# Patient Record
Sex: MALE | Race: WHITE | Employment: OTHER | URBAN - NONMETROPOLITAN AREA
[De-identification: names, ages, dates, MRNs, and addresses within clinical notes are randomized per-mention and may not be internally consistent; named-entity substitution may affect disease eponyms.]

---

## 2017-01-12 ENCOUNTER — OFFICE VISIT (OUTPATIENT)
Dept: PAIN MANAGEMENT | Age: 72
End: 2017-01-12

## 2017-01-12 ENCOUNTER — TELEPHONE (OUTPATIENT)
Dept: PAIN MANAGEMENT | Age: 72
End: 2017-01-12

## 2017-01-12 VITALS
WEIGHT: 231.2 LBS | DIASTOLIC BLOOD PRESSURE: 70 MMHG | HEART RATE: 80 BPM | HEIGHT: 69 IN | BODY MASS INDEX: 34.24 KG/M2 | SYSTOLIC BLOOD PRESSURE: 92 MMHG

## 2017-01-12 DIAGNOSIS — G89.29 CHRONIC BILATERAL LOW BACK PAIN WITH BILATERAL SCIATICA: ICD-10-CM

## 2017-01-12 DIAGNOSIS — M47.816 LUMBAR FACET JOINT SYNDROME: ICD-10-CM

## 2017-01-12 DIAGNOSIS — M54.42 CHRONIC BILATERAL LOW BACK PAIN WITH BILATERAL SCIATICA: Primary | ICD-10-CM

## 2017-01-12 DIAGNOSIS — M54.16 LUMBAR RADICULOPATHY, CHRONIC: ICD-10-CM

## 2017-01-12 DIAGNOSIS — M54.42 ACUTE BACK PAIN WITH SCIATICA, LEFT: Primary | ICD-10-CM

## 2017-01-12 DIAGNOSIS — M54.42 CHRONIC BILATERAL LOW BACK PAIN WITH BILATERAL SCIATICA: ICD-10-CM

## 2017-01-12 DIAGNOSIS — G89.29 CHRONIC BILATERAL LOW BACK PAIN WITH BILATERAL SCIATICA: Primary | ICD-10-CM

## 2017-01-12 DIAGNOSIS — M48.061 SPINAL STENOSIS, LUMBAR REGION, WITHOUT NEUROGENIC CLAUDICATION: ICD-10-CM

## 2017-01-12 DIAGNOSIS — M54.41 CHRONIC BILATERAL LOW BACK PAIN WITH BILATERAL SCIATICA: Primary | ICD-10-CM

## 2017-01-12 DIAGNOSIS — M48.062 SPINAL STENOSIS, LUMBAR REGION, WITH NEUROGENIC CLAUDICATION: ICD-10-CM

## 2017-01-12 DIAGNOSIS — M54.41 ACUTE BACK PAIN WITH SCIATICA, RIGHT: ICD-10-CM

## 2017-01-12 DIAGNOSIS — M54.16 LUMBAR RADICULOPATHY: ICD-10-CM

## 2017-01-12 DIAGNOSIS — M54.41 CHRONIC BILATERAL LOW BACK PAIN WITH BILATERAL SCIATICA: ICD-10-CM

## 2017-01-12 PROCEDURE — 99214 OFFICE O/P EST MOD 30 MIN: CPT | Performed by: NURSE PRACTITIONER

## 2017-01-12 RX ORDER — HYDROCODONE BITARTRATE AND ACETAMINOPHEN 5; 325 MG/1; MG/1
1 TABLET ORAL DAILY PRN
Qty: 30 TABLET | Refills: 0 | Status: SHIPPED | OUTPATIENT
Start: 2017-01-12 | End: 2017-01-24 | Stop reason: SDUPTHER

## 2017-01-12 RX ORDER — TIZANIDINE HYDROCHLORIDE 4 MG/1
4 CAPSULE, GELATIN COATED ORAL 3 TIMES DAILY
Qty: 90 CAPSULE | Refills: 1 | Status: SHIPPED | OUTPATIENT
Start: 2017-01-12 | End: 2017-03-28 | Stop reason: SDUPTHER

## 2017-01-12 ASSESSMENT — ENCOUNTER SYMPTOMS
BACK PAIN: 1
RESPIRATORY NEGATIVE: 1
EYES NEGATIVE: 1
NAUSEA: 0
ALLERGIC/IMMUNOLOGIC NEGATIVE: 1
CONSTIPATION: 0

## 2017-01-17 ENCOUNTER — OFFICE VISIT (OUTPATIENT)
Dept: CARDIOLOGY | Age: 72
End: 2017-01-17

## 2017-01-17 VITALS
HEIGHT: 69 IN | SYSTOLIC BLOOD PRESSURE: 92 MMHG | HEART RATE: 74 BPM | BODY MASS INDEX: 33.92 KG/M2 | RESPIRATION RATE: 18 BRPM | DIASTOLIC BLOOD PRESSURE: 58 MMHG | WEIGHT: 229 LBS

## 2017-01-17 DIAGNOSIS — E66.9 OBESITY (BMI 35.0-39.9 WITHOUT COMORBIDITY): ICD-10-CM

## 2017-01-17 DIAGNOSIS — E11.9 TYPE 2 DIABETES MELLITUS WITHOUT COMPLICATION, WITHOUT LONG-TERM CURRENT USE OF INSULIN (HCC): ICD-10-CM

## 2017-01-17 DIAGNOSIS — Z98.1 S/P LUMBAR SPINAL FUSION: ICD-10-CM

## 2017-01-17 DIAGNOSIS — I10 ESSENTIAL HYPERTENSION: Primary | ICD-10-CM

## 2017-01-17 DIAGNOSIS — R06.09 DOE (DYSPNEA ON EXERTION): ICD-10-CM

## 2017-01-17 DIAGNOSIS — E78.2 MIXED HYPERLIPIDEMIA: ICD-10-CM

## 2017-01-17 DIAGNOSIS — I25.10 CORONARY ARTERY DISEASE DUE TO LIPID RICH PLAQUE: ICD-10-CM

## 2017-01-17 DIAGNOSIS — I25.83 CORONARY ARTERY DISEASE DUE TO LIPID RICH PLAQUE: ICD-10-CM

## 2017-01-17 PROCEDURE — 1036F TOBACCO NON-USER: CPT | Performed by: INTERNAL MEDICINE

## 2017-01-17 PROCEDURE — G8427 DOCREV CUR MEDS BY ELIG CLIN: HCPCS | Performed by: INTERNAL MEDICINE

## 2017-01-17 PROCEDURE — G8484 FLU IMMUNIZE NO ADMIN: HCPCS | Performed by: INTERNAL MEDICINE

## 2017-01-17 PROCEDURE — 3044F HG A1C LEVEL LT 7.0%: CPT | Performed by: INTERNAL MEDICINE

## 2017-01-17 PROCEDURE — G8598 ASA/ANTIPLAT THER USED: HCPCS | Performed by: INTERNAL MEDICINE

## 2017-01-17 PROCEDURE — G8419 CALC BMI OUT NRM PARAM NOF/U: HCPCS | Performed by: INTERNAL MEDICINE

## 2017-01-17 PROCEDURE — 4040F PNEUMOC VAC/ADMIN/RCVD: CPT | Performed by: INTERNAL MEDICINE

## 2017-01-17 PROCEDURE — 1123F ACP DISCUSS/DSCN MKR DOCD: CPT | Performed by: INTERNAL MEDICINE

## 2017-01-17 PROCEDURE — 99213 OFFICE O/P EST LOW 20 MIN: CPT | Performed by: INTERNAL MEDICINE

## 2017-01-17 PROCEDURE — 93000 ELECTROCARDIOGRAM COMPLETE: CPT | Performed by: INTERNAL MEDICINE

## 2017-01-17 PROCEDURE — 3017F COLORECTAL CA SCREEN DOC REV: CPT | Performed by: INTERNAL MEDICINE

## 2017-01-24 ENCOUNTER — TELEPHONE (OUTPATIENT)
Dept: PAIN MANAGEMENT | Age: 72
End: 2017-01-24

## 2017-01-24 ENCOUNTER — OFFICE VISIT (OUTPATIENT)
Dept: PAIN MANAGEMENT | Age: 72
End: 2017-01-24

## 2017-01-24 VITALS
DIASTOLIC BLOOD PRESSURE: 58 MMHG | HEART RATE: 70 BPM | WEIGHT: 231 LBS | SYSTOLIC BLOOD PRESSURE: 104 MMHG | HEIGHT: 69 IN | BODY MASS INDEX: 34.21 KG/M2 | RESPIRATION RATE: 18 BRPM

## 2017-01-24 DIAGNOSIS — M54.16 LUMBAR RADICULOPATHY, CHRONIC: ICD-10-CM

## 2017-01-24 DIAGNOSIS — Z02.83 ENCOUNTER FOR DRUG SCREENING: ICD-10-CM

## 2017-01-24 DIAGNOSIS — M54.41 CHRONIC BILATERAL LOW BACK PAIN WITH BILATERAL SCIATICA: ICD-10-CM

## 2017-01-24 DIAGNOSIS — G89.29 CHRONIC BILATERAL LOW BACK PAIN WITH BILATERAL SCIATICA: ICD-10-CM

## 2017-01-24 DIAGNOSIS — M54.42 CHRONIC BILATERAL LOW BACK PAIN WITH BILATERAL SCIATICA: ICD-10-CM

## 2017-01-24 DIAGNOSIS — Z79.891 ENCOUNTER FOR LONG-TERM OPIATE ANALGESIC USE: Primary | ICD-10-CM

## 2017-01-24 DIAGNOSIS — M47.26 OSTEOARTHRITIS OF SPINE WITH RADICULOPATHY, LUMBAR REGION: ICD-10-CM

## 2017-01-24 PROCEDURE — G8419 CALC BMI OUT NRM PARAM NOF/U: HCPCS | Performed by: NURSE PRACTITIONER

## 2017-01-24 PROCEDURE — 3017F COLORECTAL CA SCREEN DOC REV: CPT | Performed by: NURSE PRACTITIONER

## 2017-01-24 PROCEDURE — 4040F PNEUMOC VAC/ADMIN/RCVD: CPT | Performed by: NURSE PRACTITIONER

## 2017-01-24 PROCEDURE — G8598 ASA/ANTIPLAT THER USED: HCPCS | Performed by: NURSE PRACTITIONER

## 2017-01-24 PROCEDURE — 1036F TOBACCO NON-USER: CPT | Performed by: NURSE PRACTITIONER

## 2017-01-24 PROCEDURE — 99215 OFFICE O/P EST HI 40 MIN: CPT | Performed by: NURSE PRACTITIONER

## 2017-01-24 PROCEDURE — 1123F ACP DISCUSS/DSCN MKR DOCD: CPT | Performed by: NURSE PRACTITIONER

## 2017-01-24 PROCEDURE — G8484 FLU IMMUNIZE NO ADMIN: HCPCS | Performed by: NURSE PRACTITIONER

## 2017-01-24 PROCEDURE — G8427 DOCREV CUR MEDS BY ELIG CLIN: HCPCS | Performed by: NURSE PRACTITIONER

## 2017-01-24 RX ORDER — DIAZEPAM 5 MG/1
TABLET ORAL
Qty: 4 TABLET | Refills: 0 | Status: CANCELLED | OUTPATIENT
Start: 2017-01-24

## 2017-01-24 RX ORDER — DIAZEPAM 5 MG/1
TABLET ORAL
Qty: 4 TABLET | Refills: 0 | Status: ON HOLD | OUTPATIENT
Start: 2017-01-24 | End: 2017-03-14 | Stop reason: HOSPADM

## 2017-01-24 RX ORDER — HYDROCODONE BITARTRATE AND ACETAMINOPHEN 5; 325 MG/1; MG/1
1 TABLET ORAL DAILY PRN
Qty: 30 TABLET | Refills: 0 | Status: SHIPPED | OUTPATIENT
Start: 2017-02-11 | End: 2017-03-13

## 2017-01-24 ASSESSMENT — ENCOUNTER SYMPTOMS
CONSTIPATION: 0
ALLERGIC/IMMUNOLOGIC NEGATIVE: 1
RESPIRATORY NEGATIVE: 1
EYES NEGATIVE: 1
BACK PAIN: 1
NAUSEA: 0

## 2017-02-13 ENCOUNTER — OFFICE VISIT (OUTPATIENT)
Dept: INTERNAL MEDICINE | Age: 72
End: 2017-02-13

## 2017-02-13 VITALS
HEIGHT: 69 IN | RESPIRATION RATE: 16 BRPM | HEART RATE: 88 BPM | WEIGHT: 230 LBS | BODY MASS INDEX: 34.07 KG/M2 | DIASTOLIC BLOOD PRESSURE: 74 MMHG | SYSTOLIC BLOOD PRESSURE: 130 MMHG

## 2017-02-13 DIAGNOSIS — I25.83 CORONARY ARTERY DISEASE DUE TO LIPID RICH PLAQUE: Primary | ICD-10-CM

## 2017-02-13 DIAGNOSIS — Z12.5 SPECIAL SCREENING FOR MALIGNANT NEOPLASM OF PROSTATE: ICD-10-CM

## 2017-02-13 DIAGNOSIS — I25.10 CORONARY ARTERY DISEASE DUE TO LIPID RICH PLAQUE: Primary | ICD-10-CM

## 2017-02-13 DIAGNOSIS — E78.2 MIXED HYPERLIPIDEMIA: ICD-10-CM

## 2017-02-13 DIAGNOSIS — E11.9 TYPE 2 DIABETES MELLITUS WITHOUT COMPLICATION, WITHOUT LONG-TERM CURRENT USE OF INSULIN (HCC): ICD-10-CM

## 2017-02-13 DIAGNOSIS — I10 ESSENTIAL HYPERTENSION: ICD-10-CM

## 2017-02-13 PROCEDURE — 99214 OFFICE O/P EST MOD 30 MIN: CPT | Performed by: INTERNAL MEDICINE

## 2017-02-13 PROCEDURE — G8427 DOCREV CUR MEDS BY ELIG CLIN: HCPCS | Performed by: INTERNAL MEDICINE

## 2017-02-13 PROCEDURE — 3044F HG A1C LEVEL LT 7.0%: CPT | Performed by: INTERNAL MEDICINE

## 2017-02-13 PROCEDURE — G8484 FLU IMMUNIZE NO ADMIN: HCPCS | Performed by: INTERNAL MEDICINE

## 2017-02-13 PROCEDURE — 1123F ACP DISCUSS/DSCN MKR DOCD: CPT | Performed by: INTERNAL MEDICINE

## 2017-02-13 PROCEDURE — 1036F TOBACCO NON-USER: CPT | Performed by: INTERNAL MEDICINE

## 2017-02-13 PROCEDURE — 4040F PNEUMOC VAC/ADMIN/RCVD: CPT | Performed by: INTERNAL MEDICINE

## 2017-02-13 PROCEDURE — 3017F COLORECTAL CA SCREEN DOC REV: CPT | Performed by: INTERNAL MEDICINE

## 2017-02-13 PROCEDURE — G8419 CALC BMI OUT NRM PARAM NOF/U: HCPCS | Performed by: INTERNAL MEDICINE

## 2017-02-13 PROCEDURE — G8598 ASA/ANTIPLAT THER USED: HCPCS | Performed by: INTERNAL MEDICINE

## 2017-02-13 RX ORDER — LOSARTAN POTASSIUM 25 MG/1
25 TABLET ORAL DAILY
Qty: 90 TABLET | Refills: 3 | Status: ON HOLD | OUTPATIENT
Start: 2017-02-13 | End: 2017-06-29 | Stop reason: SINTOL

## 2017-02-13 RX ORDER — CLOPIDOGREL BISULFATE 75 MG/1
75 TABLET ORAL DAILY
Qty: 90 TABLET | Refills: 3 | Status: SHIPPED | OUTPATIENT
Start: 2017-02-13 | End: 2018-01-28 | Stop reason: SDUPTHER

## 2017-02-13 RX ORDER — ATORVASTATIN CALCIUM 80 MG/1
80 TABLET, FILM COATED ORAL DAILY
Qty: 90 TABLET | Refills: 3 | Status: SHIPPED | OUTPATIENT
Start: 2017-02-13 | End: 2018-03-03 | Stop reason: SDUPTHER

## 2017-02-13 ASSESSMENT — ENCOUNTER SYMPTOMS
ABDOMINAL PAIN: 0
VOMITING: 0
COUGH: 0
SHORTNESS OF BREATH: 0
DIARRHEA: 0
BLOOD IN STOOL: 0
EYE PAIN: 0
CONSTIPATION: 0
BACK PAIN: 0
NAUSEA: 0

## 2017-02-13 ASSESSMENT — PATIENT HEALTH QUESTIONNAIRE - PHQ9
1. LITTLE INTEREST OR PLEASURE IN DOING THINGS: 0
SUM OF ALL RESPONSES TO PHQ9 QUESTIONS 1 & 2: 0
2. FEELING DOWN, DEPRESSED OR HOPELESS: 0
SUM OF ALL RESPONSES TO PHQ QUESTIONS 1-9: 0

## 2017-02-14 ENCOUNTER — HOSPITAL ENCOUNTER (OUTPATIENT)
Age: 72
Setting detail: OUTPATIENT SURGERY
Discharge: HOME | End: 2017-02-14
Attending: PHYSICAL MEDICINE & REHABILITATION | Admitting: PHYSICAL MEDICINE & REHABILITATION
Payer: MEDICARE

## 2017-02-14 ENCOUNTER — SURGERY (OUTPATIENT)
Age: 72
End: 2017-02-14

## 2017-02-14 ENCOUNTER — APPOINTMENT (OUTPATIENT)
Dept: GENERAL RADIOLOGY | Age: 72
End: 2017-02-14
Attending: PHYSICAL MEDICINE & REHABILITATION
Payer: MEDICARE

## 2017-02-14 VITALS
HEIGHT: 69 IN | WEIGHT: 232.8 LBS | DIASTOLIC BLOOD PRESSURE: 64 MMHG | SYSTOLIC BLOOD PRESSURE: 105 MMHG | HEART RATE: 65 BPM | OXYGEN SATURATION: 92 % | BODY MASS INDEX: 34.48 KG/M2 | RESPIRATION RATE: 16 BRPM | TEMPERATURE: 97.3 F

## 2017-02-14 LAB — GLUCOSE BLD-MCNC: 219 MG/DL (ref 75–110)

## 2017-02-14 PROCEDURE — 2500000003 HC RX 250 WO HCPCS: Performed by: PHYSICAL MEDICINE & REHABILITATION

## 2017-02-14 PROCEDURE — 64483 NJX AA&/STRD TFRM EPI L/S 1: CPT | Performed by: PHYSICAL MEDICINE & REHABILITATION

## 2017-02-14 PROCEDURE — 7100000010 HC PHASE II RECOVERY - FIRST 15 MIN: Performed by: PHYSICAL MEDICINE & REHABILITATION

## 2017-02-14 PROCEDURE — 82947 ASSAY GLUCOSE BLOOD QUANT: CPT

## 2017-02-14 PROCEDURE — 6360000004 HC RX CONTRAST MEDICATION: Performed by: PHYSICAL MEDICINE & REHABILITATION

## 2017-02-14 PROCEDURE — 6360000002 HC RX W HCPCS: Performed by: PHYSICAL MEDICINE & REHABILITATION

## 2017-02-14 PROCEDURE — 64484 NJX AA&/STRD TFRM EPI L/S EA: CPT | Performed by: PHYSICAL MEDICINE & REHABILITATION

## 2017-02-14 PROCEDURE — 2580000003 HC RX 258: Performed by: PHYSICAL MEDICINE & REHABILITATION

## 2017-02-14 PROCEDURE — 7100000011 HC PHASE II RECOVERY - ADDTL 15 MIN: Performed by: PHYSICAL MEDICINE & REHABILITATION

## 2017-02-14 PROCEDURE — 3209999900 FLUORO FOR SURGICAL PROCEDURES

## 2017-02-14 RX ORDER — SODIUM CHLORIDE 0.9 % (FLUSH) 0.9 %
10 SYRINGE (ML) INJECTION EVERY 12 HOURS SCHEDULED
Status: CANCELLED | OUTPATIENT
Start: 2017-02-14

## 2017-02-14 RX ORDER — DEXAMETHASONE SODIUM PHOSPHATE 10 MG/ML
INJECTION INTRAMUSCULAR; INTRAVENOUS PRN
Status: DISCONTINUED | OUTPATIENT
Start: 2017-02-14 | End: 2017-02-14 | Stop reason: HOSPADM

## 2017-02-14 RX ORDER — BUPIVACAINE HYDROCHLORIDE 5 MG/ML
INJECTION, SOLUTION EPIDURAL; INTRACAUDAL PRN
Status: DISCONTINUED | OUTPATIENT
Start: 2017-02-14 | End: 2017-02-14 | Stop reason: HOSPADM

## 2017-02-14 RX ORDER — SODIUM CHLORIDE 0.9 % (FLUSH) 0.9 %
10 SYRINGE (ML) INJECTION PRN
Status: CANCELLED | OUTPATIENT
Start: 2017-02-14

## 2017-02-14 RX ORDER — 0.9 % SODIUM CHLORIDE 0.9 %
VIAL (ML) INJECTION PRN
Status: DISCONTINUED | OUTPATIENT
Start: 2017-02-14 | End: 2017-02-14 | Stop reason: HOSPADM

## 2017-02-14 RX ADMIN — SODIUM CHLORIDE 2 ML: 9 INJECTION INTRAMUSCULAR; INTRAVENOUS; SUBCUTANEOUS at 08:50

## 2017-02-14 RX ADMIN — Medication 0.5 ML: at 08:50

## 2017-02-14 RX ADMIN — Medication 3 ML: at 08:49

## 2017-02-14 RX ADMIN — DEXAMETHASONE SODIUM PHOSPHATE 20 MG: 10 INJECTION INTRAMUSCULAR; INTRAVENOUS at 08:50

## 2017-02-14 RX ADMIN — BUPIVACAINE HYDROCHLORIDE 2 ML: 5 INJECTION, SOLUTION EPIDURAL; INTRACAUDAL; PERINEURAL at 08:50

## 2017-02-14 ASSESSMENT — PAIN DESCRIPTION - DESCRIPTORS: DESCRIPTORS: CONSTANT

## 2017-02-14 ASSESSMENT — PAIN - FUNCTIONAL ASSESSMENT: PAIN_FUNCTIONAL_ASSESSMENT: 0-10

## 2017-02-14 ASSESSMENT — PAIN SCALES - GENERAL: PAINLEVEL_OUTOF10: 0

## 2017-03-06 ENCOUNTER — TELEPHONE (OUTPATIENT)
Dept: PAIN MANAGEMENT | Age: 72
End: 2017-03-06

## 2017-03-14 ENCOUNTER — SURGERY (OUTPATIENT)
Age: 72
End: 2017-03-14

## 2017-03-14 ENCOUNTER — HOSPITAL ENCOUNTER (OUTPATIENT)
Age: 72
Setting detail: OUTPATIENT SURGERY
Discharge: HOME | End: 2017-03-14
Attending: PHYSICAL MEDICINE & REHABILITATION | Admitting: PHYSICAL MEDICINE & REHABILITATION
Payer: MEDICARE

## 2017-03-14 ENCOUNTER — APPOINTMENT (OUTPATIENT)
Dept: GENERAL RADIOLOGY | Age: 72
End: 2017-03-14
Attending: PHYSICAL MEDICINE & REHABILITATION
Payer: MEDICARE

## 2017-03-14 VITALS
DIASTOLIC BLOOD PRESSURE: 67 MMHG | RESPIRATION RATE: 16 BRPM | BODY MASS INDEX: 33.68 KG/M2 | HEIGHT: 69 IN | SYSTOLIC BLOOD PRESSURE: 130 MMHG | OXYGEN SATURATION: 95 % | TEMPERATURE: 97.5 F | WEIGHT: 227.4 LBS | HEART RATE: 69 BPM

## 2017-03-14 PROBLEM — G57.00 PYRIFORMIS SYNDROME: Status: ACTIVE | Noted: 2017-03-14

## 2017-03-14 LAB — GLUCOSE BLD-MCNC: 77 MG/DL (ref 75–110)

## 2017-03-14 PROCEDURE — 3209999900 FLUORO FOR SURGICAL PROCEDURES

## 2017-03-14 PROCEDURE — 20552 NJX 1/MLT TRIGGER POINT 1/2: CPT | Performed by: PHYSICAL MEDICINE & REHABILITATION

## 2017-03-14 PROCEDURE — 2500000003 HC RX 250 WO HCPCS: Performed by: PHYSICAL MEDICINE & REHABILITATION

## 2017-03-14 PROCEDURE — 6360000004 HC RX CONTRAST MEDICATION: Performed by: PHYSICAL MEDICINE & REHABILITATION

## 2017-03-14 PROCEDURE — 82947 ASSAY GLUCOSE BLOOD QUANT: CPT

## 2017-03-14 PROCEDURE — 6360000002 HC RX W HCPCS: Performed by: PHYSICAL MEDICINE & REHABILITATION

## 2017-03-14 PROCEDURE — 7100000010 HC PHASE II RECOVERY - FIRST 15 MIN: Performed by: PHYSICAL MEDICINE & REHABILITATION

## 2017-03-14 PROCEDURE — 27096 INJECT SACROILIAC JOINT: CPT | Performed by: PHYSICAL MEDICINE & REHABILITATION

## 2017-03-14 PROCEDURE — G0260 INJ FOR SACROILIAC JT ANESTH: HCPCS | Performed by: PHYSICAL MEDICINE & REHABILITATION

## 2017-03-14 RX ORDER — ROPIVACAINE HYDROCHLORIDE 5 MG/ML
INJECTION, SOLUTION EPIDURAL; INFILTRATION; PERINEURAL PRN
Status: DISCONTINUED | OUTPATIENT
Start: 2017-03-14 | End: 2017-03-14 | Stop reason: HOSPADM

## 2017-03-14 RX ORDER — DEXAMETHASONE SODIUM PHOSPHATE 10 MG/ML
INJECTION INTRAMUSCULAR; INTRAVENOUS PRN
Status: DISCONTINUED | OUTPATIENT
Start: 2017-03-14 | End: 2017-03-14 | Stop reason: HOSPADM

## 2017-03-14 RX ORDER — LIDOCAINE HYDROCHLORIDE 20 MG/ML
INJECTION, SOLUTION EPIDURAL; INFILTRATION; INTRACAUDAL; PERINEURAL PRN
Status: DISCONTINUED | OUTPATIENT
Start: 2017-03-14 | End: 2017-03-14 | Stop reason: HOSPADM

## 2017-03-14 RX ADMIN — ROPIVACAINE HYDROCHLORIDE 2 ML: 5 INJECTION, SOLUTION EPIDURAL; INFILTRATION; PERINEURAL at 13:19

## 2017-03-14 RX ADMIN — Medication 3 ML: at 13:19

## 2017-03-14 RX ADMIN — LIDOCAINE HYDROCHLORIDE 8 ML: 20 INJECTION, SOLUTION EPIDURAL; INFILTRATION; INTRACAUDAL; PERINEURAL at 13:19

## 2017-03-14 RX ADMIN — Medication 0.5 ML: at 13:18

## 2017-03-14 RX ADMIN — DEXAMETHASONE SODIUM PHOSPHATE 20 MG: 10 INJECTION INTRAMUSCULAR; INTRAVENOUS at 13:20

## 2017-03-14 ASSESSMENT — PAIN SCALES - GENERAL: PAINLEVEL_OUTOF10: 0

## 2017-03-14 ASSESSMENT — PAIN - FUNCTIONAL ASSESSMENT: PAIN_FUNCTIONAL_ASSESSMENT: 0-10

## 2017-03-14 ASSESSMENT — PAIN DESCRIPTION - DESCRIPTORS: DESCRIPTORS: CONSTANT;RADIATING

## 2017-03-28 RX ORDER — TIZANIDINE 4 MG/1
TABLET ORAL
Qty: 90 TABLET | Refills: 0 | Status: SHIPPED | OUTPATIENT
Start: 2017-03-28 | End: 2017-03-29 | Stop reason: SDUPTHER

## 2017-03-28 RX ORDER — TIZANIDINE HYDROCHLORIDE 4 MG/1
4 CAPSULE, GELATIN COATED ORAL 3 TIMES DAILY
Qty: 90 CAPSULE | Refills: 1 | Status: SHIPPED | OUTPATIENT
Start: 2017-03-28 | End: 2017-05-24 | Stop reason: SDUPTHER

## 2017-03-29 ENCOUNTER — OFFICE VISIT (OUTPATIENT)
Dept: PAIN MANAGEMENT | Age: 72
End: 2017-03-29
Payer: MEDICARE

## 2017-03-29 VITALS
SYSTOLIC BLOOD PRESSURE: 86 MMHG | HEIGHT: 69 IN | RESPIRATION RATE: 12 BRPM | WEIGHT: 226.8 LBS | HEART RATE: 88 BPM | DIASTOLIC BLOOD PRESSURE: 48 MMHG | BODY MASS INDEX: 33.59 KG/M2

## 2017-03-29 DIAGNOSIS — M70.62 TROCHANTERIC BURSITIS, LEFT HIP: ICD-10-CM

## 2017-03-29 DIAGNOSIS — M47.816 LUMBAR FACET JOINT SYNDROME: ICD-10-CM

## 2017-03-29 DIAGNOSIS — M25.552 LEFT HIP PAIN: Primary | ICD-10-CM

## 2017-03-29 DIAGNOSIS — M48.062 SPINAL STENOSIS, LUMBAR REGION, WITH NEUROGENIC CLAUDICATION: ICD-10-CM

## 2017-03-29 PROCEDURE — G8598 ASA/ANTIPLAT THER USED: HCPCS | Performed by: PHYSICAL MEDICINE & REHABILITATION

## 2017-03-29 PROCEDURE — 1123F ACP DISCUSS/DSCN MKR DOCD: CPT | Performed by: PHYSICAL MEDICINE & REHABILITATION

## 2017-03-29 PROCEDURE — G8427 DOCREV CUR MEDS BY ELIG CLIN: HCPCS | Performed by: PHYSICAL MEDICINE & REHABILITATION

## 2017-03-29 PROCEDURE — G8417 CALC BMI ABV UP PARAM F/U: HCPCS | Performed by: PHYSICAL MEDICINE & REHABILITATION

## 2017-03-29 PROCEDURE — 3017F COLORECTAL CA SCREEN DOC REV: CPT | Performed by: PHYSICAL MEDICINE & REHABILITATION

## 2017-03-29 PROCEDURE — 4040F PNEUMOC VAC/ADMIN/RCVD: CPT | Performed by: PHYSICAL MEDICINE & REHABILITATION

## 2017-03-29 PROCEDURE — 1036F TOBACCO NON-USER: CPT | Performed by: PHYSICAL MEDICINE & REHABILITATION

## 2017-03-29 PROCEDURE — G8484 FLU IMMUNIZE NO ADMIN: HCPCS | Performed by: PHYSICAL MEDICINE & REHABILITATION

## 2017-03-29 PROCEDURE — 99214 OFFICE O/P EST MOD 30 MIN: CPT | Performed by: PHYSICAL MEDICINE & REHABILITATION

## 2017-03-31 ENCOUNTER — HOSPITAL ENCOUNTER (OUTPATIENT)
Age: 72
Setting detail: OUTPATIENT SURGERY
Discharge: HOME | End: 2017-03-31
Attending: PHYSICAL MEDICINE & REHABILITATION | Admitting: PHYSICAL MEDICINE & REHABILITATION
Payer: MEDICARE

## 2017-03-31 ENCOUNTER — APPOINTMENT (OUTPATIENT)
Dept: GENERAL RADIOLOGY | Age: 72
End: 2017-03-31
Attending: PHYSICAL MEDICINE & REHABILITATION
Payer: MEDICARE

## 2017-03-31 VITALS
DIASTOLIC BLOOD PRESSURE: 73 MMHG | OXYGEN SATURATION: 93 % | HEIGHT: 69 IN | WEIGHT: 225.6 LBS | HEART RATE: 74 BPM | RESPIRATION RATE: 16 BRPM | BODY MASS INDEX: 33.41 KG/M2 | TEMPERATURE: 96.8 F | SYSTOLIC BLOOD PRESSURE: 112 MMHG

## 2017-03-31 PROBLEM — M25.552 LEFT HIP PAIN: Status: ACTIVE | Noted: 2017-03-31

## 2017-03-31 PROBLEM — M16.12 OSTEOARTHRITIS OF LEFT HIP: Status: ACTIVE | Noted: 2017-03-31

## 2017-03-31 LAB — GLUCOSE BLD-MCNC: 195 MG/DL (ref 75–110)

## 2017-03-31 PROCEDURE — 3209999900 FLUORO FOR SURGICAL PROCEDURES

## 2017-03-31 PROCEDURE — 27093 INJECTION FOR HIP X-RAY: CPT | Performed by: PHYSICAL MEDICINE & REHABILITATION

## 2017-03-31 PROCEDURE — 77002 NEEDLE LOCALIZATION BY XRAY: CPT | Performed by: PHYSICAL MEDICINE & REHABILITATION

## 2017-03-31 PROCEDURE — 2500000003 HC RX 250 WO HCPCS: Performed by: PHYSICAL MEDICINE & REHABILITATION

## 2017-03-31 PROCEDURE — 82947 ASSAY GLUCOSE BLOOD QUANT: CPT

## 2017-03-31 PROCEDURE — 7100000010 HC PHASE II RECOVERY - FIRST 15 MIN: Performed by: PHYSICAL MEDICINE & REHABILITATION

## 2017-03-31 PROCEDURE — 3600000054 HC PAIN LEVEL 3 BASE: Performed by: PHYSICAL MEDICINE & REHABILITATION

## 2017-03-31 PROCEDURE — 7100000011 HC PHASE II RECOVERY - ADDTL 15 MIN: Performed by: PHYSICAL MEDICINE & REHABILITATION

## 2017-03-31 PROCEDURE — 3600000055 HC PAIN LEVEL 3 ADDL 15 MIN: Performed by: PHYSICAL MEDICINE & REHABILITATION

## 2017-03-31 PROCEDURE — 6360000002 HC RX W HCPCS: Performed by: PHYSICAL MEDICINE & REHABILITATION

## 2017-03-31 PROCEDURE — 6360000004 HC RX CONTRAST MEDICATION: Performed by: PHYSICAL MEDICINE & REHABILITATION

## 2017-03-31 RX ORDER — DEXAMETHASONE SODIUM PHOSPHATE 10 MG/ML
INJECTION INTRAMUSCULAR; INTRAVENOUS PRN
Status: DISCONTINUED | OUTPATIENT
Start: 2017-03-31 | End: 2017-03-31 | Stop reason: HOSPADM

## 2017-03-31 RX ORDER — ROPIVACAINE HYDROCHLORIDE 5 MG/ML
INJECTION, SOLUTION EPIDURAL; INFILTRATION; PERINEURAL PRN
Status: DISCONTINUED | OUTPATIENT
Start: 2017-03-31 | End: 2017-03-31 | Stop reason: HOSPADM

## 2017-03-31 RX ORDER — LIDOCAINE HYDROCHLORIDE 20 MG/ML
INJECTION, SOLUTION EPIDURAL; INFILTRATION; INTRACAUDAL; PERINEURAL PRN
Status: DISCONTINUED | OUTPATIENT
Start: 2017-03-31 | End: 2017-03-31 | Stop reason: HOSPADM

## 2017-03-31 ASSESSMENT — PAIN DESCRIPTION - DESCRIPTORS: DESCRIPTORS: CONSTANT

## 2017-03-31 ASSESSMENT — PAIN - FUNCTIONAL ASSESSMENT: PAIN_FUNCTIONAL_ASSESSMENT: 0-10

## 2017-03-31 ASSESSMENT — PAIN SCALES - GENERAL: PAINLEVEL_OUTOF10: 0

## 2017-04-13 ENCOUNTER — OFFICE VISIT (OUTPATIENT)
Dept: PRIMARY CARE CLINIC | Age: 72
End: 2017-04-13
Payer: MEDICARE

## 2017-04-13 VITALS
SYSTOLIC BLOOD PRESSURE: 122 MMHG | DIASTOLIC BLOOD PRESSURE: 80 MMHG | BODY MASS INDEX: 33.18 KG/M2 | HEIGHT: 69 IN | TEMPERATURE: 98.7 F | WEIGHT: 224 LBS | OXYGEN SATURATION: 96 % | HEART RATE: 84 BPM

## 2017-04-13 DIAGNOSIS — J01.00 ACUTE NON-RECURRENT MAXILLARY SINUSITIS: Primary | ICD-10-CM

## 2017-04-13 PROCEDURE — G8598 ASA/ANTIPLAT THER USED: HCPCS | Performed by: PHYSICIAN ASSISTANT

## 2017-04-13 PROCEDURE — 1123F ACP DISCUSS/DSCN MKR DOCD: CPT | Performed by: PHYSICIAN ASSISTANT

## 2017-04-13 PROCEDURE — 4040F PNEUMOC VAC/ADMIN/RCVD: CPT | Performed by: PHYSICIAN ASSISTANT

## 2017-04-13 PROCEDURE — 99213 OFFICE O/P EST LOW 20 MIN: CPT | Performed by: PHYSICIAN ASSISTANT

## 2017-04-13 PROCEDURE — 3017F COLORECTAL CA SCREEN DOC REV: CPT | Performed by: PHYSICIAN ASSISTANT

## 2017-04-13 PROCEDURE — G8427 DOCREV CUR MEDS BY ELIG CLIN: HCPCS | Performed by: PHYSICIAN ASSISTANT

## 2017-04-13 PROCEDURE — 1036F TOBACCO NON-USER: CPT | Performed by: PHYSICIAN ASSISTANT

## 2017-04-13 PROCEDURE — G8417 CALC BMI ABV UP PARAM F/U: HCPCS | Performed by: PHYSICIAN ASSISTANT

## 2017-04-13 RX ORDER — CEFUROXIME AXETIL 250 MG/1
250 TABLET ORAL 2 TIMES DAILY
Qty: 20 TABLET | Refills: 0 | Status: SHIPPED | OUTPATIENT
Start: 2017-04-13 | End: 2017-04-23

## 2017-04-13 ASSESSMENT — ENCOUNTER SYMPTOMS
COUGH: 0
SORE THROAT: 0
SHORTNESS OF BREATH: 0
HOARSE VOICE: 1
SINUS PRESSURE: 1

## 2017-04-14 ENCOUNTER — HOSPITAL ENCOUNTER (OUTPATIENT)
Age: 72
Setting detail: OUTPATIENT SURGERY
Discharge: HOME | End: 2017-04-14
Attending: PHYSICAL MEDICINE & REHABILITATION | Admitting: PHYSICAL MEDICINE & REHABILITATION
Payer: MEDICARE

## 2017-04-14 ENCOUNTER — APPOINTMENT (OUTPATIENT)
Dept: GENERAL RADIOLOGY | Age: 72
End: 2017-04-14
Attending: PHYSICAL MEDICINE & REHABILITATION
Payer: MEDICARE

## 2017-04-14 VITALS
WEIGHT: 224 LBS | RESPIRATION RATE: 16 BRPM | BODY MASS INDEX: 33.18 KG/M2 | HEIGHT: 69 IN | HEART RATE: 69 BPM | OXYGEN SATURATION: 94 % | SYSTOLIC BLOOD PRESSURE: 105 MMHG | DIASTOLIC BLOOD PRESSURE: 61 MMHG

## 2017-04-14 PROCEDURE — 3209999900 FLUORO FOR SURGICAL PROCEDURES

## 2017-04-14 PROCEDURE — 77002 NEEDLE LOCALIZATION BY XRAY: CPT

## 2017-04-14 PROCEDURE — 2500000003 HC RX 250 WO HCPCS: Performed by: PHYSICAL MEDICINE & REHABILITATION

## 2017-04-14 PROCEDURE — 6360000004 HC RX CONTRAST MEDICATION: Performed by: PHYSICAL MEDICINE & REHABILITATION

## 2017-04-14 PROCEDURE — 77002 NEEDLE LOCALIZATION BY XRAY: CPT | Performed by: PHYSICAL MEDICINE & REHABILITATION

## 2017-04-14 PROCEDURE — 7100000010 HC PHASE II RECOVERY - FIRST 15 MIN: Performed by: PHYSICAL MEDICINE & REHABILITATION

## 2017-04-14 PROCEDURE — 6360000002 HC RX W HCPCS: Performed by: PHYSICAL MEDICINE & REHABILITATION

## 2017-04-14 PROCEDURE — 3600000054 HC PAIN LEVEL 3 BASE: Performed by: PHYSICAL MEDICINE & REHABILITATION

## 2017-04-14 PROCEDURE — 27093 INJECTION FOR HIP X-RAY: CPT | Performed by: PHYSICAL MEDICINE & REHABILITATION

## 2017-04-14 RX ORDER — ROPIVACAINE HYDROCHLORIDE 5 MG/ML
INJECTION, SOLUTION EPIDURAL; INFILTRATION; PERINEURAL PRN
Status: DISCONTINUED | OUTPATIENT
Start: 2017-04-14 | End: 2017-04-14 | Stop reason: HOSPADM

## 2017-04-14 RX ORDER — LIDOCAINE HYDROCHLORIDE 20 MG/ML
INJECTION, SOLUTION EPIDURAL; INFILTRATION; INTRACAUDAL; PERINEURAL PRN
Status: DISCONTINUED | OUTPATIENT
Start: 2017-04-14 | End: 2017-04-14 | Stop reason: HOSPADM

## 2017-04-14 RX ORDER — DEXAMETHASONE SODIUM PHOSPHATE 10 MG/ML
INJECTION INTRAMUSCULAR; INTRAVENOUS PRN
Status: DISCONTINUED | OUTPATIENT
Start: 2017-04-14 | End: 2017-04-14 | Stop reason: HOSPADM

## 2017-04-14 ASSESSMENT — PAIN SCALES - GENERAL: PAINLEVEL_OUTOF10: 0

## 2017-04-14 ASSESSMENT — PAIN - FUNCTIONAL ASSESSMENT: PAIN_FUNCTIONAL_ASSESSMENT: 0-10

## 2017-04-28 ENCOUNTER — TELEPHONE (OUTPATIENT)
Dept: PAIN MANAGEMENT | Age: 72
End: 2017-04-28

## 2017-04-28 ENCOUNTER — OFFICE VISIT (OUTPATIENT)
Dept: PAIN MANAGEMENT | Age: 72
End: 2017-04-28
Payer: MEDICARE

## 2017-04-28 VITALS
SYSTOLIC BLOOD PRESSURE: 140 MMHG | BODY MASS INDEX: 33.26 KG/M2 | HEART RATE: 72 BPM | WEIGHT: 225.2 LBS | DIASTOLIC BLOOD PRESSURE: 80 MMHG

## 2017-04-28 DIAGNOSIS — G89.29 CHRONIC BILATERAL LOW BACK PAIN WITH BILATERAL SCIATICA: ICD-10-CM

## 2017-04-28 DIAGNOSIS — M47.816 LUMBAR FACET JOINT SYNDROME: ICD-10-CM

## 2017-04-28 DIAGNOSIS — M54.42 CHRONIC BILATERAL LOW BACK PAIN WITH BILATERAL SCIATICA: ICD-10-CM

## 2017-04-28 DIAGNOSIS — M48.061 NEURAL FORAMINAL STENOSIS OF LUMBAR SPINE: ICD-10-CM

## 2017-04-28 DIAGNOSIS — M53.3 SACROILIAC PAIN: ICD-10-CM

## 2017-04-28 DIAGNOSIS — M54.41 CHRONIC BILATERAL LOW BACK PAIN WITH BILATERAL SCIATICA: ICD-10-CM

## 2017-04-28 DIAGNOSIS — M54.17 LUMBOSACRAL RADICULOPATHY AT L5: Primary | ICD-10-CM

## 2017-04-28 PROCEDURE — 4040F PNEUMOC VAC/ADMIN/RCVD: CPT | Performed by: NURSE PRACTITIONER

## 2017-04-28 PROCEDURE — G8598 ASA/ANTIPLAT THER USED: HCPCS | Performed by: NURSE PRACTITIONER

## 2017-04-28 PROCEDURE — 1123F ACP DISCUSS/DSCN MKR DOCD: CPT | Performed by: NURSE PRACTITIONER

## 2017-04-28 PROCEDURE — 1036F TOBACCO NON-USER: CPT | Performed by: NURSE PRACTITIONER

## 2017-04-28 PROCEDURE — G8417 CALC BMI ABV UP PARAM F/U: HCPCS | Performed by: NURSE PRACTITIONER

## 2017-04-28 PROCEDURE — 99214 OFFICE O/P EST MOD 30 MIN: CPT | Performed by: NURSE PRACTITIONER

## 2017-04-28 PROCEDURE — G8427 DOCREV CUR MEDS BY ELIG CLIN: HCPCS | Performed by: NURSE PRACTITIONER

## 2017-04-28 PROCEDURE — 3017F COLORECTAL CA SCREEN DOC REV: CPT | Performed by: NURSE PRACTITIONER

## 2017-04-28 ASSESSMENT — ENCOUNTER SYMPTOMS
CONSTIPATION: 0
BACK PAIN: 1
EYES NEGATIVE: 1
ALLERGIC/IMMUNOLOGIC NEGATIVE: 1
RESPIRATORY NEGATIVE: 1
NAUSEA: 0

## 2017-05-02 ENCOUNTER — APPOINTMENT (OUTPATIENT)
Dept: GENERAL RADIOLOGY | Age: 72
End: 2017-05-02
Attending: PHYSICAL MEDICINE & REHABILITATION
Payer: MEDICARE

## 2017-05-02 ENCOUNTER — HOSPITAL ENCOUNTER (OUTPATIENT)
Age: 72
Setting detail: OUTPATIENT SURGERY
Discharge: HOME | End: 2017-05-02
Attending: PHYSICAL MEDICINE & REHABILITATION | Admitting: PHYSICAL MEDICINE & REHABILITATION
Payer: MEDICARE

## 2017-05-02 VITALS
RESPIRATION RATE: 18 BRPM | HEART RATE: 66 BPM | HEIGHT: 69 IN | DIASTOLIC BLOOD PRESSURE: 102 MMHG | BODY MASS INDEX: 33.68 KG/M2 | WEIGHT: 227.4 LBS | SYSTOLIC BLOOD PRESSURE: 143 MMHG | OXYGEN SATURATION: 95 %

## 2017-05-02 LAB — GLUCOSE BLD-MCNC: 158 MG/DL (ref 75–110)

## 2017-05-02 PROCEDURE — 64483 NJX AA&/STRD TFRM EPI L/S 1: CPT | Performed by: PHYSICAL MEDICINE & REHABILITATION

## 2017-05-02 PROCEDURE — 2500000003 HC RX 250 WO HCPCS: Performed by: PHYSICAL MEDICINE & REHABILITATION

## 2017-05-02 PROCEDURE — 7100000011 HC PHASE II RECOVERY - ADDTL 15 MIN: Performed by: PHYSICAL MEDICINE & REHABILITATION

## 2017-05-02 PROCEDURE — 2580000003 HC RX 258: Performed by: PHYSICAL MEDICINE & REHABILITATION

## 2017-05-02 PROCEDURE — 6360000004 HC RX CONTRAST MEDICATION: Performed by: PHYSICAL MEDICINE & REHABILITATION

## 2017-05-02 PROCEDURE — 3209999900 FLUORO FOR SURGICAL PROCEDURES

## 2017-05-02 PROCEDURE — 7100000010 HC PHASE II RECOVERY - FIRST 15 MIN: Performed by: PHYSICAL MEDICINE & REHABILITATION

## 2017-05-02 PROCEDURE — 6360000002 HC RX W HCPCS: Performed by: PHYSICAL MEDICINE & REHABILITATION

## 2017-05-02 PROCEDURE — 3600000056 HC PAIN LEVEL 4 BASE: Performed by: PHYSICAL MEDICINE & REHABILITATION

## 2017-05-02 PROCEDURE — 82947 ASSAY GLUCOSE BLOOD QUANT: CPT

## 2017-05-02 RX ORDER — SODIUM CHLORIDE 0.9 % (FLUSH) 0.9 %
10 SYRINGE (ML) INJECTION EVERY 12 HOURS SCHEDULED
Status: DISCONTINUED | OUTPATIENT
Start: 2017-05-02 | End: 2017-05-02 | Stop reason: HOSPADM

## 2017-05-02 RX ORDER — SODIUM CHLORIDE 0.9 % (FLUSH) 0.9 %
10 SYRINGE (ML) INJECTION PRN
Status: DISCONTINUED | OUTPATIENT
Start: 2017-05-02 | End: 2017-05-02 | Stop reason: HOSPADM

## 2017-05-02 RX ORDER — BUPIVACAINE HYDROCHLORIDE 5 MG/ML
INJECTION, SOLUTION EPIDURAL; INTRACAUDAL PRN
Status: DISCONTINUED | OUTPATIENT
Start: 2017-05-02 | End: 2017-05-02 | Stop reason: HOSPADM

## 2017-05-02 RX ORDER — DEXAMETHASONE SODIUM PHOSPHATE 10 MG/ML
INJECTION INTRAMUSCULAR; INTRAVENOUS PRN
Status: DISCONTINUED | OUTPATIENT
Start: 2017-05-02 | End: 2017-05-02 | Stop reason: HOSPADM

## 2017-05-02 ASSESSMENT — PAIN - FUNCTIONAL ASSESSMENT: PAIN_FUNCTIONAL_ASSESSMENT: 0-10

## 2017-05-02 ASSESSMENT — PAIN SCALES - GENERAL
PAINLEVEL_OUTOF10: 0
PAINLEVEL_OUTOF10: 0

## 2017-05-09 ENCOUNTER — HOSPITAL ENCOUNTER (OUTPATIENT)
Age: 72
Setting detail: OUTPATIENT SURGERY
Discharge: HOME | End: 2017-05-09
Attending: PHYSICAL MEDICINE & REHABILITATION | Admitting: PHYSICAL MEDICINE & REHABILITATION
Payer: MEDICARE

## 2017-05-09 ENCOUNTER — APPOINTMENT (OUTPATIENT)
Dept: GENERAL RADIOLOGY | Age: 72
End: 2017-05-09
Attending: PHYSICAL MEDICINE & REHABILITATION
Payer: MEDICARE

## 2017-05-09 VITALS
BODY MASS INDEX: 33.33 KG/M2 | WEIGHT: 225 LBS | DIASTOLIC BLOOD PRESSURE: 75 MMHG | OXYGEN SATURATION: 96 % | SYSTOLIC BLOOD PRESSURE: 120 MMHG | TEMPERATURE: 98 F | HEIGHT: 69 IN | RESPIRATION RATE: 16 BRPM | HEART RATE: 69 BPM

## 2017-05-09 PROCEDURE — 7100000010 HC PHASE II RECOVERY - FIRST 15 MIN: Performed by: PHYSICAL MEDICINE & REHABILITATION

## 2017-05-09 PROCEDURE — 2500000003 HC RX 250 WO HCPCS: Performed by: PHYSICAL MEDICINE & REHABILITATION

## 2017-05-09 PROCEDURE — 3209999900 FLUORO FOR SURGICAL PROCEDURES

## 2017-05-09 PROCEDURE — 6360000004 HC RX CONTRAST MEDICATION: Performed by: PHYSICAL MEDICINE & REHABILITATION

## 2017-05-09 PROCEDURE — 64483 NJX AA&/STRD TFRM EPI L/S 1: CPT | Performed by: PHYSICAL MEDICINE & REHABILITATION

## 2017-05-09 PROCEDURE — 2580000003 HC RX 258: Performed by: PHYSICAL MEDICINE & REHABILITATION

## 2017-05-09 PROCEDURE — 6360000002 HC RX W HCPCS: Performed by: PHYSICAL MEDICINE & REHABILITATION

## 2017-05-09 PROCEDURE — 3600000056 HC PAIN LEVEL 4 BASE: Performed by: PHYSICAL MEDICINE & REHABILITATION

## 2017-05-09 RX ORDER — 0.9 % SODIUM CHLORIDE 0.9 %
VIAL (ML) INJECTION PRN
Status: DISCONTINUED | OUTPATIENT
Start: 2017-05-09 | End: 2017-05-09 | Stop reason: HOSPADM

## 2017-05-09 RX ORDER — SODIUM CHLORIDE 0.9 % (FLUSH) 0.9 %
10 SYRINGE (ML) INJECTION EVERY 12 HOURS SCHEDULED
Status: DISCONTINUED | OUTPATIENT
Start: 2017-05-09 | End: 2017-05-09 | Stop reason: HOSPADM

## 2017-05-09 RX ORDER — BUPIVACAINE HYDROCHLORIDE 5 MG/ML
INJECTION, SOLUTION EPIDURAL; INTRACAUDAL PRN
Status: DISCONTINUED | OUTPATIENT
Start: 2017-05-09 | End: 2017-05-09 | Stop reason: HOSPADM

## 2017-05-09 RX ORDER — SODIUM CHLORIDE 0.9 % (FLUSH) 0.9 %
10 SYRINGE (ML) INJECTION PRN
Status: DISCONTINUED | OUTPATIENT
Start: 2017-05-09 | End: 2017-05-09 | Stop reason: HOSPADM

## 2017-05-09 RX ORDER — DEXAMETHASONE SODIUM PHOSPHATE 10 MG/ML
INJECTION INTRAMUSCULAR; INTRAVENOUS PRN
Status: DISCONTINUED | OUTPATIENT
Start: 2017-05-09 | End: 2017-05-09 | Stop reason: HOSPADM

## 2017-05-09 ASSESSMENT — PAIN SCALES - GENERAL
PAINLEVEL_OUTOF10: 0
PAINLEVEL_OUTOF10: 0

## 2017-05-09 ASSESSMENT — PAIN - FUNCTIONAL ASSESSMENT: PAIN_FUNCTIONAL_ASSESSMENT: 0-10

## 2017-05-11 RX ORDER — TIZANIDINE 4 MG/1
TABLET ORAL
Qty: 90 TABLET | Refills: 5 | Status: SHIPPED | OUTPATIENT
Start: 2017-05-11 | End: 2022-05-02 | Stop reason: SDUPTHER

## 2017-05-24 ENCOUNTER — OFFICE VISIT (OUTPATIENT)
Dept: PAIN MANAGEMENT | Age: 72
End: 2017-05-24
Payer: MEDICARE

## 2017-05-24 ENCOUNTER — HOSPITAL ENCOUNTER (OUTPATIENT)
Age: 72
Setting detail: SPECIMEN
Discharge: HOME | End: 2017-05-24
Payer: MEDICARE

## 2017-05-24 VITALS
HEIGHT: 69 IN | BODY MASS INDEX: 33.59 KG/M2 | DIASTOLIC BLOOD PRESSURE: 80 MMHG | SYSTOLIC BLOOD PRESSURE: 136 MMHG | RESPIRATION RATE: 16 BRPM | WEIGHT: 226.8 LBS | HEART RATE: 62 BPM

## 2017-05-24 DIAGNOSIS — Z02.83 ENCOUNTER FOR DRUG SCREENING: ICD-10-CM

## 2017-05-24 DIAGNOSIS — Z79.891 ENCOUNTER FOR LONG-TERM OPIATE ANALGESIC USE: Primary | ICD-10-CM

## 2017-05-24 PROCEDURE — 1036F TOBACCO NON-USER: CPT | Performed by: PHYSICAL MEDICINE & REHABILITATION

## 2017-05-24 PROCEDURE — 3017F COLORECTAL CA SCREEN DOC REV: CPT | Performed by: PHYSICAL MEDICINE & REHABILITATION

## 2017-05-24 PROCEDURE — 1123F ACP DISCUSS/DSCN MKR DOCD: CPT | Performed by: PHYSICAL MEDICINE & REHABILITATION

## 2017-05-24 PROCEDURE — G8427 DOCREV CUR MEDS BY ELIG CLIN: HCPCS | Performed by: PHYSICAL MEDICINE & REHABILITATION

## 2017-05-24 PROCEDURE — G8598 ASA/ANTIPLAT THER USED: HCPCS | Performed by: PHYSICAL MEDICINE & REHABILITATION

## 2017-05-24 PROCEDURE — 4040F PNEUMOC VAC/ADMIN/RCVD: CPT | Performed by: PHYSICAL MEDICINE & REHABILITATION

## 2017-05-24 PROCEDURE — 99214 OFFICE O/P EST MOD 30 MIN: CPT | Performed by: PHYSICAL MEDICINE & REHABILITATION

## 2017-05-24 PROCEDURE — G8417 CALC BMI ABV UP PARAM F/U: HCPCS | Performed by: PHYSICAL MEDICINE & REHABILITATION

## 2017-05-24 RX ORDER — HYDROCODONE BITARTRATE AND ACETAMINOPHEN 5; 325 MG/1; MG/1
1 TABLET ORAL EVERY 8 HOURS PRN
COMMUNITY
End: 2021-02-25

## 2017-05-26 ASSESSMENT — ENCOUNTER SYMPTOMS
BACK PAIN: 1
CONSTIPATION: 0
ALLERGIC/IMMUNOLOGIC NEGATIVE: 1
NAUSEA: 0
EYES NEGATIVE: 1
RESPIRATORY NEGATIVE: 1

## 2017-05-27 LAB

## 2017-06-02 ENCOUNTER — HOSPITAL ENCOUNTER (OUTPATIENT)
Age: 72
Setting detail: OUTPATIENT SURGERY
Discharge: HOME | End: 2017-06-02
Attending: PHYSICAL MEDICINE & REHABILITATION | Admitting: PHYSICAL MEDICINE & REHABILITATION
Payer: MEDICARE

## 2017-06-02 ENCOUNTER — APPOINTMENT (OUTPATIENT)
Dept: GENERAL RADIOLOGY | Age: 72
End: 2017-06-02
Attending: PHYSICAL MEDICINE & REHABILITATION
Payer: MEDICARE

## 2017-06-02 VITALS
HEART RATE: 70 BPM | DIASTOLIC BLOOD PRESSURE: 81 MMHG | RESPIRATION RATE: 16 BRPM | SYSTOLIC BLOOD PRESSURE: 104 MMHG | TEMPERATURE: 97.7 F | OXYGEN SATURATION: 95 %

## 2017-06-02 PROBLEM — M54.09 PANNICULITIS AFFECTING REGIONS, NECK AND BACK, MULTIPLE SITES IN SPINE: Status: ACTIVE | Noted: 2017-06-02

## 2017-06-02 PROCEDURE — 3600000056 HC PAIN LEVEL 4 BASE: Performed by: PHYSICAL MEDICINE & REHABILITATION

## 2017-06-02 PROCEDURE — 3209999900 FLUORO FOR SURGICAL PROCEDURES

## 2017-06-02 PROCEDURE — 7100000010 HC PHASE II RECOVERY - FIRST 15 MIN: Performed by: PHYSICAL MEDICINE & REHABILITATION

## 2017-06-02 PROCEDURE — 64493 INJ PARAVERT F JNT L/S 1 LEV: CPT | Performed by: PHYSICAL MEDICINE & REHABILITATION

## 2017-06-02 PROCEDURE — 2500000003 HC RX 250 WO HCPCS: Performed by: PHYSICAL MEDICINE & REHABILITATION

## 2017-06-02 PROCEDURE — 64495 INJ PARAVERT F JNT L/S 3 LEV: CPT | Performed by: PHYSICAL MEDICINE & REHABILITATION

## 2017-06-02 PROCEDURE — 64494 INJ PARAVERT F JNT L/S 2 LEV: CPT | Performed by: PHYSICAL MEDICINE & REHABILITATION

## 2017-06-02 PROCEDURE — 7100000011 HC PHASE II RECOVERY - ADDTL 15 MIN: Performed by: PHYSICAL MEDICINE & REHABILITATION

## 2017-06-02 PROCEDURE — 6360000004 HC RX CONTRAST MEDICATION: Performed by: PHYSICAL MEDICINE & REHABILITATION

## 2017-06-02 RX ORDER — LIDOCAINE HYDROCHLORIDE 20 MG/ML
INJECTION, SOLUTION EPIDURAL; INFILTRATION; INTRACAUDAL; PERINEURAL PRN
Status: DISCONTINUED | OUTPATIENT
Start: 2017-06-02 | End: 2017-06-02 | Stop reason: HOSPADM

## 2017-06-02 ASSESSMENT — PAIN - FUNCTIONAL ASSESSMENT: PAIN_FUNCTIONAL_ASSESSMENT: 0-10

## 2017-06-02 ASSESSMENT — PAIN SCALES - GENERAL
PAINLEVEL_OUTOF10: 0
PAINLEVEL_OUTOF10: 0

## 2017-06-09 ENCOUNTER — APPOINTMENT (OUTPATIENT)
Dept: GENERAL RADIOLOGY | Age: 72
End: 2017-06-09
Attending: PHYSICAL MEDICINE & REHABILITATION
Payer: MEDICARE

## 2017-06-09 ENCOUNTER — HOSPITAL ENCOUNTER (OUTPATIENT)
Age: 72
Setting detail: OUTPATIENT SURGERY
Discharge: HOME | End: 2017-06-09
Attending: PHYSICAL MEDICINE & REHABILITATION | Admitting: PHYSICAL MEDICINE & REHABILITATION
Payer: MEDICARE

## 2017-06-09 VITALS
HEART RATE: 72 BPM | SYSTOLIC BLOOD PRESSURE: 167 MMHG | OXYGEN SATURATION: 95 % | BODY MASS INDEX: 33.47 KG/M2 | RESPIRATION RATE: 16 BRPM | DIASTOLIC BLOOD PRESSURE: 99 MMHG | TEMPERATURE: 97.6 F | HEIGHT: 69 IN | WEIGHT: 226 LBS

## 2017-06-09 PROCEDURE — 64494 INJ PARAVERT F JNT L/S 2 LEV: CPT | Performed by: PHYSICAL MEDICINE & REHABILITATION

## 2017-06-09 PROCEDURE — 2500000003 HC RX 250 WO HCPCS: Performed by: PHYSICAL MEDICINE & REHABILITATION

## 2017-06-09 PROCEDURE — 7100000010 HC PHASE II RECOVERY - FIRST 15 MIN: Performed by: PHYSICAL MEDICINE & REHABILITATION

## 2017-06-09 PROCEDURE — 6360000004 HC RX CONTRAST MEDICATION: Performed by: PHYSICAL MEDICINE & REHABILITATION

## 2017-06-09 PROCEDURE — 3209999900 FLUORO FOR SURGICAL PROCEDURES

## 2017-06-09 PROCEDURE — 7100000011 HC PHASE II RECOVERY - ADDTL 15 MIN: Performed by: PHYSICAL MEDICINE & REHABILITATION

## 2017-06-09 PROCEDURE — 64495 INJ PARAVERT F JNT L/S 3 LEV: CPT | Performed by: PHYSICAL MEDICINE & REHABILITATION

## 2017-06-09 PROCEDURE — 64493 INJ PARAVERT F JNT L/S 1 LEV: CPT | Performed by: PHYSICAL MEDICINE & REHABILITATION

## 2017-06-09 PROCEDURE — 3600000056 HC PAIN LEVEL 4 BASE: Performed by: PHYSICAL MEDICINE & REHABILITATION

## 2017-06-09 RX ORDER — SODIUM CHLORIDE 0.9 % (FLUSH) 0.9 %
10 SYRINGE (ML) INJECTION PRN
Status: DISCONTINUED | OUTPATIENT
Start: 2017-06-09 | End: 2017-06-09 | Stop reason: HOSPADM

## 2017-06-09 RX ORDER — SODIUM CHLORIDE 0.9 % (FLUSH) 0.9 %
10 SYRINGE (ML) INJECTION EVERY 12 HOURS SCHEDULED
Status: DISCONTINUED | OUTPATIENT
Start: 2017-06-09 | End: 2017-06-09 | Stop reason: HOSPADM

## 2017-06-09 RX ORDER — DIAZEPAM 5 MG/1
TABLET ORAL
Qty: 4 TABLET | Refills: 0 | Status: SHIPPED | OUTPATIENT
Start: 2017-06-09 | End: 2017-08-14 | Stop reason: ALTCHOICE

## 2017-06-09 RX ORDER — BUPIVACAINE HYDROCHLORIDE 7.5 MG/ML
INJECTION, SOLUTION EPIDURAL; RETROBULBAR PRN
Status: DISCONTINUED | OUTPATIENT
Start: 2017-06-09 | End: 2017-06-09 | Stop reason: HOSPADM

## 2017-06-09 ASSESSMENT — PAIN DESCRIPTION - LOCATION: LOCATION: BACK

## 2017-06-09 ASSESSMENT — PAIN SCALES - GENERAL: PAINLEVEL_OUTOF10: 0

## 2017-06-09 ASSESSMENT — PAIN - FUNCTIONAL ASSESSMENT: PAIN_FUNCTIONAL_ASSESSMENT: 0-10

## 2017-06-09 ASSESSMENT — PAIN DESCRIPTION - DESCRIPTORS: DESCRIPTORS: CONSTANT

## 2017-06-09 ASSESSMENT — PAIN DESCRIPTION - PAIN TYPE: TYPE: CHRONIC PAIN

## 2017-06-29 ENCOUNTER — APPOINTMENT (OUTPATIENT)
Dept: GENERAL RADIOLOGY | Age: 72
End: 2017-06-29
Attending: PHYSICAL MEDICINE & REHABILITATION
Payer: MEDICARE

## 2017-06-29 ENCOUNTER — HOSPITAL ENCOUNTER (OUTPATIENT)
Age: 72
Setting detail: OUTPATIENT SURGERY
Discharge: HOME | End: 2017-06-29
Attending: PHYSICAL MEDICINE & REHABILITATION | Admitting: PHYSICAL MEDICINE & REHABILITATION
Payer: MEDICARE

## 2017-06-29 VITALS
TEMPERATURE: 97.6 F | WEIGHT: 222.6 LBS | BODY MASS INDEX: 32.97 KG/M2 | DIASTOLIC BLOOD PRESSURE: 92 MMHG | HEIGHT: 69 IN | OXYGEN SATURATION: 98 % | SYSTOLIC BLOOD PRESSURE: 152 MMHG | RESPIRATION RATE: 16 BRPM | HEART RATE: 70 BPM

## 2017-06-29 PROCEDURE — 64636 DESTROY L/S FACET JNT ADDL: CPT | Performed by: PHYSICAL MEDICINE & REHABILITATION

## 2017-06-29 PROCEDURE — 3209999900 FLUORO FOR SURGICAL PROCEDURES

## 2017-06-29 PROCEDURE — 2500000003 HC RX 250 WO HCPCS: Performed by: PHYSICAL MEDICINE & REHABILITATION

## 2017-06-29 PROCEDURE — 64635 DESTROY LUMB/SAC FACET JNT: CPT | Performed by: PHYSICAL MEDICINE & REHABILITATION

## 2017-06-29 PROCEDURE — 6360000002 HC RX W HCPCS: Performed by: PHYSICAL MEDICINE & REHABILITATION

## 2017-06-29 PROCEDURE — 3600000057 HC PAIN LEVEL 4 ADDL 15 MIN: Performed by: PHYSICAL MEDICINE & REHABILITATION

## 2017-06-29 PROCEDURE — 7100000010 HC PHASE II RECOVERY - FIRST 15 MIN: Performed by: PHYSICAL MEDICINE & REHABILITATION

## 2017-06-29 PROCEDURE — 7100000011 HC PHASE II RECOVERY - ADDTL 15 MIN: Performed by: PHYSICAL MEDICINE & REHABILITATION

## 2017-06-29 PROCEDURE — 3600000056 HC PAIN LEVEL 4 BASE: Performed by: PHYSICAL MEDICINE & REHABILITATION

## 2017-06-29 RX ORDER — LIDOCAINE HYDROCHLORIDE 40 MG/ML
INJECTION, SOLUTION RETROBULBAR; TOPICAL PRN
Status: DISCONTINUED | OUTPATIENT
Start: 2017-06-29 | End: 2017-06-29 | Stop reason: HOSPADM

## 2017-06-29 RX ORDER — BUPIVACAINE HYDROCHLORIDE 5 MG/ML
INJECTION, SOLUTION EPIDURAL; INTRACAUDAL PRN
Status: DISCONTINUED | OUTPATIENT
Start: 2017-06-29 | End: 2017-06-29 | Stop reason: HOSPADM

## 2017-06-29 RX ORDER — DEXAMETHASONE SODIUM PHOSPHATE 10 MG/ML
INJECTION INTRAMUSCULAR; INTRAVENOUS PRN
Status: DISCONTINUED | OUTPATIENT
Start: 2017-06-29 | End: 2017-06-29 | Stop reason: HOSPADM

## 2017-06-29 ASSESSMENT — PAIN DESCRIPTION - DESCRIPTORS: DESCRIPTORS: SHARP

## 2017-06-29 ASSESSMENT — PAIN - FUNCTIONAL ASSESSMENT: PAIN_FUNCTIONAL_ASSESSMENT: 0-10

## 2017-07-06 ENCOUNTER — APPOINTMENT (OUTPATIENT)
Dept: GENERAL RADIOLOGY | Age: 72
End: 2017-07-06
Attending: PHYSICAL MEDICINE & REHABILITATION
Payer: MEDICARE

## 2017-07-06 ENCOUNTER — HOSPITAL ENCOUNTER (OUTPATIENT)
Age: 72
Setting detail: OUTPATIENT SURGERY
Discharge: HOME | End: 2017-07-06
Attending: PHYSICAL MEDICINE & REHABILITATION | Admitting: PHYSICAL MEDICINE & REHABILITATION
Payer: MEDICARE

## 2017-07-06 VITALS
SYSTOLIC BLOOD PRESSURE: 151 MMHG | DIASTOLIC BLOOD PRESSURE: 81 MMHG | WEIGHT: 220 LBS | TEMPERATURE: 97.4 F | RESPIRATION RATE: 16 BRPM | HEIGHT: 69 IN | HEART RATE: 71 BPM | BODY MASS INDEX: 32.58 KG/M2 | OXYGEN SATURATION: 96 %

## 2017-07-06 PROCEDURE — 64636 DESTROY L/S FACET JNT ADDL: CPT | Performed by: PHYSICAL MEDICINE & REHABILITATION

## 2017-07-06 PROCEDURE — 3600000056 HC PAIN LEVEL 4 BASE: Performed by: PHYSICAL MEDICINE & REHABILITATION

## 2017-07-06 PROCEDURE — 64635 DESTROY LUMB/SAC FACET JNT: CPT | Performed by: PHYSICAL MEDICINE & REHABILITATION

## 2017-07-06 PROCEDURE — 3600000057 HC PAIN LEVEL 4 ADDL 15 MIN: Performed by: PHYSICAL MEDICINE & REHABILITATION

## 2017-07-06 PROCEDURE — 6360000002 HC RX W HCPCS: Performed by: PHYSICAL MEDICINE & REHABILITATION

## 2017-07-06 PROCEDURE — 7100000010 HC PHASE II RECOVERY - FIRST 15 MIN: Performed by: PHYSICAL MEDICINE & REHABILITATION

## 2017-07-06 PROCEDURE — 2500000003 HC RX 250 WO HCPCS: Performed by: PHYSICAL MEDICINE & REHABILITATION

## 2017-07-06 PROCEDURE — 3209999900 FLUORO FOR SURGICAL PROCEDURES

## 2017-07-06 RX ORDER — BUPIVACAINE HYDROCHLORIDE 5 MG/ML
INJECTION, SOLUTION EPIDURAL; INTRACAUDAL PRN
Status: DISCONTINUED | OUTPATIENT
Start: 2017-07-06 | End: 2017-07-06 | Stop reason: HOSPADM

## 2017-07-06 RX ORDER — DEXAMETHASONE SODIUM PHOSPHATE 10 MG/ML
INJECTION INTRAMUSCULAR; INTRAVENOUS PRN
Status: DISCONTINUED | OUTPATIENT
Start: 2017-07-06 | End: 2017-07-06 | Stop reason: HOSPADM

## 2017-07-06 RX ORDER — LIDOCAINE HYDROCHLORIDE 40 MG/ML
INJECTION, SOLUTION RETROBULBAR; TOPICAL PRN
Status: DISCONTINUED | OUTPATIENT
Start: 2017-07-06 | End: 2017-07-06 | Stop reason: HOSPADM

## 2017-07-06 ASSESSMENT — PAIN SCALES - GENERAL
PAINLEVEL_OUTOF10: 0

## 2017-07-06 ASSESSMENT — PAIN DESCRIPTION - DESCRIPTORS: DESCRIPTORS: CONSTANT

## 2017-07-06 ASSESSMENT — PAIN - FUNCTIONAL ASSESSMENT: PAIN_FUNCTIONAL_ASSESSMENT: 0-10

## 2017-07-14 ENCOUNTER — OFFICE VISIT (OUTPATIENT)
Dept: PRIMARY CARE CLINIC | Age: 72
End: 2017-07-14
Payer: MEDICARE

## 2017-07-14 VITALS
BODY MASS INDEX: 32.82 KG/M2 | SYSTOLIC BLOOD PRESSURE: 112 MMHG | HEIGHT: 69 IN | WEIGHT: 221.6 LBS | OXYGEN SATURATION: 96 % | HEART RATE: 70 BPM | DIASTOLIC BLOOD PRESSURE: 60 MMHG

## 2017-07-14 DIAGNOSIS — J01.11 ACUTE RECURRENT FRONTAL SINUSITIS: Primary | ICD-10-CM

## 2017-07-14 PROCEDURE — G8598 ASA/ANTIPLAT THER USED: HCPCS | Performed by: FAMILY MEDICINE

## 2017-07-14 PROCEDURE — 4040F PNEUMOC VAC/ADMIN/RCVD: CPT | Performed by: FAMILY MEDICINE

## 2017-07-14 PROCEDURE — G8427 DOCREV CUR MEDS BY ELIG CLIN: HCPCS | Performed by: FAMILY MEDICINE

## 2017-07-14 PROCEDURE — 99213 OFFICE O/P EST LOW 20 MIN: CPT | Performed by: FAMILY MEDICINE

## 2017-07-14 PROCEDURE — G8417 CALC BMI ABV UP PARAM F/U: HCPCS | Performed by: FAMILY MEDICINE

## 2017-07-14 PROCEDURE — 3017F COLORECTAL CA SCREEN DOC REV: CPT | Performed by: FAMILY MEDICINE

## 2017-07-14 PROCEDURE — 1123F ACP DISCUSS/DSCN MKR DOCD: CPT | Performed by: FAMILY MEDICINE

## 2017-07-14 PROCEDURE — 1036F TOBACCO NON-USER: CPT | Performed by: FAMILY MEDICINE

## 2017-07-14 RX ORDER — AZITHROMYCIN 250 MG/1
TABLET, FILM COATED ORAL
Qty: 1 PACKET | Refills: 0 | Status: SHIPPED | OUTPATIENT
Start: 2017-07-14 | End: 2017-07-24

## 2017-07-25 ENCOUNTER — OFFICE VISIT (OUTPATIENT)
Dept: CARDIOLOGY | Age: 72
End: 2017-07-25
Payer: MEDICARE

## 2017-07-25 VITALS
BODY MASS INDEX: 33.47 KG/M2 | HEART RATE: 66 BPM | WEIGHT: 226 LBS | HEIGHT: 69 IN | DIASTOLIC BLOOD PRESSURE: 80 MMHG | SYSTOLIC BLOOD PRESSURE: 122 MMHG

## 2017-07-25 DIAGNOSIS — Z98.1 S/P LUMBAR SPINAL FUSION: ICD-10-CM

## 2017-07-25 DIAGNOSIS — I10 ESSENTIAL HYPERTENSION: ICD-10-CM

## 2017-07-25 DIAGNOSIS — E78.2 MIXED HYPERLIPIDEMIA: ICD-10-CM

## 2017-07-25 DIAGNOSIS — E66.9 OBESITY (BMI 35.0-39.9 WITHOUT COMORBIDITY): ICD-10-CM

## 2017-07-25 DIAGNOSIS — M54.41 CHRONIC BILATERAL LOW BACK PAIN WITH BILATERAL SCIATICA: ICD-10-CM

## 2017-07-25 DIAGNOSIS — I25.10 CORONARY ARTERY DISEASE DUE TO LIPID RICH PLAQUE: Primary | ICD-10-CM

## 2017-07-25 DIAGNOSIS — M54.42 CHRONIC BILATERAL LOW BACK PAIN WITH BILATERAL SCIATICA: ICD-10-CM

## 2017-07-25 DIAGNOSIS — R06.09 DOE (DYSPNEA ON EXERTION): ICD-10-CM

## 2017-07-25 DIAGNOSIS — I25.83 CORONARY ARTERY DISEASE DUE TO LIPID RICH PLAQUE: Primary | ICD-10-CM

## 2017-07-25 DIAGNOSIS — G89.29 CHRONIC BILATERAL LOW BACK PAIN WITH BILATERAL SCIATICA: ICD-10-CM

## 2017-07-25 PROCEDURE — 99213 OFFICE O/P EST LOW 20 MIN: CPT | Performed by: INTERNAL MEDICINE

## 2017-07-25 PROCEDURE — G8598 ASA/ANTIPLAT THER USED: HCPCS | Performed by: INTERNAL MEDICINE

## 2017-07-25 PROCEDURE — G8427 DOCREV CUR MEDS BY ELIG CLIN: HCPCS | Performed by: INTERNAL MEDICINE

## 2017-07-25 PROCEDURE — 4040F PNEUMOC VAC/ADMIN/RCVD: CPT | Performed by: INTERNAL MEDICINE

## 2017-07-25 PROCEDURE — 1123F ACP DISCUSS/DSCN MKR DOCD: CPT | Performed by: INTERNAL MEDICINE

## 2017-07-25 PROCEDURE — 1036F TOBACCO NON-USER: CPT | Performed by: INTERNAL MEDICINE

## 2017-07-25 PROCEDURE — G8417 CALC BMI ABV UP PARAM F/U: HCPCS | Performed by: INTERNAL MEDICINE

## 2017-07-25 PROCEDURE — 3017F COLORECTAL CA SCREEN DOC REV: CPT | Performed by: INTERNAL MEDICINE

## 2017-07-25 PROCEDURE — 93000 ELECTROCARDIOGRAM COMPLETE: CPT | Performed by: INTERNAL MEDICINE

## 2017-08-03 ENCOUNTER — OFFICE VISIT (OUTPATIENT)
Dept: PAIN MANAGEMENT | Age: 72
End: 2017-08-03
Payer: MEDICARE

## 2017-08-03 VITALS
WEIGHT: 222 LBS | HEART RATE: 76 BPM | DIASTOLIC BLOOD PRESSURE: 70 MMHG | HEIGHT: 69 IN | BODY MASS INDEX: 32.88 KG/M2 | SYSTOLIC BLOOD PRESSURE: 126 MMHG | RESPIRATION RATE: 12 BRPM

## 2017-08-03 DIAGNOSIS — M54.42 CHRONIC BILATERAL LOW BACK PAIN WITH BILATERAL SCIATICA: ICD-10-CM

## 2017-08-03 DIAGNOSIS — M79.18 MYOFASCIAL PAIN: Primary | ICD-10-CM

## 2017-08-03 DIAGNOSIS — M54.41 CHRONIC BILATERAL LOW BACK PAIN WITH BILATERAL SCIATICA: ICD-10-CM

## 2017-08-03 DIAGNOSIS — G89.29 CHRONIC BILATERAL LOW BACK PAIN WITH BILATERAL SCIATICA: ICD-10-CM

## 2017-08-03 PROCEDURE — 20552 NJX 1/MLT TRIGGER POINT 1/2: CPT | Performed by: NURSE PRACTITIONER

## 2017-08-03 PROCEDURE — 99213 OFFICE O/P EST LOW 20 MIN: CPT | Performed by: NURSE PRACTITIONER

## 2017-08-03 ASSESSMENT — ENCOUNTER SYMPTOMS
RESPIRATORY NEGATIVE: 1
EYES NEGATIVE: 1
CONSTIPATION: 0
BACK PAIN: 1
ALLERGIC/IMMUNOLOGIC NEGATIVE: 1
NAUSEA: 0

## 2017-08-11 PROBLEM — M16.12 OSTEOARTHRITIS OF LEFT HIP: Status: RESOLVED | Noted: 2017-03-31 | Resolved: 2017-08-11

## 2017-08-11 PROBLEM — M54.09 PANNICULITIS AFFECTING REGIONS, NECK AND BACK, MULTIPLE SITES IN SPINE: Status: RESOLVED | Noted: 2017-06-02 | Resolved: 2017-08-11

## 2017-08-11 PROBLEM — G57.00 PYRIFORMIS SYNDROME: Status: RESOLVED | Noted: 2017-03-14 | Resolved: 2017-08-11

## 2017-08-14 ENCOUNTER — OFFICE VISIT (OUTPATIENT)
Dept: INTERNAL MEDICINE | Age: 72
End: 2017-08-14
Payer: MEDICARE

## 2017-08-14 VITALS
DIASTOLIC BLOOD PRESSURE: 70 MMHG | HEART RATE: 76 BPM | RESPIRATION RATE: 16 BRPM | WEIGHT: 219 LBS | SYSTOLIC BLOOD PRESSURE: 110 MMHG | HEIGHT: 69 IN | BODY MASS INDEX: 32.44 KG/M2

## 2017-08-14 DIAGNOSIS — I10 ESSENTIAL HYPERTENSION: ICD-10-CM

## 2017-08-14 DIAGNOSIS — Z23 NEED FOR PNEUMOCOCCAL VACCINATION: ICD-10-CM

## 2017-08-14 DIAGNOSIS — M54.41 CHRONIC BILATERAL LOW BACK PAIN WITH BILATERAL SCIATICA: ICD-10-CM

## 2017-08-14 DIAGNOSIS — E78.2 MIXED HYPERLIPIDEMIA: ICD-10-CM

## 2017-08-14 DIAGNOSIS — M54.42 CHRONIC BILATERAL LOW BACK PAIN WITH BILATERAL SCIATICA: ICD-10-CM

## 2017-08-14 DIAGNOSIS — G89.29 CHRONIC BILATERAL LOW BACK PAIN WITH BILATERAL SCIATICA: ICD-10-CM

## 2017-08-14 DIAGNOSIS — E11.9 TYPE 2 DIABETES MELLITUS WITHOUT COMPLICATION, WITHOUT LONG-TERM CURRENT USE OF INSULIN (HCC): ICD-10-CM

## 2017-08-14 DIAGNOSIS — I25.83 CORONARY ARTERY DISEASE DUE TO LIPID RICH PLAQUE: Primary | ICD-10-CM

## 2017-08-14 DIAGNOSIS — I25.10 CORONARY ARTERY DISEASE DUE TO LIPID RICH PLAQUE: Primary | ICD-10-CM

## 2017-08-14 PROCEDURE — 3046F HEMOGLOBIN A1C LEVEL >9.0%: CPT | Performed by: INTERNAL MEDICINE

## 2017-08-14 PROCEDURE — 90670 PCV13 VACCINE IM: CPT | Performed by: INTERNAL MEDICINE

## 2017-08-14 PROCEDURE — 4040F PNEUMOC VAC/ADMIN/RCVD: CPT | Performed by: INTERNAL MEDICINE

## 2017-08-14 PROCEDURE — 3017F COLORECTAL CA SCREEN DOC REV: CPT | Performed by: INTERNAL MEDICINE

## 2017-08-14 PROCEDURE — G8427 DOCREV CUR MEDS BY ELIG CLIN: HCPCS | Performed by: INTERNAL MEDICINE

## 2017-08-14 PROCEDURE — 99214 OFFICE O/P EST MOD 30 MIN: CPT | Performed by: INTERNAL MEDICINE

## 2017-08-14 PROCEDURE — 1036F TOBACCO NON-USER: CPT | Performed by: INTERNAL MEDICINE

## 2017-08-14 PROCEDURE — G8417 CALC BMI ABV UP PARAM F/U: HCPCS | Performed by: INTERNAL MEDICINE

## 2017-08-14 PROCEDURE — G8598 ASA/ANTIPLAT THER USED: HCPCS | Performed by: INTERNAL MEDICINE

## 2017-08-14 PROCEDURE — G0009 ADMIN PNEUMOCOCCAL VACCINE: HCPCS | Performed by: INTERNAL MEDICINE

## 2017-08-14 PROCEDURE — 1123F ACP DISCUSS/DSCN MKR DOCD: CPT | Performed by: INTERNAL MEDICINE

## 2017-08-14 RX ORDER — CELECOXIB 200 MG/1
200 CAPSULE ORAL 2 TIMES DAILY
Qty: 180 CAPSULE | Refills: 3 | Status: SHIPPED | OUTPATIENT
Start: 2017-08-14 | End: 2018-08-16 | Stop reason: SDUPTHER

## 2017-08-14 ASSESSMENT — ENCOUNTER SYMPTOMS
BACK PAIN: 0
COUGH: 0
EYE PAIN: 0
BLOOD IN STOOL: 0
NAUSEA: 0
SHORTNESS OF BREATH: 0
DIARRHEA: 0
CONSTIPATION: 0
ABDOMINAL PAIN: 0
VOMITING: 0

## 2017-08-25 ENCOUNTER — HOSPITAL ENCOUNTER (OUTPATIENT)
Dept: GENERAL RADIOLOGY | Age: 72
Discharge: HOME | End: 2017-08-25
Payer: MEDICARE

## 2017-08-25 ENCOUNTER — TELEPHONE (OUTPATIENT)
Dept: GENERAL RADIOLOGY | Age: 72
End: 2017-08-25

## 2017-08-25 ENCOUNTER — OFFICE VISIT (OUTPATIENT)
Dept: ORTHOPEDIC SURGERY | Age: 72
End: 2017-08-25
Payer: MEDICARE

## 2017-08-25 VITALS
HEART RATE: 76 BPM | DIASTOLIC BLOOD PRESSURE: 84 MMHG | WEIGHT: 219 LBS | SYSTOLIC BLOOD PRESSURE: 154 MMHG | BODY MASS INDEX: 32.44 KG/M2 | HEIGHT: 69 IN

## 2017-08-25 DIAGNOSIS — M48.062 SPINAL STENOSIS, LUMBAR REGION, WITH NEUROGENIC CLAUDICATION: ICD-10-CM

## 2017-08-25 DIAGNOSIS — M54.50 LUMBAR SPINE PAIN: Primary | ICD-10-CM

## 2017-08-25 DIAGNOSIS — Z98.1 S/P LUMBAR SPINAL FUSION: ICD-10-CM

## 2017-08-25 DIAGNOSIS — M54.50 LUMBAR SPINE PAIN: ICD-10-CM

## 2017-08-25 DIAGNOSIS — M43.10 ACQUIRED SPONDYLOLISTHESIS: ICD-10-CM

## 2017-08-25 PROCEDURE — 1123F ACP DISCUSS/DSCN MKR DOCD: CPT | Performed by: ORTHOPAEDIC SURGERY

## 2017-08-25 PROCEDURE — 4040F PNEUMOC VAC/ADMIN/RCVD: CPT | Performed by: ORTHOPAEDIC SURGERY

## 2017-08-25 PROCEDURE — 1036F TOBACCO NON-USER: CPT | Performed by: ORTHOPAEDIC SURGERY

## 2017-08-25 PROCEDURE — G8598 ASA/ANTIPLAT THER USED: HCPCS | Performed by: ORTHOPAEDIC SURGERY

## 2017-08-25 PROCEDURE — 3017F COLORECTAL CA SCREEN DOC REV: CPT | Performed by: ORTHOPAEDIC SURGERY

## 2017-08-25 PROCEDURE — G8417 CALC BMI ABV UP PARAM F/U: HCPCS | Performed by: ORTHOPAEDIC SURGERY

## 2017-08-25 PROCEDURE — G8427 DOCREV CUR MEDS BY ELIG CLIN: HCPCS | Performed by: ORTHOPAEDIC SURGERY

## 2017-08-25 PROCEDURE — 72110 X-RAY EXAM L-2 SPINE 4/>VWS: CPT

## 2017-08-25 PROCEDURE — 99213 OFFICE O/P EST LOW 20 MIN: CPT | Performed by: ORTHOPAEDIC SURGERY

## 2017-08-25 ASSESSMENT — ENCOUNTER SYMPTOMS: BACK PAIN: 1

## 2017-08-28 ENCOUNTER — TELEPHONE (OUTPATIENT)
Dept: GENERAL RADIOLOGY | Age: 72
End: 2017-08-28

## 2017-09-05 ENCOUNTER — TELEPHONE (OUTPATIENT)
Dept: GENERAL RADIOLOGY | Age: 72
End: 2017-09-05

## 2017-09-06 DIAGNOSIS — Z79.01 LONG TERM (CURRENT) USE OF ANTICOAGULANTS: Primary | ICD-10-CM

## 2017-09-06 DIAGNOSIS — Z01.818 PRE-OP TESTING: ICD-10-CM

## 2017-09-07 ENCOUNTER — HOSPITAL ENCOUNTER (OUTPATIENT)
Dept: LAB | Age: 72
Discharge: HOME | End: 2017-09-07
Payer: MEDICARE

## 2017-09-07 DIAGNOSIS — Z79.01 LONG TERM (CURRENT) USE OF ANTICOAGULANTS: ICD-10-CM

## 2017-09-07 DIAGNOSIS — Z01.818 PRE-OP TESTING: ICD-10-CM

## 2017-09-07 LAB
ABSOLUTE EOS #: 0.1 K/UL (ref 0–0.4)
ABSOLUTE LYMPH #: 1.8 K/UL (ref 1–4.8)
ABSOLUTE MONO #: 0.8 K/UL (ref 0.1–1.2)
BASOPHILS # BLD: 1 %
BASOPHILS ABSOLUTE: 0.1 K/UL (ref 0–0.2)
DIFFERENTIAL TYPE: ABNORMAL
EOSINOPHILS RELATIVE PERCENT: 1 %
HCT VFR BLD CALC: 43.5 % (ref 41–53)
HEMOGLOBIN: 14 G/DL (ref 13.5–17.5)
INR BLD: 1
LYMPHOCYTES # BLD: 20 %
MCH RBC QN AUTO: 28.9 PG (ref 26–34)
MCHC RBC AUTO-ENTMCNC: 32.1 G/DL (ref 31–37)
MCV RBC AUTO: 90 FL (ref 80–100)
MONOCYTES # BLD: 9 %
PARTIAL THROMBOPLASTIN TIME: 28 SEC (ref 27–35)
PDW BLD-RTO: 15.2 % (ref 11–14.5)
PLATELET # BLD: 387 K/UL (ref 140–450)
PLATELET ESTIMATE: ABNORMAL
PMV BLD AUTO: 6.9 FL (ref 6–12)
PROTHROMBIN TIME: 10.6 SEC (ref 9.4–11.3)
RBC # BLD: 4.84 M/UL (ref 4.5–5.9)
RBC # BLD: ABNORMAL 10*6/UL
SEG NEUTROPHILS: 69 %
SEGMENTED NEUTROPHILS ABSOLUTE COUNT: 6.3 K/UL (ref 1.8–7.7)
WBC # BLD: 9 K/UL (ref 3.5–11)
WBC # BLD: ABNORMAL 10*3/UL

## 2017-09-07 PROCEDURE — 85025 COMPLETE CBC W/AUTO DIFF WBC: CPT

## 2017-09-07 PROCEDURE — 36415 COLL VENOUS BLD VENIPUNCTURE: CPT

## 2017-09-07 PROCEDURE — 85730 THROMBOPLASTIN TIME PARTIAL: CPT

## 2017-09-07 PROCEDURE — 85610 PROTHROMBIN TIME: CPT

## 2017-09-08 ENCOUNTER — HOSPITAL ENCOUNTER (OUTPATIENT)
Dept: GENERAL RADIOLOGY | Age: 72
Discharge: HOME | End: 2017-09-08
Payer: MEDICARE

## 2017-09-08 ENCOUNTER — HOSPITAL ENCOUNTER (OUTPATIENT)
Dept: CT IMAGING | Age: 72
Discharge: HOME | End: 2017-09-08
Payer: MEDICARE

## 2017-09-08 VITALS
HEART RATE: 70 BPM | SYSTOLIC BLOOD PRESSURE: 160 MMHG | OXYGEN SATURATION: 94 % | TEMPERATURE: 98.1 F | DIASTOLIC BLOOD PRESSURE: 90 MMHG | RESPIRATION RATE: 16 BRPM

## 2017-09-08 DIAGNOSIS — M43.10 ACQUIRED SPONDYLOLISTHESIS: ICD-10-CM

## 2017-09-08 DIAGNOSIS — M54.50 LUMBAR SPINE PAIN: ICD-10-CM

## 2017-09-08 DIAGNOSIS — M48.062 SPINAL STENOSIS, LUMBAR REGION, WITH NEUROGENIC CLAUDICATION: ICD-10-CM

## 2017-09-08 PROCEDURE — 6360000004 HC RX CONTRAST MEDICATION: Performed by: ORTHOPAEDIC SURGERY

## 2017-09-08 PROCEDURE — 62304 MYELOGRAPHY LUMBAR INJECTION: CPT

## 2017-09-08 PROCEDURE — 72132 CT LUMBAR SPINE W/DYE: CPT

## 2017-09-08 PROCEDURE — 7100000011 HC PHASE II RECOVERY - ADDTL 15 MIN

## 2017-09-08 PROCEDURE — 7100000010 HC PHASE II RECOVERY - FIRST 15 MIN

## 2017-09-08 RX ADMIN — IOHEXOL 13 ML: 240 INJECTION, SOLUTION INTRATHECAL; INTRAVASCULAR; INTRAVENOUS; ORAL at 12:26

## 2017-09-08 ASSESSMENT — PAIN SCALES - GENERAL
PAINLEVEL_OUTOF10: 0

## 2017-09-11 ENCOUNTER — POST-OP TELEPHONE (OUTPATIENT)
Dept: PREOP | Age: 72
End: 2017-09-11

## 2017-09-29 ENCOUNTER — OFFICE VISIT (OUTPATIENT)
Dept: ORTHOPEDIC SURGERY | Age: 72
End: 2017-09-29
Payer: MEDICARE

## 2017-09-29 VITALS
HEART RATE: 76 BPM | BODY MASS INDEX: 32.44 KG/M2 | DIASTOLIC BLOOD PRESSURE: 64 MMHG | SYSTOLIC BLOOD PRESSURE: 108 MMHG | HEIGHT: 69 IN | WEIGHT: 219 LBS

## 2017-09-29 DIAGNOSIS — Z98.1 S/P LUMBAR SPINAL FUSION: ICD-10-CM

## 2017-09-29 DIAGNOSIS — G89.29 CHRONIC BILATERAL LOW BACK PAIN WITH BILATERAL SCIATICA: Primary | ICD-10-CM

## 2017-09-29 DIAGNOSIS — M54.41 CHRONIC BILATERAL LOW BACK PAIN WITH BILATERAL SCIATICA: Primary | ICD-10-CM

## 2017-09-29 DIAGNOSIS — M43.10 ACQUIRED SPONDYLOLISTHESIS: ICD-10-CM

## 2017-09-29 DIAGNOSIS — M48.062 SPINAL STENOSIS, LUMBAR REGION, WITH NEUROGENIC CLAUDICATION: ICD-10-CM

## 2017-09-29 DIAGNOSIS — M54.42 CHRONIC BILATERAL LOW BACK PAIN WITH BILATERAL SCIATICA: Primary | ICD-10-CM

## 2017-09-29 PROCEDURE — 1123F ACP DISCUSS/DSCN MKR DOCD: CPT | Performed by: ORTHOPAEDIC SURGERY

## 2017-09-29 PROCEDURE — G8598 ASA/ANTIPLAT THER USED: HCPCS | Performed by: ORTHOPAEDIC SURGERY

## 2017-09-29 PROCEDURE — G8427 DOCREV CUR MEDS BY ELIG CLIN: HCPCS | Performed by: ORTHOPAEDIC SURGERY

## 2017-09-29 PROCEDURE — 4040F PNEUMOC VAC/ADMIN/RCVD: CPT | Performed by: ORTHOPAEDIC SURGERY

## 2017-09-29 PROCEDURE — 1036F TOBACCO NON-USER: CPT | Performed by: ORTHOPAEDIC SURGERY

## 2017-09-29 PROCEDURE — 99213 OFFICE O/P EST LOW 20 MIN: CPT | Performed by: ORTHOPAEDIC SURGERY

## 2017-09-29 PROCEDURE — G8417 CALC BMI ABV UP PARAM F/U: HCPCS | Performed by: ORTHOPAEDIC SURGERY

## 2017-09-29 PROCEDURE — 3017F COLORECTAL CA SCREEN DOC REV: CPT | Performed by: ORTHOPAEDIC SURGERY

## 2017-10-02 ENCOUNTER — TELEPHONE (OUTPATIENT)
Dept: CARDIOLOGY | Age: 72
End: 2017-10-02

## 2017-10-02 NOTE — TELEPHONE ENCOUNTER
Pt called because he is scheduled to have back surgery w/ Dr Rocio Cuba. Pt stated that he needs the okay to hold his blood thinners. Last ov w/ Yan 7/25/2017. Please advise. [phone]    Per chart pt is taking Plavix 75 mg daily and ASA 81 mg daily.

## 2017-10-17 ENCOUNTER — OFFICE VISIT (OUTPATIENT)
Dept: PRIMARY CARE CLINIC | Age: 72
End: 2017-10-17
Payer: MEDICARE

## 2017-10-17 VITALS
WEIGHT: 221.2 LBS | BODY MASS INDEX: 32.76 KG/M2 | TEMPERATURE: 97.7 F | SYSTOLIC BLOOD PRESSURE: 130 MMHG | OXYGEN SATURATION: 92 % | RESPIRATION RATE: 16 BRPM | HEIGHT: 69 IN | HEART RATE: 66 BPM | DIASTOLIC BLOOD PRESSURE: 78 MMHG

## 2017-10-17 DIAGNOSIS — J32.9 SINUSITIS, UNSPECIFIED CHRONICITY, UNSPECIFIED LOCATION: Primary | ICD-10-CM

## 2017-10-17 PROCEDURE — G8427 DOCREV CUR MEDS BY ELIG CLIN: HCPCS | Performed by: NURSE PRACTITIONER

## 2017-10-17 PROCEDURE — 99213 OFFICE O/P EST LOW 20 MIN: CPT | Performed by: NURSE PRACTITIONER

## 2017-10-17 PROCEDURE — G8598 ASA/ANTIPLAT THER USED: HCPCS | Performed by: NURSE PRACTITIONER

## 2017-10-17 PROCEDURE — 3017F COLORECTAL CA SCREEN DOC REV: CPT | Performed by: NURSE PRACTITIONER

## 2017-10-17 PROCEDURE — G8484 FLU IMMUNIZE NO ADMIN: HCPCS | Performed by: NURSE PRACTITIONER

## 2017-10-17 PROCEDURE — G8417 CALC BMI ABV UP PARAM F/U: HCPCS | Performed by: NURSE PRACTITIONER

## 2017-10-17 PROCEDURE — 1036F TOBACCO NON-USER: CPT | Performed by: NURSE PRACTITIONER

## 2017-10-17 PROCEDURE — 1123F ACP DISCUSS/DSCN MKR DOCD: CPT | Performed by: NURSE PRACTITIONER

## 2017-10-17 PROCEDURE — 4040F PNEUMOC VAC/ADMIN/RCVD: CPT | Performed by: NURSE PRACTITIONER

## 2017-10-17 ASSESSMENT — ENCOUNTER SYMPTOMS
SINUS PRESSURE: 1
RESPIRATORY NEGATIVE: 1

## 2017-10-17 NOTE — PATIENT INSTRUCTIONS
Patient Education        Sinusitis: Care Instructions  Your Care Instructions    Sinusitis is an infection of the lining of the sinus cavities in your head. Sinusitis often follows a cold. It causes pain and pressure in your head and face. In most cases, sinusitis gets better on its own in 1 to 2 weeks. But some mild symptoms may last for several weeks. Sometimes antibiotics are needed. Follow-up care is a key part of your treatment and safety. Be sure to make and go to all appointments, and call your doctor if you are having problems. It's also a good idea to know your test results and keep a list of the medicines you take. How can you care for yourself at home? · Take an over-the-counter pain medicine, such as acetaminophen (Tylenol), ibuprofen (Advil, Motrin), or naproxen (Aleve). Read and follow all instructions on the label. · If the doctor prescribed antibiotics, take them as directed. Do not stop taking them just because you feel better. You need to take the full course of antibiotics. · Be careful when taking over-the-counter cold or flu medicines and Tylenol at the same time. Many of these medicines have acetaminophen, which is Tylenol. Read the labels to make sure that you are not taking more than the recommended dose. Too much acetaminophen (Tylenol) can be harmful. · Breathe warm, moist air from a steamy shower, a hot bath, or a sink filled with hot water. Avoid cold, dry air. Using a humidifier in your home may help. Follow the directions for cleaning the machine. · Use saline (saltwater) nasal washes to help keep your nasal passages open and wash out mucus and bacteria. You can buy saline nose drops at a grocery store or drugstore. Or you can make your own at home by adding 1 teaspoon of salt and 1 teaspoon of baking soda to 2 cups of distilled water. If you make your own, fill a bulb syringe with the solution, insert the tip into your nostril, and squeeze gently. Marek Letart your nose.   · Put a hot, wet towel or a warm gel pack on your face 3 or 4 times a day for 5 to 10 minutes each time. · Try a decongestant nasal spray like oxymetazoline (Afrin). Do not use it for more than 3 days in a row. Using it for more than 3 days can make your congestion worse. When should you call for help? Call your doctor now or seek immediate medical care if:  · You have new or worse swelling or redness in your face or around your eyes. · You have a new or higher fever. Watch closely for changes in your health, and be sure to contact your doctor if:  · You have new or worse facial pain. · The mucus from your nose becomes thicker (like pus) or has new blood in it. · You are not getting better as expected. Where can you learn more? Go to https://AdChoicepenatanaeleb.Xinyi Network. org and sign in to your [employer] account. Enter C137 in the [employer]OP box to learn more about \"Sinusitis: Care Instructions. \"     If you do not have an account, please click on the \"Sign Up Now\" link. Current as of: July 29, 2016  Content Version: 11.3  © 9490-7225 [employer]. Care instructions adapted under license by [facility] ([facility]). If you have questions about a medical condition or this instruction, always ask your healthcare professional. [place] [address] any warranty or liability for your use of this information. Patient Education        Saline Nasal Washes: Care Instructions  Your Care Instructions  Saline nasal washes help keep the nasal passages open by washing out thick or dried mucus. This simple remedy can help relieve symptoms of allergies, sinusitis, and colds. It also can make the nose feel more comfortable by keeping the mucous membranes moist. You may notice a little burning sensation in your nose the first few times you use the solution, but this usually gets better in a few days. Follow-up care is a key part of your treatment and safety.  Be sure to make and go to all appointments, and call your doctor if you are having problems. It's also a good idea to know your test results and keep a list of the medicines you take. How can you care for yourself at home? · You can buy premixed saline solution in a squeeze bottle or other sinus rinse products at a drugstore. Read and follow the instructions on the label. · You also can make your own saline solution by adding 1 teaspoon of salt and 1 teaspoon of baking soda to 2 cups of distilled water. · If you use a homemade solution, pour a small amount into a clean bowl. Using a rubber bulb syringe, squeeze the syringe and place the tip in the salt water. Pull a small amount of the salt water into the syringe by relaxing your hand. · Sit down with your head tilted slightly back. Do not lie down. Put the tip of the bulb syringe or the squeeze bottle a little way into one of your nostrils. Gently drip or squirt a few drops into the nostril. Repeat with the other nostril. Some sneezing and gagging are normal at first.  · Gently blow your nose. · Wipe the syringe or bottle tip clean after each use. · Repeat this 2 or 3 times a day. · Use nasal washes gently if you have nosebleeds often. When should you call for help? Watch closely for changes in your health, and be sure to contact your doctor if:  · You often get nosebleeds. · You have problems doing the nasal washes. Where can you learn more? Go to https://Loans On Fine Artcorrina.SimpliVity. org and sign in to your [employer] account. Enter [phone] in the [facility] box to learn more about \"Saline Nasal Washes: Care Instructions. \"     If you do not have an account, please click on the \"Sign Up Now\" link. Current as of: May 4, 2017  Content Version: 11.3  © 6926-3890 [employer]. Care instructions adapted under license by [facility] ([facility]).  If you have questions about a medical condition or this instruction, always ask your healthcare professional. [address] any warranty or liability for your use of this information. Patient Education        Chronic Sinusitis: Care Instructions  Your Care Instructions    Sinusitis is an infection of the lining of the sinus cavities in your head. It causes pain and pressure in your head and face. Sinusitis can be short-term (acute) or long-term (chronic). Chronic sinusitis lasts 12 weeks or longer. It is often caused by a bacterial or fungal infection. Other things, such as allergies, may also be involved. Chronic sinusitis may be hard to treat. It can lead to permanent changes in the mucous membranes that line the sinuses. It may make future sinus infections more likely. The infection may take some time to treat. Antibiotics are usually used if the infection is caused by bacteria. You may also need to use a corticosteroid nasal spray. If the infection is not cured after you try two or more different antibiotics, you may want to talk with your doctor about surgery or allergy testing. If the sinusitis is caused by a fungal infection, you may need to take antifungals or other medicines. You may also need surgery. Follow-up care is a key part of your treatment and safety. Be sure to make and go to all appointments, and call your doctor if you are having problems. It's also a good idea to know your test results and keep a list of the medicines you take. How can you care for yourself at home? Medicines  · Be safe with medicines. Take your medicines exactly as prescribed. Call your doctor if you think you are having a problem with your medicine. You will get more details on the specific medicines your doctor prescribes. · Take your antibiotics as directed. Do not stop taking them just because you feel better. You need to take the full course of antibiotics. · Your doctor may recommend a corticosteroid nasal spray, wash, drops, or pills. Take this medicine exactly as prescribed. At home  · Breathe warm, moist air.  You can use a always ask your healthcare professional. [place] [address] any warranty or liability for your use of this information.

## 2017-11-02 ENCOUNTER — HOSPITAL ENCOUNTER (OUTPATIENT)
Dept: PREADMISSION TESTING | Age: 72
Discharge: HOME | End: 2017-11-02
Payer: MEDICARE

## 2017-11-02 VITALS
TEMPERATURE: 98.1 F | HEART RATE: 76 BPM | RESPIRATION RATE: 18 BRPM | HEIGHT: 69 IN | DIASTOLIC BLOOD PRESSURE: 66 MMHG | BODY MASS INDEX: 33.03 KG/M2 | OXYGEN SATURATION: 92 % | WEIGHT: 223 LBS | SYSTOLIC BLOOD PRESSURE: 115 MMHG

## 2017-11-02 LAB
ABSOLUTE EOS #: 0.1 K/UL (ref 0–0.4)
ABSOLUTE IMMATURE GRANULOCYTE: ABNORMAL K/UL (ref 0–0.3)
ABSOLUTE LYMPH #: 1.5 K/UL (ref 1–4.8)
ABSOLUTE MONO #: 0.5 K/UL (ref 0.1–1.3)
ANION GAP SERPL CALCULATED.3IONS-SCNC: 11 MMOL/L (ref 9–17)
BASOPHILS # BLD: 1 %
BASOPHILS ABSOLUTE: 0 K/UL (ref 0–0.2)
BUN BLDV-MCNC: 20 MG/DL (ref 8–23)
BUN/CREAT BLD: ABNORMAL (ref 9–20)
CALCIUM SERPL-MCNC: 9.2 MG/DL (ref 8.6–10.4)
CHLORIDE BLD-SCNC: 102 MMOL/L (ref 98–107)
CO2: 28 MMOL/L (ref 20–31)
CREAT SERPL-MCNC: 0.77 MG/DL (ref 0.7–1.2)
DIFFERENTIAL TYPE: ABNORMAL
EOSINOPHILS RELATIVE PERCENT: 2 %
GFR AFRICAN AMERICAN: >60 ML/MIN
GFR NON-AFRICAN AMERICAN: >60 ML/MIN
GFR SERPL CREATININE-BSD FRML MDRD: ABNORMAL ML/MIN/{1.73_M2}
GFR SERPL CREATININE-BSD FRML MDRD: ABNORMAL ML/MIN/{1.73_M2}
GLUCOSE BLD-MCNC: 133 MG/DL (ref 70–99)
HCT VFR BLD CALC: 44.6 % (ref 41–53)
HEMOGLOBIN: 14.7 G/DL (ref 13.5–17.5)
IMMATURE GRANULOCYTES: ABNORMAL %
LYMPHOCYTES # BLD: 27 %
MCH RBC QN AUTO: 29.4 PG (ref 26–34)
MCHC RBC AUTO-ENTMCNC: 32.9 G/DL (ref 31–37)
MCV RBC AUTO: 89.3 FL (ref 80–100)
MONOCYTES # BLD: 10 %
PDW BLD-RTO: 15.9 % (ref 11.5–14.9)
PLATELET # BLD: 363 K/UL (ref 150–450)
PLATELET ESTIMATE: ABNORMAL
PMV BLD AUTO: 7.4 FL (ref 6–12)
POTASSIUM SERPL-SCNC: 5.2 MMOL/L (ref 3.7–5.3)
RBC # BLD: 5 M/UL (ref 4.5–5.9)
RBC # BLD: ABNORMAL 10*6/UL
SEG NEUTROPHILS: 60 %
SEGMENTED NEUTROPHILS ABSOLUTE COUNT: 3.2 K/UL (ref 1.3–9.1)
SODIUM BLD-SCNC: 141 MMOL/L (ref 135–144)
WBC # BLD: 5.4 K/UL (ref 3.5–11)
WBC # BLD: ABNORMAL 10*3/UL

## 2017-11-02 PROCEDURE — 36415 COLL VENOUS BLD VENIPUNCTURE: CPT

## 2017-11-02 PROCEDURE — 85025 COMPLETE CBC W/AUTO DIFF WBC: CPT

## 2017-11-02 PROCEDURE — 80048 BASIC METABOLIC PNL TOTAL CA: CPT

## 2017-11-02 NOTE — H&P
HISTORY and Treinta BALTAZAR [address]         NAME:  Kenneth Mullen  MRN: [id number]   YOB: 1945   Date: 11/2/2017   Age: 67 y.o. Gender: male       COMPLAINT AND PRESENT HISTORY:    67 y o male with chronic, constant back pain to 7-8 on scale, worse with standing. Patient has had a number of surgeries and procedures without full recovery. Recent myelogram shows damage below fusion. He has some relief if he lays flat on bed. DIAGNOSTIC RESULTS   Radiology: CT lumbar spine. Impression   *Transpedicular hardware fixation L2-L3 without radiographic complication. No canal or neural foraminal stenosis of this level. *Broad-based disc bulge present with ligamentum flavum and facet joint   hypertrophy causing moderate canal and bilateral neural foraminal stenosis. *Aneurysmal dilatation of the infrarenal abdominal aorta measuring 3.1 cm. Please see follow-up recommendations below.       RECOMMENDATIONS:   Managing Abdominal Aortic Aneurysms       3.0-3.4 cm: Every 3 years.       *For abdominal aortas with maximum diameter of 2.6-2.9 cm meeting criteria   for AAA (>50% of proximal normal segment).           Labs:  CBC: No results for input(s): WBC, HGB, PLT in the last 72 hours. BMP:  No results for input(s): NA, K, CL, CO2, BUN, CREATININE, GLUCOSE in the last 72 hours. Hepatic: No results for input(s): AST, ALT, ALB, BILITOT, ALKPHOS in the last 72 hours. CARDIAC ENZY: No results for input(s): CKTOTAL, CKMB, CKMBINDEX, TROPONINT, MYOGLOBIN in the last 72 hours. INR: No results for input(s): INR in the last 72 hours. BNP: No results for input(s): BNP in the last 72 hours. ABGs: No results found for: PHART, PO2ART, FLX7EKQ  Lipids: No results for input(s): CHOL, HDL in the last 72 hours.     Invalid input(s): LDLCALCU  U/A:  Lab Results   Component Value Date    COLORU YELLOW 02/11/2015    WBCUA 0 TO 2 04/07/2014    RBCUA 0 TO 2 04/07/2014    MUCUS NOT REPORTED 04/07/2014 BACTERIA FEW 04/07/2014    SPECGRAV 1.023 02/11/2015    LEUKOCYTESUR NEGATIVE 02/11/2015    GLUCOSEU NEGATIVE 02/11/2015    AMORPHOUS 2+ 04/07/2014       PAST MEDICAL HISTORY     Past Medical History:   Diagnosis Date    Asthma     CAD (coronary artery disease)     STENTS X 4    Cancer (HCC)     THROAT, HX. RADIATION , LT EAR    Cervical radiculopathy     Chronic back pain     Colonic polyp     Degeneration of cervical intervertebral disc     [facility], [facility] C4/C5-C6/C7    Degeneration of cervical intervertebral disc     [facility] 1994, C3/C4    Depression     Diverticulosis     GERD (gastroesophageal reflux disease)     Glucose intolerance (impaired glucose tolerance)     IS NOT DIABETIC, PER PT    HTN (hypertension)     Hyperlipidemia     Lumbar radiculopathy     MI (myocardial infarction)     2011    Numbness and tingling     HANDS    OA (osteoarthritis)     Pre-diabetes     Sacroiliac pain 11/4/2014    Vision abnormalities     WEARS GLASSES       Pt denies any history of Diabetes mellitus type 2, stroke,  COPD,  Cancer, Seizures,Thyroid disease, Kidney Disease, Hepatitis, TB, Psychiatric Disorders or Substance abuse.     SURGICAL HISTORY       Past Surgical History:   Procedure Laterality Date    ABSCESS DRAINAGE Right     ELBOW WITH CLOSURE    ANESTHESIA NERVE BLOCK Bilateral 6/2/2017    Bilat L1 L2 L3 L4 L5 Diagnostic Medial Branch Blk performed by Jo Oneal MD at [facility] Bilateral 6/9/2017    Bilat L1 L2 L3 L4 L5 Confirmatory Medial BB performed by Jo Oneal MD at [facility]  06/01/2011    DR Carla Lizama CARDIAC CATHETERIZATION  5-5-2011    STENTSx4    CERVICAL SPINE SURGERY  08/09/2001    Anterior Cervical Decompression and Fusion C3-4    CERVICAL SPINE SURGERY Left 4053,5830,1091    Cervical C6-C7 Discectomy and Foraminotomy    COLONOSCOPY  2007, 2003    POLYPS    COLONOSCOPY 9/9/13    hyperplastic polyp x 3    CORONARY ANGIOPLASTY WITH STENT PLACEMENT      ELBOW BURSA SURGERY Right 2012,2011    FINGER TRIGGER RELEASE Right     THIRD FINGER    [facility] INJECTION PROCEDURE FOR SACROILIAC JOINT Bilateral 3/14/2017    Bilat Sacroiliac & Piriformis Inj's performed by Des Rizo MD at [address] Right     ELBOW    LUMBAR FUSION  4/7/14    lumbar decompression and fusion    LUMBAR SPINE SURGERY  0169,5036    Fusion    LUMBAR SPINE SURGERY Left 2/14/2017    Left L4 & L5 Transforaminal ANITHA performed by Des Rizo MD at [address] [place] [address]  Bilateral 5/2/2017    Bilat L5 Transforaminal ANITHA performed by Des Rizo MD at [address] [place] [address]  Bilateral 5/9/2017    Bilat L5 Transforaminal ANITHA performed by Des Rizo MD at [address] [facility]  2/19/13, 5/14/13    L1/L2 IESI    OTHER SURGICAL HISTORY      Hardware correction, patient had 1 broken screw and to loose screws in back     OTHER SURGICAL HISTORY Left 09/27/2016     C6 TFE    OTHER SURGICAL HISTORY Bilateral 11/29/2016    L3, L4 L5 Diagnostic Medial Branch Block    OTHER SURGICAL HISTORY  12/06/2016    jovani L3 L4 L5 conf MBB    OTHER SURGICAL HISTORY Right 12/12/2016    L3,L4,L5 RFA    OTHER SURGICAL HISTORY Left 12/15/17    L3.  L4, L5 RFA    OTHER SURGICAL HISTORY Left 01/31/2017    L4 & L5 TFE    IA ARTHROCENTESIS ASPIR&/INJ MAJOR JT/BURSA W/O US Left 3/31/2017    Left Hip Inj performed by Des Rizo MD at [address] ARTHROCENTESIS ASPIR&/INJ MAJOR JT/BURSA W/O US Left 4/14/2017    Left Hip Inj performed by Des Rizo MD at [address] Right 6/29/2017    Right L1 L2 L3 L4 L5 Radiofrequency Ablation performed by Des Rizo MD at [address] Left 7/6/2017    Left L1 L2 L3 L4 L5 Radiofrequency performed by Des Rizo MD at [facility]  famotidine (PEPCID) 20 MG tablet Take 20 mg by mouth daily. No current facility-administered medications on file prior to encounter. General health:  Fairly good. No fever or chills. Skin:  No itching, redness or rash. Head, eyes, ears, nose, throat:  No headache, epistaxis, rhinorrhea hearing loss or sore throat. Headache R/T neck, glasses, B hearing aides,     Neck:  No pain, stiffness or masses. Cardiovascular/Respiratory system:  No chest pain, palpitation, shortness of breath, coughing or expectoration. Cardiac stents,               Gastrointestinal tract: No abdominal pain, nausea, vomiting, diarrhea or constipation. Heartburn once in a while. Genitourinary:  No burning on micturition. No hesitancy, urgency, frequency or discoloration of urine. Locomotor:  No bone or joint pains. No swelling or deformities. Neuropsychiatric:  No referable complaints. Depression,     See HPI. GENERAL PHYSICAL EXAM:         Vitals: /66   Pulse 76   Temp 98.1 °F (36.7 °C)   Resp 18   Ht 5' 9\" (1.753 m)   Wt 223 lb (101.2 kg)   SpO2 92%   BMI 32.93 kg/m²  Body mass index is 32.93 kg/m². GENERAL APPEARANCE:  Norberto Osullivan is 67 y.o.,  male, mildly obese, nourished, conscious, alert. Does not appear to be distress or pain at this time. SKIN:  Warm, dry, no cyanosis or jaundice. HEAD:  Normocephalic, atraumatic, no swelling or tenderness. EYES:  Pupils equal, reactive to light, Conjunctiva is clear, EOMs intact jovani. eyelids WNL. EARS:  No discharge, no marked hearing loss. Hearing aides. NOSE:  No rhinorrhea, epistaxis or septal deformity. THROAT:  Not congested. No ulceration bleeding or discharge. NECK:  No stiffness, trachea central.  No palpable masses or L.N.      CHEST:  Symmetrical and equal on expansion. HEART:  Regular rate and rhythm. S1 > S2, No audible murmurs or gallops.      LUNGS:

## 2017-11-03 ENCOUNTER — ANESTHESIA EVENT (OUTPATIENT)
Dept: OPERATING ROOM | Age: 72
DRG: 460 | End: 2017-11-03
Payer: MEDICARE

## 2017-11-03 RX ORDER — SODIUM CHLORIDE 0.9 % (FLUSH) 0.9 %
10 SYRINGE (ML) INJECTION EVERY 12 HOURS SCHEDULED
Status: CANCELLED | OUTPATIENT
Start: 2017-11-03

## 2017-11-03 RX ORDER — LIDOCAINE HYDROCHLORIDE 10 MG/ML
1 INJECTION, SOLUTION EPIDURAL; INFILTRATION; INTRACAUDAL; PERINEURAL
Status: CANCELLED | OUTPATIENT
Start: 2017-11-03 | End: 2017-11-03

## 2017-11-03 RX ORDER — SODIUM CHLORIDE, SODIUM LACTATE, POTASSIUM CHLORIDE, CALCIUM CHLORIDE 600; 310; 30; 20 MG/100ML; MG/100ML; MG/100ML; MG/100ML
INJECTION, SOLUTION INTRAVENOUS CONTINUOUS
Status: CANCELLED | OUTPATIENT
Start: 2017-11-03

## 2017-11-03 RX ORDER — SODIUM CHLORIDE 0.9 % (FLUSH) 0.9 %
10 SYRINGE (ML) INJECTION PRN
Status: CANCELLED | OUTPATIENT
Start: 2017-11-03

## 2017-11-15 ENCOUNTER — APPOINTMENT (OUTPATIENT)
Dept: GENERAL RADIOLOGY | Age: 72
DRG: 460 | End: 2017-11-15
Attending: ORTHOPAEDIC SURGERY
Payer: MEDICARE

## 2017-11-15 ENCOUNTER — HOSPITAL ENCOUNTER (INPATIENT)
Age: 72
LOS: 1 days | Discharge: HOME | DRG: 460 | End: 2017-11-16
Attending: ORTHOPAEDIC SURGERY | Admitting: ORTHOPAEDIC SURGERY
Payer: MEDICARE

## 2017-11-15 ENCOUNTER — ANESTHESIA (OUTPATIENT)
Dept: OPERATING ROOM | Age: 72
DRG: 460 | End: 2017-11-15
Payer: MEDICARE

## 2017-11-15 VITALS — SYSTOLIC BLOOD PRESSURE: 121 MMHG | TEMPERATURE: 99.3 F | DIASTOLIC BLOOD PRESSURE: 74 MMHG | OXYGEN SATURATION: 96 %

## 2017-11-15 PROBLEM — M54.9 BACK PAIN: Status: ACTIVE | Noted: 2017-11-15

## 2017-11-15 LAB
GLUCOSE BLD-MCNC: 103 MG/DL (ref 75–110)
GLUCOSE BLD-MCNC: 185 MG/DL (ref 75–110)

## 2017-11-15 PROCEDURE — 6360000002 HC RX W HCPCS: Performed by: ORTHOPAEDIC SURGERY

## 2017-11-15 PROCEDURE — 7100000000 HC PACU RECOVERY - FIRST 15 MIN: Performed by: ORTHOPAEDIC SURGERY

## 2017-11-15 PROCEDURE — 3209999900 FLUORO FOR SURGICAL PROCEDURES

## 2017-11-15 PROCEDURE — 0SG00AJ FUSION OF LUMBAR VERTEBRAL JOINT WITH INTERBODY FUSION DEVICE, POSTERIOR APPROACH, ANTERIOR COLUMN, OPEN APPROACH: ICD-10-PCS | Performed by: ORTHOPAEDIC SURGERY

## 2017-11-15 PROCEDURE — 2580000003 HC RX 258: Performed by: NURSE ANESTHETIST, CERTIFIED REGISTERED

## 2017-11-15 PROCEDURE — 0SB20ZZ EXCISION OF LUMBAR VERTEBRAL DISC, OPEN APPROACH: ICD-10-PCS | Performed by: ORTHOPAEDIC SURGERY

## 2017-11-15 PROCEDURE — 94664 DEMO&/EVAL PT USE INHALER: CPT

## 2017-11-15 PROCEDURE — 72100 X-RAY EXAM L-S SPINE 2/3 VWS: CPT

## 2017-11-15 PROCEDURE — 2580000003 HC RX 258: Performed by: ORTHOPAEDIC SURGERY

## 2017-11-15 PROCEDURE — 3700000000 HC ANESTHESIA ATTENDED CARE: Performed by: ORTHOPAEDIC SURGERY

## 2017-11-15 PROCEDURE — 3700000001 HC ADD 15 MINUTES (ANESTHESIA): Performed by: ORTHOPAEDIC SURGERY

## 2017-11-15 PROCEDURE — 6370000000 HC RX 637 (ALT 250 FOR IP): Performed by: ORTHOPAEDIC SURGERY

## 2017-11-15 PROCEDURE — 6360000002 HC RX W HCPCS: Performed by: NURSE ANESTHETIST, CERTIFIED REGISTERED

## 2017-11-15 PROCEDURE — 82947 ASSAY GLUCOSE BLOOD QUANT: CPT

## 2017-11-15 PROCEDURE — 2580000003 HC RX 258: Performed by: ANESTHESIOLOGY

## 2017-11-15 PROCEDURE — 2720000010 HC SURG SUPPLY STERILE: Performed by: ORTHOPAEDIC SURGERY

## 2017-11-15 PROCEDURE — 2500000003 HC RX 250 WO HCPCS: Performed by: ORTHOPAEDIC SURGERY

## 2017-11-15 PROCEDURE — 88300 SURGICAL PATH GROSS: CPT

## 2017-11-15 PROCEDURE — A6197 ALGINATE DRSG >16 <=48 SQ IN: HCPCS | Performed by: ORTHOPAEDIC SURGERY

## 2017-11-15 PROCEDURE — 3600000013 HC SURGERY LEVEL 3 ADDTL 15MIN: Performed by: ORTHOPAEDIC SURGERY

## 2017-11-15 PROCEDURE — C1713 ANCHOR/SCREW BN/BN,TIS/BN: HCPCS | Performed by: ORTHOPAEDIC SURGERY

## 2017-11-15 PROCEDURE — 2500000003 HC RX 250 WO HCPCS: Performed by: NURSE ANESTHETIST, CERTIFIED REGISTERED

## 2017-11-15 PROCEDURE — 2780000010 HC IMPLANT OTHER: Performed by: ORTHOPAEDIC SURGERY

## 2017-11-15 PROCEDURE — 1200000000 HC SEMI PRIVATE

## 2017-11-15 PROCEDURE — 7100000001 HC PACU RECOVERY - ADDTL 15 MIN: Performed by: ORTHOPAEDIC SURGERY

## 2017-11-15 PROCEDURE — 3600000003 HC SURGERY LEVEL 3 BASE: Performed by: ORTHOPAEDIC SURGERY

## 2017-11-15 PROCEDURE — L8699 PROSTHETIC IMPLANT NOS: HCPCS | Performed by: ORTHOPAEDIC SURGERY

## 2017-11-15 DEVICE — RISE SPACER 10X22MM, 11-17MM
Type: IMPLANTABLE DEVICE | Site: SPINE LUMBAR | Status: FUNCTIONAL
Brand: RISE

## 2017-11-15 DEVICE — 6.5MM REVERE PEDICLE SCREW 50MM
Type: IMPLANTABLE DEVICE | Site: SPINE LUMBAR | Status: FUNCTIONAL
Brand: REVERE

## 2017-11-15 DEVICE — 5.5MM CURVED ROD, 90MM
Type: IMPLANTABLE DEVICE | Site: SPINE LUMBAR | Status: FUNCTIONAL
Brand: REVERE

## 2017-11-15 DEVICE — AGENT HEMOSTATIC SURGIFLOW MATRIX KIT W/THROMBIN: Type: IMPLANTABLE DEVICE | Site: SPINE LUMBAR | Status: FUNCTIONAL

## 2017-11-15 DEVICE — 5.5MM CURVED ROD, 85MM
Type: IMPLANTABLE DEVICE | Site: SPINE LUMBAR | Status: FUNCTIONAL
Brand: REVERE

## 2017-11-15 DEVICE — 6.5MM REVERE PEDICLE SCREW 55MM
Type: IMPLANTABLE DEVICE | Site: SPINE LUMBAR | Status: FUNCTIONAL
Brand: REVERE

## 2017-11-15 DEVICE — REVERE LOCKING CAP
Type: IMPLANTABLE DEVICE | Site: SPINE LUMBAR | Status: FUNCTIONAL
Brand: REVERE

## 2017-11-15 DEVICE — GRAFT BNE CHIP 30 CC FD CORTICAL CANC: Type: IMPLANTABLE DEVICE | Site: SPINE LUMBAR | Status: FUNCTIONAL

## 2017-11-15 RX ORDER — PAROXETINE HYDROCHLORIDE 20 MG/1
20 TABLET, FILM COATED ORAL DAILY
Status: DISCONTINUED | OUTPATIENT
Start: 2017-11-15 | End: 2017-11-16 | Stop reason: HOSPADM

## 2017-11-15 RX ORDER — OXYCODONE HYDROCHLORIDE AND ACETAMINOPHEN 5; 325 MG/1; MG/1
1 TABLET ORAL EVERY 4 HOURS PRN
Status: DISCONTINUED | OUTPATIENT
Start: 2017-11-15 | End: 2017-11-16 | Stop reason: HOSPADM

## 2017-11-15 RX ORDER — SODIUM CHLORIDE 0.9 % (FLUSH) 0.9 %
10 SYRINGE (ML) INJECTION EVERY 12 HOURS SCHEDULED
Status: DISCONTINUED | OUTPATIENT
Start: 2017-11-15 | End: 2017-11-16 | Stop reason: HOSPADM

## 2017-11-15 RX ORDER — SODIUM CHLORIDE 0.9 % (FLUSH) 0.9 %
10 SYRINGE (ML) INJECTION PRN
Status: DISCONTINUED | OUTPATIENT
Start: 2017-11-15 | End: 2017-11-15 | Stop reason: HOSPADM

## 2017-11-15 RX ORDER — SUCCINYLCHOLINE CHLORIDE 20 MG/ML
INJECTION INTRAMUSCULAR; INTRAVENOUS PRN
Status: DISCONTINUED | OUTPATIENT
Start: 2017-11-15 | End: 2017-11-15 | Stop reason: SDUPTHER

## 2017-11-15 RX ORDER — PANTOPRAZOLE SODIUM 40 MG/1
40 TABLET, DELAYED RELEASE ORAL DAILY
Status: DISCONTINUED | OUTPATIENT
Start: 2017-11-15 | End: 2017-11-16 | Stop reason: HOSPADM

## 2017-11-15 RX ORDER — OXYCODONE HYDROCHLORIDE AND ACETAMINOPHEN 5; 325 MG/1; MG/1
2 TABLET ORAL EVERY 4 HOURS PRN
Status: DISCONTINUED | OUTPATIENT
Start: 2017-11-15 | End: 2017-11-16 | Stop reason: HOSPADM

## 2017-11-15 RX ORDER — ACETAMINOPHEN 325 MG/1
650 TABLET ORAL EVERY 4 HOURS PRN
Status: DISCONTINUED | OUTPATIENT
Start: 2017-11-15 | End: 2017-11-16 | Stop reason: HOSPADM

## 2017-11-15 RX ORDER — SODIUM CHLORIDE 0.9 % (FLUSH) 0.9 %
10 SYRINGE (ML) INJECTION EVERY 12 HOURS SCHEDULED
Status: DISCONTINUED | OUTPATIENT
Start: 2017-11-15 | End: 2017-11-15 | Stop reason: HOSPADM

## 2017-11-15 RX ORDER — SODIUM CHLORIDE 0.9 % (FLUSH) 0.9 %
10 SYRINGE (ML) INJECTION PRN
Status: DISCONTINUED | OUTPATIENT
Start: 2017-11-15 | End: 2017-11-16 | Stop reason: HOSPADM

## 2017-11-15 RX ORDER — ONDANSETRON 2 MG/ML
4 INJECTION INTRAMUSCULAR; INTRAVENOUS EVERY 6 HOURS PRN
Status: DISCONTINUED | OUTPATIENT
Start: 2017-11-15 | End: 2017-11-16 | Stop reason: HOSPADM

## 2017-11-15 RX ORDER — LABETALOL HYDROCHLORIDE 5 MG/ML
5 INJECTION, SOLUTION INTRAVENOUS EVERY 10 MIN PRN
Status: DISCONTINUED | OUTPATIENT
Start: 2017-11-15 | End: 2017-11-15 | Stop reason: HOSPADM

## 2017-11-15 RX ORDER — LIDOCAINE HYDROCHLORIDE 10 MG/ML
INJECTION, SOLUTION EPIDURAL; INFILTRATION; INTRACAUDAL; PERINEURAL PRN
Status: DISCONTINUED | OUTPATIENT
Start: 2017-11-15 | End: 2017-11-15 | Stop reason: SDUPTHER

## 2017-11-15 RX ORDER — DEXAMETHASONE SODIUM PHOSPHATE 4 MG/ML
INJECTION, SOLUTION INTRA-ARTICULAR; INTRALESIONAL; INTRAMUSCULAR; INTRAVENOUS; SOFT TISSUE PRN
Status: DISCONTINUED | OUTPATIENT
Start: 2017-11-15 | End: 2017-11-15 | Stop reason: SDUPTHER

## 2017-11-15 RX ORDER — PROPOFOL 10 MG/ML
INJECTION, EMULSION INTRAVENOUS PRN
Status: DISCONTINUED | OUTPATIENT
Start: 2017-11-15 | End: 2017-11-15 | Stop reason: SDUPTHER

## 2017-11-15 RX ORDER — HYDROMORPHONE HCL 110MG/55ML
0.5 PATIENT CONTROLLED ANALGESIA SYRINGE INTRAVENOUS EVERY 5 MIN PRN
Status: DISCONTINUED | OUTPATIENT
Start: 2017-11-15 | End: 2017-11-15 | Stop reason: HOSPADM

## 2017-11-15 RX ORDER — MORPHINE SULFATE 10 MG/ML
2 INJECTION, SOLUTION INTRAMUSCULAR; INTRAVENOUS EVERY 5 MIN PRN
Status: DISCONTINUED | OUTPATIENT
Start: 2017-11-15 | End: 2017-11-15 | Stop reason: HOSPADM

## 2017-11-15 RX ORDER — CYCLOBENZAPRINE HCL 10 MG
10 TABLET ORAL 3 TIMES DAILY PRN
Status: DISCONTINUED | OUTPATIENT
Start: 2017-11-15 | End: 2017-11-16 | Stop reason: HOSPADM

## 2017-11-15 RX ORDER — DIPHENHYDRAMINE HYDROCHLORIDE 50 MG/ML
12.5 INJECTION INTRAMUSCULAR; INTRAVENOUS
Status: DISCONTINUED | OUTPATIENT
Start: 2017-11-15 | End: 2017-11-15 | Stop reason: HOSPADM

## 2017-11-15 RX ORDER — FENTANYL CITRATE 50 UG/ML
INJECTION, SOLUTION INTRAMUSCULAR; INTRAVENOUS PRN
Status: DISCONTINUED | OUTPATIENT
Start: 2017-11-15 | End: 2017-11-15 | Stop reason: SDUPTHER

## 2017-11-15 RX ORDER — MIDAZOLAM HYDROCHLORIDE 1 MG/ML
INJECTION INTRAMUSCULAR; INTRAVENOUS PRN
Status: DISCONTINUED | OUTPATIENT
Start: 2017-11-15 | End: 2017-11-15 | Stop reason: SDUPTHER

## 2017-11-15 RX ORDER — SODIUM CHLORIDE, SODIUM LACTATE, POTASSIUM CHLORIDE, CALCIUM CHLORIDE 600; 310; 30; 20 MG/100ML; MG/100ML; MG/100ML; MG/100ML
INJECTION, SOLUTION INTRAVENOUS CONTINUOUS
Status: DISCONTINUED | OUTPATIENT
Start: 2017-11-15 | End: 2017-11-15

## 2017-11-15 RX ORDER — MEPERIDINE HYDROCHLORIDE 50 MG/ML
12.5 INJECTION INTRAMUSCULAR; INTRAVENOUS; SUBCUTANEOUS EVERY 5 MIN PRN
Status: DISCONTINUED | OUTPATIENT
Start: 2017-11-15 | End: 2017-11-15 | Stop reason: HOSPADM

## 2017-11-15 RX ORDER — LIDOCAINE HYDROCHLORIDE 10 MG/ML
1 INJECTION, SOLUTION EPIDURAL; INFILTRATION; INTRACAUDAL; PERINEURAL
Status: DISCONTINUED | OUTPATIENT
Start: 2017-11-15 | End: 2017-11-15 | Stop reason: HOSPADM

## 2017-11-15 RX ORDER — EPHEDRINE SULFATE 50 MG/ML
INJECTION, SOLUTION INTRAVENOUS PRN
Status: DISCONTINUED | OUTPATIENT
Start: 2017-11-15 | End: 2017-11-15 | Stop reason: SDUPTHER

## 2017-11-15 RX ORDER — ONDANSETRON 2 MG/ML
4 INJECTION INTRAMUSCULAR; INTRAVENOUS
Status: DISCONTINUED | OUTPATIENT
Start: 2017-11-15 | End: 2017-11-15 | Stop reason: HOSPADM

## 2017-11-15 RX ORDER — ONDANSETRON 2 MG/ML
INJECTION INTRAMUSCULAR; INTRAVENOUS PRN
Status: DISCONTINUED | OUTPATIENT
Start: 2017-11-15 | End: 2017-11-15 | Stop reason: SDUPTHER

## 2017-11-15 RX ORDER — CEFAZOLIN SODIUM 1 G/3ML
INJECTION, POWDER, FOR SOLUTION INTRAMUSCULAR; INTRAVENOUS
Status: DISPENSED
Start: 2017-11-15 | End: 2017-11-15

## 2017-11-15 RX ORDER — BUPIVACAINE HYDROCHLORIDE AND EPINEPHRINE 5; 5 MG/ML; UG/ML
INJECTION, SOLUTION EPIDURAL; INTRACAUDAL; PERINEURAL PRN
Status: DISCONTINUED | OUTPATIENT
Start: 2017-11-15 | End: 2017-11-15 | Stop reason: HOSPADM

## 2017-11-15 RX ORDER — ATORVASTATIN CALCIUM 80 MG/1
80 TABLET, FILM COATED ORAL DAILY
Status: DISCONTINUED | OUTPATIENT
Start: 2017-11-15 | End: 2017-11-16 | Stop reason: HOSPADM

## 2017-11-15 RX ORDER — DOCUSATE SODIUM 100 MG/1
100 CAPSULE, LIQUID FILLED ORAL 2 TIMES DAILY
Status: DISCONTINUED | OUTPATIENT
Start: 2017-11-15 | End: 2017-11-16 | Stop reason: HOSPADM

## 2017-11-15 RX ORDER — FAMOTIDINE 20 MG/1
20 TABLET, FILM COATED ORAL DAILY
Status: DISCONTINUED | OUTPATIENT
Start: 2017-11-15 | End: 2017-11-16 | Stop reason: HOSPADM

## 2017-11-15 RX ORDER — TRANEXAMIC ACID 100 MG/ML
INJECTION, SOLUTION INTRAVENOUS PRN
Status: DISCONTINUED | OUTPATIENT
Start: 2017-11-15 | End: 2017-11-15 | Stop reason: SDUPTHER

## 2017-11-15 RX ADMIN — DOCUSATE SODIUM 100 MG: 100 CAPSULE, LIQUID FILLED ORAL at 17:43

## 2017-11-15 RX ADMIN — REMIFENTANIL HYDROCHLORIDE 0.05 MCG/KG/MIN: 1 INJECTION, POWDER, LYOPHILIZED, FOR SOLUTION INTRAVENOUS at 07:53

## 2017-11-15 RX ADMIN — METOPROLOL TARTRATE 25 MG: 25 TABLET ORAL at 20:00

## 2017-11-15 RX ADMIN — PHENYLEPHRINE HYDROCHLORIDE 100 MCG: 10 INJECTION INTRAVENOUS at 08:45

## 2017-11-15 RX ADMIN — HYDROMORPHONE HYDROCHLORIDE 1 MG: 1 INJECTION, SOLUTION INTRAMUSCULAR; INTRAVENOUS; SUBCUTANEOUS at 12:43

## 2017-11-15 RX ADMIN — EPHEDRINE SULFATE 15 MG: 50 INJECTION INTRAMUSCULAR; INTRAVENOUS; SUBCUTANEOUS at 08:31

## 2017-11-15 RX ADMIN — EPHEDRINE SULFATE 15 MG: 50 INJECTION INTRAMUSCULAR; INTRAVENOUS; SUBCUTANEOUS at 09:24

## 2017-11-15 RX ADMIN — LIDOCAINE HYDROCHLORIDE 50 MG: 10 INJECTION, SOLUTION EPIDURAL; INFILTRATION; INTRACAUDAL; PERINEURAL at 07:53

## 2017-11-15 RX ADMIN — PAROXETINE HYDROCHLORIDE 20 MG: 20 TABLET, FILM COATED ORAL at 17:43

## 2017-11-15 RX ADMIN — SODIUM CHLORIDE, POTASSIUM CHLORIDE, SODIUM LACTATE AND CALCIUM CHLORIDE: 600; 310; 30; 20 INJECTION, SOLUTION INTRAVENOUS at 07:48

## 2017-11-15 RX ADMIN — MIDAZOLAM 2 MG: 1 INJECTION INTRAMUSCULAR; INTRAVENOUS at 07:48

## 2017-11-15 RX ADMIN — PHENYLEPHRINE HYDROCHLORIDE 100 MCG: 10 INJECTION INTRAVENOUS at 08:07

## 2017-11-15 RX ADMIN — EPHEDRINE SULFATE 10 MG: 50 INJECTION INTRAMUSCULAR; INTRAVENOUS; SUBCUTANEOUS at 08:25

## 2017-11-15 RX ADMIN — Medication 10 ML: at 20:02

## 2017-11-15 RX ADMIN — SODIUM CHLORIDE, POTASSIUM CHLORIDE, SODIUM LACTATE AND CALCIUM CHLORIDE: 600; 310; 30; 20 INJECTION, SOLUTION INTRAVENOUS at 07:09

## 2017-11-15 RX ADMIN — DOCUSATE SODIUM 100 MG: 100 CAPSULE, LIQUID FILLED ORAL at 20:00

## 2017-11-15 RX ADMIN — OXYCODONE HYDROCHLORIDE AND ACETAMINOPHEN 2 TABLET: 5; 325 TABLET ORAL at 22:41

## 2017-11-15 RX ADMIN — SUCCINYLCHOLINE CHLORIDE 140 MG: 20 INJECTION, SOLUTION INTRAMUSCULAR; INTRAVENOUS at 07:53

## 2017-11-15 RX ADMIN — HYDROMORPHONE HYDROCHLORIDE 1 MG: 1 INJECTION, SOLUTION INTRAMUSCULAR; INTRAVENOUS; SUBCUTANEOUS at 12:20

## 2017-11-15 RX ADMIN — METFORMIN HYDROCHLORIDE 500 MG: 500 TABLET, FILM COATED ORAL at 18:22

## 2017-11-15 RX ADMIN — PROPOFOL 200 MG: 10 INJECTION, EMULSION INTRAVENOUS at 10:00

## 2017-11-15 RX ADMIN — EPHEDRINE SULFATE 10 MG: 50 INJECTION INTRAMUSCULAR; INTRAVENOUS; SUBCUTANEOUS at 08:28

## 2017-11-15 RX ADMIN — CEFAZOLIN 2 G: 1 INJECTION, POWDER, FOR SOLUTION INTRAMUSCULAR; INTRAVENOUS at 08:10

## 2017-11-15 RX ADMIN — PROPOFOL 200 MG: 10 INJECTION, EMULSION INTRAVENOUS at 07:53

## 2017-11-15 RX ADMIN — OXYCODONE HYDROCHLORIDE AND ACETAMINOPHEN 1 TABLET: 5; 325 TABLET ORAL at 18:32

## 2017-11-15 RX ADMIN — OXYCODONE HYDROCHLORIDE AND ACETAMINOPHEN 1 TABLET: 5; 325 TABLET ORAL at 17:36

## 2017-11-15 RX ADMIN — DEXAMETHASONE SODIUM PHOSPHATE 8 MG: 4 INJECTION, SOLUTION INTRAMUSCULAR; INTRAVENOUS at 08:27

## 2017-11-15 RX ADMIN — TRANEXAMIC ACID 1000 MG: 100 INJECTION, SOLUTION INTRAVENOUS at 08:05

## 2017-11-15 RX ADMIN — ONDANSETRON 4 MG: 2 INJECTION INTRAMUSCULAR; INTRAVENOUS at 12:18

## 2017-11-15 RX ADMIN — CEFAZOLIN 1 G: 1 INJECTION, POWDER, FOR SOLUTION INTRAMUSCULAR; INTRAVENOUS at 12:08

## 2017-11-15 RX ADMIN — PANTOPRAZOLE SODIUM 40 MG: 40 TABLET, DELAYED RELEASE ORAL at 17:43

## 2017-11-15 RX ADMIN — WATER 2 G: 1 INJECTION INTRAMUSCULAR; INTRAVENOUS; SUBCUTANEOUS at 20:00

## 2017-11-15 RX ADMIN — FENTANYL CITRATE 100 MCG: 50 INJECTION, SOLUTION INTRAMUSCULAR; INTRAVENOUS at 07:53

## 2017-11-15 ASSESSMENT — PAIN - FUNCTIONAL ASSESSMENT: PAIN_FUNCTIONAL_ASSESSMENT: 0-10

## 2017-11-15 ASSESSMENT — PAIN SCALES - GENERAL
PAINLEVEL_OUTOF10: 0
PAINLEVEL_OUTOF10: 5
PAINLEVEL_OUTOF10: 3
PAINLEVEL_OUTOF10: 5
PAINLEVEL_OUTOF10: 3

## 2017-11-15 ASSESSMENT — PAIN DESCRIPTION - LOCATION: LOCATION: BACK

## 2017-11-15 ASSESSMENT — PAIN DESCRIPTION - ORIENTATION: ORIENTATION: LOWER

## 2017-11-15 ASSESSMENT — LIFESTYLE VARIABLES: SMOKING_STATUS: 0

## 2017-11-15 ASSESSMENT — PAIN DESCRIPTION - PAIN TYPE: TYPE: ACUTE PAIN;SURGICAL PAIN

## 2017-11-15 ASSESSMENT — ENCOUNTER SYMPTOMS
DYSPNEA ACTIVITY LEVEL: NO INTERVAL CHANGE
STRIDOR: 0
SHORTNESS OF BREATH: 1

## 2017-11-15 ASSESSMENT — PAIN DESCRIPTION - FREQUENCY: FREQUENCY: INTERMITTENT

## 2017-11-15 ASSESSMENT — PAIN DESCRIPTION - DESCRIPTORS: DESCRIPTORS: ACHING

## 2017-11-15 NOTE — FLOWSHEET NOTE
11/15/17 0803   Encounter Summary   Services provided to: Patient and family together   Referral/Consult From: [address] [place] [address]; Family members   Continue Visiting (11/15/17)   Complexity of Encounter Moderate   Length of Encounter 15 minutes   Spiritual Assessment Completed Yes   Routine   Type Pre-procedure   Assessment Coping; Anxious; Hopeful   Intervention Active listening;[place];Prayer;Explored feelings, thoughts, concerns; Discussed relationship with God;Discussed meaning/purpose   Outcome Comfort;Tearful;Expressed gratitude; Shared reminiscences   Spiritual/Gnosticism   Type Spiritual support   Intervention Prayer

## 2017-11-15 NOTE — BRIEF OP NOTE
Brief Postoperative Note    Norberto Osullivan  YOB: 1945  [id number]    Pre-operative Diagnosis: L3-4 retrolisthesis h/o 2-3 and 4-sacrum fusion    Post-operative Diagnosis: Same    Procedure: L3-4 Plif    Anesthesia: General    Surgeons/Assistants: monet    Estimated Blood Loss: 163     Complications: None    Specimens: Was Not Obtained    Findings: cephalid screws K2 retained new scres L4 globus with rise cage    Electronically signed by Roger Sneed MD on 11/15/2017 at 12:16 PM

## 2017-11-15 NOTE — ANESTHESIA POSTPROCEDURE EVALUATION
POST- ANESTHESIA EVALUATION       Pt Name: Kunal Worthington  MRN: [id number]  YOB: 1945  Date of evaluation: 11/15/2017  Time:  2:30 PM      /78   Pulse 93   Temp 97.7 °F (36.5 °C) (Oral)   Resp 17   Ht 5' 9\" (1.753 m)   Wt 223 lb (101.2 kg)   SpO2 93%   BMI 32.93 kg/m²      Consciousness Level  Awake  Cardiopulmonary Status  Stable  Pain Adequately Treated YES  Nausea / Vomiting  NO  Adequate Hydration  YES  Anesthesia Related Complications NONE      Electronically signed by Andre Gonzales MD on 11/15/2017 at 2:30 PM       Department of Anesthesiology  Postprocedure Note    Patient: Kunal Worthington  MRN: [id number]  YOB: 1945  Date of evaluation: 11/15/2017  Time:  2:30 PM     Procedure Summary     Date:  11/15/17 Room / Location:  [address] [place] [address] Akanksha Burns 01 / [address]    Anesthesia Start:  0748 Anesthesia Stop:  1311    Procedure:  LUMBAR DECOMPRESSION POSTERIOR W/FUSION L3 L4 ( with SPINAL CORD MONITOR ) (N/A ) Diagnosis:  (L3 L4 RETROLISTHESIS )    Surgeon:  Dav Mcneill MD Responsible Provider:  Andre Gonzales MD    Anesthesia Type:  general ASA Status:  3          Anesthesia Type: general    Johan Phase I: Johan Score: 10    Johan Phase II:      Last vitals: Reviewed and per EMR flowsheets.        Anesthesia Post Evaluation

## 2017-11-15 NOTE — ANESTHESIA PRE PROCEDURE
11/14/17    BMI:   Wt Readings from Last 3 Encounters:   11/15/17 223 lb (101.2 kg)   11/02/17 223 lb (101.2 kg)   10/17/17 221 lb 3.2 oz (100.3 kg)     Body mass index is 32.93 kg/m². CBC:   Lab Results   Component Value Date    WBC 5.4 11/02/2017    RBC 5.00 11/02/2017    HGB 14.7 11/02/2017    HCT 44.6 11/02/2017    MCV 89.3 11/02/2017    RDW 15.9 11/02/2017     11/02/2017     K 5.2    CMP:   Lab Results   Component Value Date     11/02/2017    K 5.2 11/02/2017     11/02/2017    CO2 28 11/02/2017    BUN 20 11/02/2017    CREATININE 0.77 11/02/2017    GFRAA >60 11/02/2017    LABGLOM >60 11/02/2017    GLUCOSE 133 11/02/2017    PROT 7.4 08/09/2017    CALCIUM 9.2 11/02/2017    BILITOT 0.25 08/09/2017    ALKPHOS 75 08/09/2017    AST 15 08/09/2017    ALT 19 08/09/2017       POC Tests: No results for input(s): POCGLU, POCNA, POCK, POCCL, POCBUN, POCHEMO, POCHCT in the last 72 hours.     Coags:   Lab Results   Component Value Date    PROTIME 10.6 09/07/2017    INR 1.0 09/07/2017    APTT 28.0 09/07/2017       HCG (If Applicable): No results found for: PREGTESTUR, PREGSERUM, HCG, HCGQUANT     ABGs: No results found for: PHART, PO2ART, LGD5KTT, YMZ3RTS, BEART, B9JGUOMX     Type & Screen (If Applicable):  No results found for: [facility]    Anesthesia Evaluation  Patient summary reviewed  Airway: Mallampati: III  TM distance: >3 FB   Neck ROM: full  Mouth opening: > = 3 FB Dental: normal exam         Pulmonary:normal exam    (+) shortness of breath: no interval change,  asthma: allergic asthma,     (-) pneumonia, COPD, recent URI, sleep apnea, rhonchi, wheezes, rales, stridor and not a current smoker                           Cardiovascular:  Exercise tolerance: good (>4 METS),   (+) hypertension: no interval change, past MI: no interval change, CAD: no interval change, GILL: no interval change,     (-) pacemaker, valvular problems/murmurs, CABG/stent, dysrhythmias,  angina,  CHF, orthopnea, PND,

## 2017-11-15 NOTE — H&P
meeting criteria   for AAA (>50% of proximal normal segment).           Labs:  CBC: No results for input(s): WBC, HGB, PLT in the last 72 hours. BMP:  No results for input(s): NA, K, CL, CO2, BUN, CREATININE, GLUCOSE in the last 72 hours. Hepatic: No results for input(s): AST, ALT, ALB, BILITOT, ALKPHOS in the last 72 hours. CARDIAC ENZY: No results for input(s): CKTOTAL, CKMB, CKMBINDEX, TROPONINT, MYOGLOBIN in the last 72 hours. INR: No results for input(s): INR in the last 72 hours. BNP: No results for input(s): BNP in the last 72 hours.   ABGs: No results found for: PHART, PO2ART, VEB9BMT  Lipids: No results for input(s): CHOL, HDL in the last 72 hours.     Invalid input(s): LDLCALCU  U/A:        Lab Results   Component Value Date     COLORU YELLOW 02/11/2015     WBCUA 0 TO 2 04/07/2014     RBCUA 0 TO 2 04/07/2014     MUCUS NOT REPORTED 04/07/2014     BACTERIA FEW 04/07/2014     SPECGRAV 1.023 02/11/2015     LEUKOCYTESUR NEGATIVE 02/11/2015     GLUCOSEU NEGATIVE 02/11/2015     AMORPHOUS 2+ 04/07/2014         PAST MEDICAL HISTORY      Past Medical History        Past Medical History:   Diagnosis Date    Asthma      CAD (coronary artery disease)       STENTS X 4    Cancer (HCC)       THROAT, HX. RADIATION , LT EAR    Cervical radiculopathy      Chronic back pain      Colonic polyp      Degeneration of cervical intervertebral disc       [facility], 1990 C4/C5-C6/C7    Degeneration of cervical intervertebral disc       [facility] 1994, C3/C4    Depression      Diverticulosis      GERD (gastroesophageal reflux disease)      Glucose intolerance (impaired glucose tolerance)       IS NOT DIABETIC, PER PT    HTN (hypertension)      Hyperlipidemia      Lumbar radiculopathy      MI (myocardial infarction)       2011    Numbness and tingling       HANDS    OA (osteoarthritis)      Pre-diabetes      Sacroiliac pain 11/4/2014    Vision abnormalities       WEARS GLASSES            Pt denies any history of Diabetes mellitus type 2, stroke,  COPD,  Cancer, Seizures,Thyroid disease, Kidney Disease, Hepatitis, TB, Psychiatric Disorders or Substance abuse.     SURGICAL HISTORY        Past Surgical History         Past Surgical History:   Procedure Laterality Date    ABSCESS DRAINAGE Right       ELBOW WITH CLOSURE    ANESTHESIA NERVE BLOCK Bilateral 6/2/2017     Bilat L1 L2 L3 L4 L5 Diagnostic Medial Branch Blk performed by Rebel Shook MD at [address] Bilateral 6/9/2017     Bilat L1 L2 L3 L4 L5 Confirmatory Medial BB performed by Rebel Shook MD at [place] [address]        CARDIAC CATHETERIZATION   06/01/2011     DR Nabor Nguyen CARDIAC CATHETERIZATION   5-5-2011     STENTSx4    CERVICAL SPINE SURGERY   08/09/2001     Anterior Cervical Decompression and Fusion C3-4    CERVICAL SPINE SURGERY Left 2803,1260,7656     Cervical C6-C7 Discectomy and Foraminotomy    COLONOSCOPY   2007, 2003     POLYPS    COLONOSCOPY   9/9/13     hyperplastic polyp x 3    CORONARY ANGIOPLASTY WITH STENT PLACEMENT        ELBOW BURSA SURGERY Right 2012,2011    FINGER TRIGGER RELEASE Right       THIRD FINGER    [facility] INJECTION PROCEDURE FOR SACROILIAC JOINT Bilateral 3/14/2017     Bilat Sacroiliac & Piriformis Inj's performed by Rebel Shook MD at [address] Right       ELBOW    LUMBAR FUSION   4/7/14     lumbar decompression and fusion   [facility] 898 [id number]     Fusion    LUMBAR SPINE SURGERY Left 2/14/2017     Left L4 & L5 Transforaminal ANITHA performed by Rebel Shook MD at [address] [place] [address]  Bilateral 5/2/2017     Bilat L5 Transforaminal ANITHA performed by Rebel Shook MD at [address] [place] [address]  Bilateral 5/9/2017     Bilat L5 Transforaminal ANITHA performed by Rebel Shook MD at [address]   2/19/13, 5/14/13     L1/L2 IESI    OTHER SURGICAL HISTORY         Hardware correction,     Joint pain, muscle aches    Ibuprofen Other (See Comments)       bleeding    Lisinopril Hives    Effient [Prasugrel] Rash                Current Outpatient Prescriptions on File Prior to Encounter   Medication Sig Dispense Refill    celecoxib (CELEBREX) 200 MG capsule Take 1 capsule by mouth 2 times daily 180 capsule 3    HYDROcodone-acetaminophen (NORCO) 5-325 MG per tablet Take 1 tablet by mouth every 8 hours as needed for Pain .        tiZANidine (ZANAFLEX) 4 MG tablet TAKE ONE TABLET BY MOUTH THREE TIMES DAILY 90 tablet 5    metoprolol tartrate (LOPRESSOR) 25 MG tablet Take 1 tablet by mouth 2 times daily 180 tablet 3    clopidogrel (PLAVIX) 75 MG tablet Take 1 tablet by mouth daily 90 tablet 3    atorvastatin (LIPITOR) 80 MG tablet Take 1 tablet by mouth daily 90 tablet 3    metFORMIN (GLUCOPHAGE) 500 MG tablet Take 1 tablet by mouth 2 times daily (with meals) 180 tablet 3    PARoxetine (PAXIL) 20 MG tablet TAKE ONE TABLET BY MOUTH ONCE DAILY IN THE MORNING 90 tablet 3    pantoprazole (PROTONIX) 40 MG tablet Take 1 tablet by mouth daily 90 tablet 3    aspirin 81 MG tablet Take 81 mg by mouth daily        famotidine (PEPCID) 20 MG tablet Take 20 mg by mouth daily.          No current facility-administered medications on file prior to encounter.                        General health:  Fairly good. No fever or chills. Skin:  No itching, redness or rash.       Head, eyes, ears, nose, throat:  No headache, epistaxis, rhinorrhea hearing loss or sore throat. Headache R/T neck, glasses, B hearing aides,      Neck:  No pain, stiffness or masses.      Cardiovascular/Respiratory system:  No chest pain, palpitation, shortness of breath, coughing or expectoration. Cardiac stents,                Gastrointestinal tract: No abdominal pain, nausea, vomiting, diarrhea or constipation. Heartburn once in a while.      Genitourinary:  No burning on micturition.   No hesitancy, urgency, frequency or discoloration of urine.      Locomotor:  No bone or joint pains. No swelling or deformities.       Neuropsychiatric:  No referable complaints. Depression,      See HPI.      GENERAL PHYSICAL EXAM:         Vitals: /66   Pulse 76   Temp 98.1 °F (36.7 °C)   Resp 18   Ht 5' 9\" (1.753 m)   Wt 223 lb (101.2 kg)   SpO2 92%   BMI 32.93 kg/m²  Body mass index is 32.93 kg/m².      GENERAL APPEARANCE:  Norberto Osullivan is 67 y.o.,  male, mildly obese, nourished, conscious, alert. Does not appear to be distress or pain at this time.           SKIN:  Warm, dry, no cyanosis or jaundice.     HEAD:  Normocephalic, atraumatic, no swelling or tenderness.      EYES:  Pupils equal, reactive to light, Conjunctiva is clear, EOMs intact walter. eyelids WNL.    EARS:  No discharge, no marked hearing loss. Hearing aides.     NOSE:  No rhinorrhea, epistaxis or septal deformity.     THROAT:  Not congested. No ulceration bleeding or discharge.      NECK:  No stiffness, trachea central.  No palpable masses or L. N.       CHEST:  Symmetrical and equal on expansion.       HEART:  Regular rate and rhythm. S1 > S2, No audible murmurs or gallops.      LUNGS:  Equal on expansion, normal breath sounds. No adventitious sounds.       ABDOMEN:  Obese. Soft on palpation. No localized tenderness. No guarding or rigidity. No palpable organomegaly.     LYMPHATICS:  No palpable Lymphadenopathy. Ambulates with limp. Posture hunched forward.     LOCOMOTOR, BACK AND SPINE:  No tenderness or deformities.      EXTREMITIES:  Symmetrical, no pedal edema. Khurrams sign negative. No discoloration or ulcerations.     NEUROLOGIC:  The patient is conscious, alert, oriented. No apparent focal sensory deficits. No motor deficits, muscle strength equal Walter.  No facial droop, tongue protrudes centrally, no slurring of the speech.      PROVISIONAL DIAGNOSES:       L3-L4 retrolithiasis  Lumbar decompression posterior with fusion L3-L4 with

## 2017-11-16 VITALS
WEIGHT: 250.44 LBS | BODY MASS INDEX: 37.09 KG/M2 | DIASTOLIC BLOOD PRESSURE: 64 MMHG | SYSTOLIC BLOOD PRESSURE: 107 MMHG | TEMPERATURE: 98.1 F | RESPIRATION RATE: 16 BRPM | OXYGEN SATURATION: 96 % | HEIGHT: 69 IN | HEART RATE: 89 BPM

## 2017-11-16 LAB
HCT VFR BLD CALC: 34 % (ref 41–53)
HEMOGLOBIN: 11.2 G/DL (ref 13.5–17.5)
MCH RBC QN AUTO: 29.7 PG (ref 26–34)
MCHC RBC AUTO-ENTMCNC: 33 G/DL (ref 31–37)
MCV RBC AUTO: 90 FL (ref 80–100)
PDW BLD-RTO: 16.1 % (ref 11.5–14.9)
PLATELET # BLD: 267 K/UL (ref 150–450)
PMV BLD AUTO: 6.8 FL (ref 6–12)
RBC # BLD: 3.78 M/UL (ref 4.5–5.9)
WBC # BLD: 8.3 K/UL (ref 3.5–11)

## 2017-11-16 PROCEDURE — 99024 POSTOP FOLLOW-UP VISIT: CPT | Performed by: ORTHOPAEDIC SURGERY

## 2017-11-16 PROCEDURE — 97530 THERAPEUTIC ACTIVITIES: CPT

## 2017-11-16 PROCEDURE — 85027 COMPLETE CBC AUTOMATED: CPT

## 2017-11-16 PROCEDURE — 6370000000 HC RX 637 (ALT 250 FOR IP): Performed by: ORTHOPAEDIC SURGERY

## 2017-11-16 PROCEDURE — G8988 SELF CARE GOAL STATUS: HCPCS

## 2017-11-16 PROCEDURE — 97165 OT EVAL LOW COMPLEX 30 MIN: CPT

## 2017-11-16 PROCEDURE — 2580000003 HC RX 258: Performed by: ORTHOPAEDIC SURGERY

## 2017-11-16 PROCEDURE — G8987 SELF CARE CURRENT STATUS: HCPCS

## 2017-11-16 PROCEDURE — 6360000002 HC RX W HCPCS: Performed by: ORTHOPAEDIC SURGERY

## 2017-11-16 PROCEDURE — 36415 COLL VENOUS BLD VENIPUNCTURE: CPT

## 2017-11-16 RX ORDER — OXYCODONE HYDROCHLORIDE AND ACETAMINOPHEN 5; 325 MG/1; MG/1
1-2 TABLET ORAL EVERY 4 HOURS PRN
Qty: 40 TABLET | Refills: 0 | Status: SHIPPED | OUTPATIENT
Start: 2017-11-16 | End: 2018-05-11 | Stop reason: SDUPTHER

## 2017-11-16 RX ADMIN — ATORVASTATIN CALCIUM 80 MG: 80 TABLET, FILM COATED ORAL at 10:47

## 2017-11-16 RX ADMIN — OXYCODONE HYDROCHLORIDE AND ACETAMINOPHEN 2 TABLET: 5; 325 TABLET ORAL at 10:48

## 2017-11-16 RX ADMIN — WATER 2 G: 1 INJECTION INTRAMUSCULAR; INTRAVENOUS; SUBCUTANEOUS at 05:11

## 2017-11-16 RX ADMIN — METOPROLOL TARTRATE 25 MG: 25 TABLET ORAL at 10:48

## 2017-11-16 RX ADMIN — OXYCODONE HYDROCHLORIDE AND ACETAMINOPHEN 2 TABLET: 5; 325 TABLET ORAL at 05:12

## 2017-11-16 RX ADMIN — FAMOTIDINE 20 MG: 20 TABLET, FILM COATED ORAL at 10:48

## 2017-11-16 RX ADMIN — METFORMIN HYDROCHLORIDE 500 MG: 500 TABLET, FILM COATED ORAL at 10:48

## 2017-11-16 RX ADMIN — PAROXETINE HYDROCHLORIDE 20 MG: 20 TABLET, FILM COATED ORAL at 11:41

## 2017-11-16 RX ADMIN — Medication 10 ML: at 05:13

## 2017-11-16 ASSESSMENT — PAIN DESCRIPTION - PAIN TYPE: TYPE: SURGICAL PAIN

## 2017-11-16 ASSESSMENT — PAIN SCALES - GENERAL
PAINLEVEL_OUTOF10: 6
PAINLEVEL_OUTOF10: 3
PAINLEVEL_OUTOF10: 5

## 2017-11-16 NOTE — PROGRESS NOTES
[address]   Occupational Therapy Evaluation  Date: 17  Patient Name: Diaz Recinos       Room:   MRN: [id number]  Account: [de-identified]   : 1945  (77 y.o.) Gender: male     Referring Practitioner: Dr Tremayne Smith  Diagnosis: s/p lumbar L3-5  posterior Laminatectomy with Instrumented Fusion (past l3-4 PLIF)  for leg pain and limping Gait   Additional Pertinent Hx: 2015 Lumbar PLIF;     Treatment Diagnosis: Impaired ADL status   Past Medical History:  has a past medical history of Asthma; CAD (coronary artery disease); Cancer ([facility] [address] Cervical radiculopathy; Chronic back pain; Colonic polyp; Degeneration of cervical intervertebral disc; Degeneration of cervical intervertebral disc; Depression; Diverticulosis; GERD (gastroesophageal reflux disease); Glucose intolerance (impaired glucose tolerance); HTN (hypertension); Hyperlipidemia; Lumbar radiculopathy; MI (myocardial infarction); Numbness and tingling; OA (osteoarthritis); Pre-diabetes; Sacroiliac pain; and Vision abnormalities. Past Surgical History:   has a past surgical history that includes Coronary angioplasty with stent; Finger trigger release (Right); Abscess Drainage (Right); other surgical history (13, 13); Elbow bursa surgery (Right, ,); Infected skin debridement (Right); Colonoscopy (, ); Colonoscopy (13); Cervical spine surgery (2001); Cervical spine surgery (Left, [id number],[id number],[id number]); Lumbar spine surgery (3169,3557); lumbar fusion (14); skin biopsy; other surgical history; other surgical history (Left, 2016); other surgical history (Bilateral, 2016); other surgical history (2016); other surgical history (Right, 2016); other surgical history (Left, 12/15/17); other surgical history (Left, 2017); Lumbar spine surgery (Left, 2017);  Injection Procedure For Sacroiliac Joint (Bilateral, 3/14/2017); arthrocentesis aspir&/inj major jt/bursa w/o us (Left, 3/31/2017); arthrocentesis aspir&/inj major jt/bursa w/o us (Left, 4/14/2017); Lumbar spine surgery (Bilateral, 5/2/2017); Lumbar spine surgery (Bilateral, 5/9/2017); Nerve Block (Bilateral, 6/2/2017); Nerve Block (Bilateral, 6/9/2017); RADIOFREQUENCY ABLATION NERVES (Right, 6/29/2017); RADIOFREQUENCY ABLATION NERVES (Left, 7/6/2017); Cardiac catheterization; Cardiac catheterization (06/01/2011); Cardiac catheterization (5-5-2011); and back surgery.     Restrictions  Restrictions/Precautions: Weight Bearing  Spinal Precautions: Now s/p lumbar L3-5  posterior Laminatectomy with Instrumented Fusion (past l3-4 PLIF)   Position Activity Restriction  Spinal Precautions: No Twisting  Spinal Precautions: Now s/p lumbar L3-5  posterior Laminatectomy with Instrumented Fusion (past l3-4 PLIF)      Vitals  Temp: 98.1 °F (36.7 °C)  Pulse: 89  Resp: 16  BP: 107/64  Height: 5' 9\" (175.3 cm)  Weight: 250 lb 7.1 oz (113.6 kg)  BMI (Calculated): 37.1  Oxygen Therapy  SpO2: 96 %  Pulse Oximeter Device Mode: Continuous  Pulse Oximeter Device Location: Left, Finger  O2 Device: None (Room air)  O2 Flow Rate (L/min): 2 L/min  Level of Consciousness: Alert    Subjective  Subjective: Alert  Comments: Feels good - no more leg pain - wants to go home today   Overall Orientation Status: Within Normal Limits  Vision  Vision: Within Functional Limits  Hearing  Hearing: Within functional limits  Social/Functional History  Lives With: Spouse  Type of Home: House  Home Layout: One level  Home Access: Stairs to enter with rails  Entrance Stairs - Number of Steps: 2 Steps   Entrance Stairs - Rails: Both  Bathroom Shower/Tub: Tub/Shower unit, Curtain  Bathroom Toilet: Standard  Bathroom Accessibility: Accessible  Home Equipment: Standard walker, Cane, Rolling walker  ADL Assistance: Independent  Homemaking Assistance: Independent  Homemaking Responsibilities: Yes  Ambulation Assistance: Independent  Transfer Assistance: Independent  Active : Yes  Mode of Transportation: Car  Occupation: Retired    Objective      Cognition  Overall Cognitive Status: WFL       ADL  Feeding: Independent  Grooming: Modified independent   UE Bathing: Modified independent   LE Bathing: Modified independent   UE Dressing: Stand by assistance  LE Dressing: Stand by assistance  Toileting: Supervision    UE Function           LUE Strength  Gross LUE Strength: WFL  L Hand Grasp: 4/5     LUE Tone: Normotonic     LUE AROM (degrees)  LUE AROM : WFL     Left Hand AROM (degrees)  Left Hand AROM: WFL  RUE Strength  Gross RUE Strength: WFL  R Hand Grasp: 4/5      RUE Tone: Normotonic     RUE AROM (degrees)  RUE AROM : WFL     Right Hand AROM (degrees)  Right Hand AROM: WFL    Fine Motor Skills  Coordination  Movements Are Fluid And Coordinated: Yes          Mobility          Balance  Sitting Balance: Independent  Standing Balance: Modified independent         Bed mobility  Rolling to Left: Supervision  Rolling to Right: Supervision            Assessment  Performance deficits / Impairments: Decreased functional mobility , Decreased safe awareness, Decreased endurance  Treatment Diagnosis: Impaired ADL status   Prognosis: Good  Decision Making: Low Complexity  History: Moderate Chart Review -   Exam: 3 areas of altered performance   Patient Education: Reviewed Back Program,   REQUIRES OT FOLLOW UP: Yes  Discharge Recommendations: Outpatient OT  Activity Tolerance: Patient Tolerated treatment well         G CODE  OT G-codes  Functional Assessment Tool Used: AM PAC - Basic Cares   Score: 20  Self Care Current Status ():  At least 20 percent but less than 40 percent impaired, limited or restricted  Self Care Goal Status (): 0 percent impaired, limited or restricted    Goals  Patient Goals   Patient goals : Return home   Short term goals  Time Frame for Short term goals: 1-2 days  Short term goal 1: Participate in care tasks using safe techniques with Back Program   Short term

## 2017-11-16 NOTE — PLAN OF CARE
Problem: Falls - Risk of:  Goal: Will remain free from falls  Will remain free from falls   Outcome: Ongoing  Pt. Free of falls and injuries this shift. Problem: Mobility - Impaired:  Goal: Mobility will improve  Mobility will improve   Outcome: Ongoing  Up per self. Ambulating in hallways with staff. Problem: Pain:  Goal: Pain level will decrease  Pain level will decrease   Outcome: Ongoing  Adequate pain control achieved this shift. See MAR. Problem: Falls - Risk of  Goal: Absence of falls  Outcome: Ongoing  Pt. Free of falls and injuries this shift.

## 2017-11-16 NOTE — FLOWSHEET NOTE
11/16/17 1016   Encounter Summary   Services provided to: Patient   Referral/Consult From: Rounding   Continue Visiting (11/16/17)   Complexity of Encounter Low   Length of Encounter 15 minutes   Spiritual/Worship   Type Ritual   Intervention Anointing   Sacraments   Sacrament of Sick-Anointing Anointed  (Fr Hodge 11/16/17)

## 2017-11-16 NOTE — OP NOTE
[address]                   [address] [place], [address]                                 OPERATIVE REPORT    PATIENT NAME: Jesus Gonzalez                         :        1945  MED REC NO:   [id number]                              ROOM:       2046  ACCOUNT NO:   [de-identified]                           ADMIT DATE: 11/15/2017  PROVIDER:     David Brewer    DATE OF PROCEDURE:  11/15/2017    PREOPERATIVE DIAGNOSES:  L2-L3 retrolisthesis, lumbar spinal stenosis,  history of L4 to sacrum uninstrumented fusion, history of L2-L3 posterior  lumbar interbody fusion with posterior screws and rods. POSTOPERATIVE DIAGNOSES:  L2-L3 retrolisthesis, lumbar spinal stenosis,  history of L4 to sacrum uninstrumented fusion, history of L2-L3 posterior  lumbar interbody fusion with posterior screws and rods. OPERATIONS PERFORMED:  L3-L4 posterior decompression with posterior  interbody fusion with application of intervertebral body fusion cage,  posterior segmental instrumentation, fluoroscopy, allograft and autograft  and exploration of fusion L2-L3 with removal of hardware deep. SURGEON:  David Brewer MD    FIRST ASSISTANT:  Patricia You. ANESTHESIA:  General.    BLOOD LOSS:  300 mL. FLUID REPLACEMENT:  Less than 300 mL. FINDINGS:  1. The patient with K2 medical screw taran systems cephaladly. Screws were  retained. Rods were discontinued. 2.  Globus screws added to L4.  3.  RISE intervertebral body fusion cage utilized. 4.  The patient with obvious evidence of INFUSE affected fusion at L2-L3  with significant ossification within the soft tissue scar and a very solid  arthrodesis at L2-L3. PROCEDURE IN DETAIL:  The patient was taken to the operating room and  placed under general anesthesia, transferred to the [address] [place] [address]  table, checked for padding and positioning. Back was prepped and draped in  the usual sterile fashion.   Localizing x-ray was which was a bigger contributor to his stenosis than the  bony hypertrophy of the joints. After acceptably decompressing the patient, disk space was addressed. The  patient had very large venous plexuses overlying disk bilaterally. On the  left, we were gradually able to get this fully under control and have  access to the disk at the same time. On the right, although we  substantially diminished the amount of venous plexus. We were never able  without the extreme measures to get exceptional control and therefore we  were elected to perform only through the left hand side. After accessing the disk, the disk was excised with a combination of  scalpel, intradiskal pituitaries, paddle jodie, and curettes. After  removing the disk through the cartilaginous endplates, a right cage was  placed. Initially, it was probably expanded to about 16 and then backed  down to about 15. This came to rise in the anterior two thirds of the  vertebral body and was largely an anterior-posterior cage. We were able to pack copious amounts of autograft. In fact, 90% of the  patient's local autograft was then packed laterally to the cage on the left  hand side. We again inspected the disk on the right and again controlling the venous  plexus enough to allow us to access would require substantial amount of  extra work. At this time, it was judged to be more harmful than beneficial  to graft the patient from the right hand side. Therefore, at this time, we went ahead and repositioned the taran on the  right, compressed the right across the L3-L4 disk space actually applying  the desired amount of lordosis across the disk space. The taran was then placed on the left. Again, the screws were tightened in  situ at L2-L3 and then the L4 screw was compressed and torqued. Lateral gutters were then grafted with cortical cancellous allograft. A  gram of vancomycin was placed in the depths of wound.   Defect was  reapproximated in two layers, a #2 Vicryl suture, subcuticular layer with  2-0 Vicryl, followed by 3-0 Vicryl skin closure. Aquacel dressing was  applied. The patient was awakened from anesthesia and taken to recovery  room in stable condition. COMPLICATIONS RAISED DURING THE OPERATION:  None noted.         JAMIE Berger    D: 11/15/2017 12:41:53       T: 11/15/2017 15:50:33     [email or web address]  Job#: [id number]     Doc#: [id number]

## 2017-11-16 NOTE — CARE COORDINATION
[facility]  DVT Prophylaxis and Vaccine Status  Work List  Mandatory for all patients      Patient must be on both Chemical prophylaxis and Mechanical prophylaxis. If chemical/mechanical prophylaxis is not ordered, the physician must document a reason for not using prophylaxis     Chemical Prophylaxis  Is patient on chemical prophylaxis: No  If no chemical prophylaxis Is a order in for No Chemical VTE prophylaxisYes  If no was the physician notified not applicable      Mechanical Prophylaxis  Is patient on mechanical prophylaxis, intermittent pneumatic compression device: Yes  If no was the physician notified not applicable        Pneumonia Vaccine  Vaccine indicated:  Up-to-date  If indicated was the vaccine given: not applicable    Influenza Vaccine (applicable from October through March):  Vaccine indicated: Vaccination was ordered  If indicated was the vaccine given: patient wants in am    Patient Education  Education completed on DVT prophylaxis: yes              ADMISSION NOTE       Patient admitted to room  2046. Time of admit: 1440    Admit from:  PACU    Reason for admission:  Lumbar decompression    Where patient has been residing for the last 24 hrs:  home    Has the patient been admitted to any facility in the last 4 weeks, which one:  no    Family at bedside:  no    Patient is currently in pain No  Patient has been oriented to room, educated on how to use call light, and to call for assistance prior to getting up. Bed in lowest and locked position. 2 siderails up for safety. Call light within reach. [facility]  SCIP  Core Measure Compliance  Work List    24 hours from Anesthesia End Time is: 1311 on 11-15-17    VTE must be administered and applied within 24 hours of anesthesia end time. not applicable  Patient should have both Chemical and Mechanical  Prophylaxis    Beta Blocker  It is recommended that patients take their beta blocker the day of surgery.   Was the patient on a beta blocker prior to surgery? Yes  Did the physician reorder the beta blocker for post op medications? yes  If the physician did not reorder the beta blocker was he notified not applicable      Prophylactic Post Op Antibiotics  Is the antibiotic scheduled to end within 24 hours of Anesthesia end time? Yes    Aguilar  Does patient have a aguilar? No  It is recommended that the aguilar be discontinued by POD#2.  Place a sticky note to the physician to remind him/her that the aguilar should be out by the following date: na

## 2017-11-16 NOTE — PROGRESS NOTES
Surgical Progress Note    POD: 1    Patient doing well  Vitals:    17 0631   BP: 107/64   Pulse: 89   Resp: 16   Temp: 98.1 °F (36.7 °C)   SpO2: 96%      Temp (24hrs), Av.8 °F (37.1 °C), Min:96.6 °F (35.9 °C), Max:99.9 °F (37.7 °C)       Pain Control Good  No unusual nausea    Exam:  Reports limp and leg pain resolved  Wants to go home      Lungs:  No respiratory distress    Labs reviewed:  Hemoglobin   Date/Time Value Ref Range Status   2017 07:01 AM 11.2 (L) 13.5 - 17.5 g/dL Final     Hematocrit   Date/Time Value Ref Range Status   2017 07:01 AM 34.0 (L) 41 - 53 % Final     WBC   Date/Time Value Ref Range Status   2017 07:01 AM 8.3 3.5 - 11.0 k/uL Final     Sodium   Date/Time Value Ref Range Status   2017 09:40  135 - 144 mmol/L Final     Potassium   Date/Time Value Ref Range Status   2017 09:40 AM 5.2 3.7 - 5.3 mmol/L Final     Chloride   Date/Time Value Ref Range Status   2017 09:40  98 - 107 mmol/L Final     CO2   Date/Time Value Ref Range Status   2017 09:40 AM 28 20 - 31 mmol/L Final     INR   Date/Time Value Ref Range Status   2017 01:38 PM 1.0  Final     Comment:           Therapeutic Range:  Normal:     INR = 0.8-1.3  Venous Thrombosis Prevention:     INR = 2.0-3.0  Mechanical Heart Valve Replacement:     INR = 2.5-3.5        [facility] [address] [facility] [address] [phone]         I/O last 3 completed shifts:   In:  [I.V.:]  Out: 1050 [Urine:750; Blood:300]    Assessment:  Doing well pod 1 plif    Plan:  See my orders    Erwin Menon MD  2017 7:31 AM

## 2017-11-17 LAB — SURGICAL PATHOLOGY REPORT: NORMAL

## 2017-11-17 NOTE — PLAN OF CARE
Problem: Mobility - Impaired:  Goal: Mobility will improve  Mobility will improve   Outcome: Completed Date Met: 11/16/17      Problem: Pain:  Goal: Pain level will decrease  Pain level will decrease   Outcome: Completed Date Met: 11/16/17    Goal: Control of acute pain  Control of acute pain   Outcome: Completed Date Met: 11/16/17    Goal: Control of chronic pain  Control of chronic pain   Outcome: Completed Date Met: 11/16/17      Problem: Falls - Risk of  Goal: Absence of falls  Outcome: Completed Date Met: 11/16/17

## 2017-11-28 ENCOUNTER — OFFICE VISIT (OUTPATIENT)
Dept: ORTHOPEDIC SURGERY | Age: 72
End: 2017-11-28

## 2017-11-28 ENCOUNTER — TELEPHONE (OUTPATIENT)
Dept: PAIN MANAGEMENT | Age: 72
End: 2017-11-28

## 2017-11-28 ENCOUNTER — NURSE ONLY (OUTPATIENT)
Dept: LAB | Age: 72
End: 2017-11-28
Payer: MEDICARE

## 2017-11-28 DIAGNOSIS — M43.10 ACQUIRED SPONDYLOLISTHESIS: Primary | ICD-10-CM

## 2017-11-28 DIAGNOSIS — Z98.1 S/P LUMBAR SPINAL FUSION: ICD-10-CM

## 2017-11-28 DIAGNOSIS — M48.062 SPINAL STENOSIS, LUMBAR REGION, WITH NEUROGENIC CLAUDICATION: ICD-10-CM

## 2017-11-28 DIAGNOSIS — M54.41 CHRONIC BILATERAL LOW BACK PAIN WITH BILATERAL SCIATICA: ICD-10-CM

## 2017-11-28 DIAGNOSIS — Z23 NEED FOR IMMUNIZATION AGAINST INFLUENZA: Primary | ICD-10-CM

## 2017-11-28 DIAGNOSIS — G89.29 CHRONIC BILATERAL LOW BACK PAIN WITH BILATERAL SCIATICA: ICD-10-CM

## 2017-11-28 DIAGNOSIS — M54.42 CHRONIC BILATERAL LOW BACK PAIN WITH BILATERAL SCIATICA: ICD-10-CM

## 2017-11-28 PROCEDURE — 90662 IIV NO PRSV INCREASED AG IM: CPT | Performed by: INTERNAL MEDICINE

## 2017-11-28 PROCEDURE — 99024 POSTOP FOLLOW-UP VISIT: CPT | Performed by: ORTHOPAEDIC SURGERY

## 2017-11-28 PROCEDURE — G0008 ADMIN INFLUENZA VIRUS VAC: HCPCS | Performed by: INTERNAL MEDICINE

## 2017-11-28 RX ORDER — PAROXETINE HYDROCHLORIDE 20 MG/1
TABLET, FILM COATED ORAL
Qty: 90 TABLET | Refills: 3 | Status: SHIPPED | OUTPATIENT
Start: 2017-11-28 | End: 2018-08-16 | Stop reason: SDUPTHER

## 2017-11-28 RX ORDER — OXYCODONE HYDROCHLORIDE AND ACETAMINOPHEN 5; 325 MG/1; MG/1
1-2 TABLET ORAL EVERY 4 HOURS PRN
Qty: 40 TABLET | Refills: 0 | Status: SHIPPED | OUTPATIENT
Start: 2017-11-28 | End: 2017-12-21 | Stop reason: SDUPTHER

## 2017-11-28 RX ORDER — PAROXETINE HYDROCHLORIDE 20 MG/1
TABLET, FILM COATED ORAL
Qty: 90 TABLET | Refills: 3 | Status: CANCELLED | OUTPATIENT
Start: 2017-11-28

## 2017-11-28 NOTE — TELEPHONE ENCOUNTER
Patient requesting new Rx for Paxil 20mg 1 tab daily.    Last ov 8/3/17  Next ov Follow up NOT scheduled  Patient fills prescriptions at [facility]/[place]

## 2017-12-21 DIAGNOSIS — Z98.1 S/P LUMBAR SPINAL FUSION: ICD-10-CM

## 2017-12-21 DIAGNOSIS — M48.062 SPINAL STENOSIS, LUMBAR REGION, WITH NEUROGENIC CLAUDICATION: ICD-10-CM

## 2017-12-21 RX ORDER — OXYCODONE HYDROCHLORIDE AND ACETAMINOPHEN 5; 325 MG/1; MG/1
1-2 TABLET ORAL EVERY 4 HOURS PRN
Qty: 40 TABLET | Refills: 0 | Status: SHIPPED | OUTPATIENT
Start: 2017-12-21 | End: 2018-02-15 | Stop reason: ALTCHOICE

## 2017-12-21 NOTE — TELEPHONE ENCOUNTER
Patient would like refill of his Percocet. He takes 1-2 a day as needed for pain.  Please call to [facility] in [place]

## 2018-01-04 DIAGNOSIS — M54.42 CHRONIC BILATERAL LOW BACK PAIN WITH BILATERAL SCIATICA: Primary | ICD-10-CM

## 2018-01-04 DIAGNOSIS — G89.29 CHRONIC BILATERAL LOW BACK PAIN WITH BILATERAL SCIATICA: Primary | ICD-10-CM

## 2018-01-04 DIAGNOSIS — M54.41 CHRONIC BILATERAL LOW BACK PAIN WITH BILATERAL SCIATICA: Primary | ICD-10-CM

## 2018-01-12 ENCOUNTER — OFFICE VISIT (OUTPATIENT)
Dept: ORTHOPEDIC SURGERY | Age: 73
End: 2018-01-12

## 2018-01-12 ENCOUNTER — HOSPITAL ENCOUNTER (OUTPATIENT)
Dept: GENERAL RADIOLOGY | Age: 73
Discharge: HOME | End: 2018-01-12
Payer: MEDICARE

## 2018-01-12 VITALS
DIASTOLIC BLOOD PRESSURE: 82 MMHG | BODY MASS INDEX: 32.14 KG/M2 | SYSTOLIC BLOOD PRESSURE: 130 MMHG | HEIGHT: 69 IN | HEART RATE: 81 BPM | WEIGHT: 217 LBS

## 2018-01-12 DIAGNOSIS — G89.29 CHRONIC BILATERAL LOW BACK PAIN WITH BILATERAL SCIATICA: ICD-10-CM

## 2018-01-12 DIAGNOSIS — M54.42 CHRONIC BILATERAL LOW BACK PAIN WITH BILATERAL SCIATICA: ICD-10-CM

## 2018-01-12 DIAGNOSIS — Z98.1 S/P LUMBAR SPINAL FUSION: ICD-10-CM

## 2018-01-12 DIAGNOSIS — M54.41 CHRONIC BILATERAL LOW BACK PAIN WITH BILATERAL SCIATICA: ICD-10-CM

## 2018-01-12 DIAGNOSIS — M48.062 SPINAL STENOSIS, LUMBAR REGION, WITH NEUROGENIC CLAUDICATION: ICD-10-CM

## 2018-01-12 DIAGNOSIS — M43.10 ACQUIRED SPONDYLOLISTHESIS: Primary | ICD-10-CM

## 2018-01-12 PROCEDURE — 72100 X-RAY EXAM L-S SPINE 2/3 VWS: CPT

## 2018-01-12 PROCEDURE — 99024 POSTOP FOLLOW-UP VISIT: CPT | Performed by: ORTHOPAEDIC SURGERY

## 2018-01-28 DIAGNOSIS — I25.83 CORONARY ARTERY DISEASE DUE TO LIPID RICH PLAQUE: ICD-10-CM

## 2018-01-28 DIAGNOSIS — I25.10 CORONARY ARTERY DISEASE DUE TO LIPID RICH PLAQUE: ICD-10-CM

## 2018-01-28 DIAGNOSIS — I10 ESSENTIAL HYPERTENSION: ICD-10-CM

## 2018-01-29 RX ORDER — CLOPIDOGREL BISULFATE 75 MG/1
TABLET ORAL
Qty: 90 TABLET | Refills: 3 | Status: SHIPPED | OUTPATIENT
Start: 2018-01-29 | End: 2019-02-15 | Stop reason: SDUPTHER

## 2018-02-05 ENCOUNTER — HOSPITAL ENCOUNTER (OUTPATIENT)
Dept: LAB | Age: 73
Setting detail: SPECIMEN
Discharge: HOME | End: 2018-02-05
Payer: MEDICARE

## 2018-02-05 DIAGNOSIS — I10 ESSENTIAL HYPERTENSION: ICD-10-CM

## 2018-02-05 DIAGNOSIS — E11.9 TYPE 2 DIABETES MELLITUS WITHOUT COMPLICATION, WITHOUT LONG-TERM CURRENT USE OF INSULIN (HCC): ICD-10-CM

## 2018-02-05 DIAGNOSIS — E78.2 MIXED HYPERLIPIDEMIA: ICD-10-CM

## 2018-02-05 LAB
CREATININE URINE: 108.3 MG/DL (ref 39–259)
ESTIMATED AVERAGE GLUCOSE: 123 MG/DL
HBA1C MFR BLD: 5.9 % (ref 4.8–5.9)
MICROALBUMIN/CREAT 24H UR: 21 MG/L
MICROALBUMIN/CREAT UR-RTO: 19 MCG/MG CREAT

## 2018-02-05 PROCEDURE — 83036 HEMOGLOBIN GLYCOSYLATED A1C: CPT

## 2018-02-05 PROCEDURE — 36415 COLL VENOUS BLD VENIPUNCTURE: CPT

## 2018-02-05 PROCEDURE — 82043 UR ALBUMIN QUANTITATIVE: CPT

## 2018-02-05 PROCEDURE — 82570 ASSAY OF URINE CREATININE: CPT

## 2018-02-06 ENCOUNTER — OFFICE VISIT (OUTPATIENT)
Dept: CARDIOLOGY | Age: 73
End: 2018-02-06
Payer: MEDICARE

## 2018-02-06 VITALS
HEIGHT: 69 IN | WEIGHT: 223 LBS | BODY MASS INDEX: 33.03 KG/M2 | HEART RATE: 61 BPM | DIASTOLIC BLOOD PRESSURE: 70 MMHG | SYSTOLIC BLOOD PRESSURE: 124 MMHG

## 2018-02-06 DIAGNOSIS — R06.09 DOE (DYSPNEA ON EXERTION): ICD-10-CM

## 2018-02-06 DIAGNOSIS — E78.2 MIXED HYPERLIPIDEMIA: ICD-10-CM

## 2018-02-06 DIAGNOSIS — I10 ESSENTIAL HYPERTENSION: ICD-10-CM

## 2018-02-06 DIAGNOSIS — E66.9 OBESITY (BMI 35.0-39.9 WITHOUT COMORBIDITY): ICD-10-CM

## 2018-02-06 DIAGNOSIS — I25.10 CORONARY ARTERY DISEASE DUE TO LIPID RICH PLAQUE: Primary | ICD-10-CM

## 2018-02-06 DIAGNOSIS — I25.83 CORONARY ARTERY DISEASE DUE TO LIPID RICH PLAQUE: Primary | ICD-10-CM

## 2018-02-06 DIAGNOSIS — E11.9 TYPE 2 DIABETES MELLITUS WITHOUT COMPLICATION, WITHOUT LONG-TERM CURRENT USE OF INSULIN (HCC): ICD-10-CM

## 2018-02-06 PROCEDURE — 93000 ELECTROCARDIOGRAM COMPLETE: CPT | Performed by: INTERNAL MEDICINE

## 2018-02-06 PROCEDURE — 99213 OFFICE O/P EST LOW 20 MIN: CPT | Performed by: INTERNAL MEDICINE

## 2018-02-15 ENCOUNTER — TELEPHONE (OUTPATIENT)
Dept: INTERNAL MEDICINE | Age: 73
End: 2018-02-15

## 2018-02-15 ENCOUNTER — OFFICE VISIT (OUTPATIENT)
Dept: INTERNAL MEDICINE | Age: 73
End: 2018-02-15
Payer: MEDICARE

## 2018-02-15 ENCOUNTER — HOSPITAL ENCOUNTER (OUTPATIENT)
Dept: LAB | Age: 73
Setting detail: SPECIMEN
Discharge: HOME | End: 2018-02-15
Payer: MEDICARE

## 2018-02-15 VITALS
RESPIRATION RATE: 16 BRPM | WEIGHT: 220 LBS | DIASTOLIC BLOOD PRESSURE: 74 MMHG | HEART RATE: 76 BPM | SYSTOLIC BLOOD PRESSURE: 120 MMHG | HEIGHT: 69 IN | BODY MASS INDEX: 32.58 KG/M2

## 2018-02-15 DIAGNOSIS — I25.10 CORONARY ARTERY DISEASE DUE TO LIPID RICH PLAQUE: ICD-10-CM

## 2018-02-15 DIAGNOSIS — I25.83 CORONARY ARTERY DISEASE DUE TO LIPID RICH PLAQUE: ICD-10-CM

## 2018-02-15 DIAGNOSIS — E78.2 MIXED HYPERLIPIDEMIA: ICD-10-CM

## 2018-02-15 DIAGNOSIS — I10 ESSENTIAL HYPERTENSION: ICD-10-CM

## 2018-02-15 DIAGNOSIS — I25.83 CORONARY ARTERY DISEASE DUE TO LIPID RICH PLAQUE: Primary | ICD-10-CM

## 2018-02-15 DIAGNOSIS — E11.9 TYPE 2 DIABETES MELLITUS WITHOUT COMPLICATION, WITHOUT LONG-TERM CURRENT USE OF INSULIN (HCC): ICD-10-CM

## 2018-02-15 DIAGNOSIS — J21.9 ACUTE BRONCHIOLITIS DUE TO UNSPECIFIED ORGANISM: ICD-10-CM

## 2018-02-15 DIAGNOSIS — I25.10 CORONARY ARTERY DISEASE DUE TO LIPID RICH PLAQUE: Primary | ICD-10-CM

## 2018-02-15 DIAGNOSIS — Z12.5 SPECIAL SCREENING FOR MALIGNANT NEOPLASM OF PROSTATE: ICD-10-CM

## 2018-02-15 DIAGNOSIS — D64.9 ANEMIA, UNSPECIFIED TYPE: Primary | ICD-10-CM

## 2018-02-15 LAB
HEMOGLOBIN: 12.2 G/DL (ref 13.5–17.5)
IRON SATURATION: 12 % (ref 20–55)
IRON: 52 UG/DL (ref 59–158)
TOTAL IRON BINDING CAPACITY: 445 UG/DL (ref 250–450)
UNSATURATED IRON BINDING CAPACITY: 393 UG/DL (ref 112–347)

## 2018-02-15 PROCEDURE — G8427 DOCREV CUR MEDS BY ELIG CLIN: HCPCS | Performed by: INTERNAL MEDICINE

## 2018-02-15 PROCEDURE — 83540 ASSAY OF IRON: CPT

## 2018-02-15 PROCEDURE — 1123F ACP DISCUSS/DSCN MKR DOCD: CPT | Performed by: INTERNAL MEDICINE

## 2018-02-15 PROCEDURE — 4040F PNEUMOC VAC/ADMIN/RCVD: CPT | Performed by: INTERNAL MEDICINE

## 2018-02-15 PROCEDURE — 83550 IRON BINDING TEST: CPT

## 2018-02-15 PROCEDURE — 36415 COLL VENOUS BLD VENIPUNCTURE: CPT

## 2018-02-15 PROCEDURE — 99214 OFFICE O/P EST MOD 30 MIN: CPT | Performed by: INTERNAL MEDICINE

## 2018-02-15 PROCEDURE — 1036F TOBACCO NON-USER: CPT | Performed by: INTERNAL MEDICINE

## 2018-02-15 PROCEDURE — G8484 FLU IMMUNIZE NO ADMIN: HCPCS | Performed by: INTERNAL MEDICINE

## 2018-02-15 PROCEDURE — G8417 CALC BMI ABV UP PARAM F/U: HCPCS | Performed by: INTERNAL MEDICINE

## 2018-02-15 PROCEDURE — 85018 HEMOGLOBIN: CPT

## 2018-02-15 PROCEDURE — G8598 ASA/ANTIPLAT THER USED: HCPCS | Performed by: INTERNAL MEDICINE

## 2018-02-15 PROCEDURE — 3017F COLORECTAL CA SCREEN DOC REV: CPT | Performed by: INTERNAL MEDICINE

## 2018-02-15 PROCEDURE — 3044F HG A1C LEVEL LT 7.0%: CPT | Performed by: INTERNAL MEDICINE

## 2018-02-15 RX ORDER — AMOXICILLIN AND CLAVULANATE POTASSIUM 875; 125 MG/1; MG/1
1 TABLET, FILM COATED ORAL 2 TIMES DAILY
Qty: 20 TABLET | Refills: 0 | Status: SHIPPED | OUTPATIENT
Start: 2018-02-15 | End: 2018-02-25

## 2018-02-15 RX ORDER — LANOLIN ALCOHOL/MO/W.PET/CERES
325 CREAM (GRAM) TOPICAL 2 TIMES DAILY
Qty: 180 TABLET | Refills: 3 | Status: SHIPPED | OUTPATIENT
Start: 2018-02-15 | End: 2018-02-15

## 2018-02-15 RX ORDER — FERROUS SULFATE 325(65) MG
325 TABLET ORAL 2 TIMES DAILY
COMMUNITY
End: 2019-02-13 | Stop reason: SDUPTHER

## 2018-02-15 RX ORDER — ASCORBIC ACID 500 MG
500 TABLET ORAL 2 TIMES DAILY
COMMUNITY
End: 2020-08-18 | Stop reason: ALTCHOICE

## 2018-02-15 ASSESSMENT — PATIENT HEALTH QUESTIONNAIRE - PHQ9
1. LITTLE INTEREST OR PLEASURE IN DOING THINGS: 0
SUM OF ALL RESPONSES TO PHQ QUESTIONS 1-9: 0
SUM OF ALL RESPONSES TO PHQ9 QUESTIONS 1 & 2: 0
2. FEELING DOWN, DEPRESSED OR HOPELESS: 0

## 2018-02-15 ASSESSMENT — ENCOUNTER SYMPTOMS
SHORTNESS OF BREATH: 0
BACK PAIN: 0
BLOOD IN STOOL: 0
NAUSEA: 0
CONSTIPATION: 0
DIARRHEA: 0
EYE PAIN: 0
ABDOMINAL PAIN: 0
VOMITING: 0
COUGH: 0

## 2018-02-15 NOTE — PROGRESS NOTES
W/FUSION L3 L4 ( with SPINAL CORD MONITOR ) performed by Thomas Temple MD at [address] [place] [address] [address]  [phone]    Fusion    LUMBAR SPINE SURGERY Left 2/14/2017    Left L4 & L5 Transforaminal ANITHA performed by Saint Ream, MD at [address] [place] [address]. [address] Bilateral 5/2/2017    Bilat L5 Transforaminal ANITHA performed by Saint Ream, MD at [address] [place] [address] [address] Bilateral 5/9/2017    Bilat L5 Transforaminal ANITHA performed by Saint Ream, MD at [address] [facility]  2/19/13, 5/14/13    L1/L2 IESI    OTHER SURGICAL HISTORY      Hardware correction, patient had 1 broken screw and to loose screws in back     OTHER SURGICAL HISTORY Left 09/27/2016     C6 TFE    OTHER SURGICAL HISTORY Bilateral 11/29/2016    L3, L4 L5 Diagnostic Medial Branch Block    OTHER SURGICAL HISTORY  12/06/2016    jovani L3 L4 L5 conf MBB    OTHER SURGICAL HISTORY Right 12/12/2016    L3,L4,L5 RFA    OTHER SURGICAL HISTORY Left 12/15/17    L3.  L4, L5 RFA    OTHER SURGICAL HISTORY Left 01/31/2017    L4 & L5 TFE    AL ARTHROCENTESIS ASPIR&/INJ MAJOR JT/BURSA W/O US Left 3/31/2017    Left Hip Inj performed by Saint Ream, MD at [address] ARTHROCENTESIS ASPIR&/INJ MAJOR JT/BURSA W/O US Left 4/14/2017    Left Hip Inj performed by Saint Ream, MD at [address] Right 6/29/2017    Right L1 L2 L3 L4 L5 Radiofrequency Ablation performed by Saint Ream, MD at [address] Left 7/6/2017    Left L1 L2 L3 L4 L5 Radiofrequency performed by Saint Ream, MD at [facility]      LT EAR       Family History   Problem Relation Age of Onset    Cancer Father      colon cancer       Social History   Substance Use Topics    Smoking status: Former Smoker     Packs/day: 1.00     Years: 40.00     Types: Cigarettes, Pipe, Cigars     Quit date: 5/5/2011    Smokeless tobacco: Former User     Types: Chew     Quit date: 5/5/2011    Alcohol use 0.0 oz/week      Comment: RARE      Current Outpatient Prescriptions   Medication Sig Dispense Refill    clopidogrel (PLAVIX) 75 MG tablet TAKE 1 TABLET DAILY 90 tablet 3    metFORMIN (GLUCOPHAGE) 500 MG tablet Take 1 tablet by mouth 2 times daily (with meals) 180 tablet 3    PARoxetine (PAXIL) 20 MG tablet TAKE ONE TABLET BY MOUTH ONCE DAILY IN THE MORNING 90 tablet 3    oxyCODONE-acetaminophen (PERCOCET) 5-325 MG per tablet Take 1-2 tablets by mouth every 4 hours as needed for Pain . 40 tablet 0    celecoxib (CELEBREX) 200 MG capsule Take 1 capsule by mouth 2 times daily 180 capsule 3    HYDROcodone-acetaminophen (NORCO) 5-325 MG per tablet Take 1 tablet by mouth every 8 hours as needed for Pain .  tiZANidine (ZANAFLEX) 4 MG tablet TAKE ONE TABLET BY MOUTH THREE TIMES DAILY 90 tablet 5    metoprolol tartrate (LOPRESSOR) 25 MG tablet Take 1 tablet by mouth 2 times daily 180 tablet 3    atorvastatin (LIPITOR) 80 MG tablet Take 1 tablet by mouth daily 90 tablet 3    pantoprazole (PROTONIX) 40 MG tablet Take 1 tablet by mouth daily 90 tablet 3    aspirin 81 MG tablet Take 81 mg by mouth daily      famotidine (PEPCID) 20 MG tablet Take 20 mg by mouth daily. No current facility-administered medications for this visit.       Allergies   Allergen Reactions    Crestor [Rosuvastatin] Other (See Comments)     Joint pain, muscle aches    Ibuprofen Other (See Comments)     bleeding    Lisinopril Hives    Effient [Prasugrel] Rash       Health Maintenance   Topic Date Due    AAA screen  1945    Smoker: low dose lung CT screening  03/20/1999    Diabetic retinal exam  05/31/2018 (Originally 8/11/2017)    DTaP/Tdap/Td vaccine (1 - Tdap) 08/14/2018 (Originally [birth year]20/1964)    Lipid screen  08/09/2018    Diabetic foot exam  08/14/2018    Pneumococcal low/med risk (2 of 2 - PPSV23) 08/14/2018    Colon cancer screen colonoscopy  09/09/2018    A1C test (Diabetic or Prediabetic)  02/05/2019   

## 2018-02-15 NOTE — PATIENT INSTRUCTIONS
you can lose your balance and fall. · Talk to your doctor if you have numbness in your feet. Preventing falls at home  · Remove raised doorway thresholds, throw rugs, and clutter. Repair loose carpet or raised areas in the floor. · Move furniture and electrical cords to keep them out of walking paths. · Use nonskid floor wax, and wipe up spills right away, especially on ceramic tile floors. · If you use a walker or cane, put rubber tips on it. If you use crutches, clean the bottoms of them regularly with an abrasive pad, such as steel wool. · Keep your house well lit, especially [place], and outside walkways. Use night-lights in areas such as hallways and bathrooms. Add extra light switches or use remote switches (such as switches that go on or off when you clap your hands) to make it easier to turn lights on if you have to get up during the night. · Install sturdy handrails on stairways. · Move items in your cabinets so that the things you use a lot are on the lower shelves (about waist level). · Keep a cordless phone and a flashlight with new batteries by your bed. If possible, put a phone in each of the main rooms of your house, or carry a cell phone in case you fall and cannot reach a phone. Or, you can wear a device around your neck or wrist. You push a button that sends a signal for help. · Wear low-heeled shoes that fit well and give your feet good support. Use footwear with nonskid soles. Check the heels and soles of your shoes for wear. Repair or replace worn heels or soles. · Do not wear socks without shoes on wood floors. · Walk on the grass when the sidewalks are slippery. If you live in an area that gets snow and ice in the winter, sprinkle salt on slippery steps and sidewalks. Preventing falls in the bath  · Install grab bars and nonskid mats inside and outside your shower or tub and near the toilet and sinks. · Use shower chairs and bath benches.   · Use a hand-held shower head that will allow you to sit while showering. · Get into a tub or shower by putting the weaker leg in first. Get out of a tub or shower with your strong side first.  · Repair loose toilet seats and consider installing a raised toilet seat to make getting on and off the toilet easier. · Keep your bathroom door unlocked while you are in the shower. Where can you learn more? Go to [email or web address] and sign in to your [employer] account. Enter [phone] in the [employer] box to learn more about \"Preventing Falls: Care Instructions. \"     If you do not have an account, please click on the \"Sign Up Now\" link. Current as of: May 12, 2017  Content Version: 11.5  © 4057-6638 Healthwise, Incorporated. Care instructions adapted under license by [facility] ([facility]). If you have questions about a medical condition or this instruction, always ask your healthcare professional. [address] any warranty or liability for your use of this information.

## 2018-02-15 NOTE — PROGRESS NOTES
Al received counseling on the following healthy behaviors: medication adherence  Reviewed prior labs and health maintenance  Continue current medications except where noted below, diet and exercise. Discussed use, benefit, and side effects of prescribed medications. Barriers to medication compliance addressed. Patient given educational materials - see patient instructions  Was a self-tracking handout given in paper form or via [employer]? No    Requested Prescriptions      No prescriptions requested or ordered in this encounter       All patient questions answered. Patient voiced understanding. Quality Measures    Body mass index is 32.49 kg/m². Elevated. Weight control planned discussed: healthy diet and regular exercise. BP: 120/74. Blood pressure is normal. Treatment plan consists of: see progress note below. Fall Risk 2/15/2018 2/13/2017 8/14/2014   2 or more falls in past year? no no yes   Fall with injury in past year? no no yes     The patient does not have a history of falls. I did not - not indicated , complete a risk assessment for falls.  A plan of care for falls home safety tips provided    Lab Results   Component Value Date    LDLCHOLESTEROL 75 08/09/2017    (goal LDL reduction with dx if diabetes is 50% LDL reduction)    PHQ Scores 2/15/2018 2/13/2017   PHQ2 Score 0 0   PHQ9 Score 0 0     Interpretation of Total Score Depression Severity: 1-4 = Minimal depression, 5-9 = Mild depression, 10-14 = Moderate depression, 15-19 = Moderately severe depression, 20-27 = Severe depression

## 2018-03-03 DIAGNOSIS — E78.2 MIXED HYPERLIPIDEMIA: ICD-10-CM

## 2018-03-03 DIAGNOSIS — I10 ESSENTIAL HYPERTENSION: ICD-10-CM

## 2018-03-05 RX ORDER — ATORVASTATIN CALCIUM 80 MG/1
TABLET, FILM COATED ORAL
Qty: 90 TABLET | Refills: 3 | Status: SHIPPED | OUTPATIENT
Start: 2018-03-05 | End: 2019-02-19 | Stop reason: SDUPTHER

## 2018-03-05 RX ORDER — LOSARTAN POTASSIUM 25 MG/1
TABLET ORAL
Qty: 90 TABLET | Refills: 3 | OUTPATIENT
Start: 2018-03-05

## 2018-03-12 DIAGNOSIS — I10 ESSENTIAL HYPERTENSION: ICD-10-CM

## 2018-03-12 DIAGNOSIS — I10 ESSENTIAL HYPERTENSION: Primary | ICD-10-CM

## 2018-03-12 RX ORDER — LOSARTAN POTASSIUM 25 MG/1
25 TABLET ORAL DAILY
Qty: 90 TABLET | Refills: 3 | OUTPATIENT
Start: 2018-03-12

## 2018-03-12 RX ORDER — LOSARTAN POTASSIUM 25 MG/1
25 TABLET ORAL DAILY
Qty: 90 TABLET | Refills: 3 | Status: SHIPPED | OUTPATIENT
Start: 2018-03-12 | End: 2020-08-24 | Stop reason: SDUPTHER

## 2018-03-12 RX ORDER — LOSARTAN POTASSIUM 25 MG/1
25 TABLET ORAL DAILY
COMMUNITY
End: 2018-03-12 | Stop reason: SDUPTHER

## 2018-03-23 DIAGNOSIS — I10 ESSENTIAL HYPERTENSION: ICD-10-CM

## 2018-03-23 RX ORDER — LOSARTAN POTASSIUM 25 MG/1
TABLET ORAL
Qty: 90 TABLET | Refills: 3 | Status: SHIPPED | OUTPATIENT
Start: 2018-03-23 | End: 2018-08-07 | Stop reason: SDUPTHER

## 2018-03-27 ENCOUNTER — OFFICE VISIT (OUTPATIENT)
Dept: PRIMARY CARE CLINIC | Age: 73
End: 2018-03-27
Payer: MEDICARE

## 2018-03-27 VITALS
TEMPERATURE: 97 F | WEIGHT: 229 LBS | SYSTOLIC BLOOD PRESSURE: 122 MMHG | OXYGEN SATURATION: 94 % | HEART RATE: 60 BPM | DIASTOLIC BLOOD PRESSURE: 74 MMHG | BODY MASS INDEX: 33.92 KG/M2 | HEIGHT: 69 IN

## 2018-03-27 DIAGNOSIS — J01.41 ACUTE RECURRENT PANSINUSITIS: Primary | ICD-10-CM

## 2018-03-27 PROCEDURE — G8598 ASA/ANTIPLAT THER USED: HCPCS | Performed by: PHYSICIAN ASSISTANT

## 2018-03-27 PROCEDURE — 99213 OFFICE O/P EST LOW 20 MIN: CPT | Performed by: PHYSICIAN ASSISTANT

## 2018-03-27 PROCEDURE — G8417 CALC BMI ABV UP PARAM F/U: HCPCS | Performed by: PHYSICIAN ASSISTANT

## 2018-03-27 PROCEDURE — 1123F ACP DISCUSS/DSCN MKR DOCD: CPT | Performed by: PHYSICIAN ASSISTANT

## 2018-03-27 PROCEDURE — G8427 DOCREV CUR MEDS BY ELIG CLIN: HCPCS | Performed by: PHYSICIAN ASSISTANT

## 2018-03-27 PROCEDURE — 3017F COLORECTAL CA SCREEN DOC REV: CPT | Performed by: PHYSICIAN ASSISTANT

## 2018-03-27 PROCEDURE — G8482 FLU IMMUNIZE ORDER/ADMIN: HCPCS | Performed by: PHYSICIAN ASSISTANT

## 2018-03-27 PROCEDURE — 4040F PNEUMOC VAC/ADMIN/RCVD: CPT | Performed by: PHYSICIAN ASSISTANT

## 2018-03-27 PROCEDURE — 1036F TOBACCO NON-USER: CPT | Performed by: PHYSICIAN ASSISTANT

## 2018-03-27 RX ORDER — CEFUROXIME AXETIL 250 MG/1
250 TABLET ORAL 2 TIMES DAILY
Qty: 28 TABLET | Refills: 0 | Status: SHIPPED | OUTPATIENT
Start: 2018-03-27 | End: 2018-04-10

## 2018-03-27 ASSESSMENT — ENCOUNTER SYMPTOMS
COUGH: 1
WHEEZING: 1
RHINORRHEA: 1
SHORTNESS OF BREATH: 0
SORE THROAT: 0

## 2018-05-11 DIAGNOSIS — M43.10 ACQUIRED SPONDYLOLISTHESIS: Primary | ICD-10-CM

## 2018-05-11 RX ORDER — OXYCODONE HYDROCHLORIDE AND ACETAMINOPHEN 5; 325 MG/1; MG/1
1-2 TABLET ORAL EVERY 4 HOURS PRN
Qty: 40 TABLET | Refills: 0 | Status: SHIPPED | OUTPATIENT
Start: 2018-05-11 | End: 2019-02-15

## 2018-07-10 ENCOUNTER — OFFICE VISIT (OUTPATIENT)
Dept: OTOLARYNGOLOGY | Age: 73
End: 2018-07-10
Payer: MEDICARE

## 2018-07-10 ENCOUNTER — HOSPITAL ENCOUNTER (OUTPATIENT)
Dept: GENERAL RADIOLOGY | Age: 73
Discharge: HOME | End: 2018-07-12
Payer: MEDICARE

## 2018-07-10 VITALS
HEART RATE: 72 BPM | SYSTOLIC BLOOD PRESSURE: 136 MMHG | RESPIRATION RATE: 12 BRPM | BODY MASS INDEX: 33.92 KG/M2 | DIASTOLIC BLOOD PRESSURE: 74 MMHG | WEIGHT: 229 LBS | HEIGHT: 69 IN

## 2018-07-10 DIAGNOSIS — C32.9 CARCINOMA LARYNX (HCC): Primary | ICD-10-CM

## 2018-07-10 DIAGNOSIS — J32.9 SINUSITIS, UNSPECIFIED CHRONICITY, UNSPECIFIED LOCATION: ICD-10-CM

## 2018-07-10 PROCEDURE — 70220 X-RAY EXAM OF SINUSES: CPT

## 2018-07-10 PROCEDURE — 31575 DIAGNOSTIC LARYNGOSCOPY: CPT | Performed by: OTOLARYNGOLOGY

## 2018-07-10 RX ORDER — AMOXICILLIN AND CLAVULANATE POTASSIUM 500; 125 MG/1; MG/1
1 TABLET, FILM COATED ORAL 2 TIMES DAILY
Qty: 20 TABLET | Refills: 0 | Status: SHIPPED | OUTPATIENT
Start: 2018-07-10 | End: 2018-07-20

## 2018-07-10 NOTE — PROGRESS NOTES
Austin Erazo MD at [address] [facility] EAR       Family History:     Family History   Problem Relation Age of Onset   Jose Rafael Omalley Cancer Father         colon cancer       Social Hx:     Social History     Social History    Marital status:      Spouse name: N/A    Number of children: N/A    Years of education: N/A     Occupational History    Not on file. Social History Main Topics    Smoking status: Former Smoker     Packs/day: 1.00     Years: 40.00     Types: Cigarettes, Pipe, Cigars     Quit date: 2011    Smokeless tobacco: Former User     Types: Chew     Quit date: 2011    Alcohol use 0.0 oz/week      Comment: RARE    Drug use: No    Sexual activity: Not on file     Other Topics Concern    Not on file     Social History Narrative    ** Merged History Encounter **            ROS:   CV: cad   Endocrine:    Resp:     GI:    gerd   Neuro:   PE:     General appearance:  Normal                 Ability to Communicate:  Normal       Head & Face:  Normal   Salivary Glands:  Normal              Facial Strength:  Normal   Ears:    Pinna:  Normal            EAC:  Normal      TMs:  Normal       [place]:  Rowland Rinne     Finger Rub     Nose:    External: Normal    Septum:  Normal    Turbinates: Normal             Nasal Cavity: Normal         Naso Pharyngoscopy:     Nasal Endoscopy:      Oral Cavity & Oral Pharynx:    Tongue:  Normal    Teeth & Gums:             Palate:  Era     Tonsils:      FOM:  Normal     Other:     Procedure: FIBEROPTIC DIRECT LARYNGOSCOPY    Pre-op dx: ca Lx     Post - op dx: Same    Indications: same    Anesthesia: Topical spray of oxymetazoline & 4% lidocaine mixed 1:1    Description of procedure: Each nostril was sprayed with an oxymetazoline & 4% lidocaine mixture. After approximately 5 minutes a [employer] 4mm fiberoptic nasopharyngoscope was passed through the  nostril, through the naso pharynx to the hypopharynx.  The base of tongue, epiglottis, aryepglotic folds, arytenoids, intra-arytenoid space, false and true vocal cords, and pyriform sinuses were visualized. Vocal cord mobility was also assessed. Findings:  Base of tongue: Normal    Epiglottis: Normal    Aryepiglottic folds: Normal    Arytenoids: Normal    False vocal cords: Normal    True vocal cords: Normal, no sign of neoplasm    Pyriform sinuses: Normal    Vocal cord mobility: Normal    Intra-aytenoid space: Normal    Hypopharynx : Normal  Pus both middle meati   Neck:    Thyroid:Normal       Lymph nodes: Normal           Trachea:  Normal      Masses:  Normal    Other:        Eyes:    EOMs:      Nystagmus:      Neurological:    CN V:      CN VII:       Gait & Station:      Romberg:      Tandem Gait:      Halpikes:       Oriented x 3: Normal     Affect:  Normal    Data reviewed:      ASSESSMENT:sinusitis,allergic rhinitis   Diagnosis Orders   1. Carcinoma larynx (HCC)  ME LARYNGOSCOPY FLEXIBLE DIAGNOSTIC   2. Sinusitis, unspecified chronicity, unspecified location  XR SINUSES (MIN 3 VIEWS )       PLAN: sinus xrays, aug, see 1mos, consider CT ,allergy eval, levaquin  Return in about 4 weeks (around 8/7/2018).     Orders Placed This Encounter   Medications    amoxicillin-clavulanate (AUGMENTIN) 500-125 MG per tablet     Sig: Take 1 tablet by mouth 2 times daily for 10 days     Dispense:  20 tablet     Refill:  0         Enedina Mueller MD

## 2018-07-12 DIAGNOSIS — M48.062 SPINAL STENOSIS, LUMBAR REGION, WITH NEUROGENIC CLAUDICATION: Primary | ICD-10-CM

## 2018-08-07 ENCOUNTER — OFFICE VISIT (OUTPATIENT)
Dept: CARDIOLOGY | Age: 73
End: 2018-08-07
Payer: MEDICARE

## 2018-08-07 ENCOUNTER — TELEPHONE (OUTPATIENT)
Dept: CARDIOLOGY | Age: 73
End: 2018-08-07

## 2018-08-07 VITALS
BODY MASS INDEX: 31.25 KG/M2 | WEIGHT: 211 LBS | HEIGHT: 69 IN | SYSTOLIC BLOOD PRESSURE: 130 MMHG | HEART RATE: 68 BPM | DIASTOLIC BLOOD PRESSURE: 68 MMHG

## 2018-08-07 DIAGNOSIS — I10 ESSENTIAL HYPERTENSION: ICD-10-CM

## 2018-08-07 DIAGNOSIS — I25.83 CORONARY ARTERY DISEASE DUE TO LIPID RICH PLAQUE: Primary | ICD-10-CM

## 2018-08-07 DIAGNOSIS — Z98.1 S/P LUMBAR SPINAL FUSION: ICD-10-CM

## 2018-08-07 DIAGNOSIS — I25.10 CORONARY ARTERY DISEASE DUE TO LIPID RICH PLAQUE: Primary | ICD-10-CM

## 2018-08-07 DIAGNOSIS — E11.9 TYPE 2 DIABETES MELLITUS WITHOUT COMPLICATION, WITHOUT LONG-TERM CURRENT USE OF INSULIN (HCC): ICD-10-CM

## 2018-08-07 DIAGNOSIS — E66.9 OBESITY (BMI 35.0-39.9 WITHOUT COMORBIDITY): ICD-10-CM

## 2018-08-07 DIAGNOSIS — R06.09 DOE (DYSPNEA ON EXERTION): ICD-10-CM

## 2018-08-07 DIAGNOSIS — E78.2 MIXED HYPERLIPIDEMIA: ICD-10-CM

## 2018-08-07 PROCEDURE — 99213 OFFICE O/P EST LOW 20 MIN: CPT | Performed by: INTERNAL MEDICINE

## 2018-08-07 PROCEDURE — 93000 ELECTROCARDIOGRAM COMPLETE: CPT | Performed by: INTERNAL MEDICINE

## 2018-08-07 NOTE — PROGRESS NOTES
Cardiology Consultation  [facility]    HPI and Chief Complaint:  Meenu Morales  is doing well from a cardiac standpoint. Good functional capacity with no significant change in functional capacity. No chest pain, no dyspnea, no PND, no syncope or pre-syncope, no orthopnea. No symptoms of CHF or angina/chest pain. They are here for follow up regarding the above chronic stable cardiovascular conditions. REVIEW OF SYSTEMS:    · Constitutional: there has been no unanticipated weight loss. There's been No change in energy level, No change in activity level. · Eyes: No visual changes or diplopia. No scleral icterus. · ENT: No Headaches, hearing loss or vertigo. No mouth sores or sore throat. · Cardiovascular: No chest pain, no dyspnea, no chf like symptoms  · Respiratory: No previous pulmonary problems  · Gastrointestinal: No abdominal pain, appetite loss, blood in stools. No change in bowel or bladder habits. · Genitourinary: No dysuria, trouble voiding, or hematuria. · Musculoskeletal:  No gait disturbance, No weakness or joint complaints. · Integumentary: No rash or pruritis. · Neurological: No headache, diplopia, change in muscle strength, numbness or tingling. No change in gait, balance, coordination, mood, affect, memory, mentation, behavior. · Psychiatric: No new anxiety or depression. · Endocrine: No temperature intolerance. No excessive thirst, fluid intake, or urination. No tremor. · Hematologic/Lymphatic: No abnormal bruising or bleeding, blood clots or swollen lymph nodes. · Allergic/Immunologic: No nasal congestion or hives. Physical Exam:   Vitals: /78 mmHg  Pulse 68  Ht 5' 9\" (1.753 m)  Wt 230 lb (104.327 kg)  BMI 33.95 kg/m2  General appearance: alert and cooperative with exam  HEENT: Head: Normocephalic, no lesions, without obvious abnormality.   Neck: no carotid bruit, no JVD  Lungs: clear to auscultation bilaterally  Heart: regular rate and rhythm, S1, S2 normal, no murmur, click, rub or gallop  Abdomen: soft, non-tender; bowel sounds normal; no masses,  no organomegaly  Extremities: extremities normal, atraumatic, no cyanosis or edema  Neurologic: Mental status: Alert, oriented, thought content appropriate    EKG: No acute ischemia    LAST ECHO: 10/17/14  1. Left ventricle is normal in dimension with normal left ventricular systolic function. Ejection fraction is 50-55%. 2. Biatrial enlargement. 3. Right ventricle is dilated with normal function. 4. Mitral valve leaflets are thickened with trivial regurgitation. 5. Mildly sclerotic aortic valve with no stenosis or regurgitation. 6. Trivial tricuspid regurgitation. RVSP is 57mm Hg. 7. Grade I diastolic dysfunction. 8. No pericardial effusion. LAST STRESS:    LAST CATH: May 2011  LAD and RCA  Required stents        Past Medical and Surgical History, Problem List, Allergies, Medications, Labs, Imaging, all reviewed extensively in EMR and with the patient. Assessment and Plan:    1. STEMI on 5/5/2011 with thrombectomy and 2 MELBA to RCA. 2. Staged PCI on 5/31/2011 had MELBA to LAD. 3. CAD- LDL goal < 70. Check lipids, ck, cmp every 6 months and optimize medical therapy. 4. Diastolic dysfunction (HFpEF)- stable, well controlled. 5. Had L4-L5 fusion in February 2015. Did well. Has been back on asp/plav  6. Dyspnea from allergies and diastolic dysfunction. Stable, well controlled. 7. HTN- failry well controlled at this time. Cont to optimize meds. 8. Obesity - encouraged diet, exercise. 5. Former Tobacco abuse. 10. Hyperlipidemia- as above. LDL goal < 70. Optimize therapy. 11. Allergic to Effient and ACEIs. 12. Depression  13. Preserved LVEF  14. Type 2 Diabetes- needs tighter control  15. Hypertriglyceridemia with TGs 180, needs tighter glycemic control  Counseled to be compliant with all physician visits, physician recommendations, and medication compliance.   Discussed medications and side effects

## 2018-08-09 ENCOUNTER — HOSPITAL ENCOUNTER (OUTPATIENT)
Dept: LAB | Age: 73
Setting detail: SPECIMEN
Discharge: HOME | End: 2018-08-09
Payer: MEDICARE

## 2018-08-09 DIAGNOSIS — E11.9 TYPE 2 DIABETES MELLITUS WITHOUT COMPLICATION, WITHOUT LONG-TERM CURRENT USE OF INSULIN (HCC): ICD-10-CM

## 2018-08-09 DIAGNOSIS — E78.2 MIXED HYPERLIPIDEMIA: ICD-10-CM

## 2018-08-09 DIAGNOSIS — I10 ESSENTIAL HYPERTENSION: ICD-10-CM

## 2018-08-09 DIAGNOSIS — Z12.5 SPECIAL SCREENING FOR MALIGNANT NEOPLASM OF PROSTATE: ICD-10-CM

## 2018-08-09 DIAGNOSIS — I25.10 CORONARY ARTERY DISEASE DUE TO LIPID RICH PLAQUE: ICD-10-CM

## 2018-08-09 DIAGNOSIS — D64.9 ANEMIA, UNSPECIFIED TYPE: ICD-10-CM

## 2018-08-09 DIAGNOSIS — I25.83 CORONARY ARTERY DISEASE DUE TO LIPID RICH PLAQUE: ICD-10-CM

## 2018-08-09 LAB
ALBUMIN SERPL-MCNC: 4.1 G/DL (ref 3.5–5.2)
ALBUMIN/GLOBULIN RATIO: 1.3 (ref 1–2.5)
ALP BLD-CCNC: 74 U/L (ref 40–129)
ALT SERPL-CCNC: 16 U/L (ref 5–41)
ANION GAP SERPL CALCULATED.3IONS-SCNC: 10 MMOL/L (ref 9–17)
AST SERPL-CCNC: 14 U/L
BILIRUB SERPL-MCNC: 0.25 MG/DL (ref 0.3–1.2)
BUN BLDV-MCNC: 20 MG/DL (ref 8–23)
BUN/CREAT BLD: 24 (ref 9–20)
CALCIUM SERPL-MCNC: 9.1 MG/DL (ref 8.6–10.4)
CHLORIDE BLD-SCNC: 104 MMOL/L (ref 98–107)
CHOLESTEROL/HDL RATIO: 3.5
CHOLESTEROL: 154 MG/DL
CO2: 30 MMOL/L (ref 20–31)
CREAT SERPL-MCNC: 0.82 MG/DL (ref 0.7–1.2)
ESTIMATED AVERAGE GLUCOSE: 126 MG/DL
GFR AFRICAN AMERICAN: >60 ML/MIN
GFR NON-AFRICAN AMERICAN: >60 ML/MIN
GFR SERPL CREATININE-BSD FRML MDRD: ABNORMAL ML/MIN/{1.73_M2}
GFR SERPL CREATININE-BSD FRML MDRD: ABNORMAL ML/MIN/{1.73_M2}
GLUCOSE BLD-MCNC: 126 MG/DL (ref 70–99)
HBA1C MFR BLD: 6 % (ref 4.8–5.9)
HDLC SERPL-MCNC: 44 MG/DL
HEMOGLOBIN: 13.7 G/DL (ref 13.5–17.5)
IRON SATURATION: 14 % (ref 20–55)
IRON: 56 UG/DL (ref 59–158)
LDL CHOLESTEROL: 69 MG/DL (ref 0–130)
POTASSIUM SERPL-SCNC: 5.2 MMOL/L (ref 3.7–5.3)
PROSTATE SPECIFIC ANTIGEN: 0.93 UG/L
SODIUM BLD-SCNC: 144 MMOL/L (ref 135–144)
TOTAL IRON BINDING CAPACITY: 398 UG/DL (ref 250–450)
TOTAL PROTEIN: 7.3 G/DL (ref 6.4–8.3)
TRIGL SERPL-MCNC: 204 MG/DL
UNSATURATED IRON BINDING CAPACITY: 342 UG/DL (ref 112–347)
VLDLC SERPL CALC-MCNC: ABNORMAL MG/DL (ref 1–30)

## 2018-08-09 PROCEDURE — 80061 LIPID PANEL: CPT

## 2018-08-09 PROCEDURE — 85018 HEMOGLOBIN: CPT

## 2018-08-09 PROCEDURE — 83550 IRON BINDING TEST: CPT

## 2018-08-09 PROCEDURE — G0103 PSA SCREENING: HCPCS

## 2018-08-09 PROCEDURE — 36415 COLL VENOUS BLD VENIPUNCTURE: CPT

## 2018-08-09 PROCEDURE — 80053 COMPREHEN METABOLIC PANEL: CPT

## 2018-08-09 PROCEDURE — 83036 HEMOGLOBIN GLYCOSYLATED A1C: CPT

## 2018-08-09 PROCEDURE — 83540 ASSAY OF IRON: CPT

## 2018-08-10 ENCOUNTER — OFFICE VISIT (OUTPATIENT)
Dept: OTOLARYNGOLOGY | Age: 73
End: 2018-08-10
Payer: MEDICARE

## 2018-08-10 VITALS
HEIGHT: 69 IN | SYSTOLIC BLOOD PRESSURE: 128 MMHG | BODY MASS INDEX: 31.25 KG/M2 | DIASTOLIC BLOOD PRESSURE: 82 MMHG | RESPIRATION RATE: 14 BRPM | HEART RATE: 78 BPM | WEIGHT: 211 LBS

## 2018-08-10 DIAGNOSIS — H61.102 LESION OF LEFT PINNA: Primary | ICD-10-CM

## 2018-08-10 PROCEDURE — 99213 OFFICE O/P EST LOW 20 MIN: CPT | Performed by: OTOLARYNGOLOGY

## 2018-08-10 PROCEDURE — 1101F PT FALLS ASSESS-DOCD LE1/YR: CPT | Performed by: OTOLARYNGOLOGY

## 2018-08-10 PROCEDURE — G8427 DOCREV CUR MEDS BY ELIG CLIN: HCPCS | Performed by: OTOLARYNGOLOGY

## 2018-08-10 PROCEDURE — 1123F ACP DISCUSS/DSCN MKR DOCD: CPT | Performed by: OTOLARYNGOLOGY

## 2018-08-10 PROCEDURE — 4040F PNEUMOC VAC/ADMIN/RCVD: CPT | Performed by: OTOLARYNGOLOGY

## 2018-08-10 PROCEDURE — G8417 CALC BMI ABV UP PARAM F/U: HCPCS | Performed by: OTOLARYNGOLOGY

## 2018-08-10 PROCEDURE — 1036F TOBACCO NON-USER: CPT | Performed by: OTOLARYNGOLOGY

## 2018-08-10 PROCEDURE — G8598 ASA/ANTIPLAT THER USED: HCPCS | Performed by: OTOLARYNGOLOGY

## 2018-08-10 PROCEDURE — 3017F COLORECTAL CA SCREEN DOC REV: CPT | Performed by: OTOLARYNGOLOGY

## 2018-08-10 RX ORDER — AMOXICILLIN AND CLAVULANATE POTASSIUM 500; 125 MG/1; MG/1
1 TABLET, FILM COATED ORAL 2 TIMES DAILY
Qty: 42 TABLET | Refills: 0 | Status: SHIPPED | OUTPATIENT
Start: 2018-08-10 | End: 2018-08-31

## 2018-08-10 NOTE — PROGRESS NOTES
Norberto Osullivan  8/10/18    Chief Complaint   Patient presents with    Sinusitis     re ck after ct scan       HPI:  fu for purulent rhinorrhea, did better on Aug, but now w drainage again, xray, r max haziness, hx TVC CA rxd w RT 20 yrs ago    Past Med Hx:     Past Medical History:   Diagnosis Date    Asthma     CAD (coronary artery disease)     STENTS X 4    Cancer ([facility] [address]     THROAT, HX. RADIATION , LT EAR    Cervical radiculopathy     Chronic back pain     Colonic polyp     Degeneration of cervical intervertebral disc     [facility] C4/C5-C6/C7    Degeneration of cervical intervertebral disc     [facility] 1994, C3/C4    Depression     Diverticulosis     GERD (gastroesophageal reflux disease)     Glucose intolerance (impaired glucose tolerance)     IS NOT DIABETIC, PER PT    HTN (hypertension)     Hyperlipidemia     Lumbar radiculopathy     MI (myocardial infarction) ([facility] [address]     2011    Numbness and tingling     HANDS    OA (osteoarthritis)     Pre-diabetes     Sacroiliac pain 11/4/2014    Vision abnormalities     WEARS GLASSES        Past Surgical History:   Procedure Laterality Date    ABSCESS DRAINAGE Right     ELBOW WITH CLOSURE    ANESTHESIA NERVE BLOCK Bilateral 6/2/2017    Bilat L1 L2 L3 L4 L5 Diagnostic Medial Branch Blk performed by Wendy Armstrong MD at [address] [facility] Bilateral 6/9/2017    Bilat L1 L2 L3 L4 L5 Confirmatory Medial BB performed by Wendy Armstrong MD at [facility]  06/01/2011    DR Yolis Amos CARDIAC CATHETERIZATION  5-5-2011    STENTSx4    CERVICAL SPINE SURGERY  08/09/2001    Anterior Cervical Decompression and Fusion C3-4    CERVICAL SPINE SURGERY Left [id number]    Cervical C6-C7 Discectomy and Foraminotomy    COLONOSCOPY  2007, 2003    POLYPS    COLONOSCOPY  9/9/13    hyperplastic polyp x 3    CORONARY ANGIOPLASTY WITH STENT PLACEMENT      ELBOW BURSA SURGERY Right

## 2018-08-16 ENCOUNTER — OFFICE VISIT (OUTPATIENT)
Dept: INTERNAL MEDICINE | Age: 73
End: 2018-08-16
Payer: MEDICARE

## 2018-08-16 ENCOUNTER — TELEPHONE (OUTPATIENT)
Dept: CARDIOLOGY | Age: 73
End: 2018-08-16

## 2018-08-16 VITALS
HEIGHT: 69 IN | SYSTOLIC BLOOD PRESSURE: 112 MMHG | BODY MASS INDEX: 31.4 KG/M2 | HEART RATE: 72 BPM | RESPIRATION RATE: 16 BRPM | DIASTOLIC BLOOD PRESSURE: 64 MMHG | WEIGHT: 212 LBS

## 2018-08-16 DIAGNOSIS — M54.42 CHRONIC BILATERAL LOW BACK PAIN WITH BILATERAL SCIATICA: ICD-10-CM

## 2018-08-16 DIAGNOSIS — D64.9 POSTOPERATIVE ANEMIA: ICD-10-CM

## 2018-08-16 DIAGNOSIS — Z23 NEED FOR PNEUMOCOCCAL VACCINATION: ICD-10-CM

## 2018-08-16 DIAGNOSIS — E78.2 MIXED HYPERLIPIDEMIA: ICD-10-CM

## 2018-08-16 DIAGNOSIS — I25.10 CORONARY ARTERY DISEASE DUE TO LIPID RICH PLAQUE: Primary | ICD-10-CM

## 2018-08-16 DIAGNOSIS — I25.83 CORONARY ARTERY DISEASE DUE TO LIPID RICH PLAQUE: Primary | ICD-10-CM

## 2018-08-16 DIAGNOSIS — G89.29 CHRONIC BILATERAL LOW BACK PAIN WITH BILATERAL SCIATICA: ICD-10-CM

## 2018-08-16 DIAGNOSIS — M54.41 CHRONIC BILATERAL LOW BACK PAIN WITH BILATERAL SCIATICA: ICD-10-CM

## 2018-08-16 DIAGNOSIS — I10 ESSENTIAL HYPERTENSION: ICD-10-CM

## 2018-08-16 DIAGNOSIS — E11.9 TYPE 2 DIABETES MELLITUS WITHOUT COMPLICATION, WITHOUT LONG-TERM CURRENT USE OF INSULIN (HCC): ICD-10-CM

## 2018-08-16 PROCEDURE — G8417 CALC BMI ABV UP PARAM F/U: HCPCS | Performed by: INTERNAL MEDICINE

## 2018-08-16 PROCEDURE — G0009 ADMIN PNEUMOCOCCAL VACCINE: HCPCS | Performed by: INTERNAL MEDICINE

## 2018-08-16 PROCEDURE — 1101F PT FALLS ASSESS-DOCD LE1/YR: CPT | Performed by: INTERNAL MEDICINE

## 2018-08-16 PROCEDURE — 99214 OFFICE O/P EST MOD 30 MIN: CPT | Performed by: INTERNAL MEDICINE

## 2018-08-16 PROCEDURE — 1123F ACP DISCUSS/DSCN MKR DOCD: CPT | Performed by: INTERNAL MEDICINE

## 2018-08-16 PROCEDURE — 4040F PNEUMOC VAC/ADMIN/RCVD: CPT | Performed by: INTERNAL MEDICINE

## 2018-08-16 PROCEDURE — 3017F COLORECTAL CA SCREEN DOC REV: CPT | Performed by: INTERNAL MEDICINE

## 2018-08-16 PROCEDURE — G8598 ASA/ANTIPLAT THER USED: HCPCS | Performed by: INTERNAL MEDICINE

## 2018-08-16 PROCEDURE — 1036F TOBACCO NON-USER: CPT | Performed by: INTERNAL MEDICINE

## 2018-08-16 PROCEDURE — 2022F DILAT RTA XM EVC RTNOPTHY: CPT | Performed by: INTERNAL MEDICINE

## 2018-08-16 PROCEDURE — G8427 DOCREV CUR MEDS BY ELIG CLIN: HCPCS | Performed by: INTERNAL MEDICINE

## 2018-08-16 PROCEDURE — 3044F HG A1C LEVEL LT 7.0%: CPT | Performed by: INTERNAL MEDICINE

## 2018-08-16 PROCEDURE — 90732 PPSV23 VACC 2 YRS+ SUBQ/IM: CPT | Performed by: INTERNAL MEDICINE

## 2018-08-16 RX ORDER — CELECOXIB 200 MG/1
200 CAPSULE ORAL 2 TIMES DAILY
Qty: 180 CAPSULE | Refills: 3 | Status: SHIPPED | OUTPATIENT
Start: 2018-08-16 | End: 2019-08-20 | Stop reason: SDUPTHER

## 2018-08-16 RX ORDER — PAROXETINE HYDROCHLORIDE 20 MG/1
TABLET, FILM COATED ORAL
Qty: 90 TABLET | Refills: 3 | Status: SHIPPED | OUTPATIENT
Start: 2018-08-16 | End: 2019-09-04 | Stop reason: SDUPTHER

## 2018-08-16 ASSESSMENT — ENCOUNTER SYMPTOMS
NAUSEA: 0
VOMITING: 0
COUGH: 0
DIARRHEA: 0
EYE PAIN: 0
BACK PAIN: 0
ABDOMINAL PAIN: 0
SHORTNESS OF BREATH: 0
CONSTIPATION: 0
BLOOD IN STOOL: 0

## 2018-08-16 NOTE — TELEPHONE ENCOUNTER
Pt called to state the metformin prescription that went to [facility] needs changed to 500 mg instead of 1000 mg. Pt had labs done with Dr Myra Platt and he thinks the pt didn't need to have the medication increased due to the new results. Please review.     [phone]    Last Appt:  8/7/2018  Next Appt:  2-19-19

## 2018-08-16 NOTE — PATIENT INSTRUCTIONS
increase your steps. Once you get there, set a higher goal. Aim for 10,000 steps a day. · If the weather keeps you from walking outside, go for walks at the mall with a friend. Local schools and churches may have indoor gyms where you can walk. Fitting a walk into your workday  · Park several blocks away from work, or get off the bus a few stops early. · Use the stairs instead of the elevator, at least for a few floors. · Suggest holding meetings with colleagues during a walk inside or outside the building. · Use the restroom that is the farthest from your desk or workstation. · Use your morning and afternoon breaks to take quick 15-minute walks. Staying safe  · Know your surroundings. Walk in a well-lighted, safe place. If it is dark, walk with a partner. Wear light-colored clothing. If you can, buy a vest or jacket that reflects light. · Carry a cell phone for emergencies. · Drink plenty of water. Take a water bottle with you when you walk. This is very important if it is hot out. · Be careful not to slip on wet or icy ground. You can buy \"grippers\" for your shoes to help keep you from slipping. · Pay attention to your walking surface. Use sidewalks and paths. · If you have breathing problems like asthma or COPD, ask your doctor when it is safe for you to walk outdoors. Cold, dry air, smog, pollen, or other things in the air could cause breathing problems. Where can you learn more? Go to https://Mediaspectrumcorrina.healthiCharts. org and sign in to your [employer] account. Enter R159 in the [employer] box to learn more about \"Walking for Exercise: Care Instructions. \"     If you do not have an account, please click on the \"Sign Up Now\" link. Current as of: December 7, 2017  Content Version: 11.7  © 9090-3127 [employer] Inc[employer]. Care instructions adapted under license by [facility] ([facility]).  If you have questions about a medical condition or this instruction, always ask your healthcare

## 2018-08-16 NOTE — PROGRESS NOTES
[address] [facility] INTERNAL MED  [address] [address]  Dept: [phone]  Dept Fax: [phone]  Loc: [phone]    Norberto Lr is a 75 y.o. male who presents today for his medical conditions/complaints as noted below. Norberto Osullivan is c/o of   Chief Complaint   Patient presents with    Coronary Artery Disease     6 month    Hypertension    Hyperlipidemia         HPI:     Coronary Artery Disease   Presents for follow-up (CAD due to lipid rich plaque) visit. Pertinent negatives include no chest pain, chest pressure, dizziness, leg swelling, palpitations or shortness of breath. Risk factors include hyperlipidemia. The symptoms have been stable. Compliance with diet is good. Compliance with exercise is good. Compliance with medications is good. Hypertension   This is a chronic problem. The current episode started more than 1 year ago. The problem has been waxing and waning since onset. The problem is controlled. Pertinent negatives include no chest pain, headaches, neck pain, palpitations or shortness of breath. Hyperlipidemia   This is a chronic problem. The current episode started more than 1 year ago. The problem is controlled. Recent lipid tests were reviewed and are variable. Pertinent negatives include no chest pain or shortness of breath. Hemoglobin A1C (%)   Date Value   08/09/2018 6.0 (H)   02/05/2018 5.9   08/09/2017 6.4 (H)                Microalb/Crt.  Ratio (mcg/mg creat)   Date Value   02/05/2018 19 (H)     LDL Cholesterol (mg/dL)   Date Value   08/09/2018 69   08/09/2017 75   08/04/2016 83         AST (U/L)   Date Value   08/09/2018 14     ALT (U/L)   Date Value   08/09/2018 16     BUN (mg/dL)   Date Value   08/09/2018 20     BP Readings from Last 3 Encounters:   08/16/18 112/64   08/10/18 128/82   08/07/18 130/68              Past Medical History:   Diagnosis Date    Asthma     CAD (coronary artery disease)     STENTS X 4    Cancer (HCC)     THROAT, HX. RADIATION , LT EAR    Cervical radiculopathy     Chronic back pain     Colonic polyp     Degeneration of cervical intervertebral disc     [facility], 1990 C4/C5-C6/C7    Degeneration of cervical intervertebral disc     [facility] 1994, C3/C4    Depression     Diverticulosis     GERD (gastroesophageal reflux disease)     Glucose intolerance (impaired glucose tolerance)     IS NOT DIABETIC, PER PT    HTN (hypertension)     Hyperlipidemia     Lumbar radiculopathy     MI (myocardial infarction) ([facility] [address]     2011    Numbness and tingling     HANDS    OA (osteoarthritis)     Pre-diabetes     Sacroiliac pain 11/4/2014    Vision abnormalities     WEARS GLASSES      Past Surgical History:   Procedure Laterality Date    ABSCESS DRAINAGE Right     ELBOW WITH CLOSURE    ANESTHESIA NERVE BLOCK Bilateral 6/2/2017    Bilat L1 L2 L3 L4 L5 Diagnostic Medial Branch Blk performed by Miller Arceo MD at [address] [facility] Bilateral 6/9/2017    Bilat L1 L2 L3 L4 L5 Confirmatory Medial BB performed by Miller Arceo MD at [address] [facility]  06/01/2011    DR Frost Comment CARDIAC CATHETERIZATION  5-5-2011    STENTSx4    CERVICAL SPINE SURGERY  08/09/2001    Anterior Cervical Decompression and Fusion C3-4    CERVICAL SPINE SURGERY Left [id number]    Cervical C6-C7 Discectomy and Foraminotomy    COLONOSCOPY  2007, 2003    POLYPS    COLONOSCOPY  9/9/13    hyperplastic polyp x 3    CORONARY ANGIOPLASTY WITH STENT PLACEMENT      ELBOW BURSA SURGERY Right 2012,2011    FINGER TRIGGER RELEASE Right     THIRD FINGER    [facility]. INJECTION PROCEDURE FOR SACROILIAC JOINT Bilateral 3/14/2017    Bilat Sacroiliac & Piriformis Inj's performed by Miller Arceo MD at [address] Right     ELBOW    LUMBAR FUSION  4/7/14    lumbar decompression and fusion    LUMBAR FUSION N/A 11/15/2017    LUMBAR DECOMPRESSION POSTERIOR W/FUSION L3 L4 ( with SPINAL CORD MONITOR ) performed by Garcia Reza MD at [address] [facility] [address]  [phone],[phone]    Fusion    LUMBAR SPINE SURGERY Left 2/14/2017    Left L4 & L5 Transforaminal ANITHA performed by Kirt Melendez MD at [address] [facility] [address] Bilateral 5/2/2017    Bilat L5 Transforaminal ANITHA performed by Kirt Melendez MD at [address] [facility] [address] Bilateral 5/9/2017    Bilat L5 Transforaminal ANITHA performed by Kirt Melendez MD at [address] [facility] [address]  2/19/13, 5/14/13    L1/L2 IESI    OTHER SURGICAL HISTORY      Hardware correction, patient had 1 broken screw and to loose screws in back     OTHER SURGICAL HISTORY Left 09/27/2016     C6 TFE    OTHER SURGICAL HISTORY Bilateral 11/29/2016    L3, L4 L5 Diagnostic Medial Branch Block    OTHER SURGICAL HISTORY  12/06/2016    jovani L3 L4 L5 conf MBB    OTHER SURGICAL HISTORY Right 12/12/2016    L3,L4,L5 RFA    OTHER SURGICAL HISTORY Left 12/15/17    L3.  L4, L5 RFA    OTHER SURGICAL HISTORY Left 01/31/2017    L4 & L5 TFE    [place] ARTHROCENTESIS ASPIR&/INJ MAJOR JT/BURSA W/O US Left 3/31/2017    Left Hip Inj performed by Kirt Melendez MD at [address] ARTHROCENTESIS ASPIR&/INJ MAJOR JT/BURSA W/O US Left 4/14/2017    Left Hip Inj performed by Kirt Melendez MD at [address] Right 6/29/2017    Right L1 L2 L3 L4 L5 Radiofrequency Ablation performed by Kirt Melendez MD at [address] Left 7/6/2017    Left L1 L2 L3 L4 L5 Radiofrequency performed by Kirt Melendez MD at [address] [facility]      LT EAR       Family History   Problem Relation Age of Onset    Cancer Father         colon cancer       Social History   Substance Use Topics    Smoking status: Former Smoker     Packs/day: 1.00     Years: 40.00     Types: Cigarettes, Pipe, Cigars     Quit date: 5/5/2011    Smokeless tobacco: Former User     Types: [address] [place] [address] date: 5/5/2011    Alcohol use 0.0 oz/week      Comment: RARE      Current Outpatient Prescriptions   Medication Sig Dispense Refill    celecoxib (CELEBREX) 200 MG capsule Take 1 capsule by mouth 2 times daily 180 capsule 3    PARoxetine (PAXIL) 20 MG tablet TAKE ONE TABLET BY MOUTH ONCE DAILY IN THE MORNING 90 tablet 3    metFORMIN (GLUCOPHAGE) 1000 MG tablet Take 1 tablet by mouth 2 times daily (with meals) 180 tablet 3    amoxicillin-clavulanate (AUGMENTIN) 500-125 MG per tablet Take 1 tablet by mouth 2 times daily for 21 days 42 tablet 0    oxyCODONE-acetaminophen (PERCOCET) 5-325 MG per tablet Take 1-2 tablets by mouth every 4 hours as needed for Pain. . 40 tablet 0    losartan (COZAAR) 25 MG tablet Take 1 tablet by mouth daily 90 tablet 3    atorvastatin (LIPITOR) 80 MG tablet TAKE 1 TABLET DAILY 90 tablet 3    metoprolol tartrate (LOPRESSOR) 25 MG tablet TAKE 1 TABLET TWICE A  tablet 3    vitamin C (ASCORBIC ACID) 500 MG tablet Take 500 mg by mouth 2 times daily With ferrous sulfate      ferrous sulfate 325 (65 Fe) MG tablet Take 325 mg by mouth 2 times daily With Vitamin C 500 mg bid      clopidogrel (PLAVIX) 75 MG tablet TAKE 1 TABLET DAILY 90 tablet 3    HYDROcodone-acetaminophen (NORCO) 5-325 MG per tablet Take 1 tablet by mouth every 8 hours as needed for Pain .  tiZANidine (ZANAFLEX) 4 MG tablet TAKE ONE TABLET BY MOUTH THREE TIMES DAILY 90 tablet 5    pantoprazole (PROTONIX) 40 MG tablet Take 1 tablet by mouth daily 90 tablet 3    aspirin 81 MG tablet Take 81 mg by mouth daily      famotidine (PEPCID) 20 MG tablet Take 20 mg by mouth daily. No current facility-administered medications for this visit.       Allergies   Allergen Reactions    Crestor [Rosuvastatin] Other (See Comments)     Joint pain, muscle aches    Ibuprofen Other (See Comments)     bleeding    Lisinopril Hives    Effient [Prasugrel] Rash       Health Maintenance   Topic Date Due    AAA screen  1945    Low dose CT lung screening

## 2018-08-17 ENCOUNTER — OFFICE VISIT (OUTPATIENT)
Dept: ORTHOPEDIC SURGERY | Age: 73
End: 2018-08-17
Payer: MEDICARE

## 2018-08-17 ENCOUNTER — HOSPITAL ENCOUNTER (OUTPATIENT)
Dept: GENERAL RADIOLOGY | Age: 73
Discharge: HOME | End: 2018-08-19
Payer: MEDICARE

## 2018-08-17 VITALS
BODY MASS INDEX: 31.25 KG/M2 | WEIGHT: 211 LBS | SYSTOLIC BLOOD PRESSURE: 110 MMHG | HEART RATE: 72 BPM | HEIGHT: 69 IN | DIASTOLIC BLOOD PRESSURE: 62 MMHG

## 2018-08-17 DIAGNOSIS — M48.062 SPINAL STENOSIS, LUMBAR REGION, WITH NEUROGENIC CLAUDICATION: Primary | ICD-10-CM

## 2018-08-17 DIAGNOSIS — M43.10 ACQUIRED SPONDYLOLISTHESIS: ICD-10-CM

## 2018-08-17 DIAGNOSIS — M48.062 SPINAL STENOSIS, LUMBAR REGION, WITH NEUROGENIC CLAUDICATION: ICD-10-CM

## 2018-08-17 DIAGNOSIS — M54.41 CHRONIC BILATERAL LOW BACK PAIN WITH BILATERAL SCIATICA: ICD-10-CM

## 2018-08-17 DIAGNOSIS — M54.42 CHRONIC BILATERAL LOW BACK PAIN WITH BILATERAL SCIATICA: ICD-10-CM

## 2018-08-17 DIAGNOSIS — G89.29 CHRONIC BILATERAL LOW BACK PAIN WITH BILATERAL SCIATICA: ICD-10-CM

## 2018-08-17 DIAGNOSIS — Z98.1 S/P LUMBAR SPINAL FUSION: ICD-10-CM

## 2018-08-17 PROCEDURE — G8598 ASA/ANTIPLAT THER USED: HCPCS | Performed by: ORTHOPAEDIC SURGERY

## 2018-08-17 PROCEDURE — 4040F PNEUMOC VAC/ADMIN/RCVD: CPT | Performed by: ORTHOPAEDIC SURGERY

## 2018-08-17 PROCEDURE — 99213 OFFICE O/P EST LOW 20 MIN: CPT | Performed by: ORTHOPAEDIC SURGERY

## 2018-08-17 PROCEDURE — 1123F ACP DISCUSS/DSCN MKR DOCD: CPT | Performed by: ORTHOPAEDIC SURGERY

## 2018-08-17 PROCEDURE — 1036F TOBACCO NON-USER: CPT | Performed by: ORTHOPAEDIC SURGERY

## 2018-08-17 PROCEDURE — 72100 X-RAY EXAM L-S SPINE 2/3 VWS: CPT

## 2018-08-17 PROCEDURE — G8417 CALC BMI ABV UP PARAM F/U: HCPCS | Performed by: ORTHOPAEDIC SURGERY

## 2018-08-17 PROCEDURE — G8427 DOCREV CUR MEDS BY ELIG CLIN: HCPCS | Performed by: ORTHOPAEDIC SURGERY

## 2018-08-17 PROCEDURE — 1101F PT FALLS ASSESS-DOCD LE1/YR: CPT | Performed by: ORTHOPAEDIC SURGERY

## 2018-08-17 PROCEDURE — 3017F COLORECTAL CA SCREEN DOC REV: CPT | Performed by: ORTHOPAEDIC SURGERY

## 2018-08-17 RX ORDER — OXYCODONE HYDROCHLORIDE AND ACETAMINOPHEN 5; 325 MG/1; MG/1
1-2 TABLET ORAL EVERY 4 HOURS PRN
Qty: 40 TABLET | Refills: 0 | Status: SHIPPED | OUTPATIENT
Start: 2018-08-17 | End: 2018-08-20 | Stop reason: SDUPTHER

## 2018-08-20 ENCOUNTER — NURSE ONLY (OUTPATIENT)
Dept: SURGERY | Age: 73
End: 2018-08-20

## 2018-08-20 ENCOUNTER — TELEPHONE (OUTPATIENT)
Dept: SURGERY | Age: 73
End: 2018-08-20

## 2018-08-20 VITALS
HEART RATE: 72 BPM | SYSTOLIC BLOOD PRESSURE: 122 MMHG | TEMPERATURE: 97.1 F | WEIGHT: 212 LBS | DIASTOLIC BLOOD PRESSURE: 78 MMHG | BODY MASS INDEX: 31.4 KG/M2 | HEIGHT: 69 IN

## 2018-08-20 DIAGNOSIS — Z12.11 COLON CANCER SCREENING: ICD-10-CM

## 2018-08-20 DIAGNOSIS — Z86.010 HISTORY OF COLON POLYPS: Primary | ICD-10-CM

## 2018-08-20 RX ORDER — POLYETHYLENE GLYCOL 3350, SODIUM CHLORIDE, SODIUM BICARBONATE, POTASSIUM CHLORIDE 420; 11.2; 5.72; 1.48 G/4L; G/4L; G/4L; G/4L
4000 POWDER, FOR SOLUTION ORAL ONCE
Qty: 4000 ML | Refills: 0 | Status: SHIPPED | OUTPATIENT
Start: 2018-08-20 | End: 2018-08-20

## 2018-08-20 ASSESSMENT — ENCOUNTER SYMPTOMS: BACK PAIN: 1

## 2018-08-20 NOTE — PROGRESS NOTES
Patient presents for nurse visit to schedule colonoscopy. History and physical done. Risks of procedure discussed with patient and pamphlet given. Bowel prep sent to pharmacy v.o. Dr. Rigoberto Bassett. Bowel prep instructions given written and verbally. patient verbalizes understanding.
SURGERY  08/09/2001    Anterior Cervical Decompression and Fusion C3-4    CERVICAL SPINE SURGERY Left [id number]    Cervical C6-C7 Discectomy and Foraminotomy    COLONOSCOPY  2007, 2003    POLYPS    COLONOSCOPY  9/9/13    hyperplastic polyp x 3    CORONARY ANGIOPLASTY WITH STENT PLACEMENT      ELBOW BURSA SURGERY Right 2012,2011    FINGER TRIGGER RELEASE Right     THIRD FINGER    [facility]. INJECTION PROCEDURE FOR SACROILIAC JOINT Bilateral 3/14/2017    Bilat Sacroiliac & Piriformis Inj's performed by Leisa Cantrell MD at [address] Right     ELBOW    LUMBAR FUSION  4/7/14    lumbar decompression and fusion    LUMBAR FUSION N/A 11/15/2017    LUMBAR DECOMPRESSION POSTERIOR W/FUSION L3 L4 ( with SPINAL CORD MONITOR ) performed by Domenica Pagan MD at [address] [place] [address]  [phone],[phone]    Fusion    LUMBAR SPINE SURGERY Left 2/14/2017    Left L4 & L5 Transforaminal ANITHA performed by Leisa Cantrell MD at [address] [facility] Bilateral 5/2/2017    Bilat L5 Transforaminal ANITHA performed by Leisa Cantrell MD at [address] [place] [address] Bilateral 5/9/2017    Bilat L5 Transforaminal ANITHA performed by Leisa Cantrell MD at [address] [facility]  2/19/13, 5/14/13    L1/L2 IESI    OTHER SURGICAL HISTORY      Hardware correction, patient had 1 broken screw and to loose screws in back     OTHER SURGICAL HISTORY Left 09/27/2016     C6 TFE    OTHER SURGICAL HISTORY Bilateral 11/29/2016    L3, L4 L5 Diagnostic Medial Branch Block    OTHER SURGICAL HISTORY  12/06/2016    jovani L3 L4 L5 conf MBB    OTHER SURGICAL HISTORY Right 12/12/2016    L3,L4,L5 RFA    OTHER SURGICAL HISTORY Left 12/15/17    L3.  L4, L5 RFA    OTHER SURGICAL HISTORY Left 01/31/2017    L4 & L5 TFE    [place] ARTHROCENTESIS ASPIR&/INJ MAJOR JT/BURSA W/O US Left 3/31/2017    Left Hip Inj performed by Leisa Cantrell MD at [address] ARTHROCENTESIS ASPIR&/INJ MAJOR JT/BURSA W/O US Left

## 2018-08-20 NOTE — PROGRESS NOTES
Subjective:      Patient ID: Jacob Rodrigues is a 75 y.o. male. Back Pain     Follow-up L2 to L4 decompression and instrumented fusion to 3 wasn't on add-on post nonunion 3 for    Patient has been doing quite well. He does get some back ache with weather changes. Review of Systems   Musculoskeletal: Positive for back pain. All other systems reviewed and are negative. Objective:   Physical Exam   Constitutional: He is oriented to person, place, and time. He appears well-developed and well-nourished. HENT:   Head: Normocephalic and atraumatic. Eyes: Conjunctivae and EOM are normal.   Neck: Normal range of motion. Pulmonary/Chest: Effort normal. No respiratory distress. Neurological: He is alert and oriented to person, place, and time. He has normal strength. No sensory deficit. Normal gait   Skin: Skin is warm and dry. Psychiatric: His behavior is normal. Thought content normal.   Nursing note and vitals reviewed. AP lateral lumbar spine reviewed patient with stable hardware construct status post L2-3 lateral interbody fusion with history of 34 PLIF - hardware appears stable previous halos from and it initial nonunion still evident    Assessment:      Encounter Diagnoses   Name Primary?     Spinal stenosis, lumbar region, with neurogenic claudication Yes    Acquired spondylolisthesis     S/P lumbar spinal fusion     Chronic bilateral low back pain with bilateral sciatica            Plan:      Follow-up 6 months        Yajaira Elizabeth MD

## 2018-08-20 NOTE — TELEPHONE ENCOUNTER
Spoke with pt- says he didn't fill the 1000 mg script. He will continue to take 500 mg bid and monitor BS readings. He will report any abnormal numbers.

## 2018-08-20 NOTE — TELEPHONE ENCOUNTER
[address]         Patient:Norberto Osullivan           HENRY:[id number]           Surgical/Procedure Planned: colonoscopy    Date & Location: 10/15/18 at [facility]       Outpatient   Planned Length of OR: 30 minutes    Sedation: intravenous sedation    This is notification of the scheduled procedure only: no    Estimated Cardiac Risk for Non-Cardiac Surgery/Procedure     Low__X___ Moderate______ High______    Medication Instructions - Clarification needed by this date:       ASA 81 mg Hold _5-7__ Days  Plavix  Hold _5-7__ Days    Resume medications: 48 hours later     Lovenox indicated: _____Yes __X__NO    Provider:Dariusz      Signature of Provider Giving Orders for Medication holds:    ____Yolanda Heredia DO________________

## 2018-08-27 ENCOUNTER — TELEPHONE (OUTPATIENT)
Dept: INTERNAL MEDICINE | Age: 73
End: 2018-08-27

## 2018-09-07 ENCOUNTER — OFFICE VISIT (OUTPATIENT)
Dept: OTOLARYNGOLOGY | Age: 73
End: 2018-09-07
Payer: MEDICARE

## 2018-09-07 VITALS
SYSTOLIC BLOOD PRESSURE: 112 MMHG | WEIGHT: 212 LBS | DIASTOLIC BLOOD PRESSURE: 78 MMHG | RESPIRATION RATE: 14 BRPM | BODY MASS INDEX: 31.4 KG/M2 | HEART RATE: 72 BPM | HEIGHT: 69 IN

## 2018-09-07 DIAGNOSIS — J32.9 SINUSITIS, UNSPECIFIED CHRONICITY, UNSPECIFIED LOCATION: ICD-10-CM

## 2018-09-07 DIAGNOSIS — C32.9 CARCINOMA LARYNX (HCC): Primary | ICD-10-CM

## 2018-09-07 DIAGNOSIS — H61.102 LESION OF LEFT PINNA: ICD-10-CM

## 2018-09-07 PROCEDURE — 1101F PT FALLS ASSESS-DOCD LE1/YR: CPT | Performed by: OTOLARYNGOLOGY

## 2018-09-07 PROCEDURE — 1123F ACP DISCUSS/DSCN MKR DOCD: CPT | Performed by: OTOLARYNGOLOGY

## 2018-09-07 PROCEDURE — 1036F TOBACCO NON-USER: CPT | Performed by: OTOLARYNGOLOGY

## 2018-09-07 PROCEDURE — G8598 ASA/ANTIPLAT THER USED: HCPCS | Performed by: OTOLARYNGOLOGY

## 2018-09-07 PROCEDURE — G8427 DOCREV CUR MEDS BY ELIG CLIN: HCPCS | Performed by: OTOLARYNGOLOGY

## 2018-09-07 PROCEDURE — 99213 OFFICE O/P EST LOW 20 MIN: CPT | Performed by: OTOLARYNGOLOGY

## 2018-09-07 PROCEDURE — G8417 CALC BMI ABV UP PARAM F/U: HCPCS | Performed by: OTOLARYNGOLOGY

## 2018-09-07 PROCEDURE — 4040F PNEUMOC VAC/ADMIN/RCVD: CPT | Performed by: OTOLARYNGOLOGY

## 2018-09-07 PROCEDURE — 3017F COLORECTAL CA SCREEN DOC REV: CPT | Performed by: OTOLARYNGOLOGY

## 2018-09-07 NOTE — PROGRESS NOTES
Norberto Osullivan  9/7/18    Chief Complaint   Patient presents with    Sinus Problem     re ck nose and ear       HPI: Hx of Ca TVC rxd w RT 20 yrs ago.  Here for fu on L pinna lesion( Ca removed prev Dr Sandhya Maynard) and fu L max sinusitis, finished Aug    Past Med Hx:     Past Medical History:   Diagnosis Date    Asthma     CAD (coronary artery disease)     STENTS X 4    Cancer ([facility] [address]     THROAT, HX. RADIATION , LT EAR    Cervical radiculopathy     Chronic back pain     Colonic polyp     Degeneration of cervical intervertebral disc     [facility] 1990 C4/C5-C6/C7    Degeneration of cervical intervertebral disc     [facility] 1994, C3/C4    Depression     Diverticulosis     GERD (gastroesophageal reflux disease)     Glucose intolerance (impaired glucose tolerance)     IS NOT DIABETIC, PER PT    HTN (hypertension)     Hyperlipidemia     Lumbar radiculopathy     MI (myocardial infarction) ([facility] [address]     2011    Numbness and tingling     HANDS    OA (osteoarthritis)     Pre-diabetes     Sacroiliac pain 11/4/2014    Vision abnormalities     WEARS GLASSES        Past Surgical History:   Procedure Laterality Date    ABSCESS DRAINAGE Right     ELBOW WITH CLOSURE    ANESTHESIA NERVE BLOCK Bilateral 6/2/2017    Bilat L1 L2 L3 L4 L5 Diagnostic Medial Branch Blk performed by Yadi Hart MD at [address] Bilateral 6/9/2017    Bilat L1 L2 L3 L4 L5 Confirmatory Medial BB performed by Yadi Hart MD at [facility]  06/01/2011    DR [place] CARDIAC CATHETERIZATION  5-5-2011    STENTSx4    CERVICAL SPINE SURGERY  08/09/2001    Anterior Cervical Decompression and Fusion C3-4    CERVICAL SPINE SURGERY Left [id number]    Cervical C6-C7 Discectomy and Foraminotomy    COLONOSCOPY  2007, 2003    POLYPS    COLONOSCOPY  9/9/13    hyperplastic polyp x 3    CORONARY ANGIOPLASTY WITH STENT PLACEMENT      ELBOW BURSA SURGERY Right [facility]    SKIN BIOPSY      LT EAR       Family History:     Family History   Problem Relation Age of Onset   Esequiel Goltz Cancer Father         colon cancer       Social Hx:     Social History     Social History    Marital status:      Spouse name: N/A    Number of children: N/A    Years of education: N/A     Occupational History    Not on file.      Social History Main Topics    Smoking status: Former Smoker     Packs/day: 1.00     Years: 40.00     Types: Cigarettes, Pipe, Cigars     Quit date: 2011    Smokeless tobacco: Former User     Types: Chew     Quit date: 2011    Alcohol use 0.0 oz/week      Comment: RARE    Drug use: No    Sexual activity: Not on file     Other Topics Concern    Not on file     Social History Narrative    ** Merged History Encounter **            ROS:   CV: cad on Plavix   Endocrine:    Resp:     GI:       Neuro:   PE:     General appearance:  Normal                 Ability to Communicate:  Normal       Head & Face:  Normal   Salivary Glands:  Normal              Facial Strength:  Normal   Ears:    Pinna:  L pinna crusty lesion,doubt malig, has derm appt next wk, will have derm check this         EAC:  Normal      TMs:  Normal       [place]:  [employer]     Nose:    External: Normal    Septum:  Normal    Turbinates: Normal             Nasal Cavity: Normal         Naso Pharyngoscopy:     Nasal Endoscopy: L [facility] now clear, has open accessory ostia      Oral Cavity & Oral Pharynx:    Tongue:  Normal    Teeth & Gums:             Palate:  Era     Tonsils:      FOM:  Normal     Other:      Neck:    Thyroid:Normal       Lymph nodes: Normal           Trachea:  Normal      Masses:  Normal    Other:        Eyes:    EOMs:      Nystagmus:      Neurological:    CN V:      CN VII:       Gait & Station:      Romberg:      Tandem Gait:      [place]:       Oriented x 3: Normal     Affect:  Normal    Data reviewed:      ASSESSMENT:   Diagnosis Orders 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 Sinusitis, unspecified chronicity, unspecified location     3. Lesion of left pinna         PLAN:see 6 mos to check Lx and pinna  Return in about 6 months (around 3/7/2019). No orders of the defined types were placed in this encounter.         Zenon Dancer MD

## 2018-09-25 DIAGNOSIS — J21.9 ACUTE BRONCHIOLITIS DUE TO UNSPECIFIED ORGANISM: ICD-10-CM

## 2018-09-25 RX ORDER — AMOXICILLIN AND CLAVULANATE POTASSIUM 875; 125 MG/1; MG/1
TABLET, FILM COATED ORAL
Qty: 20 TABLET | Refills: 0 | OUTPATIENT
Start: 2018-09-25

## 2018-10-02 ENCOUNTER — NURSE ONLY (OUTPATIENT)
Dept: LAB | Age: 73
End: 2018-10-02
Payer: MEDICARE

## 2018-10-02 DIAGNOSIS — Z23 NEED FOR VACCINATION: Primary | ICD-10-CM

## 2018-10-02 PROCEDURE — G0008 ADMIN INFLUENZA VIRUS VAC: HCPCS | Performed by: INTERNAL MEDICINE

## 2018-10-02 PROCEDURE — 90662 IIV NO PRSV INCREASED AG IM: CPT | Performed by: INTERNAL MEDICINE

## 2018-10-03 ENCOUNTER — OFFICE VISIT (OUTPATIENT)
Dept: PAIN MANAGEMENT | Age: 73
End: 2018-10-03
Payer: COMMERCIAL

## 2018-10-03 VITALS
SYSTOLIC BLOOD PRESSURE: 122 MMHG | WEIGHT: 211 LBS | BODY MASS INDEX: 31.25 KG/M2 | HEART RATE: 78 BPM | HEIGHT: 69 IN | DIASTOLIC BLOOD PRESSURE: 68 MMHG

## 2018-10-03 DIAGNOSIS — G89.29 NECK PAIN, CHRONIC: Primary | ICD-10-CM

## 2018-10-03 DIAGNOSIS — M54.2 NECK PAIN, CHRONIC: Primary | ICD-10-CM

## 2018-10-03 DIAGNOSIS — G89.29 ENCOUNTER FOR CHRONIC PAIN MANAGEMENT: ICD-10-CM

## 2018-10-03 PROCEDURE — 99213 OFFICE O/P EST LOW 20 MIN: CPT | Performed by: NURSE PRACTITIONER

## 2018-10-05 ENCOUNTER — OFFICE VISIT (OUTPATIENT)
Dept: INTERNAL MEDICINE | Age: 73
End: 2018-10-05
Payer: MEDICARE

## 2018-10-05 VITALS
BODY MASS INDEX: 31.7 KG/M2 | HEIGHT: 69 IN | RESPIRATION RATE: 16 BRPM | DIASTOLIC BLOOD PRESSURE: 80 MMHG | WEIGHT: 214 LBS | SYSTOLIC BLOOD PRESSURE: 142 MMHG | HEART RATE: 74 BPM

## 2018-10-05 DIAGNOSIS — E78.2 MIXED HYPERLIPIDEMIA: ICD-10-CM

## 2018-10-05 DIAGNOSIS — M54.2 NECK PAIN, CHRONIC: ICD-10-CM

## 2018-10-05 DIAGNOSIS — F33.41 RECURRENT MAJOR DEPRESSIVE DISORDER, IN PARTIAL REMISSION (HCC): ICD-10-CM

## 2018-10-05 DIAGNOSIS — G89.29 NECK PAIN, CHRONIC: ICD-10-CM

## 2018-10-05 DIAGNOSIS — I10 ESSENTIAL HYPERTENSION: ICD-10-CM

## 2018-10-05 DIAGNOSIS — E11.9 TYPE 2 DIABETES MELLITUS WITHOUT COMPLICATION, WITHOUT LONG-TERM CURRENT USE OF INSULIN (HCC): Primary | ICD-10-CM

## 2018-10-05 PROCEDURE — G8427 DOCREV CUR MEDS BY ELIG CLIN: HCPCS | Performed by: INTERNAL MEDICINE

## 2018-10-05 PROCEDURE — 3044F HG A1C LEVEL LT 7.0%: CPT | Performed by: INTERNAL MEDICINE

## 2018-10-05 PROCEDURE — 1101F PT FALLS ASSESS-DOCD LE1/YR: CPT | Performed by: INTERNAL MEDICINE

## 2018-10-05 PROCEDURE — 2022F DILAT RTA XM EVC RTNOPTHY: CPT | Performed by: INTERNAL MEDICINE

## 2018-10-05 PROCEDURE — G8598 ASA/ANTIPLAT THER USED: HCPCS | Performed by: INTERNAL MEDICINE

## 2018-10-05 PROCEDURE — 4040F PNEUMOC VAC/ADMIN/RCVD: CPT | Performed by: INTERNAL MEDICINE

## 2018-10-05 PROCEDURE — 3017F COLORECTAL CA SCREEN DOC REV: CPT | Performed by: INTERNAL MEDICINE

## 2018-10-05 PROCEDURE — 1036F TOBACCO NON-USER: CPT | Performed by: INTERNAL MEDICINE

## 2018-10-05 PROCEDURE — 99214 OFFICE O/P EST MOD 30 MIN: CPT | Performed by: INTERNAL MEDICINE

## 2018-10-05 PROCEDURE — G8482 FLU IMMUNIZE ORDER/ADMIN: HCPCS | Performed by: INTERNAL MEDICINE

## 2018-10-05 PROCEDURE — G8417 CALC BMI ABV UP PARAM F/U: HCPCS | Performed by: INTERNAL MEDICINE

## 2018-10-05 PROCEDURE — 1123F ACP DISCUSS/DSCN MKR DOCD: CPT | Performed by: INTERNAL MEDICINE

## 2018-10-05 ASSESSMENT — ENCOUNTER SYMPTOMS
CONSTIPATION: 0
SHORTNESS OF BREATH: 0
BACK PAIN: 0
BLOOD IN STOOL: 0
VOMITING: 0
EYE PAIN: 0
DIARRHEA: 0
ABDOMINAL PAIN: 0
COUGH: 0
NAUSEA: 0
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
## 2018-10-08 ASSESSMENT — ENCOUNTER SYMPTOMS
CONSTIPATION: 0
EYES NEGATIVE: 1
ALLERGIC/IMMUNOLOGIC NEGATIVE: 1
NAUSEA: 0
RESPIRATORY NEGATIVE: 1
BACK PAIN: 1

## 2018-10-08 NOTE — PROGRESS NOTES
[id number]    Cervical C6-C7 Discectomy and Foraminotomy    COLONOSCOPY  2007, 2003    POLYPS    COLONOSCOPY  9/9/13    hyperplastic polyp x 3    CORONARY ANGIOPLASTY WITH STENT PLACEMENT      ELBOW BURSA SURGERY Right 2012,2011    FINGER TRIGGER RELEASE Right     THIRD FINGER    [facility] INJECTION PROCEDURE FOR SACROILIAC JOINT Bilateral 3/14/2017    Bilat Sacroiliac & Piriformis Inj's performed by Shade Yuan MD at [address] Right     ELBOW    LUMBAR FUSION  4/7/14    lumbar decompression and fusion    LUMBAR FUSION N/A 11/15/2017    LUMBAR DECOMPRESSION POSTERIOR W/FUSION L3 L4 ( with SPINAL CORD MONITOR ) performed by Alta Putnam MD at [address]  5589,1296    Fusion    LUMBAR SPINE SURGERY Left 2/14/2017    Left L4 & L5 Transforaminal ANITHA performed by Shade Yuan MD at [address] Bilateral 5/2/2017    Bilat L5 Transforaminal ANITHA performed by Shade Yuan MD at [address] Bilateral 5/9/2017    Bilat L5 Transforaminal ANITHA performed by Shade Yuan MD at [address]  2/19/13, 5/14/13    L1/L2 IESI    OTHER SURGICAL HISTORY      Hardware correction, patient had 1 broken screw and to loose screws in back     OTHER SURGICAL HISTORY Left 09/27/2016     C6 TFE    OTHER SURGICAL HISTORY Bilateral 11/29/2016    L3, L4 L5 Diagnostic Medial Branch Block    OTHER SURGICAL HISTORY  12/06/2016    jovani L3 L4 L5 conf MBB    OTHER SURGICAL HISTORY Right 12/12/2016    L3,L4,L5 RFA    OTHER SURGICAL HISTORY Left 12/15/17    L3.  L4, L5 RFA    OTHER SURGICAL HISTORY Left 01/31/2017    L4 & L5 TFE    [place] ARTHROCENTESIS ASPIR&/INJ MAJOR JT/BURSA W/O US Left 3/31/2017    Left Hip Inj performed by Shade Yuan MD at [address] ARTHROCENTESIS ASPIR&/INJ MAJOR JT/BURSA W/O US Left 4/14/2017    Left Hip Inj performed by Shade Yuan MD at [address]. No respiratory distress. Musculoskeletal:        Cervical back: He exhibits pain. Neurological: He is alert and oriented to person, place, and time. No cranial nerve deficit. GCS eye subscore is 4. GCS verbal subscore is 5. GCS motor subscore is 6. Skin: Skin is warm, dry and intact. He is not diaphoretic. No cyanosis. No pallor. Nails show no clubbing. Psychiatric: He has a normal mood and affect. His behavior is normal.   Vitals reviewed. Assessment:      1. Neck pain, chronic    2.  Encounter for chronic pain management          Plan:      Consider injections, left C6 TFE, will need to submit C9 via [address], APRN - CNP

## 2018-10-09 ENCOUNTER — TELEPHONE (OUTPATIENT)
Dept: PAIN MANAGEMENT | Age: 73
End: 2018-10-09

## 2018-10-15 ENCOUNTER — ANESTHESIA EVENT (OUTPATIENT)
Dept: OPERATING ROOM | Age: 73
End: 2018-10-15
Payer: MEDICARE

## 2018-10-15 ENCOUNTER — HOSPITAL ENCOUNTER (OUTPATIENT)
Age: 73
Setting detail: OUTPATIENT SURGERY
Discharge: HOME | End: 2018-10-15
Attending: SURGERY | Admitting: SURGERY
Payer: MEDICARE

## 2018-10-15 ENCOUNTER — ANESTHESIA (OUTPATIENT)
Dept: OPERATING ROOM | Age: 73
End: 2018-10-15
Payer: MEDICARE

## 2018-10-15 VITALS
RESPIRATION RATE: 16 BRPM | SYSTOLIC BLOOD PRESSURE: 180 MMHG | BODY MASS INDEX: 31.4 KG/M2 | DIASTOLIC BLOOD PRESSURE: 91 MMHG | HEART RATE: 85 BPM | WEIGHT: 212 LBS | TEMPERATURE: 98.8 F | OXYGEN SATURATION: 93 % | HEIGHT: 69 IN

## 2018-10-15 VITALS — OXYGEN SATURATION: 93 % | SYSTOLIC BLOOD PRESSURE: 90 MMHG | DIASTOLIC BLOOD PRESSURE: 55 MMHG

## 2018-10-15 PROCEDURE — 3700000001 HC ADD 15 MINUTES (ANESTHESIA): Performed by: SURGERY

## 2018-10-15 PROCEDURE — 7100000010 HC PHASE II RECOVERY - FIRST 15 MIN: Performed by: SURGERY

## 2018-10-15 PROCEDURE — 3609010300 HC COLONOSCOPY W/BIOPSY SINGLE/MULTIPLE: Performed by: SURGERY

## 2018-10-15 PROCEDURE — 3700000000 HC ANESTHESIA ATTENDED CARE: Performed by: SURGERY

## 2018-10-15 PROCEDURE — 00811 ANES LWR INTST NDSC NOS: CPT | Performed by: NURSE ANESTHETIST, CERTIFIED REGISTERED

## 2018-10-15 PROCEDURE — 7100000011 HC PHASE II RECOVERY - ADDTL 15 MIN: Performed by: SURGERY

## 2018-10-15 PROCEDURE — 45380 COLONOSCOPY AND BIOPSY: CPT | Performed by: SURGERY

## 2018-10-15 PROCEDURE — 2709999900 HC NON-CHARGEABLE SUPPLY: Performed by: SURGERY

## 2018-10-15 PROCEDURE — 6360000002 HC RX W HCPCS: Performed by: NURSE ANESTHETIST, CERTIFIED REGISTERED

## 2018-10-15 PROCEDURE — 2580000003 HC RX 258: Performed by: SURGERY

## 2018-10-15 PROCEDURE — 88305 TISSUE EXAM BY PATHOLOGIST: CPT

## 2018-10-15 RX ORDER — PROPOFOL 10 MG/ML
INJECTION, EMULSION INTRAVENOUS PRN
Status: DISCONTINUED | OUTPATIENT
Start: 2018-10-15 | End: 2018-10-15 | Stop reason: SDUPTHER

## 2018-10-15 RX ORDER — SODIUM CHLORIDE, SODIUM LACTATE, POTASSIUM CHLORIDE, CALCIUM CHLORIDE 600; 310; 30; 20 MG/100ML; MG/100ML; MG/100ML; MG/100ML
INJECTION, SOLUTION INTRAVENOUS CONTINUOUS
Status: DISCONTINUED | OUTPATIENT
Start: 2018-10-15 | End: 2018-10-15 | Stop reason: HOSPADM

## 2018-10-15 RX ADMIN — PROPOFOL 50 MG: 10 INJECTION, EMULSION INTRAVENOUS at 09:25

## 2018-10-15 RX ADMIN — PROPOFOL 50 MG: 10 INJECTION, EMULSION INTRAVENOUS at 09:35

## 2018-10-15 RX ADMIN — PROPOFOL 50 MG: 10 INJECTION, EMULSION INTRAVENOUS at 09:24

## 2018-10-15 RX ADMIN — PROPOFOL 50 MG: 10 INJECTION, EMULSION INTRAVENOUS at 09:40

## 2018-10-15 RX ADMIN — PROPOFOL 50 MG: 10 INJECTION, EMULSION INTRAVENOUS at 09:30

## 2018-10-15 RX ADMIN — SODIUM CHLORIDE, POTASSIUM CHLORIDE, SODIUM LACTATE AND CALCIUM CHLORIDE: 600; 310; 30; 20 INJECTION, SOLUTION INTRAVENOUS at 08:50

## 2018-10-15 ASSESSMENT — ENCOUNTER SYMPTOMS: SHORTNESS OF BREATH: 1

## 2018-10-15 ASSESSMENT — PAIN SCALES - GENERAL
PAINLEVEL_OUTOF10: 0

## 2018-10-15 ASSESSMENT — PAIN - FUNCTIONAL ASSESSMENT: PAIN_FUNCTIONAL_ASSESSMENT: 0-10

## 2018-10-15 NOTE — OP NOTE
COLONOSCOPY PROCEDURE NOTE:      Pre op diagnosis:    1.   hx of polyps 2013  2.  + fam hx colon cancer  3. Complains of a hemorrhoid today     Operative Procedure:    1. Colonoscopy with cold bx    Surgeon:    Dr. Flavio Avila     Anesthesia:    IV sedation per CRNA    EBL:  minimal    Procedure:    Patient was taken to the endoscopy suite and placed in a left lateral decubitus position. They were given IV sedation as mentioned above. A digital rectal exam was performed. There were no masses and anal tone was normal.  The colonoscope was inserted into the rectum and carefully manipulated up through the sigmoid colon, transverse colon, right colon and into the cecum. The cecum was identified by the ileal cecal valve and transabdominal illumination. Then the scope was slowly withdrawn. The scope was retroflexed in the rectum and the dentate line was examined. The scope was removed. The patient tolerated the procedure well. The following findings were noted. Final Diagnosis:    1. Severe sigmoid diverticulosis  2. Large external hemorrhoid  3.  5 mm polyp at 40 cm, removed with cold bx    Plan:    1. Await bx  2. Increase fiber and water for diverticulosis  3. Would need external hermorhoidectomy to tx hemorhoid. ADDENDUM:  Endo Review :  10/27/2018    Pathology:      -- Diagnosis --     Colon, biopsy at 40 cm:     Hyperplastic polyp. Plan:    1. Repeat CS in 5 years due to hx of polyps  2. If pt wants the external hemorrhoid, txed we need to schedule that in the OR.

## 2018-10-15 NOTE — H&P
CATHETERIZATION        CARDIAC CATHETERIZATION   06/01/2011     DR Nimisha Eaton CARDIAC CATHETERIZATION   5-5-2011     STENTSx4    CERVICAL SPINE SURGERY   08/09/2001     Anterior Cervical Decompression and Fusion C3-4    CERVICAL SPINE SURGERY Left [id number]     Cervical C6-C7 Discectomy and Foraminotomy    COLONOSCOPY   2007, 2003     POLYPS    COLONOSCOPY   9/9/13     hyperplastic polyp x 3    CORONARY ANGIOPLASTY WITH STENT PLACEMENT        ELBOW BURSA SURGERY Right 2012,2011    FINGER TRIGGER RELEASE Right       THIRD FINGER    [facility] INJECTION PROCEDURE FOR SACROILIAC JOINT Bilateral 3/14/2017     Bilat Sacroiliac & Piriformis Inj's performed by Frances Mcgowan MD at [address] Right       ELBOW    LUMBAR FUSION   4/7/14     lumbar decompression and fusion    LUMBAR FUSION N/A 11/15/2017     LUMBAR DECOMPRESSION POSTERIOR W/FUSION L3 L4 ( with SPINAL CORD MONITOR ) performed by Demetrice Novoa MD at [address] [facility] [address]   [phone]     Fusion    LUMBAR SPINE SURGERY Left 2/14/2017     Left L4 & L5 Transforaminal ANITHA performed by Frances Mcgowan MD at [address] [facility] Bilateral 5/2/2017     Bilat L5 Transforaminal ANITHA performed by Frances Mcgowan MD at [address] [facility] Bilateral 5/9/2017     Bilat L5 Transforaminal ANITHA performed by Frances Mcgowan MD at [place] [address]   2/19/13, 5/14/13     L1/L2 IESI    OTHER SURGICAL HISTORY         Hardware correction, patient had 1 broken screw and to loose screws in back     OTHER SURGICAL HISTORY Left 09/27/2016      C6 TFE    OTHER SURGICAL HISTORY Bilateral 11/29/2016     L3, L4 L5 Diagnostic Medial Branch Block    OTHER SURGICAL HISTORY   12/06/2016     jovani L3 L4 L5 conf MBB    OTHER SURGICAL HISTORY Right 12/12/2016     L3,L4,L5 RFA    OTHER SURGICAL HISTORY Left 12/15/17     L3.  L4, L5 RFA    OTHER SURGICAL HISTORY Left 01/31/2017     L4 & L5 TFE    HI per tablet Take 1 tablet by mouth 2 times daily for 21 days 42 tablet 0    oxyCODONE-acetaminophen (PERCOCET) 5-325 MG per tablet Take 1-2 tablets by mouth every 4 hours as needed for Pain. . 40 tablet 0    losartan (COZAAR) 25 MG tablet Take 1 tablet by mouth daily 90 tablet 3    atorvastatin (LIPITOR) 80 MG tablet TAKE 1 TABLET DAILY 90 tablet 3    metoprolol tartrate (LOPRESSOR) 25 MG tablet TAKE 1 TABLET TWICE A  tablet 3    ferrous sulfate 325 (65 Fe) MG tablet Take 325 mg by mouth 2 times daily With Vitamin C 500 mg bid        clopidogrel (PLAVIX) 75 MG tablet TAKE 1 TABLET DAILY 90 tablet 3    HYDROcodone-acetaminophen (NORCO) 5-325 MG per tablet Take 1 tablet by mouth every 8 hours as needed for Pain .  tiZANidine (ZANAFLEX) 4 MG tablet TAKE ONE TABLET BY MOUTH THREE TIMES DAILY 90 tablet 5    pantoprazole (PROTONIX) 40 MG tablet Take 1 tablet by mouth daily 90 tablet 3    aspirin 81 MG tablet Take 81 mg by mouth daily        famotidine (PEPCID) 20 MG tablet Take 20 mg by mouth daily.  vitamin C (ASCORBIC ACID) 500 MG tablet Take 500 mg by mouth 2 times daily With ferrous sulfate          No current facility-administered medications on file prior to visit. Allergies as of 08/20/2018 - Review Complete 08/20/2018   Allergen Reaction Noted    Crestor [rosuvastatin] Other (See Comments) 04/04/2014    Ibuprofen Other (See Comments) 11/23/2016    Lisinopril Hives 06/26/2013    Effient [prasugrel] Rash 06/26/2013          PHYSICAL EXAM:    Blood pressure 115/83, pulse 101, temperature 98.8 °F (37.1 °C), temperature source Oral, resp. rate 16, height 5' 9\" (1.753 m), weight 212 lb (96.2 kg), SpO2 93 %.     Gen:  A and O x 3, NAD, well nourished  Eyes:  Sclera non icterus, PERRL  Lungs:  CTA, symmetrical  Chest:  RRR, no murmurs  Abd:  Soft, NT, ND, no HSM, no bruits                 ASSESSMENT:   1.   hx of polyps 2013  2.  + fam hx colon cancer     PLAN:colonoscopy at [facility] on 10/15/18

## 2018-10-15 NOTE — ANESTHESIA PRE PROCEDURE
Comments)     bleeding    Lisinopril Hives    Effient [Prasugrel] Rash       Problem List:    Patient Active Problem List   Diagnosis Code    Chronic back pain M54.9, G89.29    Neck pain, chronic M54.2, G89.29    Brachial neuritis or radiculitis M54.12    Spinal stenosis, lumbar region, with neurogenic claudication M48.062    Displacement of cervical intervertebral disc without myelopathy M50.20    CAD (coronary artery disease) I25.10    GILL (dyspnea on exertion) R06.09    S/P lumbar spinal fusion Z98.1    Obesity (BMI 35.0-39.9 without comorbidity) E66.9    HTN (hypertension) I10    Hyperlipidemia E78.5    Type 2 diabetes mellitus without complication (HCC) T38.5    Chronic bilateral low back pain with bilateral sciatica M54.42, M54.41, G89.29    Left hip pain M25.552    Acquired spondylolisthesis M43.10    Back pain M54.9    Recurrent major depressive disorder, in partial remission (HCC) F33.41       Past Medical History:        Diagnosis Date    Asthma     CAD (coronary artery disease)     STENTS X 4    Cancer (HCC)     THROAT, HX. RADIATION , LT EAR    Cervical radiculopathy     Chronic back pain     Colonic polyp     Degeneration of cervical intervertebral disc     [facility], 1990 C4/C5-C6/C7    Degeneration of cervical intervertebral disc     [facility] 1994, C3/C4    Depression     Diverticulosis     GERD (gastroesophageal reflux disease)     Glucose intolerance (impaired glucose tolerance)     IS NOT DIABETIC, PER PT    HTN (hypertension)     Hyperlipidemia     Lumbar radiculopathy     MI (myocardial infarction) ([facility] [address]     2011    Numbness and tingling     HANDS    OA (osteoarthritis)     Pre-diabetes     Sacroiliac pain 11/4/2014    Vision abnormalities     WEARS GLASSES       Past Surgical History:        Procedure Laterality Date    ABSCESS DRAINAGE Right     ELBOW WITH CLOSURE    ANESTHESIA NERVE BLOCK Bilateral 6/2/2017    Bilat L1 L2 L3 L4 L5 Diagnostic Medial Branch Blk things, talking but unable to remember conversation, lifting chin/repositioning airway. Benefits continue breathing on own, unless obtunded. Since sedation is on a contuinuum general anesthesia with the use of endotracheal intubation was discussed as a back up plan. Specifically dicussed risks, benefits, alternatives, personnel involved, including risks of: cut or cracked lip, chipped tooth/teeth, broken or removed teeth, sore throat, damaged vocal cords, nausea and vomiting, allergic reaction to medication, failed intubation, need to repeat procedure, emergent airway attempts, case cancellation, need for prolonged intubation/remaining intubated, other monitors, and possible death. The patient will be placed on a cardiac monitor and vital signs, pulse oximetry and level of consciousness will be continuously evaluated throughout the procedure. The patient will be closely monitored until recovery from the medications is complete and the patient has returned to baseline status. Pt verbalized an understanding and agreed to proceed. Electronically signed by:  LAWRENCE Combs CRNA, GIO 10/15/2018 7:18 AM  Staff Anesthetist   [facility]   Department of Anesthesia  )  Induction: intravenous. Anesthetic plan and risks discussed with patient. Use of blood products discussed with patient whom.    Plan discussed with surgical team.                  LAWRENCE Combs CRNA   10/15/2018

## 2018-10-16 LAB — SURGICAL PATHOLOGY REPORT: NORMAL

## 2018-10-29 ENCOUNTER — TELEPHONE (OUTPATIENT)
Dept: SURGERY | Age: 73
End: 2018-10-29

## 2018-11-06 ENCOUNTER — INITIAL CONSULT (OUTPATIENT)
Dept: SURGERY | Age: 73
End: 2018-11-06
Payer: MEDICARE

## 2018-11-06 ENCOUNTER — OFFICE VISIT (OUTPATIENT)
Dept: INTERNAL MEDICINE | Age: 73
End: 2018-11-06
Payer: COMMERCIAL

## 2018-11-06 ENCOUNTER — HOSPITAL ENCOUNTER (OUTPATIENT)
Dept: LAB | Age: 73
Discharge: HOME | End: 2018-11-06
Payer: MEDICARE

## 2018-11-06 VITALS
WEIGHT: 213 LBS | HEART RATE: 72 BPM | TEMPERATURE: 97 F | DIASTOLIC BLOOD PRESSURE: 68 MMHG | HEIGHT: 68 IN | BODY MASS INDEX: 32.28 KG/M2 | SYSTOLIC BLOOD PRESSURE: 120 MMHG

## 2018-11-06 VITALS
RESPIRATION RATE: 16 BRPM | HEIGHT: 69 IN | BODY MASS INDEX: 31.55 KG/M2 | HEART RATE: 68 BPM | SYSTOLIC BLOOD PRESSURE: 120 MMHG | WEIGHT: 213 LBS | DIASTOLIC BLOOD PRESSURE: 70 MMHG

## 2018-11-06 DIAGNOSIS — K64.9 HEMORRHOIDS, UNSPECIFIED HEMORRHOID TYPE: Primary | ICD-10-CM

## 2018-11-06 DIAGNOSIS — E11.9 TYPE 2 DIABETES MELLITUS WITHOUT COMPLICATION, WITHOUT LONG-TERM CURRENT USE OF INSULIN (HCC): ICD-10-CM

## 2018-11-06 DIAGNOSIS — M54.2 NECK PAIN, CHRONIC: Primary | ICD-10-CM

## 2018-11-06 DIAGNOSIS — F33.41 RECURRENT MAJOR DEPRESSIVE DISORDER, IN PARTIAL REMISSION (HCC): ICD-10-CM

## 2018-11-06 DIAGNOSIS — I10 ESSENTIAL HYPERTENSION: ICD-10-CM

## 2018-11-06 DIAGNOSIS — M54.2 NECK PAIN, CHRONIC: ICD-10-CM

## 2018-11-06 DIAGNOSIS — F32.A DEPRESSION, UNSPECIFIED DEPRESSION TYPE: ICD-10-CM

## 2018-11-06 DIAGNOSIS — G89.29 NECK PAIN, CHRONIC: ICD-10-CM

## 2018-11-06 DIAGNOSIS — G89.29 NECK PAIN, CHRONIC: Primary | ICD-10-CM

## 2018-11-06 LAB
ANION GAP SERPL CALCULATED.3IONS-SCNC: 11 MMOL/L (ref 9–17)
BUN BLDV-MCNC: 20 MG/DL (ref 8–23)
BUN/CREAT BLD: 25 (ref 9–20)
CALCIUM SERPL-MCNC: 8.7 MG/DL (ref 8.6–10.4)
CHLORIDE BLD-SCNC: 104 MMOL/L (ref 98–107)
CO2: 28 MMOL/L (ref 20–31)
CREAT SERPL-MCNC: 0.79 MG/DL (ref 0.7–1.2)
GFR AFRICAN AMERICAN: >60 ML/MIN
GFR NON-AFRICAN AMERICAN: >60 ML/MIN
GFR SERPL CREATININE-BSD FRML MDRD: ABNORMAL ML/MIN/{1.73_M2}
GFR SERPL CREATININE-BSD FRML MDRD: ABNORMAL ML/MIN/{1.73_M2}
GLUCOSE BLD-MCNC: 141 MG/DL (ref 70–99)
POTASSIUM SERPL-SCNC: 4.9 MMOL/L (ref 3.7–5.3)
SODIUM BLD-SCNC: 143 MMOL/L (ref 135–144)

## 2018-11-06 PROCEDURE — 36415 COLL VENOUS BLD VENIPUNCTURE: CPT

## 2018-11-06 PROCEDURE — 4040F PNEUMOC VAC/ADMIN/RCVD: CPT | Performed by: SURGERY

## 2018-11-06 PROCEDURE — 1123F ACP DISCUSS/DSCN MKR DOCD: CPT | Performed by: SURGERY

## 2018-11-06 PROCEDURE — G8482 FLU IMMUNIZE ORDER/ADMIN: HCPCS | Performed by: SURGERY

## 2018-11-06 PROCEDURE — 99214 OFFICE O/P EST MOD 30 MIN: CPT | Performed by: INTERNAL MEDICINE

## 2018-11-06 PROCEDURE — 1101F PT FALLS ASSESS-DOCD LE1/YR: CPT | Performed by: SURGERY

## 2018-11-06 PROCEDURE — G8598 ASA/ANTIPLAT THER USED: HCPCS | Performed by: SURGERY

## 2018-11-06 PROCEDURE — 3017F COLORECTAL CA SCREEN DOC REV: CPT | Performed by: SURGERY

## 2018-11-06 PROCEDURE — 99213 OFFICE O/P EST LOW 20 MIN: CPT | Performed by: SURGERY

## 2018-11-06 PROCEDURE — 1036F TOBACCO NON-USER: CPT | Performed by: SURGERY

## 2018-11-06 PROCEDURE — G8417 CALC BMI ABV UP PARAM F/U: HCPCS | Performed by: SURGERY

## 2018-11-06 PROCEDURE — G8427 DOCREV CUR MEDS BY ELIG CLIN: HCPCS | Performed by: SURGERY

## 2018-11-06 PROCEDURE — 80048 BASIC METABOLIC PNL TOTAL CA: CPT

## 2018-11-06 ASSESSMENT — ENCOUNTER SYMPTOMS
BLOOD IN STOOL: 0
NAUSEA: 0
DIARRHEA: 0
VOMITING: 0
CONSTIPATION: 0
COUGH: 0
ABDOMINAL PAIN: 0
SHORTNESS OF BREATH: 0
BACK PAIN: 0
EYE PAIN: 0

## 2018-11-06 NOTE — PROGRESS NOTES
[address] [facility]  [place] [address] [id number]  Dept: [phone]  Dept Fax: [phone]  Loc: [phone]    Norberto Chapa is a 75 y.o. male who presents today for his medical conditions/complaintsas noted below. Norberto GAONA Shi is c/o of   Chief Complaint   Patient presents with    Neck Pain    Hypertension    Depression         HPI:     Neck Pain    This is a chronic (Neck pain goes back to March 1990 when he had the accident. The neck pain(despite surgery and injections) problem. The current episode started more than 1 year ago (Date Of injury was March 15, 1990). The problem occurs constantly. The problem has been waxing and waning (Neck pain is improved with Celebrex 200 mg twice a day). Pertinent negatives include no chest pain, fever, headaches, numbness or weakness. Hypertension   This is a chronic problem. The current episode started more than 1 year ago. The problem has been waxing and waning since onset. The problem is controlled. Associated symptoms include neck pain. Pertinent negatives include no chest pain, headaches, palpitations or shortness of breath. Other   This is a chronic (3-depression, which also dates back to March 15, 1990 injury/accident. Paxil is effective for his depression) problem. The current episode started more than 1 year ago. The problem occurs constantly. The problem has been waxing and waning. Associated symptoms include neck pain. Pertinent negatives include no abdominal pain, arthralgias, chest pain, chills, coughing, fever, headaches, nausea, numbness, rash, vomiting or weakness. Hemoglobin A1C (%)   Date Value   08/09/2018 6.0 (H)   02/05/2018 5.9   08/09/2017 6.4 (H)            Microalb/Crt.  Ratio (mcg/mg creat)   Date Value   02/05/2018 19 (H)     LDL Cholesterol (mg/dL)   Date Value   08/09/2018 69   08/09/2017 75   08/04/2016 83         AST (U/L)   Date Value   08/09/2018 14     ALT (U/L)   Date Value   08/09/2018 16 BUN (mg/dL)   Date Value   08/09/2018 20     BP Readings from Last 3 Encounters:   11/06/18 120/70   11/06/18 120/68   10/15/18 (!) 180/91              Past Medical History:   Diagnosis Date    Asthma     CAD (coronary artery disease)     STENTS X 4    Cancer (HCC)     THROAT, HX. RADIATION , LT EAR    Cervical radiculopathy     Chronic back pain     Colonic polyp     Degeneration of cervical intervertebral disc     [facility], 1990 C4/C5-C6/C7    Degeneration of cervical intervertebral disc     [facility] 1994, C3/C4    Depression     Diverticulosis     GERD (gastroesophageal reflux disease)     Glucose intolerance (impaired glucose tolerance)     IS NOT DIABETIC, PER PT    HTN (hypertension)     Hyperlipidemia     Lumbar radiculopathy     MI [facility]myocardial infarction) ([facility] [address]     2011    Numbness and tingling     HANDS    OA (osteoarthritis)     Pre-diabetes     Sacroiliac pain 11/4/2014    Vision abnormalities     WEARS GLASSES      Past Surgical History:   Procedure Laterality Date    ABSCESS DRAINAGE Right     ELBOW WITH CLOSURE    ANESTHESIA NERVE BLOCK Bilateral 6/2/2017    Bilat L1 L2 L3 L4 L5 Diagnostic Medial Branch Blk performed by Emilie Lackey MD at [address] [facility] Bilateral 6/9/2017    Bilat L1 L2 L3 L4 L5 Confirmatory Medial BB performed by Emilie Lackey MD at [address] [facility]  06/01/2011    DR Jacqueline Paiz CARDIAC CATHETERIZATION  5-5-2011    STENTSx4    CERVICAL SPINE SURGERY  08/09/2001    Anterior Cervical Decompression and Fusion C3-4    CERVICAL SPINE SURGERY Left [id number]    Cervical C6-C7 Discectomy and Foraminotomy    COLONOSCOPY  2007, 2003    POLYPS    COLONOSCOPY  9/9/13    hyperplastic polyp x 3    COLONOSCOPY N/A 10/15/2018    hyperplastic polyp, severe sigmoid diverticulosis, large ext. hemorrhoid, Dr Rgeina Rich Right 2012,2011    FINGER TRIGGER RELEASE Right     THIRD FINGER    [facility] INJECTION PROCEDURE FOR SACROILIAC JOINT Bilateral 3/14/2017    Bilat Sacroiliac & Piriformis Inj's performed by Portia Medellin MD at [address] Right     ELBOW    LUMBAR FUSION  4/7/14    lumbar decompression and fusion    LUMBAR FUSION N/A 11/15/2017    LUMBAR DECOMPRESSION POSTERIOR W/FUSION L3 L4 ( with SPINAL CORD MONITOR ) performed by Felipa Brock MD at [address]  [phone]    Fusion    LUMBAR SPINE SURGERY Left 2/14/2017    Left L4 & L5 Transforaminal ANITHA performed by Portia Medellin MD at [address] Bilateral 5/2/2017    Bilat L5 Transforaminal ANITHA performed by Portia Medellin MD at [address] Bilateral 5/9/2017    Bilat L5 Transforaminal ANITHA performed by Portia Medellin MD at [address]  2/19/13, 5/14/13    L1/L2 IESI    OTHER SURGICAL HISTORY      Hardware correction, patient had 1 broken screw and to loose screws in back     OTHER SURGICAL HISTORY Left 09/27/2016     C6 TFE    OTHER SURGICAL HISTORY Bilateral 11/29/2016    L3, L4 L5 Diagnostic Medial Branch Block    OTHER SURGICAL HISTORY  12/06/2016    jovani L3 L4 L5 conf MBB    OTHER SURGICAL HISTORY Right 12/12/2016    L3,L4,L5 RFA    OTHER SURGICAL HISTORY Left 12/15/17    L3.  L4, L5 RFA    OTHER SURGICAL HISTORY Left 01/31/2017    L4 & L5 TFE    [place] ARTHROCENTESIS ASPIR&/INJ MAJOR JT/BURSA W/O US Left 3/31/2017    Left Hip Inj performed by Portia Medellin MD at [address] ARTHROCENTESIS ASPIR&/INJ MAJOR JT/BURSA W/O US Left 4/14/2017    Left Hip Inj performed by Portia Medellin MD at [address] Right 6/29/2017    Right L1 L2 L3 L4 L5 Radiofrequency Ablation performed by Portia Medellin MD at [address] Left 7/6/2017    Left L1 L2 L3 L4 L5 Radiofrequency performed by Portia Medellin MD for confusion. The patient is not nervous/anxious. Objective:     Physical Exam   Constitutional: He is oriented to person, place, and time. He appears well-developed and well-nourished. HENT:   Head: Normocephalic and atraumatic. Eyes: Pupils are equal, round, and reactive to light. EOM are normal.   Neck: Neck supple. Some tenderness with pressure over the cervical spine area, especially the mid to lower cervical spine area. Also, some tenderness/pain with attempting various range of motion directions with the neck. Cardiovascular: Normal rate and regular rhythm. Exam reveals no gallop and no friction rub. No murmur heard. Pulmonary/Chest: Effort normal and breath sounds normal. He has no wheezes. He has no rales. Abdominal: Soft. He exhibits no distension and no mass. There is no tenderness. There is no rebound. Musculoskeletal: Normal range of motion. He exhibits no edema. Lymphadenopathy:     He has no cervical adenopathy. Neurological: He is alert and oriented to person, place, and time. No cranial nerve deficit (grossly). Skin: Skin is warm and dry. No rash noted. Psychiatric: He has a normal mood and affect. Thought content normal.   Vitals reviewed. /70 (Site: Right Upper Arm, Position: Sitting, Cuff Size: Large Adult)   Pulse 68   Resp 16   Ht 5' 9\" (1.753 m)   Wt 213 lb (96.6 kg)   BMI 31.45 kg/m²     Assessment:       Diagnosis Orders   1. Neck pain, chronic  Basic Metabolic Panel   2. Essential hypertension  Basic Metabolic Panel   3. Depression, unspecified depression type     4. [facility]     5. Type 2 diabetes mellitus without complication, without long-term current use of insulin ([facility])  metFORMIN (GLUCOPHAGE) 500 MG tablet      Patient's neck pain is chronic since the March 1990 injury/accident. Celebrex 200 mg twice a day helps significantly with his neck pain.     The depression also is chronic

## 2018-11-07 ENCOUNTER — ANESTHESIA EVENT (OUTPATIENT)
Dept: OPERATING ROOM | Age: 73
End: 2018-11-07
Payer: MEDICARE

## 2018-11-07 ENCOUNTER — ANESTHESIA (OUTPATIENT)
Dept: OPERATING ROOM | Age: 73
End: 2018-11-07
Payer: MEDICARE

## 2018-11-07 ENCOUNTER — HOSPITAL ENCOUNTER (OUTPATIENT)
Age: 73
Setting detail: OUTPATIENT SURGERY
Discharge: HOME | End: 2018-11-07
Attending: SURGERY | Admitting: SURGERY
Payer: MEDICARE

## 2018-11-07 VITALS
RESPIRATION RATE: 14 BRPM | DIASTOLIC BLOOD PRESSURE: 85 MMHG | SYSTOLIC BLOOD PRESSURE: 110 MMHG | TEMPERATURE: 97.7 F | OXYGEN SATURATION: 98 %

## 2018-11-07 VITALS
SYSTOLIC BLOOD PRESSURE: 146 MMHG | DIASTOLIC BLOOD PRESSURE: 70 MMHG | WEIGHT: 208.4 LBS | HEIGHT: 69 IN | OXYGEN SATURATION: 93 % | BODY MASS INDEX: 30.87 KG/M2 | RESPIRATION RATE: 16 BRPM | TEMPERATURE: 97.7 F | HEART RATE: 77 BPM

## 2018-11-07 DIAGNOSIS — G89.18 POSTOPERATIVE PAIN: Primary | ICD-10-CM

## 2018-11-07 PROCEDURE — 7100000001 HC PACU RECOVERY - ADDTL 15 MIN: Performed by: SURGERY

## 2018-11-07 PROCEDURE — 46260 REMOVE IN/EX HEM GROUPS 2+: CPT

## 2018-11-07 PROCEDURE — 3600000012 HC SURGERY LEVEL 2 ADDTL 15MIN: Performed by: NURSE ANESTHETIST, CERTIFIED REGISTERED

## 2018-11-07 PROCEDURE — 3600000002 HC SURGERY LEVEL 2 BASE: Performed by: SURGERY

## 2018-11-07 PROCEDURE — 88304 TISSUE EXAM BY PATHOLOGIST: CPT

## 2018-11-07 PROCEDURE — 46255 REMOVE INT/EXT HEM 1 GROUP: CPT | Performed by: SURGERY

## 2018-11-07 PROCEDURE — 7100000010 HC PHASE II RECOVERY - FIRST 15 MIN: Performed by: SURGERY

## 2018-11-07 PROCEDURE — 00902 ANES ANORECTAL PX: CPT | Performed by: NURSE ANESTHETIST, CERTIFIED REGISTERED

## 2018-11-07 PROCEDURE — 6370000000 HC RX 637 (ALT 250 FOR IP): Performed by: NURSE ANESTHETIST, CERTIFIED REGISTERED

## 2018-11-07 PROCEDURE — 3700000000 HC ANESTHESIA ATTENDED CARE: Performed by: NURSE ANESTHETIST, CERTIFIED REGISTERED

## 2018-11-07 PROCEDURE — 6370000000 HC RX 637 (ALT 250 FOR IP): Performed by: SURGERY

## 2018-11-07 PROCEDURE — 2709999900 HC NON-CHARGEABLE SUPPLY: Performed by: SURGERY

## 2018-11-07 PROCEDURE — 7100000000 HC PACU RECOVERY - FIRST 15 MIN: Performed by: SURGERY

## 2018-11-07 PROCEDURE — 3600000002 HC SURGERY LEVEL 2 BASE: Performed by: NURSE ANESTHETIST, CERTIFIED REGISTERED

## 2018-11-07 PROCEDURE — 2580000003 HC RX 258: Performed by: SURGERY

## 2018-11-07 PROCEDURE — 3600000012 HC SURGERY LEVEL 2 ADDTL 15MIN: Performed by: SURGERY

## 2018-11-07 PROCEDURE — 3700000001 HC ADD 15 MINUTES (ANESTHESIA): Performed by: NURSE ANESTHETIST, CERTIFIED REGISTERED

## 2018-11-07 PROCEDURE — 3700000000 HC ANESTHESIA ATTENDED CARE: Performed by: SURGERY

## 2018-11-07 PROCEDURE — 7100000011 HC PHASE II RECOVERY - ADDTL 15 MIN: Performed by: SURGERY

## 2018-11-07 PROCEDURE — 6360000002 HC RX W HCPCS

## 2018-11-07 PROCEDURE — 6360000002 HC RX W HCPCS: Performed by: NURSE ANESTHETIST, CERTIFIED REGISTERED

## 2018-11-07 PROCEDURE — 2500000003 HC RX 250 WO HCPCS

## 2018-11-07 PROCEDURE — 3700000001 HC ADD 15 MINUTES (ANESTHESIA): Performed by: SURGERY

## 2018-11-07 PROCEDURE — 2500000003 HC RX 250 WO HCPCS: Performed by: SURGERY

## 2018-11-07 PROCEDURE — 2500000003 HC RX 250 WO HCPCS: Performed by: NURSE ANESTHETIST, CERTIFIED REGISTERED

## 2018-11-07 RX ORDER — HYDROCODONE BITARTRATE AND ACETAMINOPHEN 5; 325 MG/1; MG/1
1 TABLET ORAL PRN
Status: DISCONTINUED | OUTPATIENT
Start: 2018-11-07 | End: 2018-11-07 | Stop reason: HOSPADM

## 2018-11-07 RX ORDER — SODIUM CHLORIDE 0.9 % (FLUSH) 0.9 %
10 SYRINGE (ML) INJECTION PRN
Status: DISCONTINUED | OUTPATIENT
Start: 2018-11-07 | End: 2018-11-07 | Stop reason: HOSPADM

## 2018-11-07 RX ORDER — ROCURONIUM BROMIDE 10 MG/ML
INJECTION, SOLUTION INTRAVENOUS PRN
Status: DISCONTINUED | OUTPATIENT
Start: 2018-11-07 | End: 2018-11-07 | Stop reason: SDUPTHER

## 2018-11-07 RX ORDER — LIDOCAINE HYDROCHLORIDE 20 MG/ML
INJECTION, SOLUTION INFILTRATION; PERINEURAL PRN
Status: DISCONTINUED | OUTPATIENT
Start: 2018-11-07 | End: 2018-11-07 | Stop reason: SDUPTHER

## 2018-11-07 RX ORDER — FENTANYL CITRATE 50 UG/ML
INJECTION, SOLUTION INTRAMUSCULAR; INTRAVENOUS PRN
Status: DISCONTINUED | OUTPATIENT
Start: 2018-11-07 | End: 2018-11-07 | Stop reason: SDUPTHER

## 2018-11-07 RX ORDER — LIDOCAINE 50 MG/G
OINTMENT TOPICAL PRN
Status: DISCONTINUED | OUTPATIENT
Start: 2018-11-07 | End: 2018-11-07 | Stop reason: HOSPADM

## 2018-11-07 RX ORDER — MEPERIDINE HYDROCHLORIDE 50 MG/ML
12.5 INJECTION INTRAMUSCULAR; INTRAVENOUS; SUBCUTANEOUS EVERY 5 MIN PRN
Status: DISCONTINUED | OUTPATIENT
Start: 2018-11-07 | End: 2018-11-07 | Stop reason: HOSPADM

## 2018-11-07 RX ORDER — NALOXONE HYDROCHLORIDE 0.4 MG/ML
INJECTION, SOLUTION INTRAMUSCULAR; INTRAVENOUS; SUBCUTANEOUS PRN
Status: DISCONTINUED | OUTPATIENT
Start: 2018-11-07 | End: 2018-11-07 | Stop reason: SDUPTHER

## 2018-11-07 RX ORDER — PROPOFOL 10 MG/ML
INJECTION, EMULSION INTRAVENOUS PRN
Status: DISCONTINUED | OUTPATIENT
Start: 2018-11-07 | End: 2018-11-07 | Stop reason: SDUPTHER

## 2018-11-07 RX ORDER — HYDROCODONE BITARTRATE AND ACETAMINOPHEN 5; 325 MG/1; MG/1
1 TABLET ORAL EVERY 6 HOURS PRN
Qty: 30 TABLET | Refills: 0 | Status: SHIPPED | OUTPATIENT
Start: 2018-11-07 | End: 2018-11-14

## 2018-11-07 RX ORDER — MIDAZOLAM HYDROCHLORIDE 1 MG/ML
INJECTION INTRAMUSCULAR; INTRAVENOUS PRN
Status: DISCONTINUED | OUTPATIENT
Start: 2018-11-07 | End: 2018-11-07 | Stop reason: SDUPTHER

## 2018-11-07 RX ORDER — FENTANYL CITRATE 50 UG/ML
25 INJECTION, SOLUTION INTRAMUSCULAR; INTRAVENOUS EVERY 5 MIN PRN
Status: DISCONTINUED | OUTPATIENT
Start: 2018-11-07 | End: 2018-11-07 | Stop reason: HOSPADM

## 2018-11-07 RX ORDER — SODIUM CHLORIDE 0.9 % (FLUSH) 0.9 %
10 SYRINGE (ML) INJECTION EVERY 12 HOURS SCHEDULED
Status: DISCONTINUED | OUTPATIENT
Start: 2018-11-07 | End: 2018-11-07 | Stop reason: HOSPADM

## 2018-11-07 RX ORDER — MORPHINE SULFATE 2 MG/ML
2 INJECTION, SOLUTION INTRAMUSCULAR; INTRAVENOUS EVERY 5 MIN PRN
Status: DISCONTINUED | OUTPATIENT
Start: 2018-11-07 | End: 2018-11-07 | Stop reason: HOSPADM

## 2018-11-07 RX ORDER — GABAPENTIN 800 MG/1
TABLET ORAL PRN
Status: DISCONTINUED | OUTPATIENT
Start: 2018-11-07 | End: 2018-11-07 | Stop reason: SDUPTHER

## 2018-11-07 RX ORDER — MORPHINE SULFATE 2 MG/ML
1 INJECTION, SOLUTION INTRAMUSCULAR; INTRAVENOUS EVERY 5 MIN PRN
Status: DISCONTINUED | OUTPATIENT
Start: 2018-11-07 | End: 2018-11-07 | Stop reason: HOSPADM

## 2018-11-07 RX ORDER — SODIUM CHLORIDE, SODIUM LACTATE, POTASSIUM CHLORIDE, CALCIUM CHLORIDE 600; 310; 30; 20 MG/100ML; MG/100ML; MG/100ML; MG/100ML
INJECTION, SOLUTION INTRAVENOUS CONTINUOUS
Status: DISCONTINUED | OUTPATIENT
Start: 2018-11-07 | End: 2018-11-07 | Stop reason: HOSPADM

## 2018-11-07 RX ORDER — PROPOFOL 10 MG/ML
INJECTION, EMULSION INTRAVENOUS CONTINUOUS PRN
Status: DISCONTINUED | OUTPATIENT
Start: 2018-11-07 | End: 2018-11-07 | Stop reason: SDUPTHER

## 2018-11-07 RX ORDER — OXYCODONE HYDROCHLORIDE 5 MG/1
TABLET ORAL PRN
Status: DISCONTINUED | OUTPATIENT
Start: 2018-11-07 | End: 2018-11-07 | Stop reason: SDUPTHER

## 2018-11-07 RX ORDER — HYDROCODONE BITARTRATE AND ACETAMINOPHEN 5; 325 MG/1; MG/1
2 TABLET ORAL PRN
Status: DISCONTINUED | OUTPATIENT
Start: 2018-11-07 | End: 2018-11-07 | Stop reason: HOSPADM

## 2018-11-07 RX ORDER — ACETAMINOPHEN 325 MG/1
TABLET ORAL PRN
Status: DISCONTINUED | OUTPATIENT
Start: 2018-11-07 | End: 2018-11-07 | Stop reason: SDUPTHER

## 2018-11-07 RX ORDER — DIPHENHYDRAMINE HYDROCHLORIDE 50 MG/ML
12.5 INJECTION INTRAMUSCULAR; INTRAVENOUS
Status: DISCONTINUED | OUTPATIENT
Start: 2018-11-07 | End: 2018-11-07 | Stop reason: HOSPADM

## 2018-11-07 RX ORDER — CEFAZOLIN SODIUM 1 G/3ML
INJECTION, POWDER, FOR SOLUTION INTRAMUSCULAR; INTRAVENOUS PRN
Status: DISCONTINUED | OUTPATIENT
Start: 2018-11-07 | End: 2018-11-07 | Stop reason: SDUPTHER

## 2018-11-07 RX ORDER — FENTANYL CITRATE 50 UG/ML
50 INJECTION, SOLUTION INTRAMUSCULAR; INTRAVENOUS EVERY 5 MIN PRN
Status: DISCONTINUED | OUTPATIENT
Start: 2018-11-07 | End: 2018-11-07 | Stop reason: HOSPADM

## 2018-11-07 RX ORDER — ONDANSETRON 2 MG/ML
4 INJECTION INTRAMUSCULAR; INTRAVENOUS
Status: DISCONTINUED | OUTPATIENT
Start: 2018-11-07 | End: 2018-11-07 | Stop reason: HOSPADM

## 2018-11-07 RX ADMIN — MIDAZOLAM HYDROCHLORIDE 2 MG: 1 INJECTION, SOLUTION INTRAMUSCULAR; INTRAVENOUS at 13:15

## 2018-11-07 RX ADMIN — ACETAMINOPHEN 975 MG: 325 TABLET ORAL at 12:55

## 2018-11-07 RX ADMIN — PROPOFOL 60 MG: 10 INJECTION, EMULSION INTRAVENOUS at 13:26

## 2018-11-07 RX ADMIN — NALOXONE HYDROCHLORIDE 0.04 MG: 0.4 INJECTION, SOLUTION INTRAMUSCULAR; INTRAVENOUS; SUBCUTANEOUS at 13:58

## 2018-11-07 RX ADMIN — NALOXONE HYDROCHLORIDE 0.04 MG: 0.4 INJECTION, SOLUTION INTRAMUSCULAR; INTRAVENOUS; SUBCUTANEOUS at 13:56

## 2018-11-07 RX ADMIN — OXYCODONE HYDROCHLORIDE 10 MG: 5 TABLET ORAL at 12:55

## 2018-11-07 RX ADMIN — GABAPENTIN 800 MG: 800 TABLET, FILM COATED ORAL at 12:55

## 2018-11-07 RX ADMIN — LIDOCAINE HYDROCHLORIDE 40 MG: 20 INJECTION, SOLUTION INFILTRATION; PERINEURAL at 16:35

## 2018-11-07 RX ADMIN — CEFAZOLIN 2 MG: 1 INJECTION, POWDER, FOR SOLUTION INTRAMUSCULAR; INTRAVENOUS at 16:40

## 2018-11-07 RX ADMIN — LIDOCAINE HYDROCHLORIDE 40 MG: 20 INJECTION, SOLUTION INFILTRATION; PERINEURAL at 13:26

## 2018-11-07 RX ADMIN — FENTANYL CITRATE 25 MCG: 50 INJECTION, SOLUTION INTRAMUSCULAR; INTRAVENOUS at 17:05

## 2018-11-07 RX ADMIN — FENTANYL CITRATE 25 MCG: 50 INJECTION, SOLUTION INTRAMUSCULAR; INTRAVENOUS at 17:37

## 2018-11-07 RX ADMIN — FENTANYL CITRATE 50 MCG: 50 INJECTION, SOLUTION INTRAMUSCULAR; INTRAVENOUS at 16:48

## 2018-11-07 RX ADMIN — CEFAZOLIN 2000 MG: 1 INJECTION, POWDER, FOR SOLUTION INTRAMUSCULAR; INTRAVENOUS at 13:25

## 2018-11-07 RX ADMIN — ROCURONIUM BROMIDE 30 MG: 10 INJECTION INTRAVENOUS at 16:36

## 2018-11-07 RX ADMIN — FENTANYL CITRATE 50 MCG: 50 INJECTION, SOLUTION INTRAMUSCULAR; INTRAVENOUS at 13:15

## 2018-11-07 RX ADMIN — NALOXONE HYDROCHLORIDE 0.04 MG: 0.4 INJECTION, SOLUTION INTRAMUSCULAR; INTRAVENOUS; SUBCUTANEOUS at 14:00

## 2018-11-07 RX ADMIN — PROPOFOL 150 MCG/KG/MIN: 10 INJECTION, EMULSION INTRAVENOUS at 13:25

## 2018-11-07 RX ADMIN — PROPOFOL 150 MG: 10 INJECTION, EMULSION INTRAVENOUS at 16:35

## 2018-11-07 RX ADMIN — SODIUM CHLORIDE, POTASSIUM CHLORIDE, SODIUM LACTATE AND CALCIUM CHLORIDE: 600; 310; 30; 20 INJECTION, SOLUTION INTRAVENOUS at 16:32

## 2018-11-07 RX ADMIN — SODIUM CHLORIDE, POTASSIUM CHLORIDE, SODIUM LACTATE AND CALCIUM CHLORIDE: 600; 310; 30; 20 INJECTION, SOLUTION INTRAVENOUS at 12:47

## 2018-11-07 RX ADMIN — FENTANYL CITRATE 50 MCG: 50 INJECTION, SOLUTION INTRAMUSCULAR; INTRAVENOUS at 13:26

## 2018-11-07 ASSESSMENT — PULMONARY FUNCTION TESTS
PIF_VALUE: 33
PIF_VALUE: 2
PIF_VALUE: 7
PIF_VALUE: 33
PIF_VALUE: 16
PIF_VALUE: 32
PIF_VALUE: 0
PIF_VALUE: 30
PIF_VALUE: 32
PIF_VALUE: 33
PIF_VALUE: 0
PIF_VALUE: 31
PIF_VALUE: 2
PIF_VALUE: 31
PIF_VALUE: 0
PIF_VALUE: 32
PIF_VALUE: 33
PIF_VALUE: 29
PIF_VALUE: 27
PIF_VALUE: 4
PIF_VALUE: 0
PIF_VALUE: 33
PIF_VALUE: 28
PIF_VALUE: 32
PIF_VALUE: 33
PIF_VALUE: 21
PIF_VALUE: 20
PIF_VALUE: 5
PIF_VALUE: 0
PIF_VALUE: 32
PIF_VALUE: 2
PIF_VALUE: 0
PIF_VALUE: 32
PIF_VALUE: 0
PIF_VALUE: 0
PIF_VALUE: 33
PIF_VALUE: 30
PIF_VALUE: 0
PIF_VALUE: 33
PIF_VALUE: 32
PIF_VALUE: 0
PIF_VALUE: 28
PIF_VALUE: 33
PIF_VALUE: 33
PIF_VALUE: 19
PIF_VALUE: 13
PIF_VALUE: 2
PIF_VALUE: 33
PIF_VALUE: 17
PIF_VALUE: 2
PIF_VALUE: 0
PIF_VALUE: 18
PIF_VALUE: 6
PIF_VALUE: 2
PIF_VALUE: 18
PIF_VALUE: 0
PIF_VALUE: 0
PIF_VALUE: 33
PIF_VALUE: 0
PIF_VALUE: 16
PIF_VALUE: 0
PIF_VALUE: 34
PIF_VALUE: 2
PIF_VALUE: 33
PIF_VALUE: 0
PIF_VALUE: 32
PIF_VALUE: 33
PIF_VALUE: 18
PIF_VALUE: 3
PIF_VALUE: 32
PIF_VALUE: 4
PIF_VALUE: 0
PIF_VALUE: 18
PIF_VALUE: 0
PIF_VALUE: 4
PIF_VALUE: 33
PIF_VALUE: 5
PIF_VALUE: 28
PIF_VALUE: 30
PIF_VALUE: 0
PIF_VALUE: 33
PIF_VALUE: 18
PIF_VALUE: 0
PIF_VALUE: 13
PIF_VALUE: 0
PIF_VALUE: 4
PIF_VALUE: 0
PIF_VALUE: 13
PIF_VALUE: 7
PIF_VALUE: 33
PIF_VALUE: 33
PIF_VALUE: 29
PIF_VALUE: 31
PIF_VALUE: 31
PIF_VALUE: 0
PIF_VALUE: 16
PIF_VALUE: 0
PIF_VALUE: 32
PIF_VALUE: 13
PIF_VALUE: 17
PIF_VALUE: 0
PIF_VALUE: 0
PIF_VALUE: 4
PIF_VALUE: 0
PIF_VALUE: 33
PIF_VALUE: 33
PIF_VALUE: 32
PIF_VALUE: 4
PIF_VALUE: 29
PIF_VALUE: 4
PIF_VALUE: 4
PIF_VALUE: 32
PIF_VALUE: 33
PIF_VALUE: 4
PIF_VALUE: 23
PIF_VALUE: 17
PIF_VALUE: 22
PIF_VALUE: 33
PIF_VALUE: 32
PIF_VALUE: 0
PIF_VALUE: 32

## 2018-11-07 ASSESSMENT — PAIN SCALES - GENERAL
PAINLEVEL_OUTOF10: 0

## 2018-11-07 ASSESSMENT — PAIN - FUNCTIONAL ASSESSMENT: PAIN_FUNCTIONAL_ASSESSMENT: 0-10

## 2018-11-07 ASSESSMENT — ENCOUNTER SYMPTOMS: SHORTNESS OF BREATH: 1

## 2018-11-07 NOTE — ANESTHESIA PRE PROCEDURE
Neck pain, chronic M54.2, G89.29    Brachial neuritis or radiculitis M54.12    Spinal stenosis, lumbar region, with neurogenic claudication M48.062    Displacement of cervical intervertebral disc without myelopathy M50.20    CAD (coronary artery disease) I25.10    GILL (dyspnea on exertion) R06.09    S/P lumbar spinal fusion Z98.1    Obesity (BMI 35.0-39.9 without comorbidity) E66.9    HTN (hypertension) I10    Hyperlipidemia E78.5    Type 2 diabetes mellitus without complication (HCC) W13.9    Chronic bilateral low back pain with bilateral sciatica M54.42, M54.41, G89.29    Left hip pain M25.552    Acquired spondylolisthesis M43.10    Back pain M54.9    Recurrent major depressive disorder, in partial remission ([facility] [address] F33.41       Past Medical History:        Diagnosis Date    Asthma     CAD (coronary artery disease)     STENTS X 4    Cancer (HCC)     THROAT, HX. RADIATION , LT EAR    Cervical radiculopathy     Chronic back pain     Colonic polyp     Degeneration of cervical intervertebral disc     [facility], 1990 C4/C5-C6/C7    Degeneration of cervical intervertebral disc     [facility] 1994, C3/C4    Depression     Diverticulosis     GERD (gastroesophageal reflux disease)     Glucose intolerance (impaired glucose tolerance)     IS NOT DIABETIC, PER PT    HTN (hypertension)     Hyperlipidemia     Lumbar radiculopathy     MI (myocardial infarction) ([facility] [address]     2011    Numbness and tingling     HANDS    OA (osteoarthritis)     Pre-diabetes     Sacroiliac pain 11/4/2014    Vision abnormalities     WEARS GLASSES       Past Surgical History:        Procedure Laterality Date    ABSCESS DRAINAGE Right     ELBOW WITH CLOSURE    ANESTHESIA NERVE BLOCK Bilateral 6/2/2017    Bilat L1 L2 L3 L4 L5 Diagnostic Medial Branch Blk performed by Yfn Monsivais MD at [address] [facility] Bilateral 6/9/2017    Bilat L1 L2 L3 L4 L5 Confirmatory Medial BB performed by Yfn Monsivais MD at [facility] [facility] HISTORY Left 12/15/17    L3. L4, L5 RFA    OTHER SURGICAL HISTORY Left 01/31/2017    L4 & L5 TFE    [place] ARTHROCENTESIS ASPIR&/INJ MAJOR JT/BURSA W/O US Left 3/31/2017    Left Hip Inj performed by Portia Medellin MD at [address] ARTHROCENTESIS ASPIR&/INJ MAJOR JT/BURSA W/O US Left 4/14/2017    Left Hip Inj performed by Portia Medellin MD at [address] Right 6/29/2017    Right L1 L2 L3 L4 L5 Radiofrequency Ablation performed by Portia Medellin MD at [address] Left 7/6/2017    Left L1 L2 L3 L4 L5 Radiofrequency performed by Portia Medellin MD at [facility]       Social History:    Social History   Substance Use Topics    Smoking status: Former Smoker     Packs/day: 1.00     Years: 40.00     Types: Cigarettes, Pipe, Cigars     Quit date: 5/5/2011    Smokeless tobacco: Former User     Types: Chew     Quit date: 5/5/2011    Alcohol use 0.0 oz/week      Comment: RARE                                Counseling given: Not Answered      Vital Signs (Current): There were no vitals filed for this visit.                                            BP Readings from Last 3 Encounters:   11/06/18 120/70   11/06/18 120/68   10/15/18 (!) 180/91       NPO Status:                                                                                 BMI:   Wt Readings from Last 3 Encounters:   11/06/18 213 lb (96.6 kg)   11/06/18 213 lb (96.6 kg)   10/15/18 212 lb (96.2 kg)     There is no height or weight on file to calculate BMI.    CBC:   Lab Results   Component Value Date    WBC 8.3 11/16/2017    RBC 3.78 11/16/2017    HGB 13.7 08/09/2018    HCT 34.0 11/16/2017    MCV 90.0 11/16/2017    RDW 16.1 11/16/2017     11/16/2017       CMP:   Lab Results   Component Value Date     11/06/2018    K 4.9 11/06/2018     11/06/2018    CO2 28 11/06/2018    BUN 20 11/06/2018    CREATININE 0.79 11/06/2018    GFRAA >60 11/06/2018    LABGLOM >60 hear things, talking but unable to remember conversation, lifting chin/repositioning airway. Benefits continue breathing on own, unless obtunded. Since sedation is on a contuinuum general anesthesia with the use of endotracheal intubation was discussed as a back up plan. Specifically dicussed risks, benefits, alternatives, personnel involved, including risks of: cut or cracked lip, chipped tooth/teeth, broken or removed teeth, sore throat, damaged vocal cords, nausea and vomiting, allergic reaction to medication, failed intubation, need to repeat procedure, emergent airway attempts, case cancellation, need for prolonged intubation/remaining intubated, other monitors, and possible death. The patient will be placed on a cardiac monitor and vital signs, pulse oximetry and level of consciousness will be continuously evaluated throughout the procedure. The patient will be closely monitored until recovery from the medications is complete and the patient has returned to baseline status. Pt verbalized an understanding and agreed to proceed.    )  Induction: intravenous. Anesthetic plan and risks discussed with patient. Use of blood products discussed with patient whom.    Plan discussed with surgical team.                  LAWRENCE Nick - CRNA   11/7/2018

## 2018-11-07 NOTE — H&P
activity: Not on file           Other Topics Concern    Not on file          Social History Narrative     ** Merged History Encounter **               Past Surgical History         Past Surgical History:   Procedure Laterality Date    ABSCESS DRAINAGE Right       ELBOW WITH CLOSURE    ANESTHESIA NERVE BLOCK Bilateral 6/2/2017     Bilat L1 L2 L3 L4 L5 Diagnostic Medial Branch Blk performed by Lucie Garibay MD at [address] Bilateral 6/9/2017     Bilat L1 L2 L3 L4 L5 Confirmatory Medial BB performed by Lucie Garibay MD at [address]   06/01/2011     DR Evette Howard CARDIAC CATHETERIZATION   5-5-2011     STENTSx4    CERVICAL SPINE SURGERY   08/09/2001     Anterior Cervical Decompression and Fusion C3-4    CERVICAL SPINE SURGERY Left [id number],3515     Cervical C6-C7 Discectomy and Foraminotomy    COLONOSCOPY   2007, 2003     POLYPS    COLONOSCOPY   9/9/13     hyperplastic polyp x 3    COLONOSCOPY N/A 10/15/2018     hyperplastic polyp, severe sigmoid diverticulosis, large ext. hemorrhoid, Dr Bk Doty Right 2012,2011    FINGER TRIGGER RELEASE Right       THIRD FINGER    [facility]. INJECTION PROCEDURE FOR SACROILIAC JOINT Bilateral 3/14/2017     Bilat Sacroiliac & Piriformis Inj's performed by Lucie Garibay MD at [address] Right       ELBOW    LUMBAR FUSION   4/7/14     lumbar decompression and fusion    LUMBAR FUSION N/A 11/15/2017     LUMBAR DECOMPRESSION POSTERIOR W/FUSION L3 L4 ( with SPINAL CORD MONITOR ) performed by Boy Goldberg MD at [address] [place] [address]   [id number]     Fusion    LUMBAR SPINE SURGERY Left 2/14/2017     Left L4 & L5 Transforaminal ANITHA performed by Lucie Garibay MD at [address] [place] [address] Bilateral 5/2/2017     Bilat L5 Transforaminal ANITHA performed by Rebekah Easley infarction) ([facility]       2011    Numbness and tingling       HANDS    OA (osteoarthritis)      Pre-diabetes      Sacroiliac pain 11/4/2014    Vision abnormalities       WEARS GLASSES                Current Outpatient Prescriptions on File Prior to Visit   Medication Sig Dispense Refill    bisacodyl (DULCOLAX) 5 MG EC tablet Take 2 tablets at noon on bowel prep day 2 tablet 0    metFORMIN (GLUCOPHAGE) 500 MG tablet Take 500 mg by mouth 2 times daily (with meals)        celecoxib (CELEBREX) 200 MG capsule Take 1 capsule by mouth 2 times daily 180 capsule 3    PARoxetine (PAXIL) 20 MG tablet TAKE ONE TABLET BY MOUTH ONCE DAILY IN THE MORNING 90 tablet 3    oxyCODONE-acetaminophen (PERCOCET) 5-325 MG per tablet Take 1-2 tablets by mouth every 4 hours as needed for Pain. . 40 tablet 0    losartan (COZAAR) 25 MG tablet Take 1 tablet by mouth daily 90 tablet 3    atorvastatin (LIPITOR) 80 MG tablet TAKE 1 TABLET DAILY 90 tablet 3    metoprolol tartrate (LOPRESSOR) 25 MG tablet TAKE 1 TABLET TWICE A  tablet 3    vitamin C (ASCORBIC ACID) 500 MG tablet Take 500 mg by mouth 2 times daily With ferrous sulfate        ferrous sulfate 325 (65 Fe) MG tablet Take 325 mg by mouth 2 times daily With Vitamin C 500 mg bid        clopidogrel (PLAVIX) 75 MG tablet TAKE 1 TABLET DAILY 90 tablet 3    HYDROcodone-acetaminophen (NORCO) 5-325 MG per tablet Take 1 tablet by mouth every 8 hours as needed for Pain .  tiZANidine (ZANAFLEX) 4 MG tablet TAKE ONE TABLET BY MOUTH THREE TIMES DAILY 90 tablet 5    pantoprazole (PROTONIX) 40 MG tablet Take 1 tablet by mouth daily 90 tablet 3    aspirin 81 MG tablet Take 81 mg by mouth daily        famotidine (PEPCID) 20 MG tablet Take 20 mg by mouth daily. No current facility-administered medications on file prior to visit.              Allergies as of 11/06/2018 - Review Complete 11/06/2018   Allergen Reaction Noted    Crestor [rosuvastatin] Other (See Comments) 04/04/2014    Ibuprofen Other (See Comments) 11/23/2016    Lisinopril Hives 06/26/2013    Effient [prasugrel] Rash 06/26/2013            PHYSICAL EXAM:    Blood pressure 118/71, pulse 73, temperature 97.5 °F (36.4 °C), temperature source Temporal, resp. rate 16, height 5' 9\" (1.753 m), weight 208 lb 6.4 oz (94.5 kg), SpO2 95 %. Gen:  A and O x 3, NAD, well nourished  Eyes:  Sclera non icterus, PERRL  Lungs:  CTA, symmetrical  Chest:  RRR, no murmurs  Abd:  Soft, NT, ND, no HSM, no bruits  Msk:  5/5 strength all 4 extremities, no joint tenderness  Rectal:  Moderate sized left posterior external hemorrhoid     ASSESS MENT:     1. Symptomatic external hemorrhoid        PLAN:     1.   To the OR for external hemorrhoidectomy

## 2018-11-07 NOTE — ANESTHESIA POSTPROCEDURE EVALUATION
Department of Anesthesiology  Postprocedure Note    Patient: Matthew Mckenzie  MRN: [id number]  YOB: 1945  Date of evaluation: 11/7/2018  Time:  2:19 PM     Procedure Summary     Date:  11/07/18 Room / Location:  [address] [place] [address] OR    Anesthesia Start:  1968 Anesthesia Stop:      Procedure:  External HEMORRHOIDECTOMY (N/A ) Diagnosis:  (large external hemorrhoid )    Surgeon:  Christopher Nunez MD Responsible Provider:  LAWRENCE Smith CRNA    Anesthesia Type:  MAC ASA Status:  4          Anesthesia Type: MAC    Johan Phase I: Johan Score: 9    Johan Phase II:      Last vitals: Reviewed and per EMR flowsheets.        Anesthesia Post Evaluation    Patient location during evaluation: PACU  Patient participation: complete - patient participated  Level of consciousness: awake and alert  Pain score: 0  Airway patency: patent  Nausea & Vomiting: no nausea and no vomiting  Complications: no  Cardiovascular status: blood pressure returned to baseline and hemodynamically stable  Respiratory status: acceptable  Hydration status: euvolemic

## 2018-11-07 NOTE — PROGRESS NOTES
Pt up at bedside. Packing from rectum covered in liquid stool and falling out. Removed and clean dressing placed.

## 2018-11-07 NOTE — PROGRESS NOTES
Pt was taken to the OR, placed in a celeste knife prone position . He was noted to not be clean . Solid brown stool in the anal canal and verge area. Therefore we decided to take patient back to the preop and give one or two more enemas and then retry. Then he got a little hypoxic and we put him on the reg bed and ventilated pt. We gave some narcan. Pt woke up and had not problems. We will take back to pre op and give enemas and then we will take back to OR and give anesthesia with tube.

## 2018-11-08 ENCOUNTER — TELEPHONE (OUTPATIENT)
Dept: SURGERY | Age: 73
End: 2018-11-08

## 2018-11-08 ENCOUNTER — HOSPITAL ENCOUNTER (EMERGENCY)
Age: 73
Discharge: HOME | End: 2018-11-08
Attending: EMERGENCY MEDICINE
Payer: MEDICARE

## 2018-11-08 VITALS
WEIGHT: 208 LBS | SYSTOLIC BLOOD PRESSURE: 116 MMHG | BODY MASS INDEX: 30.81 KG/M2 | HEART RATE: 72 BPM | HEIGHT: 69 IN | OXYGEN SATURATION: 95 % | DIASTOLIC BLOOD PRESSURE: 63 MMHG | TEMPERATURE: 99.3 F | RESPIRATION RATE: 14 BRPM

## 2018-11-08 DIAGNOSIS — R33.9 URINARY RETENTION: Primary | ICD-10-CM

## 2018-11-08 LAB
-: ABNORMAL
AMORPHOUS: ABNORMAL
BACTERIA: ABNORMAL
BILIRUBIN URINE: NEGATIVE
CASTS UA: ABNORMAL /LPF (ref 0–2)
COLOR: ABNORMAL
COMMENT UA: ABNORMAL
CRYSTALS, UA: ABNORMAL /HPF
EPITHELIAL CELLS UA: ABNORMAL /HPF (ref 0–5)
GLUCOSE URINE: NEGATIVE
KETONES, URINE: NEGATIVE
LEUKOCYTE ESTERASE, URINE: NEGATIVE
MUCUS: ABNORMAL
NITRITE, URINE: NEGATIVE
OTHER OBSERVATIONS UA: ABNORMAL
PH UA: 6 (ref 5–6)
PROTEIN UA: ABNORMAL
RBC UA: ABNORMAL /HPF (ref 0–4)
RENAL EPITHELIAL, UA: ABNORMAL /HPF
SPECIFIC GRAVITY UA: 1.03 (ref 1.01–1.02)
TRICHOMONAS: ABNORMAL
TURBIDITY: ABNORMAL
URINE HGB: NEGATIVE
UROBILINOGEN, URINE: NORMAL
WBC UA: ABNORMAL /HPF (ref 0–4)
YEAST: ABNORMAL

## 2018-11-08 PROCEDURE — 99283 EMERGENCY DEPT VISIT LOW MDM: CPT

## 2018-11-08 PROCEDURE — 51702 INSERT TEMP BLADDER CATH: CPT

## 2018-11-08 PROCEDURE — 81001 URINALYSIS AUTO W/SCOPE: CPT

## 2018-11-08 ASSESSMENT — PAIN DESCRIPTION - ORIENTATION: ORIENTATION: LOWER

## 2018-11-08 ASSESSMENT — PAIN DESCRIPTION - LOCATION: LOCATION: ABDOMEN;RECTUM

## 2018-11-08 ASSESSMENT — PAIN DESCRIPTION - PAIN TYPE: TYPE: ACUTE PAIN

## 2018-11-08 ASSESSMENT — PAIN DESCRIPTION - DESCRIPTORS: DESCRIPTORS: ACHING;PRESSURE

## 2018-11-08 ASSESSMENT — PAIN SCALES - GENERAL: PAINLEVEL_OUTOF10: 5

## 2018-11-08 NOTE — ED PROVIDER NOTES
abnormalities. SURGICAL HISTORY      has a past surgical history that includes Coronary angioplasty with stent; Finger trigger release (Right); Abscess Drainage (Right); other surgical history (2/19/13, 5/14/13); Elbow bursa surgery (Right, 2012,2011); Infected skin debridement (Right); Colonoscopy (2007, 2003); Colonoscopy (9/9/13); Cervical spine surgery (08/09/2001); Cervical spine surgery (Left, [id number]); Lumbar spine surgery (7126,8269); lumbar fusion (4/7/14); skin biopsy; other surgical history; other surgical history (Left, 09/27/2016); other surgical history (Bilateral, 11/29/2016); other surgical history (12/06/2016); other surgical history (Right, 12/12/2016); other surgical history (Left, 12/15/17); other surgical history (Left, 01/31/2017); Lumbar spine surgery (Left, 2/14/2017); Injection Procedure For Sacroiliac Joint (Bilateral, 3/14/2017); pr arthrocentesis aspir&/inj major jt/bursa w/o us (Left, 3/31/2017); pr arthrocentesis aspir&/inj major jt/bursa w/o us (Left, 4/14/2017); Lumbar spine surgery (Bilateral, 5/2/2017); Lumbar spine surgery (Bilateral, 5/9/2017); Nerve Block (Bilateral, 6/2/2017); Nerve Block (Bilateral, 6/9/2017); RADIOFREQUENCY ABLATION NERVES (Right, 6/29/2017); RADIOFREQUENCY ABLATION NERVES (Left, 7/6/2017); Cardiac catheterization; Cardiac catheterization (06/01/2011); Cardiac catheterization (5-5-2011); back surgery; lumbar fusion (N/A, 11/15/2017); and Colonoscopy (N/A, 10/15/2018). CURRENT MEDICATIONS       Previous Medications    ASPIRIN 81 MG TABLET    Take 81 mg by mouth daily    ATORVASTATIN (LIPITOR) 80 MG TABLET    TAKE 1 TABLET DAILY    CELECOXIB (CELEBREX) 200 MG CAPSULE    Take 1 capsule by mouth 2 times daily    CLOPIDOGREL (PLAVIX) 75 MG TABLET    TAKE 1 TABLET DAILY    FAMOTIDINE (PEPCID) 20 MG TABLET    Take 20 mg by mouth daily.     FERROUS SULFATE 325 (65 FE) MG TABLET    Take 325 mg by mouth 2 times daily With Vitamin C 500 mg bid

## 2018-11-08 NOTE — TELEPHONE ENCOUNTER
Patient called this morning. He had a hemorrhoidectomy yesterday. He states that he has not urinated since yesterday morning. I discussed with Dr. Landon Kinsey. He recommends patient go to the ER. Patient notified and verbalizes understanding.

## 2018-11-09 LAB — SURGICAL PATHOLOGY REPORT: NORMAL

## 2018-11-13 ENCOUNTER — APPOINTMENT (OUTPATIENT)
Dept: CT IMAGING | Age: 73
End: 2018-11-13
Payer: MEDICARE

## 2018-11-13 ENCOUNTER — TELEPHONE (OUTPATIENT)
Dept: PRIMARY CARE CLINIC | Age: 73
End: 2018-11-13

## 2018-11-13 ENCOUNTER — HOSPITAL ENCOUNTER (EMERGENCY)
Age: 73
Discharge: HOME | End: 2018-11-13
Attending: EMERGENCY MEDICINE
Payer: MEDICARE

## 2018-11-13 VITALS
TEMPERATURE: 98.6 F | OXYGEN SATURATION: 94 % | SYSTOLIC BLOOD PRESSURE: 106 MMHG | BODY MASS INDEX: 30.36 KG/M2 | HEIGHT: 69 IN | RESPIRATION RATE: 16 BRPM | WEIGHT: 205 LBS | DIASTOLIC BLOOD PRESSURE: 60 MMHG | HEART RATE: 70 BPM

## 2018-11-13 DIAGNOSIS — R33.9 URINARY RETENTION: Primary | ICD-10-CM

## 2018-11-13 DIAGNOSIS — N30.00 ACUTE CYSTITIS WITHOUT HEMATURIA: ICD-10-CM

## 2018-11-13 LAB
-: ABNORMAL
ABSOLUTE EOS #: 0.1 K/UL (ref 0–0.4)
ABSOLUTE IMMATURE GRANULOCYTE: ABNORMAL K/UL (ref 0–0.3)
ABSOLUTE LYMPH #: 1.2 K/UL (ref 1–4.8)
ABSOLUTE MONO #: 0.6 K/UL (ref 0.1–1.2)
AMORPHOUS: ABNORMAL
ANION GAP SERPL CALCULATED.3IONS-SCNC: 14 MMOL/L (ref 9–17)
BACTERIA: ABNORMAL
BASOPHILS # BLD: 1 % (ref 0–2)
BASOPHILS ABSOLUTE: 0 K/UL (ref 0–0.2)
BILIRUBIN URINE: NEGATIVE
BUN BLDV-MCNC: 12 MG/DL (ref 8–23)
BUN/CREAT BLD: 12 (ref 9–20)
CALCIUM SERPL-MCNC: 8.9 MG/DL (ref 8.6–10.4)
CASTS UA: ABNORMAL /LPF (ref 0–2)
CHLORIDE BLD-SCNC: 100 MMOL/L (ref 98–107)
CO2: 28 MMOL/L (ref 20–31)
COLOR: ABNORMAL
COMMENT UA: ABNORMAL
CREAT SERPL-MCNC: 1.02 MG/DL (ref 0.7–1.2)
CRYSTALS, UA: ABNORMAL /HPF
DIFFERENTIAL TYPE: ABNORMAL
EOSINOPHILS RELATIVE PERCENT: 1 % (ref 1–8)
EPITHELIAL CELLS UA: ABNORMAL /HPF (ref 0–5)
GFR AFRICAN AMERICAN: >60 ML/MIN
GFR NON-AFRICAN AMERICAN: >60 ML/MIN
GFR SERPL CREATININE-BSD FRML MDRD: ABNORMAL ML/MIN/{1.73_M2}
GFR SERPL CREATININE-BSD FRML MDRD: ABNORMAL ML/MIN/{1.73_M2}
GLUCOSE BLD-MCNC: 193 MG/DL (ref 70–99)
GLUCOSE URINE: NEGATIVE
HCT VFR BLD CALC: 41.1 % (ref 41–53)
HEMOGLOBIN: 13.3 G/DL (ref 13.5–17.5)
IMMATURE GRANULOCYTES: ABNORMAL %
KETONES, URINE: NEGATIVE
LACTIC ACID: 2.6 MMOL/L (ref 0.5–2.2)
LEUKOCYTE ESTERASE, URINE: ABNORMAL
LYMPHOCYTES # BLD: 12 % (ref 15–43)
MCH RBC QN AUTO: 29.6 PG (ref 26–34)
MCHC RBC AUTO-ENTMCNC: 32.3 G/DL (ref 31–37)
MCV RBC AUTO: 91.6 FL (ref 80–100)
MONOCYTES # BLD: 6 % (ref 6–14)
MUCUS: ABNORMAL
NITRITE, URINE: NEGATIVE
NRBC AUTOMATED: ABNORMAL PER 100 WBC
OTHER OBSERVATIONS UA: ABNORMAL
PDW BLD-RTO: 14.3 % (ref 11–14.5)
PH UA: 6 (ref 5–6)
PLATELET # BLD: 323 K/UL (ref 140–450)
PLATELET ESTIMATE: ABNORMAL
PMV BLD AUTO: 6.7 FL (ref 6–12)
POTASSIUM SERPL-SCNC: 4.7 MMOL/L (ref 3.7–5.3)
PROTEIN UA: ABNORMAL
RBC # BLD: 4.48 M/UL (ref 4.5–5.9)
RBC # BLD: ABNORMAL 10*6/UL
RBC UA: ABNORMAL /HPF (ref 0–4)
RENAL EPITHELIAL, UA: ABNORMAL /HPF
SEG NEUTROPHILS: 80 % (ref 44–74)
SEGMENTED NEUTROPHILS ABSOLUTE COUNT: 8.2 K/UL (ref 1.8–7.7)
SODIUM BLD-SCNC: 142 MMOL/L (ref 135–144)
SPECIFIC GRAVITY UA: 1.02 (ref 1.01–1.02)
TRICHOMONAS: ABNORMAL
TURBIDITY: ABNORMAL
URINE HGB: ABNORMAL
UROBILINOGEN, URINE: NORMAL
WBC # BLD: 10.1 K/UL (ref 3.5–11)
WBC # BLD: ABNORMAL 10*3/UL
WBC UA: >50 /HPF (ref 0–4)
YEAST: ABNORMAL

## 2018-11-13 PROCEDURE — 36415 COLL VENOUS BLD VENIPUNCTURE: CPT

## 2018-11-13 PROCEDURE — 83605 ASSAY OF LACTIC ACID: CPT

## 2018-11-13 PROCEDURE — 81001 URINALYSIS AUTO W/SCOPE: CPT

## 2018-11-13 PROCEDURE — 2709999900 CT ABDOMEN PELVIS W IV CONTRAST

## 2018-11-13 PROCEDURE — 51702 INSERT TEMP BLADDER CATH: CPT

## 2018-11-13 PROCEDURE — 96365 THER/PROPH/DIAG IV INF INIT: CPT

## 2018-11-13 PROCEDURE — 80048 BASIC METABOLIC PNL TOTAL CA: CPT

## 2018-11-13 PROCEDURE — 87086 URINE CULTURE/COLONY COUNT: CPT

## 2018-11-13 PROCEDURE — 2580000003 HC RX 258: Performed by: EMERGENCY MEDICINE

## 2018-11-13 PROCEDURE — 6370000000 HC RX 637 (ALT 250 FOR IP): Performed by: EMERGENCY MEDICINE

## 2018-11-13 PROCEDURE — 6360000002 HC RX W HCPCS: Performed by: EMERGENCY MEDICINE

## 2018-11-13 PROCEDURE — 6360000004 HC RX CONTRAST MEDICATION: Performed by: EMERGENCY MEDICINE

## 2018-11-13 PROCEDURE — 87077 CULTURE AEROBIC IDENTIFY: CPT

## 2018-11-13 PROCEDURE — 85025 COMPLETE CBC W/AUTO DIFF WBC: CPT

## 2018-11-13 PROCEDURE — 99284 EMERGENCY DEPT VISIT MOD MDM: CPT

## 2018-11-13 PROCEDURE — 87040 BLOOD CULTURE FOR BACTERIA: CPT

## 2018-11-13 PROCEDURE — 87186 SC STD MICRODIL/AGAR DIL: CPT

## 2018-11-13 RX ORDER — CEPHALEXIN 500 MG/1
500 CAPSULE ORAL 3 TIMES DAILY
Qty: 30 CAPSULE | Refills: 0 | Status: SHIPPED | OUTPATIENT
Start: 2018-11-13 | End: 2018-11-23

## 2018-11-13 RX ORDER — 0.9 % SODIUM CHLORIDE 0.9 %
1000 INTRAVENOUS SOLUTION INTRAVENOUS ONCE
Status: COMPLETED | OUTPATIENT
Start: 2018-11-13 | End: 2018-11-13

## 2018-11-13 RX ORDER — TAMSULOSIN HYDROCHLORIDE 0.4 MG/1
0.4 CAPSULE ORAL DAILY
Qty: 5 CAPSULE | Refills: 0 | Status: SHIPPED | OUTPATIENT
Start: 2018-11-13 | End: 2019-02-15

## 2018-11-13 RX ORDER — TAMSULOSIN HYDROCHLORIDE 0.4 MG/1
0.4 CAPSULE ORAL ONCE
Status: COMPLETED | OUTPATIENT
Start: 2018-11-13 | End: 2018-11-13

## 2018-11-13 RX ADMIN — CEFTRIAXONE 1 G: 1 INJECTION, POWDER, FOR SOLUTION INTRAMUSCULAR; INTRAVENOUS at 11:00

## 2018-11-13 RX ADMIN — TAMSULOSIN HYDROCHLORIDE 0.4 MG: 0.4 CAPSULE ORAL at 11:00

## 2018-11-13 RX ADMIN — SODIUM CHLORIDE 1000 ML: 9 INJECTION, SOLUTION INTRAVENOUS at 09:57

## 2018-11-13 RX ADMIN — IOPAMIDOL 100 ML: 755 INJECTION, SOLUTION INTRAVENOUS at 09:19

## 2018-11-13 ASSESSMENT — ENCOUNTER SYMPTOMS
DIARRHEA: 0
WHEEZING: 0
CONSTIPATION: 0
TROUBLE SWALLOWING: 0
VOMITING: 0
NAUSEA: 0
SHORTNESS OF BREATH: 0
BLOOD IN STOOL: 0
ABDOMINAL PAIN: 0
SORE THROAT: 0
BACK PAIN: 0

## 2018-11-13 ASSESSMENT — PAIN DESCRIPTION - PAIN TYPE: TYPE: ACUTE PAIN

## 2018-11-13 ASSESSMENT — PAIN DESCRIPTION - DESCRIPTORS: DESCRIPTORS: PRESSURE

## 2018-11-13 ASSESSMENT — PAIN DESCRIPTION - ORIENTATION: ORIENTATION: RIGHT;LEFT

## 2018-11-13 ASSESSMENT — PAIN DESCRIPTION - LOCATION: LOCATION: GROIN

## 2018-11-13 NOTE — ED PROVIDER NOTES
[facility]  eMERGENCY dEPARTMENT eNCOUnter      Pt Name: Alicia Pickens  MRN: [id number]  [place] 1945  Date of evaluation: 11/13/2018      CHIEF COMPLAINT       Chief Complaint   Patient presents with    Urinary Retention     11/7/18, pt had hemorrhoid ectomy and has problems urinating since the procedure         HISTORY OF PRESENT ILLNESS    Norberto Osullivan is a 75 y.o. male who presents Difficulty urinating since having a hemorrhoid removed on the seventh of this month by Dr. Jak Huizar. He's had a comminuted [place] straight cath on a couple occasions he comes in today with urinary pressure is able to get a little bit A urinalyses dribbling no fevers no chills no nausea no vomiting he has some rectal pain was tolerable and he has been able to pass bowel movements  REVIEW OF SYSTEMS         Review of Systems   Constitutional: Negative for chills and fever. HENT: Negative for congestion, dental problem, sore throat and trouble swallowing. Eyes: Negative for visual disturbance. Respiratory: Negative for shortness of breath and wheezing. Cardiovascular: Negative for chest pain, palpitations and leg swelling. Gastrointestinal: Negative for abdominal pain, blood in stool, constipation, diarrhea, nausea and vomiting. Genitourinary: Positive for difficulty urinating. Negative for dysuria and testicular pain. Musculoskeletal: Negative for back pain, joint swelling and neck pain. Skin: Negative for rash. Neurological: Negative for dizziness, syncope, weakness and headaches. Hematological: Negative for adenopathy. Does not bruise/bleed easily. Psychiatric/Behavioral: Negative for confusion and suicidal ideas. PAST MEDICAL HISTORY    has a past medical history of Asthma; CAD (coronary artery disease); Cancer ([address] Cervical radiculopathy; Chronic back pain; Colonic polyp;  Degeneration of cervical intervertebral disc; Degeneration of cervical intervertebral disc; Depression; Diverticulosis; by mouth 2 times daily    CLOPIDOGREL (PLAVIX) 75 MG TABLET    TAKE 1 TABLET DAILY    FAMOTIDINE (PEPCID) 20 MG TABLET    Take 20 mg by mouth daily. FERROUS SULFATE 325 (65 FE) MG TABLET    Take 325 mg by mouth 2 times daily With Vitamin C 500 mg bid    HYDROCODONE-ACETAMINOPHEN (NORCO) 5-325 MG PER TABLET    Take 1 tablet by mouth every 8 hours as needed for Pain . HYDROCODONE-ACETAMINOPHEN (NORCO) 5-325 MG PER TABLET    Take 1 tablet by mouth every 6 hours as needed for Pain for up to 7 days. [place] Route LOSARTAN (COZAAR) 25 MG TABLET    Take 1 tablet by mouth daily    METFORMIN (GLUCOPHAGE) 500 MG TABLET    Take 1 tablet by mouth 2 times daily (with meals)    METOPROLOL TARTRATE (LOPRESSOR) 25 MG TABLET    TAKE 1 TABLET TWICE A DAY    OXYCODONE-ACETAMINOPHEN (PERCOCET) 5-325 MG PER TABLET    Take 1-2 tablets by mouth every 4 hours as needed for Pain. [place] Route PANTOPRAZOLE (PROTONIX) 40 MG TABLET    Take 1 tablet by mouth daily    PAROXETINE (PAXIL) 20 MG TABLET    TAKE ONE TABLET BY MOUTH ONCE DAILY IN THE MORNING    TIZANIDINE (ZANAFLEX) 4 MG TABLET    TAKE ONE TABLET BY MOUTH THREE TIMES DAILY    VITAMIN C (ASCORBIC ACID) 500 MG TABLET    Take 500 mg by mouth 2 times daily With ferrous sulfate       ALLERGIES     is allergic to crestor [rosuvastatin]; ibuprofen; lisinopril; and effient [prasugrel]. FAMILY HISTORY     indicated that the status of his father is unknown.      family history includes Cancer in his father. SOCIAL HISTORY      reports that he quit smoking about 7 years ago. His smoking use included Cigarettes, Pipe, and Cigars. He has a 40.00 pack-year smoking history. He quit smokeless tobacco use about 7 years ago. His smokeless tobacco use included Chew. He reports that he drinks alcohol. He reports that he does not use drugs. PHYSICAL EXAM     INITIAL VITALS:  height is 5' 9\" (1.753 m) and weight is 205 lb (93 kg). His tympanic temperature is 98.6 °F (37 °C).  His blood pressure is 106/60 and

## 2018-11-14 ENCOUNTER — OFFICE VISIT (OUTPATIENT)
Dept: UROLOGY | Age: 73
End: 2018-11-14
Payer: MEDICARE

## 2018-11-14 VITALS
HEIGHT: 69 IN | BODY MASS INDEX: 30.37 KG/M2 | WEIGHT: 205.03 LBS | DIASTOLIC BLOOD PRESSURE: 60 MMHG | SYSTOLIC BLOOD PRESSURE: 112 MMHG | HEART RATE: 74 BPM

## 2018-11-14 DIAGNOSIS — R33.9 URINARY RETENTION: Primary | ICD-10-CM

## 2018-11-14 DIAGNOSIS — N40.1 BPH WITH OBSTRUCTION/LOWER URINARY TRACT SYMPTOMS: ICD-10-CM

## 2018-11-14 DIAGNOSIS — N13.8 BPH WITH OBSTRUCTION/LOWER URINARY TRACT SYMPTOMS: ICD-10-CM

## 2018-11-14 LAB
CULTURE: ABNORMAL
Lab: ABNORMAL
ORGANISM: ABNORMAL
SPECIMEN DESCRIPTION: ABNORMAL
STATUS: ABNORMAL

## 2018-11-14 PROCEDURE — G8417 CALC BMI ABV UP PARAM F/U: HCPCS | Performed by: UROLOGY

## 2018-11-14 PROCEDURE — G8598 ASA/ANTIPLAT THER USED: HCPCS | Performed by: UROLOGY

## 2018-11-14 PROCEDURE — 1123F ACP DISCUSS/DSCN MKR DOCD: CPT | Performed by: UROLOGY

## 2018-11-14 PROCEDURE — G8427 DOCREV CUR MEDS BY ELIG CLIN: HCPCS | Performed by: UROLOGY

## 2018-11-14 PROCEDURE — 3017F COLORECTAL CA SCREEN DOC REV: CPT | Performed by: UROLOGY

## 2018-11-14 PROCEDURE — 99203 OFFICE O/P NEW LOW 30 MIN: CPT | Performed by: UROLOGY

## 2018-11-14 PROCEDURE — 1101F PT FALLS ASSESS-DOCD LE1/YR: CPT | Performed by: UROLOGY

## 2018-11-14 PROCEDURE — 1036F TOBACCO NON-USER: CPT | Performed by: UROLOGY

## 2018-11-14 PROCEDURE — G8482 FLU IMMUNIZE ORDER/ADMIN: HCPCS | Performed by: UROLOGY

## 2018-11-14 PROCEDURE — 4040F PNEUMOC VAC/ADMIN/RCVD: CPT | Performed by: UROLOGY

## 2018-11-14 RX ORDER — TAMSULOSIN HYDROCHLORIDE 0.4 MG/1
0.4 CAPSULE ORAL DAILY
Qty: 30 CAPSULE | Refills: 0 | Status: SHIPPED | OUTPATIENT
Start: 2018-11-14 | End: 2019-02-15

## 2018-11-14 NOTE — PROGRESS NOTES
free intraperitoneal air or fluid noted. Diffuse colonic diverticulosis without acute diverticulitis. Normal appendix. Small bowel loops are normal in caliber. No definite hiatal hernia. Pelvis:  No evidence for free fluid. Peritoneum/Retroperitoneum: Indeterminate low-density nodules within the bilateral adrenal glands. Kidneys perfuse and excrete in a symmetric fashion and ureters are not obstructed. There is diffuse bladder wall thickening and mild surrounding edema, decompressed by Fish catheter. . Bones/Soft Tissues: Fat containing left inguinal hernia. Fat containing umbilical hernia. Status post L2 through L4 posterior spinal fusion with vertical rods and pedicle screws. Endplate sclerosis and irregularity of L2 through L4. Vasculature: The abdominal aorta is normal in caliber. Aortic ectasia measuring 2.8 cm in greatest dimension. Bladder wall thickening and mild surrounding inflammation. Correlation with urinalysis suggested. Otherwise, no evidence for acute intra-abdominal or intrapelvic pathology. No bowel obstruction or inflammation. No free intraperitoneal air or fluid. No evidence for urinary obstruction. Normal appendix. Indeterminate low-density nodules within the bilateral adrenal glands. However, these were confirmed as adrenal adenomas on previous CT of 09/08/2017. No further follow-up necessary. Last several PSA's:  Lab Results   Component Value Date    PSA 0.93 08/09/2018    PSA 0.83 08/09/2017    PSA 0.65 08/04/2016       Last total testosterone:  No results found for: TESTOSTERONE    Urinalysis today:  No results found for this visit on 11/14/18.       Last BUN and creatinine:  Lab Results   Component Value Date    BUN 12 11/13/2018     Lab Results   Component Value Date    CREATININE 1.02 11/13/2018       Imaging Reviewed during this Office Visit:   (results were independently reviewed by physician and radiology report verified)    Elvira Martines

## 2018-11-16 ENCOUNTER — NURSE ONLY (OUTPATIENT)
Dept: UROLOGY | Age: 73
End: 2018-11-16

## 2018-11-16 VITALS
WEIGHT: 205.03 LBS | SYSTOLIC BLOOD PRESSURE: 104 MMHG | HEART RATE: 62 BPM | OXYGEN SATURATION: 96 % | DIASTOLIC BLOOD PRESSURE: 62 MMHG | HEIGHT: 69 IN | BODY MASS INDEX: 30.37 KG/M2

## 2018-11-16 DIAGNOSIS — R33.9 RETENTION OF URINE: Primary | ICD-10-CM

## 2018-11-19 LAB
CULTURE: NORMAL
CULTURE: NORMAL
Lab: NORMAL
Lab: NORMAL
SPECIMEN DESCRIPTION: NORMAL
SPECIMEN DESCRIPTION: NORMAL
STATUS: NORMAL
STATUS: NORMAL

## 2018-12-04 ENCOUNTER — OFFICE VISIT (OUTPATIENT)
Dept: SURGERY | Age: 73
End: 2018-12-04

## 2018-12-04 VITALS
TEMPERATURE: 97.7 F | HEIGHT: 69 IN | DIASTOLIC BLOOD PRESSURE: 70 MMHG | WEIGHT: 208 LBS | HEART RATE: 78 BPM | SYSTOLIC BLOOD PRESSURE: 110 MMHG | BODY MASS INDEX: 30.81 KG/M2

## 2018-12-04 DIAGNOSIS — Z09 POSTOP CHECK: Primary | ICD-10-CM

## 2018-12-04 PROCEDURE — 99024 POSTOP FOLLOW-UP VISIT: CPT | Performed by: SURGERY

## 2018-12-05 ENCOUNTER — OFFICE VISIT (OUTPATIENT)
Dept: UROLOGY | Age: 73
End: 2018-12-05
Payer: MEDICARE

## 2018-12-05 VITALS
BODY MASS INDEX: 30.51 KG/M2 | DIASTOLIC BLOOD PRESSURE: 58 MMHG | SYSTOLIC BLOOD PRESSURE: 100 MMHG | OXYGEN SATURATION: 95 % | HEIGHT: 69 IN | WEIGHT: 206 LBS | HEART RATE: 75 BPM

## 2018-12-05 DIAGNOSIS — N40.1 BPH WITH OBSTRUCTION/LOWER URINARY TRACT SYMPTOMS: ICD-10-CM

## 2018-12-05 DIAGNOSIS — R33.9 RETENTION OF URINE: ICD-10-CM

## 2018-12-05 DIAGNOSIS — R33.8 ACUTE URINARY RETENTION: Primary | ICD-10-CM

## 2018-12-05 DIAGNOSIS — R33.9 URINARY RETENTION: ICD-10-CM

## 2018-12-05 DIAGNOSIS — N13.8 BPH WITH OBSTRUCTION/LOWER URINARY TRACT SYMPTOMS: ICD-10-CM

## 2018-12-05 PROCEDURE — G8598 ASA/ANTIPLAT THER USED: HCPCS | Performed by: UROLOGY

## 2018-12-05 PROCEDURE — 99213 OFFICE O/P EST LOW 20 MIN: CPT | Performed by: UROLOGY

## 2018-12-05 PROCEDURE — 99999 PR OFFICE/OUTPT VISIT,PROCEDURE ONLY: CPT | Performed by: UROLOGY

## 2018-12-05 PROCEDURE — G8427 DOCREV CUR MEDS BY ELIG CLIN: HCPCS | Performed by: UROLOGY

## 2018-12-05 PROCEDURE — G8417 CALC BMI ABV UP PARAM F/U: HCPCS | Performed by: UROLOGY

## 2018-12-05 PROCEDURE — 1123F ACP DISCUSS/DSCN MKR DOCD: CPT | Performed by: UROLOGY

## 2018-12-05 PROCEDURE — 99999 PR MEASUREMENT,POST-VOID RESIDUAL VOLUME BY US,NON-IMAGING: CPT | Performed by: UROLOGY

## 2018-12-05 PROCEDURE — 51798 US URINE CAPACITY MEASURE: CPT | Performed by: UROLOGY

## 2018-12-05 PROCEDURE — 3017F COLORECTAL CA SCREEN DOC REV: CPT | Performed by: UROLOGY

## 2018-12-05 PROCEDURE — 1101F PT FALLS ASSESS-DOCD LE1/YR: CPT | Performed by: UROLOGY

## 2018-12-05 PROCEDURE — G8482 FLU IMMUNIZE ORDER/ADMIN: HCPCS | Performed by: UROLOGY

## 2018-12-05 PROCEDURE — 1036F TOBACCO NON-USER: CPT | Performed by: UROLOGY

## 2018-12-05 PROCEDURE — 4040F PNEUMOC VAC/ADMIN/RCVD: CPT | Performed by: UROLOGY

## 2019-01-13 DIAGNOSIS — I25.83 CORONARY ARTERY DISEASE DUE TO LIPID RICH PLAQUE: ICD-10-CM

## 2019-01-13 DIAGNOSIS — I10 ESSENTIAL HYPERTENSION: ICD-10-CM

## 2019-01-13 DIAGNOSIS — I25.10 CORONARY ARTERY DISEASE DUE TO LIPID RICH PLAQUE: ICD-10-CM

## 2019-01-14 RX ORDER — CLOPIDOGREL BISULFATE 75 MG/1
TABLET ORAL
Qty: 90 TABLET | Refills: 3 | Status: SHIPPED | OUTPATIENT
Start: 2019-01-14 | End: 2020-01-02

## 2019-02-13 ENCOUNTER — HOSPITAL ENCOUNTER (OUTPATIENT)
Dept: LAB | Age: 74
Discharge: HOME | End: 2019-02-13
Payer: MEDICARE

## 2019-02-13 ENCOUNTER — TELEPHONE (OUTPATIENT)
Dept: INTERNAL MEDICINE | Age: 74
End: 2019-02-13

## 2019-02-13 DIAGNOSIS — E78.2 MIXED HYPERLIPIDEMIA: ICD-10-CM

## 2019-02-13 DIAGNOSIS — I10 ESSENTIAL HYPERTENSION: ICD-10-CM

## 2019-02-13 DIAGNOSIS — D64.9 ANEMIA, UNSPECIFIED TYPE: Primary | ICD-10-CM

## 2019-02-13 DIAGNOSIS — D64.9 POSTOPERATIVE ANEMIA: ICD-10-CM

## 2019-02-13 DIAGNOSIS — E11.9 TYPE 2 DIABETES MELLITUS WITHOUT COMPLICATION, WITHOUT LONG-TERM CURRENT USE OF INSULIN (HCC): ICD-10-CM

## 2019-02-13 LAB
CREATININE URINE: 136.7 MG/DL (ref 39–259)
ESTIMATED AVERAGE GLUCOSE: 114 MG/DL
HBA1C MFR BLD: 5.6 % (ref 4.8–5.9)
HEMOGLOBIN: 9.9 G/DL (ref 13.5–17.5)
IRON SATURATION: 4 % (ref 20–55)
IRON: 20 UG/DL (ref 59–158)
MICROALBUMIN/CREAT 24H UR: 26 MG/L
MICROALBUMIN/CREAT UR-RTO: 19 MCG/MG CREAT
TOTAL IRON BINDING CAPACITY: 461 UG/DL (ref 250–450)
UNSATURATED IRON BINDING CAPACITY: 441 UG/DL (ref 112–347)

## 2019-02-13 PROCEDURE — 82043 UR ALBUMIN QUANTITATIVE: CPT

## 2019-02-13 PROCEDURE — 82570 ASSAY OF URINE CREATININE: CPT

## 2019-02-13 PROCEDURE — 83540 ASSAY OF IRON: CPT

## 2019-02-13 PROCEDURE — 83550 IRON BINDING TEST: CPT

## 2019-02-13 PROCEDURE — 83036 HEMOGLOBIN GLYCOSYLATED A1C: CPT

## 2019-02-13 PROCEDURE — 36415 COLL VENOUS BLD VENIPUNCTURE: CPT

## 2019-02-13 PROCEDURE — 85018 HEMOGLOBIN: CPT

## 2019-02-13 RX ORDER — FERROUS SULFATE 325(65) MG
325 TABLET ORAL 2 TIMES DAILY
Qty: 60 TABLET | Refills: 11 | Status: SHIPPED | OUTPATIENT
Start: 2019-02-13 | End: 2020-02-26

## 2019-02-14 DIAGNOSIS — M48.062 SPINAL STENOSIS, LUMBAR REGION, WITH NEUROGENIC CLAUDICATION: ICD-10-CM

## 2019-02-14 DIAGNOSIS — M54.42 CHRONIC BILATERAL LOW BACK PAIN WITH BILATERAL SCIATICA: Primary | ICD-10-CM

## 2019-02-14 DIAGNOSIS — G89.29 CHRONIC BILATERAL LOW BACK PAIN WITH BILATERAL SCIATICA: Primary | ICD-10-CM

## 2019-02-14 DIAGNOSIS — M54.41 CHRONIC BILATERAL LOW BACK PAIN WITH BILATERAL SCIATICA: Primary | ICD-10-CM

## 2019-02-15 ENCOUNTER — OFFICE VISIT (OUTPATIENT)
Dept: INTERNAL MEDICINE | Age: 74
End: 2019-02-15
Payer: MEDICARE

## 2019-02-15 VITALS
SYSTOLIC BLOOD PRESSURE: 110 MMHG | BODY MASS INDEX: 31.4 KG/M2 | DIASTOLIC BLOOD PRESSURE: 60 MMHG | RESPIRATION RATE: 16 BRPM | WEIGHT: 212 LBS | HEIGHT: 69 IN | HEART RATE: 76 BPM

## 2019-02-15 DIAGNOSIS — E78.2 MIXED HYPERLIPIDEMIA: ICD-10-CM

## 2019-02-15 DIAGNOSIS — E11.9 TYPE 2 DIABETES MELLITUS WITHOUT COMPLICATION, WITHOUT LONG-TERM CURRENT USE OF INSULIN (HCC): ICD-10-CM

## 2019-02-15 DIAGNOSIS — F33.41 RECURRENT MAJOR DEPRESSIVE DISORDER, IN PARTIAL REMISSION (HCC): ICD-10-CM

## 2019-02-15 DIAGNOSIS — I10 ESSENTIAL HYPERTENSION: ICD-10-CM

## 2019-02-15 DIAGNOSIS — Z00.00 MEDICARE ANNUAL WELLNESS VISIT, SUBSEQUENT: ICD-10-CM

## 2019-02-15 DIAGNOSIS — Z00.00 ROUTINE GENERAL MEDICAL EXAMINATION AT A HEALTH CARE FACILITY: Primary | ICD-10-CM

## 2019-02-15 DIAGNOSIS — Z12.5 SPECIAL SCREENING FOR MALIGNANT NEOPLASM OF PROSTATE: ICD-10-CM

## 2019-02-15 PROCEDURE — G8598 ASA/ANTIPLAT THER USED: HCPCS | Performed by: INTERNAL MEDICINE

## 2019-02-15 PROCEDURE — G8417 CALC BMI ABV UP PARAM F/U: HCPCS | Performed by: INTERNAL MEDICINE

## 2019-02-15 PROCEDURE — G8482 FLU IMMUNIZE ORDER/ADMIN: HCPCS | Performed by: INTERNAL MEDICINE

## 2019-02-15 PROCEDURE — 1101F PT FALLS ASSESS-DOCD LE1/YR: CPT | Performed by: INTERNAL MEDICINE

## 2019-02-15 PROCEDURE — G8427 DOCREV CUR MEDS BY ELIG CLIN: HCPCS | Performed by: INTERNAL MEDICINE

## 2019-02-15 PROCEDURE — 1123F ACP DISCUSS/DSCN MKR DOCD: CPT | Performed by: INTERNAL MEDICINE

## 2019-02-15 PROCEDURE — G0439 PPPS, SUBSEQ VISIT: HCPCS | Performed by: INTERNAL MEDICINE

## 2019-02-15 PROCEDURE — 3017F COLORECTAL CA SCREEN DOC REV: CPT | Performed by: INTERNAL MEDICINE

## 2019-02-15 PROCEDURE — 1036F TOBACCO NON-USER: CPT | Performed by: INTERNAL MEDICINE

## 2019-02-15 PROCEDURE — 3044F HG A1C LEVEL LT 7.0%: CPT | Performed by: INTERNAL MEDICINE

## 2019-02-15 PROCEDURE — 99214 OFFICE O/P EST MOD 30 MIN: CPT | Performed by: INTERNAL MEDICINE

## 2019-02-15 PROCEDURE — 4040F PNEUMOC VAC/ADMIN/RCVD: CPT | Performed by: INTERNAL MEDICINE

## 2019-02-15 PROCEDURE — 2022F DILAT RTA XM EVC RTNOPTHY: CPT | Performed by: INTERNAL MEDICINE

## 2019-02-15 RX ORDER — PANTOPRAZOLE SODIUM 40 MG/1
40 TABLET, DELAYED RELEASE ORAL DAILY
Qty: 90 TABLET | Refills: 3 | Status: SHIPPED | OUTPATIENT
Start: 2019-02-15 | End: 2020-02-07

## 2019-02-15 ASSESSMENT — ENCOUNTER SYMPTOMS
SHORTNESS OF BREATH: 0
CONSTIPATION: 0
DIARRHEA: 0
COUGH: 0
BLOOD IN STOOL: 0
NAUSEA: 0
VOMITING: 0
ABDOMINAL PAIN: 0
EYE PAIN: 0
BACK PAIN: 0

## 2019-02-15 ASSESSMENT — LIFESTYLE VARIABLES
HOW OFTEN DURING THE LAST YEAR HAVE YOU HAD A FEELING OF GUILT OR REMORSE AFTER DRINKING: 0
AUDIT TOTAL SCORE: 1
AUDIT-C TOTAL SCORE: 1
HAS A RELATIVE, FRIEND, DOCTOR, OR ANOTHER HEALTH PROFESSIONAL EXPRESSED CONCERN ABOUT YOUR DRINKING OR SUGGESTED YOU CUT DOWN: 0
HOW OFTEN DURING THE LAST YEAR HAVE YOU BEEN UNABLE TO REMEMBER WHAT HAPPENED THE NIGHT BEFORE BECAUSE YOU HAD BEEN DRINKING: 0
HAVE YOU OR SOMEONE ELSE BEEN INJURED AS A RESULT OF YOUR DRINKING: 0
HOW OFTEN DO YOU HAVE A DRINK CONTAINING ALCOHOL: 1
HOW OFTEN DO YOU HAVE SIX OR MORE DRINKS ON ONE OCCASION: 0
HOW OFTEN DURING THE LAST YEAR HAVE YOU FOUND THAT YOU WERE NOT ABLE TO STOP DRINKING ONCE YOU HAD STARTED: 0
HOW MANY STANDARD DRINKS CONTAINING ALCOHOL DO YOU HAVE ON A TYPICAL DAY: 0
HOW OFTEN DURING THE LAST YEAR HAVE YOU NEEDED AN ALCOHOLIC DRINK FIRST THING IN THE MORNING TO GET YOURSELF GOING AFTER A NIGHT OF HEAVY DRINKING: 0
HOW OFTEN DURING THE LAST YEAR HAVE YOU FAILED TO DO WHAT WAS NORMALLY EXPECTED FROM YOU BECAUSE OF DRINKING: 0

## 2019-02-15 ASSESSMENT — ANXIETY QUESTIONNAIRES: GAD7 TOTAL SCORE: 0

## 2019-02-15 ASSESSMENT — PATIENT HEALTH QUESTIONNAIRE - PHQ9
SUM OF ALL RESPONSES TO PHQ QUESTIONS 1-9: 1
SUM OF ALL RESPONSES TO PHQ QUESTIONS 1-9: 1

## 2019-02-17 DIAGNOSIS — I10 ESSENTIAL HYPERTENSION: ICD-10-CM

## 2019-02-17 DIAGNOSIS — E78.2 MIXED HYPERLIPIDEMIA: ICD-10-CM

## 2019-02-18 RX ORDER — ATORVASTATIN CALCIUM 80 MG/1
TABLET, FILM COATED ORAL
Qty: 90 TABLET | Refills: 3 | Status: SHIPPED | OUTPATIENT
Start: 2019-02-18 | End: 2020-02-03

## 2019-02-19 ENCOUNTER — OFFICE VISIT (OUTPATIENT)
Dept: CARDIOLOGY | Age: 74
End: 2019-02-19
Payer: MEDICARE

## 2019-02-19 VITALS
WEIGHT: 212 LBS | DIASTOLIC BLOOD PRESSURE: 61 MMHG | HEART RATE: 66 BPM | HEIGHT: 69 IN | BODY MASS INDEX: 31.4 KG/M2 | SYSTOLIC BLOOD PRESSURE: 111 MMHG

## 2019-02-19 DIAGNOSIS — I10 ESSENTIAL HYPERTENSION: ICD-10-CM

## 2019-02-19 DIAGNOSIS — I25.83 CORONARY ARTERY DISEASE DUE TO LIPID RICH PLAQUE: Primary | ICD-10-CM

## 2019-02-19 DIAGNOSIS — I25.10 CORONARY ARTERY DISEASE DUE TO LIPID RICH PLAQUE: Primary | ICD-10-CM

## 2019-02-19 DIAGNOSIS — Z95.5 HISTORY OF HEART ARTERY STENT: ICD-10-CM

## 2019-02-19 DIAGNOSIS — E78.2 MIXED HYPERLIPIDEMIA: ICD-10-CM

## 2019-02-19 PROCEDURE — 1123F ACP DISCUSS/DSCN MKR DOCD: CPT | Performed by: INTERNAL MEDICINE

## 2019-02-19 PROCEDURE — G8482 FLU IMMUNIZE ORDER/ADMIN: HCPCS | Performed by: INTERNAL MEDICINE

## 2019-02-19 PROCEDURE — 1101F PT FALLS ASSESS-DOCD LE1/YR: CPT | Performed by: INTERNAL MEDICINE

## 2019-02-19 PROCEDURE — G8598 ASA/ANTIPLAT THER USED: HCPCS | Performed by: INTERNAL MEDICINE

## 2019-02-19 PROCEDURE — 99213 OFFICE O/P EST LOW 20 MIN: CPT | Performed by: INTERNAL MEDICINE

## 2019-02-19 PROCEDURE — 4040F PNEUMOC VAC/ADMIN/RCVD: CPT | Performed by: INTERNAL MEDICINE

## 2019-02-19 PROCEDURE — 93000 ELECTROCARDIOGRAM COMPLETE: CPT | Performed by: INTERNAL MEDICINE

## 2019-02-19 PROCEDURE — 1036F TOBACCO NON-USER: CPT | Performed by: INTERNAL MEDICINE

## 2019-02-19 PROCEDURE — 3017F COLORECTAL CA SCREEN DOC REV: CPT | Performed by: INTERNAL MEDICINE

## 2019-02-19 PROCEDURE — G8417 CALC BMI ABV UP PARAM F/U: HCPCS | Performed by: INTERNAL MEDICINE

## 2019-02-19 PROCEDURE — G8427 DOCREV CUR MEDS BY ELIG CLIN: HCPCS | Performed by: INTERNAL MEDICINE

## 2019-02-22 ENCOUNTER — HOSPITAL ENCOUNTER (OUTPATIENT)
Dept: GENERAL RADIOLOGY | Age: 74
Discharge: HOME | End: 2019-02-24
Payer: MEDICARE

## 2019-02-22 ENCOUNTER — OFFICE VISIT (OUTPATIENT)
Dept: ORTHOPEDIC SURGERY | Age: 74
End: 2019-02-22
Payer: MEDICARE

## 2019-02-22 VITALS
SYSTOLIC BLOOD PRESSURE: 127 MMHG | WEIGHT: 206 LBS | HEIGHT: 69 IN | BODY MASS INDEX: 30.51 KG/M2 | DIASTOLIC BLOOD PRESSURE: 65 MMHG | HEART RATE: 72 BPM

## 2019-02-22 DIAGNOSIS — M54.41 CHRONIC BILATERAL LOW BACK PAIN WITH BILATERAL SCIATICA: Primary | ICD-10-CM

## 2019-02-22 DIAGNOSIS — M54.42 CHRONIC BILATERAL LOW BACK PAIN WITH BILATERAL SCIATICA: Primary | ICD-10-CM

## 2019-02-22 DIAGNOSIS — G89.29 CHRONIC BILATERAL LOW BACK PAIN WITH BILATERAL SCIATICA: Primary | ICD-10-CM

## 2019-02-22 DIAGNOSIS — G89.29 CHRONIC BILATERAL LOW BACK PAIN WITH BILATERAL SCIATICA: ICD-10-CM

## 2019-02-22 DIAGNOSIS — M48.062 SPINAL STENOSIS, LUMBAR REGION, WITH NEUROGENIC CLAUDICATION: ICD-10-CM

## 2019-02-22 DIAGNOSIS — M54.41 CHRONIC BILATERAL LOW BACK PAIN WITH BILATERAL SCIATICA: ICD-10-CM

## 2019-02-22 DIAGNOSIS — Z98.1 S/P LUMBAR SPINAL FUSION: ICD-10-CM

## 2019-02-22 DIAGNOSIS — M54.42 CHRONIC BILATERAL LOW BACK PAIN WITH BILATERAL SCIATICA: ICD-10-CM

## 2019-02-22 PROCEDURE — 1101F PT FALLS ASSESS-DOCD LE1/YR: CPT | Performed by: ORTHOPAEDIC SURGERY

## 2019-02-22 PROCEDURE — G8427 DOCREV CUR MEDS BY ELIG CLIN: HCPCS | Performed by: ORTHOPAEDIC SURGERY

## 2019-02-22 PROCEDURE — 99213 OFFICE O/P EST LOW 20 MIN: CPT | Performed by: ORTHOPAEDIC SURGERY

## 2019-02-22 PROCEDURE — G8598 ASA/ANTIPLAT THER USED: HCPCS | Performed by: ORTHOPAEDIC SURGERY

## 2019-02-22 PROCEDURE — 1123F ACP DISCUSS/DSCN MKR DOCD: CPT | Performed by: ORTHOPAEDIC SURGERY

## 2019-02-22 PROCEDURE — 4040F PNEUMOC VAC/ADMIN/RCVD: CPT | Performed by: ORTHOPAEDIC SURGERY

## 2019-02-22 PROCEDURE — 3017F COLORECTAL CA SCREEN DOC REV: CPT | Performed by: ORTHOPAEDIC SURGERY

## 2019-02-22 PROCEDURE — G8482 FLU IMMUNIZE ORDER/ADMIN: HCPCS | Performed by: ORTHOPAEDIC SURGERY

## 2019-02-22 PROCEDURE — G8417 CALC BMI ABV UP PARAM F/U: HCPCS | Performed by: ORTHOPAEDIC SURGERY

## 2019-02-22 PROCEDURE — 72100 X-RAY EXAM L-S SPINE 2/3 VWS: CPT

## 2019-02-22 PROCEDURE — 1036F TOBACCO NON-USER: CPT | Performed by: ORTHOPAEDIC SURGERY

## 2019-02-22 RX ORDER — HYDROCODONE BITARTRATE AND ACETAMINOPHEN 5; 325 MG/1; MG/1
1 TABLET ORAL EVERY 4 HOURS PRN
Qty: 28 TABLET | Refills: 0 | Status: SHIPPED | OUTPATIENT
Start: 2019-02-22 | End: 2019-02-25

## 2019-03-07 ENCOUNTER — OFFICE VISIT (OUTPATIENT)
Dept: OTOLARYNGOLOGY | Age: 74
End: 2019-03-07
Payer: MEDICARE

## 2019-03-07 VITALS — HEIGHT: 69 IN | WEIGHT: 206 LBS | RESPIRATION RATE: 14 BRPM | HEART RATE: 72 BPM | BODY MASS INDEX: 30.51 KG/M2

## 2019-03-07 DIAGNOSIS — C32.9 CARCINOMA LARYNX (HCC): Primary | ICD-10-CM

## 2019-03-07 PROCEDURE — 31575 DIAGNOSTIC LARYNGOSCOPY: CPT | Performed by: OTOLARYNGOLOGY

## 2019-05-10 ENCOUNTER — OFFICE VISIT (OUTPATIENT)
Dept: OTOLARYNGOLOGY | Age: 74
End: 2019-05-10
Payer: MEDICARE

## 2019-05-10 VITALS
SYSTOLIC BLOOD PRESSURE: 118 MMHG | BODY MASS INDEX: 30.51 KG/M2 | HEART RATE: 74 BPM | DIASTOLIC BLOOD PRESSURE: 82 MMHG | RESPIRATION RATE: 14 BRPM | WEIGHT: 206 LBS | HEIGHT: 69 IN

## 2019-05-10 DIAGNOSIS — C32.9 CARCINOMA LARYNX (HCC): Primary | ICD-10-CM

## 2019-05-10 PROCEDURE — 4040F PNEUMOC VAC/ADMIN/RCVD: CPT | Performed by: OTOLARYNGOLOGY

## 2019-05-10 PROCEDURE — G8417 CALC BMI ABV UP PARAM F/U: HCPCS | Performed by: OTOLARYNGOLOGY

## 2019-05-10 PROCEDURE — 99213 OFFICE O/P EST LOW 20 MIN: CPT | Performed by: OTOLARYNGOLOGY

## 2019-05-10 PROCEDURE — G8598 ASA/ANTIPLAT THER USED: HCPCS | Performed by: OTOLARYNGOLOGY

## 2019-05-10 PROCEDURE — 1036F TOBACCO NON-USER: CPT | Performed by: OTOLARYNGOLOGY

## 2019-05-10 PROCEDURE — 3017F COLORECTAL CA SCREEN DOC REV: CPT | Performed by: OTOLARYNGOLOGY

## 2019-05-10 PROCEDURE — 1123F ACP DISCUSS/DSCN MKR DOCD: CPT | Performed by: OTOLARYNGOLOGY

## 2019-05-10 PROCEDURE — G8427 DOCREV CUR MEDS BY ELIG CLIN: HCPCS | Performed by: OTOLARYNGOLOGY

## 2019-05-10 RX ORDER — METHYLPREDNISOLONE ACETATE 40 MG/ML
40 INJECTION, SUSPENSION INTRA-ARTICULAR; INTRALESIONAL; INTRAMUSCULAR; SOFT TISSUE ONCE
Status: DISCONTINUED | OUTPATIENT
Start: 2019-05-10 | End: 2022-04-06

## 2019-05-10 RX ORDER — AMOXICILLIN AND CLAVULANATE POTASSIUM 500; 125 MG/1; MG/1
1 TABLET, FILM COATED ORAL 2 TIMES DAILY
Qty: 20 TABLET | Refills: 0 | Status: SHIPPED | OUTPATIENT
Start: 2019-05-10 | End: 2019-05-22 | Stop reason: SDUPTHER

## 2019-05-10 RX ORDER — FLUTICASONE PROPIONATE 50 MCG
2 SPRAY, SUSPENSION (ML) NASAL DAILY
Qty: 1 BOTTLE | Refills: 5 | Status: SHIPPED | OUTPATIENT
Start: 2019-05-10 | End: 2020-01-14 | Stop reason: ALTCHOICE

## 2019-05-10 NOTE — PROGRESS NOTES
Norberto Osullivan  5/10/19    Chief Complaint   Patient presents with    Sinus Problem       HPI:  hx of Ca TVC rxd w RT 20 yrs ago, also l luiz Ca, recently froze by derm, now co rhinorrhea x 1 mos    Past Med Hx:     Past Medical History:   Diagnosis Date    Asthma     CAD (coronary artery disease)     STENTS X 4    Cancer ([facility]     THROAT, HX. RADIATION , LT EAR    Cervical radiculopathy     Chronic back pain     Colonic polyp     Degeneration of cervical intervertebral disc     [facility], 1990 C4/C5-C6/C7    Degeneration of cervical intervertebral disc     [facility] 1994, C3/C4    Depression     Diverticulosis     GERD (gastroesophageal reflux disease)     Glucose intolerance (impaired glucose tolerance)     IS NOT DIABETIC, PER PT    HTN (hypertension)     Hyperlipidemia     Lumbar radiculopathy     MI (myocardial infarction) ([facility]     2011    Numbness and tingling     HANDS    OA (osteoarthritis)     Pre-diabetes     Sacroiliac pain 11/4/2014    Vision abnormalities     WEARS GLASSES        Past Surgical History:   Procedure Laterality Date    ABSCESS DRAINAGE Right     ELBOW WITH CLOSURE    ANESTHESIA NERVE BLOCK Bilateral 6/2/2017    Bilat L1 L2 L3 L4 L5 Diagnostic Medial Branch Blk performed by Paul Garcia MD at [address] Bilateral 6/9/2017    Bilat L1 L2 L3 L4 L5 Confirmatory Medial BB performed by Paul Garcia MD at [address] [place] [address]  06/01/2011    DR Cam Bach CARDIAC CATHETERIZATION  5-5-2011    STENTSx4    CERVICAL SPINE SURGERY  08/09/2001    Anterior Cervical Decompression and Fusion C3-4    CERVICAL SPINE SURGERY Left [id number]    Cervical C6-C7 Discectomy and Foraminotomy    COLONOSCOPY  2007, 2003    POLYPS    COLONOSCOPY  9/9/13    hyperplastic polyp x 3    COLONOSCOPY N/A 10/15/2018    hyperplastic polyp, severe sigmoid diverticulosis, large ext. hemorrhoid, Dr Gloria Leo ANGIOPLASTY WITH STENT PLACEMENT      ELBOW BURSA SURGERY Right 2012,2011    FINGER TRIGGER RELEASE Right     THIRD FINGER    [facility]. INJECTION PROCEDURE FOR SACROILIAC JOINT Bilateral 3/14/2017    Bilat Sacroiliac & Piriformis Inj's performed by David Moise MD at [address] Right     ELBOW    LUMBAR FUSION  4/7/14    lumbar decompression and fusion    LUMBAR FUSION N/A 11/15/2017    LUMBAR DECOMPRESSION POSTERIOR W/FUSION L3 L4 ( with SPINAL CORD MONITOR ) performed by Reino Hatchet, MD at [address]  [id number],3709    Fusion    LUMBAR SPINE SURGERY Left 2/14/2017    Left L4 & L5 Transforaminal ANITHA performed by David Moise MD at [address] Bilateral 5/2/2017    Bilat L5 Transforaminal ANITHA performed by David Moise MD at [address] Bilateral 5/9/2017    Bilat L5 Transforaminal ANITHA performed by David Moise MD at [place] [address]  2/19/13, 5/14/13    L1/L2 IESI    OTHER SURGICAL HISTORY      Hardware correction, patient had 1 broken screw and to loose screws in back     OTHER SURGICAL HISTORY Left 09/27/2016     C6 TFE    OTHER SURGICAL HISTORY Bilateral 11/29/2016    L3, L4 L5 Diagnostic Medial Branch Block    OTHER SURGICAL HISTORY  12/06/2016    jovani L3 L4 L5 conf MBB    OTHER SURGICAL HISTORY Right 12/12/2016    L3,L4,L5 RFA    OTHER SURGICAL HISTORY Left 12/15/17    L3.  L4, L5 RFA    OTHER SURGICAL HISTORY Left 01/31/2017    L4 & L5 TFE    MA ARTHROCENTESIS ASPIR&/INJ MAJOR JT/BURSA W/O US Left 3/31/2017    Left Hip Inj performed by David Moise MD at [address] ARTHROCENTESIS ASPIR&/INJ MAJOR JT/BURSA W/O US Left 4/14/2017    Left Hip Inj performed by David Moise MD at [address] HEMORRHOIDECTOMY,INT/EXT,1 COLUMN/GROUP N/A 11/7/2018    External HEMORRHOIDECTOMY performed by Tello Walker MD at [address] Right 6/29/2017    Right L1 L2 L3 L4 Encounter **            Current Medications:     Current Outpatient Medications:     fluticasone (FLONASE) 50 MCG/ACT nasal spray, 2 sprays by Nasal route daily 2 sprays each side once/day, Disp: 1 Bottle, Rfl: 5    amoxicillin-clavulanate (AUGMENTIN) 500-125 MG per tablet, Take 1 tablet by mouth 2 times daily for 10 days, Disp: 20 tablet, Rfl: 0    metoprolol tartrate (LOPRESSOR) 25 MG tablet, TAKE 1 TABLET TWICE A DAY, Disp: 180 tablet, Rfl: 3    atorvastatin (LIPITOR) 80 MG tablet, TAKE 1 TABLET DAILY, Disp: 90 tablet, Rfl: 3    pantoprazole (PROTONIX) 40 MG tablet, Take 1 tablet by mouth daily, Disp: 90 tablet, Rfl: 3    ferrous sulfate 325 (65 Fe) MG tablet, Take 1 tablet by mouth 2 times daily With Vitamin C 500 mg bid, Disp: 60 tablet, Rfl: 11    clopidogrel (PLAVIX) 75 MG tablet, TAKE 1 TABLET DAILY, Disp: 90 tablet, Rfl: 3    metFORMIN (GLUCOPHAGE) 500 MG tablet, Take 1 tablet by mouth 2 times daily (with meals), Disp: 180 tablet, Rfl: 3    celecoxib (CELEBREX) 200 MG capsule, Take 1 capsule by mouth 2 times daily, Disp: 180 capsule, Rfl: 3    PARoxetine (PAXIL) 20 MG tablet, TAKE ONE TABLET BY MOUTH ONCE DAILY IN THE MORNING, Disp: 90 tablet, Rfl: 3    losartan (COZAAR) 25 MG tablet, Take 1 tablet by mouth daily, Disp: 90 tablet, Rfl: 3    vitamin C (ASCORBIC ACID) 500 MG tablet, Take 500 mg by mouth 2 times daily With ferrous sulfate, Disp: , Rfl:     HYDROcodone-acetaminophen (NORCO) 5-325 MG per tablet, Take 1 tablet by mouth every 8 hours as needed for Pain ., Disp: , Rfl:     tiZANidine (ZANAFLEX) 4 MG tablet, TAKE ONE TABLET BY MOUTH THREE TIMES DAILY, Disp: 90 tablet, Rfl: 5    aspirin 81 MG tablet, Take 81 mg by mouth daily, Disp: , Rfl:     famotidine (PEPCID) 20 MG tablet, Take 20 mg by mouth daily. , Disp: , Rfl:      ROS:   CV: cad   Endocrine:    Resp:     GI:       Neuro:   PE:     General appearance:  Normal                 Ability to Communicate:  Normal       Head & Face: Normal   Salivary Glands:  Normal              Facial Strength:  Normal   Ears:    Pinna:  Normal            EAC:  Normal      TMs:  Normal       [place]:  Rowland Rinne     Finger Rub     Nose:    External: Normal    Septum:  Normal    Turbinates: Normal             Nasal Cavity: purulent rhinorrhea      Naso Pharyngoscopy:     Nasal Endoscopy:      Oral Cavity & Oral Pharynx:    Tongue:  Normal    Teeth & Gums:             Palate:  Era     Tonsils:      FOM:  Normal     Other:      Neck:    Thyroid:Normal       Lymph nodes: Normal           Trachea:  Normal      Masses:  Normal    Other:        Eyes:    EOMs:      Nystagmus:      Neurological:    CN V:      CN VII:       Gait & Station:      Romberg:      Tandem Gait:      [place]:       Oriented x 3: Normal     Affect:  Normal    Data reviewed:      ASSESSMENT: sinusitis   Diagnosis Orders   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     Orders Placed This Encounter   Medications    fluticasone (FLONASE) 50 MCG/ACT nasal spray     Si sprays by Nasal route daily 2 sprays each side once/day     Dispense:  1 Bottle     Refill:  5    amoxicillin-clavulanate (AUGMENTIN) 500-125 MG per tablet     Sig: Take 1 tablet by mouth 2 times daily for 10 days     Dispense:  20 tablet     Refill:  0    methylPREDNISolone acetate (DEPO-MEDROL) injection 40 mg         Mary Mendes MD

## 2019-05-15 ENCOUNTER — HOSPITAL ENCOUNTER (OUTPATIENT)
Dept: LAB | Age: 74
Discharge: HOME | End: 2019-05-15
Payer: MEDICARE

## 2019-05-15 DIAGNOSIS — D64.9 ANEMIA, UNSPECIFIED TYPE: ICD-10-CM

## 2019-05-15 DIAGNOSIS — D64.9 ANEMIA, UNSPECIFIED TYPE: Primary | ICD-10-CM

## 2019-05-15 LAB
HEMOGLOBIN: 13.5 G/DL (ref 13.5–17.5)
IRON SATURATION: 26 % (ref 20–55)
IRON: 106 UG/DL (ref 59–158)
TOTAL IRON BINDING CAPACITY: 410 UG/DL (ref 250–450)
UNSATURATED IRON BINDING CAPACITY: 304 UG/DL (ref 112–347)

## 2019-05-15 PROCEDURE — 83540 ASSAY OF IRON: CPT

## 2019-05-15 PROCEDURE — 85018 HEMOGLOBIN: CPT

## 2019-05-15 PROCEDURE — 36415 COLL VENOUS BLD VENIPUNCTURE: CPT

## 2019-05-15 PROCEDURE — 83550 IRON BINDING TEST: CPT

## 2019-05-22 RX ORDER — AMOXICILLIN AND CLAVULANATE POTASSIUM 500; 125 MG/1; MG/1
1 TABLET, FILM COATED ORAL 2 TIMES DAILY
Qty: 20 TABLET | Refills: 0 | Status: SHIPPED | OUTPATIENT
Start: 2019-05-22 | End: 2019-06-01

## 2019-05-22 NOTE — TELEPHONE ENCOUNTER
Al called requesting a refill of the below medication which has been pended for you:     Requested Prescriptions     Pending Prescriptions Disp Refills    amoxicillin-clavulanate (AUGMENTIN) 500-125 MG per tablet 20 tablet 0     Sig: Take 1 tablet by mouth 2 times daily for 10 days       Last Appointment Date: 2/15/2019  Next Appointment Date: 8/19/2019    Allergies   Allergen Reactions    Crestor [Rosuvastatin] Other (See Comments)     Joint pain, muscle aches    Ibuprofen Other (See Comments)     bleeding    Lisinopril Hives    Effient [Prasugrel] Rash       Patient requesting a new script for Augmentin for allergies/sinus infection.

## 2019-05-25 DIAGNOSIS — I10 ESSENTIAL HYPERTENSION: ICD-10-CM

## 2019-05-28 RX ORDER — LOSARTAN POTASSIUM 25 MG/1
TABLET ORAL
Qty: 90 TABLET | Refills: 3 | Status: SHIPPED | OUTPATIENT
Start: 2019-05-28 | End: 2019-08-16

## 2019-06-05 ENCOUNTER — OFFICE VISIT (OUTPATIENT)
Dept: UROLOGY | Age: 74
End: 2019-06-05
Payer: MEDICARE

## 2019-06-05 VITALS
DIASTOLIC BLOOD PRESSURE: 62 MMHG | WEIGHT: 215.2 LBS | HEIGHT: 69 IN | SYSTOLIC BLOOD PRESSURE: 112 MMHG | BODY MASS INDEX: 31.87 KG/M2 | HEART RATE: 60 BPM

## 2019-06-05 DIAGNOSIS — R33.9 URINARY RETENTION: ICD-10-CM

## 2019-06-05 DIAGNOSIS — R33.9 RETENTION OF URINE: ICD-10-CM

## 2019-06-05 DIAGNOSIS — R33.8 ACUTE URINARY RETENTION: Primary | ICD-10-CM

## 2019-06-05 DIAGNOSIS — N13.8 BPH WITH OBSTRUCTION/LOWER URINARY TRACT SYMPTOMS: ICD-10-CM

## 2019-06-05 DIAGNOSIS — N40.1 BPH WITH OBSTRUCTION/LOWER URINARY TRACT SYMPTOMS: ICD-10-CM

## 2019-06-05 PROCEDURE — 3017F COLORECTAL CA SCREEN DOC REV: CPT | Performed by: UROLOGY

## 2019-06-05 PROCEDURE — 1123F ACP DISCUSS/DSCN MKR DOCD: CPT | Performed by: UROLOGY

## 2019-06-05 PROCEDURE — 99213 OFFICE O/P EST LOW 20 MIN: CPT | Performed by: UROLOGY

## 2019-06-05 PROCEDURE — 1036F TOBACCO NON-USER: CPT | Performed by: UROLOGY

## 2019-06-05 PROCEDURE — 4040F PNEUMOC VAC/ADMIN/RCVD: CPT | Performed by: UROLOGY

## 2019-06-05 PROCEDURE — G8427 DOCREV CUR MEDS BY ELIG CLIN: HCPCS | Performed by: UROLOGY

## 2019-06-05 PROCEDURE — G8598 ASA/ANTIPLAT THER USED: HCPCS | Performed by: UROLOGY

## 2019-06-05 PROCEDURE — G8417 CALC BMI ABV UP PARAM F/U: HCPCS | Performed by: UROLOGY

## 2019-06-05 NOTE — PROGRESS NOTES
Musa Leon MD   Urology Clinic follow up      Patient: Norberto Osullivan  YOB: 1945  Date: 6/5/2019    HISTORY OF PRESENT ILLNESS:   The patient is a 75 y.o. male who presents today for evaluation of the following problem(s): urinary retention  Overall the problem(s) : are improving  Associated Symptoms: No dysuria, gross hematuria. Pain Severity:      Summary of old records:   Hemorrhoid surgery 6 months ago  Noticed stream slowing down  1 week ago had urinary retention and had aguilar and failed another VT  Started flomax yesterday  No constipation  (Patient's old records, notes and chart reviewed and summarized above.)    Doing well  Voiding well  No complaints  No UUI no UF no nocturia    I independently reviewed and verified the images and reports from:  Ct Abdomen Pelvis W Iv Contrast  Result Date: 11/13/2018  Bladder wall thickening and mild surrounding inflammation. Correlation with urinalysis suggested. Otherwise, no evidence for acute intra-abdominal or intrapelvic pathology. No bowel obstruction or inflammation. No free intraperitoneal air or fluid. No evidence for urinary obstruction. Normal appendix. Indeterminate low-density nodules within the bilateral adrenal glands. However, these were confirmed as adrenal adenomas on previous CT of 09/08/2017. No further follow-up necessary. Last several PSA's:  Lab Results   Component Value Date    PSA 0.93 08/09/2018    PSA 0.83 08/09/2017    PSA 0.65 08/04/2016       Last total testosterone:  No results found for: TESTOSTERONE    Urinalysis today:  No results found for this visit on 06/05/19.       Last BUN and creatinine:  Lab Results   Component Value Date    BUN 12 11/13/2018     Lab Results   Component Value Date    CREATININE 1.02 11/13/2018       Imaging Reviewed during this Office Visit:   (results were independently reviewed by physician and radiology report verified)    PAST MEDICAL, FAMILY AND SOCIAL HISTORY:  Past Medical History:   Diagnosis Date    Asthma     CAD (coronary artery disease)     STENTS X 4    Cancer (HCC)     THROAT, HX. RADIATION , LT EAR    Cervical radiculopathy     Chronic back pain     Colonic polyp     Degeneration of cervical intervertebral disc     [facility], 1990 C4/C5-C6/C7    Degeneration of cervical intervertebral disc     [facility] 1994, C3/C4    Depression     Diverticulosis     GERD (gastroesophageal reflux disease)     Glucose intolerance (impaired glucose tolerance)     IS NOT DIABETIC, PER PT    HTN (hypertension)     Hyperlipidemia     Lumbar radiculopathy     MI [facility]myocardial infarction) ([facility] [address]     2011    Numbness and tingling     HANDS    OA (osteoarthritis)     Pre-diabetes     Sacroiliac pain 11/4/2014    Vision abnormalities     WEARS GLASSES     Past Surgical History:   Procedure Laterality Date    ABSCESS DRAINAGE Right     ELBOW WITH CLOSURE    ANESTHESIA NERVE BLOCK Bilateral 6/2/2017    Bilat L1 L2 L3 L4 L5 Diagnostic Medial Branch Blk performed by Iesha Hardy MD at [address] [facility] Bilateral 6/9/2017    Bilat L1 L2 L3 L4 L5 Confirmatory Medial BB performed by eIsha Hardy MD at [facility]  06/01/2011    DR Nayana Calle CARDIAC CATHETERIZATION  5-5-2011    STENTSx4    CERVICAL SPINE SURGERY  08/09/2001    Anterior Cervical Decompression and Fusion C3-4    CERVICAL SPINE SURGERY Left [id number]    Cervical C6-C7 Discectomy and Foraminotomy    COLONOSCOPY  2007, 2003    POLYPS    COLONOSCOPY  9/9/13    hyperplastic polyp x 3    COLONOSCOPY N/A 10/15/2018    hyperplastic polyp, severe sigmoid diverticulosis, large ext. hemorrhoid, Dr Jamel Treadwell Right 2012,2011    FINGER TRIGGER RELEASE Right     THIRD FINGER    [facility]. INJECTION PROCEDURE FOR SACROILIAC JOINT Bilateral 3/14/2017    Bilat Sacroiliac & Piriformis Inj's performed by Augustin Montgomery MD at [address] Right     ELBOW    LUMBAR FUSION  4/7/14    lumbar decompression and fusion    LUMBAR FUSION N/A 11/15/2017    LUMBAR DECOMPRESSION POSTERIOR W/FUSION L3 L4 ( with SPINAL CORD MONITOR ) performed by Shanda Mcleod MD at [address] [place] [address]   [id number]    Fusion    LUMBAR SPINE SURGERY Left 2/14/2017    Left L4 & L5 Transforaminal ANITHA performed by Augustin Montgomery MD at [address] [place] [address] Dr Bilateral 5/2/2017    Bilat L5 Transforaminal ANITHA performed by Augustin Montgomery MD at [address] [place] [address] Dr Bilateral 5/9/2017    Bilat L5 Transforaminal ANITHA performed by Augustin Montgomery MD at [address]  2/19/13, 5/14/13    L1/L2 IESI    OTHER SURGICAL HISTORY      Hardware correction, patient had 1 broken screw and to loose screws in back     OTHER SURGICAL HISTORY Left 09/27/2016     C6 TFE    OTHER SURGICAL HISTORY Bilateral 11/29/2016    L3, L4 L5 Diagnostic Medial Branch Block    OTHER SURGICAL HISTORY  12/06/2016    jovani L3 L4 L5 conf MBB    OTHER SURGICAL HISTORY Right 12/12/2016    L3,L4,L5 RFA    OTHER SURGICAL HISTORY Left 12/15/17    L3.  L4, L5 RFA    OTHER SURGICAL HISTORY Left 01/31/2017    L4 & L5 TFE    [facility] ARTHROCENTESIS ASPIR&/INJ MAJOR JT/BURSA W/O US Left 3/31/2017    Left Hip Inj performed by Augustin Montgomery MD at [address] ARTHROCENTESIS ASPIR&/INJ MAJOR JT/BURSA W/O US Left 4/14/2017    Left Hip Inj performed by Augustin Montgomery MD at [address] HEMORRHOIDECTOMY,INT/EXT,1 COLUMN/GROUP N/A 11/7/2018    External HEMORRHOIDECTOMY performed by Jenn Rivero MD at [address] Right 6/29/2017    Right L1 L2 L3 L4 L5 Radiofrequency Ablation performed by Augustin Montgomery MD at [address] Left 7/6/2017    Left L1 L2 L3 L4 L5 Radiofrequency performed by Augustin Montgomery MD at [address] [facility] EAR     Family History   Problem Relation Age of Onset    Cancer Father         colon cancer     Outpatient Medications Marked as Taking for the 19 encounter (Office Visit) with Julian Venegas MD   Medication Sig Dispense Refill    metoprolol tartrate (LOPRESSOR) 25 MG tablet TAKE 1 TABLET TWICE A  tablet 3    atorvastatin (LIPITOR) 80 MG tablet TAKE 1 TABLET DAILY 90 tablet 3    pantoprazole (PROTONIX) 40 MG tablet Take 1 tablet by mouth daily 90 tablet 3    ferrous sulfate 325 (65 Fe) MG tablet Take 1 tablet by mouth 2 times daily With Vitamin C 500 mg bid (Patient taking differently: Take 325 mg by mouth daily With Vitamin C 500 mg qd) 60 tablet 11    clopidogrel (PLAVIX) 75 MG tablet TAKE 1 TABLET DAILY 90 tablet 3    metFORMIN (GLUCOPHAGE) 500 MG tablet Take 1 tablet by mouth 2 times daily (with meals) 180 tablet 3    celecoxib (CELEBREX) 200 MG capsule Take 1 capsule by mouth 2 times daily 180 capsule 3    PARoxetine (PAXIL) 20 MG tablet TAKE ONE TABLET BY MOUTH ONCE DAILY IN THE MORNING 90 tablet 3    losartan (COZAAR) 25 MG tablet Take 1 tablet by mouth daily 90 tablet 3    vitamin C (ASCORBIC ACID) 500 MG tablet Take 500 mg by mouth 2 times daily With ferrous sulfate      tiZANidine (ZANAFLEX) 4 MG tablet TAKE ONE TABLET BY MOUTH THREE TIMES DAILY 90 tablet 5    aspirin 81 MG tablet Take 81 mg by mouth daily      famotidine (PEPCID) 20 MG tablet Take 20 mg by mouth daily. Crestor [rosuvastatin];  Ibuprofen; Lisinopril; and Effient [prasugrel]  Social History     Tobacco Use   Smoking Status Former Smoker    Packs/day: 1.00    Years: 40.00    Pack years: 40.00    Types: Cigarettes, Pipe, Cigars    Last attempt to quit: 2011    Years since quittin.0   Smokeless Tobacco Former User    Types: [place] [employer] Quit date: 2011       Social History     Substance and Sexual Activity   Alcohol Use Yes    Alcohol/week: 0.0 oz    Comment: RARE       REVIEW OF SYSTEMS:  Constitutional: negative  Eyes: negative  Respiratory: negative  Cardiovascular: negative  Gastrointestinal: negative  Musculoskeletal: negative  Genitourinary: negative except for what is in HPI  Skin: negative   Neurological: negative  Hematological/Lymphatic: negative  Psychological: negative    Physical Exam:    This a 75 y.o. male   Vitals:    06/05/19 0841   BP: 112/62   Pulse: 60     Constitutional: Patient in no acute distress;           Assessment and Plan      1. Acute urinary retention    2. Retention of urine    3. Urinary retention    4. BPH with obstruction/lower urinary tract symptoms           Plan:         No recent issues, doing well  Emptying bladder well  No baseline symptoms  No medications necessary  Return in about 1 year (around 6/5/2020).              Carley Santana MD  [facility]

## 2019-07-05 ENCOUNTER — TELEPHONE (OUTPATIENT)
Dept: INTERNAL MEDICINE | Age: 74
End: 2019-07-05

## 2019-08-15 ENCOUNTER — HOSPITAL ENCOUNTER (OUTPATIENT)
Dept: LAB | Age: 74
Discharge: HOME | End: 2019-08-15
Payer: MEDICARE

## 2019-08-15 DIAGNOSIS — M43.10 ACQUIRED SPONDYLOLISTHESIS: ICD-10-CM

## 2019-08-15 DIAGNOSIS — E11.9 TYPE 2 DIABETES MELLITUS WITHOUT COMPLICATION, WITHOUT LONG-TERM CURRENT USE OF INSULIN (HCC): ICD-10-CM

## 2019-08-15 DIAGNOSIS — E78.2 MIXED HYPERLIPIDEMIA: ICD-10-CM

## 2019-08-15 DIAGNOSIS — M48.062 SPINAL STENOSIS, LUMBAR REGION, WITH NEUROGENIC CLAUDICATION: ICD-10-CM

## 2019-08-15 DIAGNOSIS — G89.29 CHRONIC BILATERAL LOW BACK PAIN WITH BILATERAL SCIATICA: Primary | ICD-10-CM

## 2019-08-15 DIAGNOSIS — D64.9 ANEMIA, UNSPECIFIED TYPE: ICD-10-CM

## 2019-08-15 DIAGNOSIS — Z98.1 S/P LUMBAR SPINAL FUSION: ICD-10-CM

## 2019-08-15 DIAGNOSIS — Z12.5 SPECIAL SCREENING FOR MALIGNANT NEOPLASM OF PROSTATE: ICD-10-CM

## 2019-08-15 DIAGNOSIS — M54.41 CHRONIC BILATERAL LOW BACK PAIN WITH BILATERAL SCIATICA: Primary | ICD-10-CM

## 2019-08-15 DIAGNOSIS — I10 ESSENTIAL HYPERTENSION: ICD-10-CM

## 2019-08-15 DIAGNOSIS — M54.42 CHRONIC BILATERAL LOW BACK PAIN WITH BILATERAL SCIATICA: Primary | ICD-10-CM

## 2019-08-15 LAB
ALBUMIN SERPL-MCNC: 4.1 G/DL (ref 3.5–5.2)
ALBUMIN/GLOBULIN RATIO: 1.5 (ref 1–2.5)
ALP BLD-CCNC: 61 U/L (ref 40–129)
ALT SERPL-CCNC: 18 U/L (ref 5–41)
ANION GAP SERPL CALCULATED.3IONS-SCNC: 12 MMOL/L (ref 9–17)
AST SERPL-CCNC: 16 U/L
BILIRUB SERPL-MCNC: 0.13 MG/DL (ref 0.3–1.2)
BUN BLDV-MCNC: 25 MG/DL (ref 8–23)
BUN/CREAT BLD: 25 (ref 9–20)
CALCIUM SERPL-MCNC: 8.8 MG/DL (ref 8.6–10.4)
CHLORIDE BLD-SCNC: 104 MMOL/L (ref 98–107)
CHOLESTEROL/HDL RATIO: 4.5
CHOLESTEROL: 170 MG/DL
CO2: 27 MMOL/L (ref 20–31)
CREAT SERPL-MCNC: 1.02 MG/DL (ref 0.7–1.2)
ESTIMATED AVERAGE GLUCOSE: 108 MG/DL
GFR AFRICAN AMERICAN: >60 ML/MIN
GFR NON-AFRICAN AMERICAN: >60 ML/MIN
GFR SERPL CREATININE-BSD FRML MDRD: ABNORMAL ML/MIN/{1.73_M2}
GFR SERPL CREATININE-BSD FRML MDRD: ABNORMAL ML/MIN/{1.73_M2}
GLUCOSE BLD-MCNC: 137 MG/DL (ref 70–99)
HBA1C MFR BLD: 5.4 % (ref 4.8–5.9)
HDLC SERPL-MCNC: 38 MG/DL
HEMOGLOBIN: 11.2 G/DL (ref 13.5–17.5)
IRON SATURATION: 14 % (ref 20–55)
IRON: 52 UG/DL (ref 59–158)
LDL CHOLESTEROL: 80 MG/DL (ref 0–130)
POTASSIUM SERPL-SCNC: 4.9 MMOL/L (ref 3.7–5.3)
PROSTATE SPECIFIC ANTIGEN: 1 UG/L
SODIUM BLD-SCNC: 143 MMOL/L (ref 135–144)
TOTAL IRON BINDING CAPACITY: 383 UG/DL (ref 250–450)
TOTAL PROTEIN: 6.8 G/DL (ref 6.4–8.3)
TRIGL SERPL-MCNC: 259 MG/DL
UNSATURATED IRON BINDING CAPACITY: 331 UG/DL (ref 112–347)
VLDLC SERPL CALC-MCNC: ABNORMAL MG/DL (ref 1–30)

## 2019-08-15 PROCEDURE — 36415 COLL VENOUS BLD VENIPUNCTURE: CPT

## 2019-08-15 PROCEDURE — 83036 HEMOGLOBIN GLYCOSYLATED A1C: CPT

## 2019-08-15 PROCEDURE — 80053 COMPREHEN METABOLIC PANEL: CPT

## 2019-08-15 PROCEDURE — 85018 HEMOGLOBIN: CPT

## 2019-08-15 PROCEDURE — G0103 PSA SCREENING: HCPCS

## 2019-08-15 PROCEDURE — 83540 ASSAY OF IRON: CPT

## 2019-08-15 PROCEDURE — 80061 LIPID PANEL: CPT

## 2019-08-15 PROCEDURE — 83550 IRON BINDING TEST: CPT

## 2019-08-16 ENCOUNTER — HOSPITAL ENCOUNTER (OUTPATIENT)
Dept: GENERAL RADIOLOGY | Age: 74
Discharge: HOME | End: 2019-08-18
Payer: MEDICARE

## 2019-08-16 ENCOUNTER — OFFICE VISIT (OUTPATIENT)
Dept: OTOLARYNGOLOGY | Age: 74
End: 2019-08-16
Payer: MEDICARE

## 2019-08-16 VITALS — HEIGHT: 69 IN | BODY MASS INDEX: 31.84 KG/M2 | HEART RATE: 68 BPM | RESPIRATION RATE: 14 BRPM | WEIGHT: 215 LBS

## 2019-08-16 DIAGNOSIS — J34.89 RHINORRHEA: ICD-10-CM

## 2019-08-16 DIAGNOSIS — C32.9 CARCINOMA LARYNX (HCC): Primary | ICD-10-CM

## 2019-08-16 PROCEDURE — 70220 X-RAY EXAM OF SINUSES: CPT

## 2019-08-16 PROCEDURE — 31575 DIAGNOSTIC LARYNGOSCOPY: CPT | Performed by: OTOLARYNGOLOGY

## 2019-08-16 RX ORDER — AMOXICILLIN AND CLAVULANATE POTASSIUM 500; 125 MG/1; MG/1
1 TABLET, FILM COATED ORAL 2 TIMES DAILY
Qty: 20 TABLET | Refills: 0 | Status: SHIPPED | OUTPATIENT
Start: 2019-08-16 | End: 2019-08-26

## 2019-08-16 NOTE — PROGRESS NOTES
organization: Not on file     Attends meetings of clubs or organizations: Not on file     Relationship status: Not on file    Intimate partner violence:     Fear of current or ex partner: Not on file     Emotionally abused: Not on file     Physically abused: Not on file     Forced sexual activity: Not on file   Other Topics Concern    Not on file   Social History Narrative    ** Merged History Encounter **            Current Medications:     Current Outpatient Medications:     Handicap Placard MISC, by Does not apply route Expires: 7/5/2021, Disp: 2 each, Rfl: 0    fluticasone (FLONASE) 50 MCG/ACT nasal spray, 2 sprays by Nasal route daily 2 sprays each side once/day, Disp: 1 Bottle, Rfl: 5    metoprolol tartrate (LOPRESSOR) 25 MG tablet, TAKE 1 TABLET TWICE A DAY, Disp: 180 tablet, Rfl: 3    atorvastatin (LIPITOR) 80 MG tablet, TAKE 1 TABLET DAILY, Disp: 90 tablet, Rfl: 3    pantoprazole (PROTONIX) 40 MG tablet, Take 1 tablet by mouth daily, Disp: 90 tablet, Rfl: 3    ferrous sulfate 325 (65 Fe) MG tablet, Take 1 tablet by mouth 2 times daily With Vitamin C 500 mg bid (Patient taking differently: Take 325 mg by mouth daily With Vitamin C 500 mg qd), Disp: 60 tablet, Rfl: 11    clopidogrel (PLAVIX) 75 MG tablet, TAKE 1 TABLET DAILY, Disp: 90 tablet, Rfl: 3    metFORMIN (GLUCOPHAGE) 500 MG tablet, Take 1 tablet by mouth 2 times daily (with meals), Disp: 180 tablet, Rfl: 3    celecoxib (CELEBREX) 200 MG capsule, Take 1 capsule by mouth 2 times daily, Disp: 180 capsule, Rfl: 3    PARoxetine (PAXIL) 20 MG tablet, TAKE ONE TABLET BY MOUTH ONCE DAILY IN THE MORNING, Disp: 90 tablet, Rfl: 3    losartan (COZAAR) 25 MG tablet, Take 1 tablet by mouth daily, Disp: 90 tablet, Rfl: 3    vitamin C (ASCORBIC ACID) 500 MG tablet, Take 500 mg by mouth 2 times daily With ferrous sulfate, Disp: , Rfl:     HYDROcodone-acetaminophen (NORCO) 5-325 MG per tablet, Take 1 tablet by mouth every 8 hours as needed for Pain . ,

## 2019-08-19 ENCOUNTER — OFFICE VISIT (OUTPATIENT)
Dept: INTERNAL MEDICINE | Age: 74
End: 2019-08-19
Payer: MEDICARE

## 2019-08-19 VITALS
DIASTOLIC BLOOD PRESSURE: 76 MMHG | SYSTOLIC BLOOD PRESSURE: 136 MMHG | HEART RATE: 60 BPM | WEIGHT: 218 LBS | HEIGHT: 69 IN | BODY MASS INDEX: 32.29 KG/M2 | RESPIRATION RATE: 16 BRPM

## 2019-08-19 DIAGNOSIS — D64.9 ANEMIA, UNSPECIFIED TYPE: ICD-10-CM

## 2019-08-19 DIAGNOSIS — E11.9 TYPE 2 DIABETES MELLITUS WITHOUT COMPLICATION, WITHOUT LONG-TERM CURRENT USE OF INSULIN (HCC): ICD-10-CM

## 2019-08-19 DIAGNOSIS — I10 ESSENTIAL HYPERTENSION: Primary | ICD-10-CM

## 2019-08-19 DIAGNOSIS — E78.2 MIXED HYPERLIPIDEMIA: ICD-10-CM

## 2019-08-19 PROCEDURE — 1123F ACP DISCUSS/DSCN MKR DOCD: CPT | Performed by: INTERNAL MEDICINE

## 2019-08-19 PROCEDURE — 3288F FALL RISK ASSESSMENT DOCD: CPT | Performed by: INTERNAL MEDICINE

## 2019-08-19 PROCEDURE — 3044F HG A1C LEVEL LT 7.0%: CPT | Performed by: INTERNAL MEDICINE

## 2019-08-19 PROCEDURE — 3017F COLORECTAL CA SCREEN DOC REV: CPT | Performed by: INTERNAL MEDICINE

## 2019-08-19 PROCEDURE — G8427 DOCREV CUR MEDS BY ELIG CLIN: HCPCS | Performed by: INTERNAL MEDICINE

## 2019-08-19 PROCEDURE — G8417 CALC BMI ABV UP PARAM F/U: HCPCS | Performed by: INTERNAL MEDICINE

## 2019-08-19 PROCEDURE — 4040F PNEUMOC VAC/ADMIN/RCVD: CPT | Performed by: INTERNAL MEDICINE

## 2019-08-19 PROCEDURE — 99214 OFFICE O/P EST MOD 30 MIN: CPT | Performed by: INTERNAL MEDICINE

## 2019-08-19 PROCEDURE — G8598 ASA/ANTIPLAT THER USED: HCPCS | Performed by: INTERNAL MEDICINE

## 2019-08-19 PROCEDURE — G8510 SCR DEP NEG, NO PLAN REQD: HCPCS | Performed by: INTERNAL MEDICINE

## 2019-08-19 PROCEDURE — 1036F TOBACCO NON-USER: CPT | Performed by: INTERNAL MEDICINE

## 2019-08-19 PROCEDURE — 2022F DILAT RTA XM EVC RTNOPTHY: CPT | Performed by: INTERNAL MEDICINE

## 2019-08-19 ASSESSMENT — ENCOUNTER SYMPTOMS
BACK PAIN: 0
COUGH: 0
VOMITING: 0
CONSTIPATION: 0
DIARRHEA: 0
BLOOD IN STOOL: 0
ABDOMINAL PAIN: 0
SHORTNESS OF BREATH: 0
NAUSEA: 0
EYE PAIN: 0

## 2019-08-19 ASSESSMENT — PATIENT HEALTH QUESTIONNAIRE - PHQ9
SUM OF ALL RESPONSES TO PHQ QUESTIONS 1-9: 0
SUM OF ALL RESPONSES TO PHQ9 QUESTIONS 1 & 2: 0
SUM OF ALL RESPONSES TO PHQ QUESTIONS 1-9: 0
1. LITTLE INTEREST OR PLEASURE IN DOING THINGS: 0
2. FEELING DOWN, DEPRESSED OR HOPELESS: 0

## 2019-08-19 NOTE — PROGRESS NOTES
Cervical radiculopathy     Chronic back pain     Colonic polyp     Degeneration of cervical intervertebral disc     [facility], 1990 C4/C5-C6/C7    Degeneration of cervical intervertebral disc     [facility] 1994, C3/C4    Depression     Diverticulosis     GERD (gastroesophageal reflux disease)     Glucose intolerance (impaired glucose tolerance)     IS NOT DIABETIC, PER PT    HTN (hypertension)     Hyperlipidemia     Lumbar radiculopathy     MI (myocardial infarction) ([facility] [address]     2011    Numbness and tingling     HANDS    OA (osteoarthritis)     Pre-diabetes     Sacroiliac pain 11/4/2014    Vision abnormalities     WEARS GLASSES      Past Surgical History:   Procedure Laterality Date    ABSCESS DRAINAGE Right     ELBOW WITH CLOSURE    ANESTHESIA NERVE BLOCK Bilateral 6/2/2017    Bilat L1 L2 L3 L4 L5 Diagnostic Medial Branch Blk performed by Tobias Hsieh MD at [facility] [facility] Bilateral 6/9/2017    Bilat L1 L2 L3 L4 L5 Confirmatory Medial BB performed by Tobias Hsieh MD at [facility]  06/01/2011    DR Betty Flynn CARDIAC CATHETERIZATION  5-5-2011    STENTSx4    CERVICAL SPINE SURGERY  08/09/2001    Anterior Cervical Decompression and Fusion C3-4    CERVICAL SPINE SURGERY Left [id number]    Cervical C6-C7 Discectomy and Foraminotomy    COLONOSCOPY  2007, 2003    POLYPS    COLONOSCOPY  9/9/13    hyperplastic polyp x 3    COLONOSCOPY N/A 10/15/2018    hyperplastic polyp, severe sigmoid diverticulosis, large ext. hemorrhoid, Dr Kyle Doyle Right 2012,2011    FINGER TRIGGER RELEASE Right     [facility] INJECTION PROCEDURE FOR SACROILIAC JOINT Bilateral 3/14/2017    Bilat Sacroiliac & Piriformis Inj's performed by Tobias Hsieh MD at [address] Right     ELBOW    LUMBAR FUSION  4/7/14    lumbar decompression Smoking status: Former Smoker     Packs/day: 1.00     Years: 40.00     Pack years: 40.00     Types: Cigarettes, Pipe, Cigars     Last attempt to quit: 2011     Years since quittin.2    Smokeless tobacco: Former User     Types: [address] date: 2011   Substance Use Topics    Alcohol use: Yes     Alcohol/week: 0.0 standard drinks     Comment: RARE         Current Outpatient Medications   Medication Sig Dispense Refill    amoxicillin-clavulanate (AUGMENTIN) 500-125 MG per tablet Take 1 tablet by mouth 2 times daily for 10 days 20 tablet 0    Handicap Placard MISC by Does not apply route Expires: 2021 2 each 0    fluticasone (FLONASE) 50 MCG/ACT nasal spray 2 sprays by Nasal route daily 2 sprays each side once/day 1 Bottle 5    metoprolol tartrate (LOPRESSOR) 25 MG tablet TAKE 1 TABLET TWICE A  tablet 3    atorvastatin (LIPITOR) 80 MG tablet TAKE 1 TABLET DAILY 90 tablet 3    pantoprazole (PROTONIX) 40 MG tablet Take 1 tablet by mouth daily 90 tablet 3    ferrous sulfate 325 (65 Fe) MG tablet Take 1 tablet by mouth 2 times daily With Vitamin C 500 mg bid (Patient taking differently: Take 325 mg by mouth daily With Vitamin C 500 mg qd) 60 tablet 11    clopidogrel (PLAVIX) 75 MG tablet TAKE 1 TABLET DAILY 90 tablet 3    metFORMIN (GLUCOPHAGE) 500 MG tablet Take 1 tablet by mouth 2 times daily (with meals) 180 tablet 3    celecoxib (CELEBREX) 200 MG capsule Take 1 capsule by mouth 2 times daily 180 capsule 3    PARoxetine (PAXIL) 20 MG tablet TAKE ONE TABLET BY MOUTH ONCE DAILY IN THE MORNING 90 tablet 3    losartan (COZAAR) 25 MG tablet Take 1 tablet by mouth daily 90 tablet 3    vitamin C (ASCORBIC ACID) 500 MG tablet Take 500 mg by mouth 2 times daily With ferrous sulfate      HYDROcodone-acetaminophen (NORCO) 5-325 MG per tablet Take 1 tablet by mouth every 8 hours as needed for Pain .       tiZANidine (ZANAFLEX) 4 MG tablet TAKE ONE TABLET BY MOUTH THREE TIMES DAILY 90 tablet

## 2019-08-19 NOTE — PROGRESS NOTES
Al received counseling on the following healthy behaviors: medication adherence  Reviewed prior labs and health maintenance  Continue current medications except where noted below, diet and exercise. Discussed use, benefit, and side effects of prescribed medications. Barriers to medication compliance addressed. Patient given educational materials - see patient instructions  Was a self-tracking handout given in paper form or via [employer]? No    Requested Prescriptions      No prescriptions requested or ordered in this encounter       All patient questions answered. Patient voiced understanding. Quality Measures    Body mass index is 32.19 kg/m². Elevated. Weight control planned discussed: healthy diet and regular exercise. BP: 136/76. Blood pressure is normal. Treatment plan consists of: see progress note below. Fall Risk 8/19/2019 2/15/2019 2/15/2018 2/13/2017 8/14/2014   2 or more falls in past year? no no no no yes   Fall with injury in past year? no no no no yes     The patient does not have a history of falls. I did , complete a risk assessment for falls.  A plan of care for falls home safety tips provided    Lab Results   Component Value Date    LDLCHOLESTEROL 80 08/15/2019    (goal LDL reduction with dx if diabetes is 50% LDL reduction)    PHQ Scores 8/19/2019 2/15/2019 2/15/2018 2/13/2017   PHQ2 Score 0 1 0 0   PHQ9 Score 0 1 0 0     Interpretation of Total Score Depression Severity: 1-4 = Minimal depression, 5-9 = Mild depression, 10-14 = Moderate depression, 15-19 = Moderately severe depression, 20-27 = Severe depression

## 2019-08-20 ENCOUNTER — OFFICE VISIT (OUTPATIENT)
Dept: CARDIOLOGY | Age: 74
End: 2019-08-20
Payer: MEDICARE

## 2019-08-20 VITALS
WEIGHT: 214.8 LBS | RESPIRATION RATE: 16 BRPM | BODY MASS INDEX: 31.81 KG/M2 | HEIGHT: 69 IN | SYSTOLIC BLOOD PRESSURE: 118 MMHG | HEART RATE: 59 BPM | DIASTOLIC BLOOD PRESSURE: 70 MMHG

## 2019-08-20 DIAGNOSIS — M54.41 CHRONIC BILATERAL LOW BACK PAIN WITH BILATERAL SCIATICA: ICD-10-CM

## 2019-08-20 DIAGNOSIS — I10 ESSENTIAL HYPERTENSION: Primary | ICD-10-CM

## 2019-08-20 DIAGNOSIS — G89.29 CHRONIC BILATERAL LOW BACK PAIN WITH BILATERAL SCIATICA: ICD-10-CM

## 2019-08-20 DIAGNOSIS — M54.42 CHRONIC BILATERAL LOW BACK PAIN WITH BILATERAL SCIATICA: ICD-10-CM

## 2019-08-20 DIAGNOSIS — E78.2 MIXED HYPERLIPIDEMIA: ICD-10-CM

## 2019-08-20 DIAGNOSIS — Z95.5 HISTORY OF HEART ARTERY STENT: ICD-10-CM

## 2019-08-20 PROCEDURE — 1036F TOBACCO NON-USER: CPT | Performed by: INTERNAL MEDICINE

## 2019-08-20 PROCEDURE — 99214 OFFICE O/P EST MOD 30 MIN: CPT | Performed by: INTERNAL MEDICINE

## 2019-08-20 PROCEDURE — G8427 DOCREV CUR MEDS BY ELIG CLIN: HCPCS | Performed by: INTERNAL MEDICINE

## 2019-08-20 PROCEDURE — G8417 CALC BMI ABV UP PARAM F/U: HCPCS | Performed by: INTERNAL MEDICINE

## 2019-08-20 PROCEDURE — 3017F COLORECTAL CA SCREEN DOC REV: CPT | Performed by: INTERNAL MEDICINE

## 2019-08-20 PROCEDURE — G8598 ASA/ANTIPLAT THER USED: HCPCS | Performed by: INTERNAL MEDICINE

## 2019-08-20 PROCEDURE — 93000 ELECTROCARDIOGRAM COMPLETE: CPT | Performed by: INTERNAL MEDICINE

## 2019-08-20 PROCEDURE — 4040F PNEUMOC VAC/ADMIN/RCVD: CPT | Performed by: INTERNAL MEDICINE

## 2019-08-20 PROCEDURE — 1123F ACP DISCUSS/DSCN MKR DOCD: CPT | Performed by: INTERNAL MEDICINE

## 2019-08-20 RX ORDER — CELECOXIB 200 MG/1
CAPSULE ORAL
Qty: 180 CAPSULE | Refills: 3 | Status: SHIPPED | OUTPATIENT
Start: 2019-08-20 | End: 2020-08-24 | Stop reason: SDUPTHER

## 2019-08-20 NOTE — PROGRESS NOTES
without myelopathy    CAD (coronary artery disease)    GILL (dyspnea on exertion)    S/P lumbar spinal fusion    Obesity (BMI 35.0-39.9 without comorbidity)    HTN (hypertension)    Hyperlipidemia    Type 2 diabetes mellitus without complication (HCC)    Chronic bilateral low back pain with bilateral sciatica    Left hip pain    Acquired spondylolisthesis    Back pain    Recurrent major depressive disorder, in partial remission (HCC)    Grade III hemorrhoids    Recurrent major depressive disorder, in partial remission (HCC)       RECOMMENDATIONS:   CAD- History of STEMI .stents LAD/RCA  · NO CHEST PAIN, CONTINUE CURRENT MEDICATIONS       HYPERTENSION- WELL CONTROLLED, WILL CONTINUE CURRENT MEDICATIONS     HYPERLIPIDEMIA- ON STATIN, NEEDS TO WATCH LIPID PROFILE AND LFT'S      DISCUSSED IN DETAILS ABOUT RISK MODIFICATION    RETURN VISIT IN 6 MONTHS, IF ANY SYMPTOM CHANGE PATIENT ADVISED TO GO TO THE EMERGENCY ROOM.           Denton Hoffmann, Rachel [address] Cardiology Consult           [phone]

## 2019-08-23 ENCOUNTER — HOSPITAL ENCOUNTER (OUTPATIENT)
Dept: GENERAL RADIOLOGY | Age: 74
Discharge: HOME | End: 2019-08-25
Payer: MEDICARE

## 2019-08-23 ENCOUNTER — OFFICE VISIT (OUTPATIENT)
Dept: ORTHOPEDIC SURGERY | Age: 74
End: 2019-08-23
Payer: MEDICARE

## 2019-08-23 VITALS
BODY MASS INDEX: 31.84 KG/M2 | HEIGHT: 69 IN | WEIGHT: 215 LBS | DIASTOLIC BLOOD PRESSURE: 66 MMHG | HEART RATE: 80 BPM | SYSTOLIC BLOOD PRESSURE: 116 MMHG

## 2019-08-23 DIAGNOSIS — Z98.1 S/P LUMBAR SPINAL FUSION: ICD-10-CM

## 2019-08-23 DIAGNOSIS — M54.41 CHRONIC BILATERAL LOW BACK PAIN WITH BILATERAL SCIATICA: ICD-10-CM

## 2019-08-23 DIAGNOSIS — G89.29 CHRONIC BILATERAL LOW BACK PAIN WITH BILATERAL SCIATICA: Primary | ICD-10-CM

## 2019-08-23 DIAGNOSIS — M43.10 ACQUIRED SPONDYLOLISTHESIS: ICD-10-CM

## 2019-08-23 DIAGNOSIS — M54.42 CHRONIC BILATERAL LOW BACK PAIN WITH BILATERAL SCIATICA: ICD-10-CM

## 2019-08-23 DIAGNOSIS — M54.41 CHRONIC BILATERAL LOW BACK PAIN WITH BILATERAL SCIATICA: Primary | ICD-10-CM

## 2019-08-23 DIAGNOSIS — M48.062 SPINAL STENOSIS, LUMBAR REGION, WITH NEUROGENIC CLAUDICATION: ICD-10-CM

## 2019-08-23 DIAGNOSIS — G89.29 CHRONIC BILATERAL LOW BACK PAIN WITH BILATERAL SCIATICA: ICD-10-CM

## 2019-08-23 DIAGNOSIS — M54.42 CHRONIC BILATERAL LOW BACK PAIN WITH BILATERAL SCIATICA: Primary | ICD-10-CM

## 2019-08-23 PROCEDURE — 1123F ACP DISCUSS/DSCN MKR DOCD: CPT | Performed by: ORTHOPAEDIC SURGERY

## 2019-08-23 PROCEDURE — 1036F TOBACCO NON-USER: CPT | Performed by: ORTHOPAEDIC SURGERY

## 2019-08-23 PROCEDURE — 72100 X-RAY EXAM L-S SPINE 2/3 VWS: CPT

## 2019-08-23 PROCEDURE — G8598 ASA/ANTIPLAT THER USED: HCPCS | Performed by: ORTHOPAEDIC SURGERY

## 2019-08-23 PROCEDURE — 4040F PNEUMOC VAC/ADMIN/RCVD: CPT | Performed by: ORTHOPAEDIC SURGERY

## 2019-08-23 PROCEDURE — G8417 CALC BMI ABV UP PARAM F/U: HCPCS | Performed by: ORTHOPAEDIC SURGERY

## 2019-08-23 PROCEDURE — G8427 DOCREV CUR MEDS BY ELIG CLIN: HCPCS | Performed by: ORTHOPAEDIC SURGERY

## 2019-08-23 PROCEDURE — 3017F COLORECTAL CA SCREEN DOC REV: CPT | Performed by: ORTHOPAEDIC SURGERY

## 2019-08-23 PROCEDURE — 99213 OFFICE O/P EST LOW 20 MIN: CPT | Performed by: ORTHOPAEDIC SURGERY

## 2019-08-23 NOTE — PROGRESS NOTES
HEMORRHOIDECTOMY performed by Yeni Breen MD at [address] Right 2017    Right L1 L2 L3 L4 L5 Radiofrequency Ablation performed by Torrie Moreira MD at [address] 2017    Left L1 L2 L3 L4 L5 Radiofrequency performed by Torrie Moreira MD at [address]      LT EAR     Family History   Problem Relation Age of Onset    Cancer Father         colon cancer     Social History     Occupational History    Not on file   Tobacco Use    Smoking status: Former Smoker     Packs/day: 1.00     Years: 40.00     Pack years: 40.00     Types: Cigarettes, Pipe, Cigars     Last attempt to quit: 2011     Years since quittin.3    Smokeless tobacco: Former User     Types: [address] date: 2011   Substance and Sexual Activity    Alcohol use: Yes     Alcohol/week: 0.0 standard drinks     Comment: RARE    Drug use: No    Sexual activity: Not on file        Review of Systems         Physical Exam   Constitutional: He is oriented to person, place, and time. He appears well-developed and well-nourished. HENT:   Head: Normocephalic and atraumatic. Nose: Nose normal.   Eyes: Conjunctivae and EOM are normal.   Neck: Normal range of motion. Neck supple. Pulmonary/Chest: Effort normal. No respiratory distress. Musculoskeletal:   Normal gait     Neurological: He is alert and oriented to person, place, and time. He has normal strength. No sensory deficit. Skin: Skin is warm and dry. Psychiatric: His behavior is normal. Thought content normal.   Nursing note and vitals reviewed. AP and lateral lumbar spine status post L2-4 PLIF -appears ankylosed  Assessment:          1. Chronic bilateral low back pain with bilateral sciatica    2. Spinal stenosis, lumbar region, with neurogenic claudication    3. S/P lumbar spinal fusion    4.  Acquired spondylolisthesis        Plan:     Follow-up as needed    No orders of the defined types

## 2019-09-04 RX ORDER — PAROXETINE HYDROCHLORIDE 20 MG/1
TABLET, FILM COATED ORAL
Qty: 90 TABLET | Refills: 3 | Status: SHIPPED | OUTPATIENT
Start: 2019-09-04 | End: 2020-08-24 | Stop reason: SDUPTHER

## 2019-09-19 ENCOUNTER — OFFICE VISIT (OUTPATIENT)
Dept: SURGERY | Age: 74
End: 2019-09-19
Payer: MEDICARE

## 2019-09-19 ENCOUNTER — TELEPHONE (OUTPATIENT)
Dept: SURGERY | Age: 74
End: 2019-09-19

## 2019-09-19 VITALS
WEIGHT: 215 LBS | BODY MASS INDEX: 31.84 KG/M2 | HEIGHT: 69 IN | HEART RATE: 62 BPM | DIASTOLIC BLOOD PRESSURE: 64 MMHG | SYSTOLIC BLOOD PRESSURE: 114 MMHG

## 2019-09-19 DIAGNOSIS — D50.0 IRON DEFICIENCY ANEMIA DUE TO CHRONIC BLOOD LOSS: Primary | ICD-10-CM

## 2019-09-19 PROCEDURE — 3017F COLORECTAL CA SCREEN DOC REV: CPT | Performed by: SURGERY

## 2019-09-19 PROCEDURE — G8417 CALC BMI ABV UP PARAM F/U: HCPCS | Performed by: SURGERY

## 2019-09-19 PROCEDURE — 1123F ACP DISCUSS/DSCN MKR DOCD: CPT | Performed by: SURGERY

## 2019-09-19 PROCEDURE — G8428 CUR MEDS NOT DOCUMENT: HCPCS | Performed by: SURGERY

## 2019-09-19 PROCEDURE — 99214 OFFICE O/P EST MOD 30 MIN: CPT | Performed by: SURGERY

## 2019-09-19 PROCEDURE — 1036F TOBACCO NON-USER: CPT | Performed by: SURGERY

## 2019-09-19 PROCEDURE — G8598 ASA/ANTIPLAT THER USED: HCPCS | Performed by: SURGERY

## 2019-09-19 PROCEDURE — 4040F PNEUMOC VAC/ADMIN/RCVD: CPT | Performed by: SURGERY

## 2019-09-19 NOTE — PROGRESS NOTES
1 CAPSULE BY MOUTH TWICE DAILY 8/20/19  Yes Natalya Crump MD   fluticasone [facility]) 50 MCG/ACT nasal spray 2 sprays by Nasal route daily 2 sprays each side once/day 5/10/19  Yes Kavin Hardy MD   metoprolol tartrate (LOPRESSOR) 25 MG tablet TAKE 1 TABLET TWICE A DAY 2/18/19  Yes Natalya Crump MD   atorvastatin (LIPITOR) 80 MG tablet TAKE 1 TABLET DAILY 2/18/19  Yes Natalya Crump MD   pantoprazole (PROTONIX) 40 MG tablet Take 1 tablet by mouth daily 2/15/19  Yes Natalya Crump MD   ferrous sulfate 325 (65 Fe) MG tablet Take 1 tablet by mouth 2 times daily With Vitamin C 500 mg bid  Patient taking differently: Take 325 mg by mouth daily With Vitamin C 500 mg qd 2/13/19  Yes Natalya Crump MD   clopidogrel (PLAVIX) 75 MG tablet TAKE 1 TABLET DAILY 1/14/19  Yes Natalya Crump MD   metFORMIN (GLUCOPHAGE) 500 MG tablet Take 1 tablet by mouth 2 times daily (with meals) 11/6/18  Yes Natalya Crump MD   losartan (COZAAR) 25 MG tablet Take 1 tablet by mouth daily 3/12/18  Yes Natalya Crump MD   HYDROcodone-acetaminophen (NORCO) 5-325 MG per tablet Take 1 tablet by mouth every 8 hours as needed for Pain . Yes Historical Provider, MD   aspirin 81 MG tablet Take 81 mg by mouth daily   Yes Historical Provider, MD   famotidine (PEPCID) 20 MG tablet Take 20 mg by mouth daily. Yes Historical Provider, MD   Handicap Placard [facility] by Does not apply route Expires: 7/5/2021 7/5/19   Natalya Crump MD   vitamin C (ASCORBIC ACID) 500 MG tablet Take 500 mg by mouth 2 times daily With ferrous sulfate    Historical Provider, MD   tiZANidine (ZANAFLEX) 4 MG tablet TAKE ONE TABLET BY MOUTH THREE TIMES DAILY 5/11/17   Relda Picking, APRN - CNP       Allergies  Crestor [rosuvastatin]; Ibuprofen; Lisinopril; and Effient [prasugrel]      Review of Systems:  General: Denies any fever, chills. HEENT: Denies any diplopia, tinnitus or vertigo. Respiratory: Denies any shortness of breath or cough.   Cardiac:

## 2019-09-24 ENCOUNTER — TELEPHONE (OUTPATIENT)
Dept: OTOLARYNGOLOGY | Age: 74
End: 2019-09-24

## 2019-09-30 ENCOUNTER — ANESTHESIA EVENT (OUTPATIENT)
Dept: OPERATING ROOM | Age: 74
End: 2019-09-30
Payer: MEDICARE

## 2019-09-30 ENCOUNTER — HOSPITAL ENCOUNTER (OUTPATIENT)
Age: 74
Setting detail: OUTPATIENT SURGERY
Discharge: HOME | End: 2019-09-30
Attending: SURGERY | Admitting: SURGERY
Payer: MEDICARE

## 2019-09-30 ENCOUNTER — ANESTHESIA (OUTPATIENT)
Dept: OPERATING ROOM | Age: 74
End: 2019-09-30
Payer: MEDICARE

## 2019-09-30 VITALS — DIASTOLIC BLOOD PRESSURE: 67 MMHG | OXYGEN SATURATION: 98 % | SYSTOLIC BLOOD PRESSURE: 174 MMHG

## 2019-09-30 VITALS
OXYGEN SATURATION: 93 % | DIASTOLIC BLOOD PRESSURE: 75 MMHG | HEART RATE: 62 BPM | BODY MASS INDEX: 30.81 KG/M2 | SYSTOLIC BLOOD PRESSURE: 136 MMHG | HEIGHT: 69 IN | TEMPERATURE: 97.1 F | WEIGHT: 208 LBS | RESPIRATION RATE: 18 BRPM

## 2019-09-30 PROCEDURE — 6360000002 HC RX W HCPCS: Performed by: NURSE ANESTHETIST, CERTIFIED REGISTERED

## 2019-09-30 PROCEDURE — 7100000010 HC PHASE II RECOVERY - FIRST 15 MIN: Performed by: SURGERY

## 2019-09-30 PROCEDURE — 7100000011 HC PHASE II RECOVERY - ADDTL 15 MIN: Performed by: SURGERY

## 2019-09-30 PROCEDURE — 2709999900 HC NON-CHARGEABLE SUPPLY: Performed by: SURGERY

## 2019-09-30 PROCEDURE — 43239 EGD BIOPSY SINGLE/MULTIPLE: CPT | Performed by: SURGERY

## 2019-09-30 PROCEDURE — 88313 SPECIAL STAINS GROUP 2: CPT

## 2019-09-30 PROCEDURE — 3700000001 HC ADD 15 MINUTES (ANESTHESIA): Performed by: SURGERY

## 2019-09-30 PROCEDURE — 88305 TISSUE EXAM BY PATHOLOGIST: CPT

## 2019-09-30 PROCEDURE — 2580000003 HC RX 258: Performed by: SURGERY

## 2019-09-30 PROCEDURE — 3609012400 HC EGD TRANSORAL BIOPSY SINGLE/MULTIPLE: Performed by: SURGERY

## 2019-09-30 PROCEDURE — 2500000003 HC RX 250 WO HCPCS: Performed by: NURSE ANESTHETIST, CERTIFIED REGISTERED

## 2019-09-30 PROCEDURE — 2580000003 HC RX 258: Performed by: NURSE ANESTHETIST, CERTIFIED REGISTERED

## 2019-09-30 PROCEDURE — 3700000000 HC ANESTHESIA ATTENDED CARE: Performed by: SURGERY

## 2019-09-30 RX ORDER — PROPOFOL 10 MG/ML
INJECTION, EMULSION INTRAVENOUS PRN
Status: DISCONTINUED | OUTPATIENT
Start: 2019-09-30 | End: 2019-09-30 | Stop reason: SDUPTHER

## 2019-09-30 RX ORDER — LIDOCAINE HYDROCHLORIDE 40 MG/ML
INJECTION, SOLUTION RETROBULBAR; TOPICAL PRN
Status: DISCONTINUED | OUTPATIENT
Start: 2019-09-30 | End: 2019-09-30 | Stop reason: SDUPTHER

## 2019-09-30 RX ORDER — SODIUM CHLORIDE, SODIUM LACTATE, POTASSIUM CHLORIDE, CALCIUM CHLORIDE 600; 310; 30; 20 MG/100ML; MG/100ML; MG/100ML; MG/100ML
INJECTION, SOLUTION INTRAVENOUS CONTINUOUS PRN
Status: DISCONTINUED | OUTPATIENT
Start: 2019-09-30 | End: 2019-09-30 | Stop reason: SDUPTHER

## 2019-09-30 RX ORDER — SODIUM CHLORIDE, SODIUM LACTATE, POTASSIUM CHLORIDE, CALCIUM CHLORIDE 600; 310; 30; 20 MG/100ML; MG/100ML; MG/100ML; MG/100ML
INJECTION, SOLUTION INTRAVENOUS CONTINUOUS
Status: DISCONTINUED | OUTPATIENT
Start: 2019-09-30 | End: 2019-09-30 | Stop reason: HOSPADM

## 2019-09-30 RX ADMIN — LIDOCAINE HYDROCHLORIDE 80 MG: 40 INJECTION, SOLUTION RETROBULBAR; TOPICAL at 12:02

## 2019-09-30 RX ADMIN — SODIUM CHLORIDE, POTASSIUM CHLORIDE, SODIUM LACTATE AND CALCIUM CHLORIDE: 600; 310; 30; 20 INJECTION, SOLUTION INTRAVENOUS at 10:40

## 2019-09-30 RX ADMIN — PROPOFOL 100 MG: 10 INJECTION, EMULSION INTRAVENOUS at 12:02

## 2019-09-30 RX ADMIN — SODIUM CHLORIDE, POTASSIUM CHLORIDE, SODIUM LACTATE AND CALCIUM CHLORIDE: 600; 310; 30; 20 INJECTION, SOLUTION INTRAVENOUS at 12:01

## 2019-09-30 ASSESSMENT — PAIN SCALES - GENERAL
PAINLEVEL_OUTOF10: 0

## 2019-09-30 ASSESSMENT — ENCOUNTER SYMPTOMS: SHORTNESS OF BREATH: 1

## 2019-09-30 ASSESSMENT — PAIN - FUNCTIONAL ASSESSMENT: PAIN_FUNCTIONAL_ASSESSMENT: 0-10

## 2019-09-30 NOTE — ANESTHESIA PRE PROCEDURE
(NORCO) 5-325 MG per tablet Take 1 tablet by mouth every 8 hours as needed for Pain . Historical Provider, MD   aspirin 81 MG tablet Take 81 mg by mouth daily    Historical Provider, MD   famotidine (PEPCID) 20 MG tablet Take 20 mg by mouth daily. Historical Provider, MD       Current medications:    No current facility-administered medications for this encounter. Allergies:     Allergies   Allergen Reactions    Crestor [Rosuvastatin] Other (See Comments)     Joint pain, muscle aches    Ibuprofen Other (See Comments)     bleeding    Lisinopril Hives    Effient [Prasugrel] Rash       Problem List:    Patient Active Problem List   Diagnosis Code    Chronic back pain M54.9, G89.29    Neck pain, chronic M54.2, G89.29    Brachial neuritis or radiculitis M54.12    Spinal stenosis, lumbar region, with neurogenic claudication M48.062    Displacement of cervical intervertebral disc without myelopathy M50.20    CAD (coronary artery disease) I25.10    GILL (dyspnea on exertion) R06.09    S/P lumbar spinal fusion Z98.1    Obesity (BMI 35.0-39.9 without comorbidity) E66.9    HTN (hypertension) I10    Hyperlipidemia E78.5    Type 2 diabetes mellitus without complication (HCC) F67.2    Chronic bilateral low back pain with bilateral sciatica M54.42, M54.41, G89.29    Left hip pain M25.552    Acquired spondylolisthesis M43.10    Back pain M54.9    Recurrent major depressive disorder, in partial remission (HCC) F33.41    Grade III hemorrhoids K64.2    Recurrent major depressive disorder, in partial remission (HCC) F33.41       Past Medical History:        Diagnosis Date    Asthma     CAD (coronary artery disease)     STENTS X 4    Cancer (HCC)     THROAT, HX. RADIATION , LT EAR    Cervical radiculopathy     Chronic back pain     Colonic polyp     Degeneration of cervical intervertebral disc     [facility], 1990 C4/C5-C6/C7    Degeneration of cervical intervertebral disc     [facility] 1994, C3/C4    Topics    Alcohol use: Yes     Alcohol/week: 0.0 standard drinks     Comment: RARE                                Counseling given: Not Answered      Vital Signs (Current): There were no vitals filed for this visit. BP Readings from Last 3 Encounters:   09/19/19 114/64   08/23/19 116/66   08/20/19 118/70       NPO Status: Time of last liquid consumption: 2100                        Time of last solid consumption: 1600                        Date of last liquid consumption: 09/29/19                        Date of last solid food consumption: 09/29/19    BMI:   Wt Readings from Last 3 Encounters:   09/19/19 215 lb (97.5 kg)   08/23/19 215 lb (97.5 kg)   08/20/19 214 lb 12.8 oz (97.4 kg)     There is no height or weight on file to calculate BMI.    CBC:   Lab Results   Component Value Date    WBC 10.1 11/13/2018    RBC 4.48 11/13/2018    HGB 11.2 08/15/2019    HCT 41.1 11/13/2018    MCV 91.6 11/13/2018    RDW 14.3 11/13/2018     11/13/2018       CMP:   Lab Results   Component Value Date     08/15/2019    K 4.9 08/15/2019     08/15/2019    CO2 27 08/15/2019    BUN 25 08/15/2019    CREATININE 1.02 08/15/2019    GFRAA >60 08/15/2019    LABGLOM >60 08/15/2019    GLUCOSE 137 08/15/2019    PROT 6.8 08/15/2019    CALCIUM 8.8 08/15/2019    BILITOT 0.13 08/15/2019    ALKPHOS 61 08/15/2019    AST 16 08/15/2019    ALT 18 08/15/2019       POC Tests: No results for input(s): POCGLU, POCNA, POCK, POCCL, POCBUN, POCHEMO, POCHCT in the last 72 hours.     Coags:   Lab Results   Component Value Date    PROTIME 10.6 09/07/2017    INR 1.0 09/07/2017    APTT 28.0 09/07/2017       HCG (If Applicable): No results found for: PREGTESTUR, PREGSERUM, HCG, HCGQUANT     ABGs: No results found for: PHART, PO2ART, ZMG8LNN, GYP9YJP, BEART, X4ZDVSEU     Type & Screen (If Applicable):  No results found for: LABABO, [address]    Anesthesia Evaluation  Patient summary reviewed and Nursing notes reviewed no history of anesthetic complications:   Airway: Mallampati: II  TM distance: >3 FB   Neck ROM: full  Mouth opening: > = 3 FB Dental: normal exam         Pulmonary:normal exam    (+) shortness of breath:  asthma:                            Cardiovascular:    (+) hypertension:, past MI:, CAD:, GILL:,       ECG reviewed          Cleared by cardiology     Beta Blocker:  Dose within 24 Hrs         Neuro/Psych:   (+) neuromuscular disease:, psychiatric history:            GI/Hepatic/Renal:   (+) GERD:,           Endo/Other:    (+) Diabetes, . Abdominal:           Vascular:                                        Anesthesia Plan      general and TIVA     ASA 3       Induction: intravenous. Anesthetic plan and risks discussed with patient.       Plan discussed with surgical team.                Guevara Rojo, APRN - CRNA   9/30/2019

## 2019-10-02 LAB — SURGICAL PATHOLOGY REPORT: NORMAL

## 2019-10-17 ENCOUNTER — IMMUNIZATION (OUTPATIENT)
Dept: LAB | Age: 74
End: 2019-10-17
Payer: MEDICARE

## 2019-10-17 PROCEDURE — 90653 IIV ADJUVANT VACCINE IM: CPT | Performed by: INTERNAL MEDICINE

## 2019-10-17 PROCEDURE — G0008 ADMIN INFLUENZA VIRUS VAC: HCPCS | Performed by: INTERNAL MEDICINE

## 2019-10-23 ENCOUNTER — TELEPHONE (OUTPATIENT)
Dept: SURGERY | Age: 74
End: 2019-10-23

## 2019-10-28 ENCOUNTER — TELEPHONE (OUTPATIENT)
Dept: SURGERY | Age: 74
End: 2019-10-28

## 2019-12-07 DIAGNOSIS — E11.9 TYPE 2 DIABETES MELLITUS WITHOUT COMPLICATION, WITHOUT LONG-TERM CURRENT USE OF INSULIN (HCC): ICD-10-CM

## 2020-01-02 RX ORDER — CLOPIDOGREL BISULFATE 75 MG/1
TABLET ORAL
Qty: 90 TABLET | Refills: 3 | Status: SHIPPED | OUTPATIENT
Start: 2020-01-02 | End: 2020-12-16

## 2020-01-14 ENCOUNTER — OFFICE VISIT (OUTPATIENT)
Dept: CARDIOLOGY | Age: 75
End: 2020-01-14
Payer: MEDICARE

## 2020-01-14 VITALS
BODY MASS INDEX: 30.96 KG/M2 | WEIGHT: 209 LBS | HEIGHT: 69 IN | SYSTOLIC BLOOD PRESSURE: 120 MMHG | DIASTOLIC BLOOD PRESSURE: 64 MMHG | HEART RATE: 61 BPM

## 2020-01-14 PROCEDURE — 99214 OFFICE O/P EST MOD 30 MIN: CPT | Performed by: INTERNAL MEDICINE

## 2020-01-14 PROCEDURE — G8427 DOCREV CUR MEDS BY ELIG CLIN: HCPCS | Performed by: INTERNAL MEDICINE

## 2020-01-14 PROCEDURE — 1036F TOBACCO NON-USER: CPT | Performed by: INTERNAL MEDICINE

## 2020-01-14 PROCEDURE — 93005 ELECTROCARDIOGRAM TRACING: CPT | Performed by: INTERNAL MEDICINE

## 2020-01-14 PROCEDURE — 4040F PNEUMOC VAC/ADMIN/RCVD: CPT | Performed by: INTERNAL MEDICINE

## 2020-01-14 PROCEDURE — 93010 ELECTROCARDIOGRAM REPORT: CPT | Performed by: INTERNAL MEDICINE

## 2020-01-14 PROCEDURE — G8482 FLU IMMUNIZE ORDER/ADMIN: HCPCS | Performed by: INTERNAL MEDICINE

## 2020-01-14 PROCEDURE — G8417 CALC BMI ABV UP PARAM F/U: HCPCS | Performed by: INTERNAL MEDICINE

## 2020-01-14 PROCEDURE — 1123F ACP DISCUSS/DSCN MKR DOCD: CPT | Performed by: INTERNAL MEDICINE

## 2020-01-14 PROCEDURE — 99213 OFFICE O/P EST LOW 20 MIN: CPT | Performed by: INTERNAL MEDICINE

## 2020-01-14 PROCEDURE — 3017F COLORECTAL CA SCREEN DOC REV: CPT | Performed by: INTERNAL MEDICINE

## 2020-01-14 NOTE — PROGRESS NOTES
[address] [facility] CARDIOLOGY  [facility] [address]  Dept: [phone]  Loc: [phone]    Subjective: The patient is a 75y.o. year old, , male is in the office for a follow up visit. Pt is here for pre-op for his Nose/Sinus  surgery . He jessica any chest pain or discomfort. No orthopnea or PND. Jessica any palpitation, dizziness or syncope. He is completely asymptomatic from cardiac stand point. Past Medical History:   has a past medical history of Asthma, CAD (coronary artery disease), Cancer ([facility] [address] Cervical radiculopathy, Chronic back pain, Colonic polyp, Degeneration of cervical intervertebral disc, Degeneration of cervical intervertebral disc, Depression, Diverticulosis, GERD (gastroesophageal reflux disease), Glucose intolerance (impaired glucose tolerance), HTN (hypertension), Hyperlipidemia, Lumbar radiculopathy, MI (myocardial infarction) ([facility] [address] Numbness and tingling, OA (osteoarthritis), Pre-diabetes, Sacroiliac pain, and Vision abnormalities. Past Surgical History:   has a past surgical history that includes Coronary angioplasty with stent; Finger trigger release (Right); Abscess Drainage (Right); other surgical history (2/19/13, 5/14/13); Elbow bursa surgery (Right, 2012,2011); Infected skin debridement (Right); Colonoscopy (2007, 2003); Colonoscopy (9/9/13); Cervical spine surgery (08/09/2001); Cervical spine surgery (Left, 9844,[id number],[id number]); Lumbar spine surgery (2296,0038); lumbar fusion (4/7/14); skin biopsy; other surgical history; other surgical history (Left, 09/27/2016); other surgical history (Bilateral, 11/29/2016); other surgical history (12/06/2016); other surgical history (Right, 12/12/2016); other surgical history (Left, 12/15/17); other surgical history (Left, 01/31/2017); Lumbar spine surgery (Left, 2/14/2017);  Injection Procedure For Sacroiliac Joint (Bilateral, 3/14/2017); pr arthrocentesis aspir&/inj major jt/bursa w/o us (Left, 3/31/2017); pr arthrocentesis aspir&/inj major jt/bursa w/o us (Left, 4/14/2017); Lumbar spine surgery (Bilateral, 5/2/2017); Lumbar spine surgery (Bilateral, 5/9/2017); Anesthesia Nerve Block (Bilateral, 6/2/2017); Anesthesia Nerve Block (Bilateral, 6/9/2017); RADIOFREQUENCY ABLATION NERVES (Right, 6/29/2017); RADIOFREQUENCY ABLATION NERVES (Left, 7/6/2017); Cardiac catheterization; Cardiac catheterization (06/01/2011); Cardiac catheterization (5-5-2011); back surgery; lumbar fusion (N/A, 11/15/2017); Colonoscopy (N/A, 10/15/2018); pr hemorrhoidectomy,int/ext,1 column/group (N/A, 11/7/2018); and Upper gastrointestinal endoscopy (N/A, 9/30/2019). Home Medications:  Prior to Admission medications    Medication Sig Start Date End Date Taking?  Authorizing Provider   clopidogrel (PLAVIX) 75 MG tablet TAKE 1 TABLET DAILY 1/2/20  Yes Ash Rowell MD   metFORMIN (GLUCOPHAGE) 500 MG tablet TAKE 1 TABLET TWICE DAILY  WITH MEALS 12/9/19  Yes Ash Rowell MD   PARoxetine (PAXIL) 20 MG tablet TAKE 1 TABLET BY MOUTH ONCE DAILY IN THE MORNING 9/4/19  Yes Ash Rowell MD   celecoxib (CELEBREX) 200 MG capsule TAKE 1 CAPSULE BY MOUTH TWICE DAILY 8/20/19  Yes Ash Rowell MD   metoprolol tartrate (LOPRESSOR) 25 MG tablet TAKE 1 TABLET TWICE A DAY 2/18/19  Yes Ash Rowell MD   atorvastatin (LIPITOR) 80 MG tablet TAKE 1 TABLET DAILY 2/18/19  Yes Ash Rowell MD   pantoprazole (PROTONIX) 40 MG tablet Take 1 tablet by mouth daily 2/15/19  Yes Ash Rowell MD   ferrous sulfate 325 (65 Fe) MG tablet Take 1 tablet by mouth 2 times daily With Vitamin C 500 mg bid  Patient taking differently: Take 325 mg by mouth daily With Vitamin C 500 mg qd 2/13/19  Yes Ash Rowell MD   losartan (COZAAR) 25 MG tablet Take 1 tablet by mouth daily 3/12/18  Yes Ash Rowell MD   vitamin C (ASCORBIC ACID) 500 MG tablet Take 500 mg by mouth 2 times daily With ferrous sulfate   Yes Historical Provider, MD alert, cooperative, no distress and appears stated age  [de-identified]: PERRL, no cervical lymphadenopathy. No masses palpable. Normal oral mucosa  Respiratory:  · Normal excursion and expansion without use of accessory muscles  · Resp Auscultation: Good respiratory effort. No for increased work of breathing. On auscultation: clear to auscultation bilaterally  Cardiovascular:  · The apical impulse is not displaced  · Heart tones are crisp and normal. regular S1 and S2.  · Jugular venous pulsation Normal  · The carotid upstroke is normal in amplitude and contour without delay or bruit  · Peripheral pulses are symmetrical and full   Abdomen:   · No masses or tenderness  · Bowel sounds present  Extremities:  ·  No Cyanosis or Clubbing  ·  Lower extremity edema: No  ·  Skin: Warm and dry    Cardiac Data:  EKG: NSR RBBB     Labs:     CBC: No results for input(s): WBC, HGB, HCT, PLT in the last 72 hours. BMP: No results for input(s): NA, K, CO2, BUN, CREATININE, LABGLOM, GLUCOSE in the last 72 hours. PT/INR: No results for input(s): PROTIME, INR in the last 72 hours. FASTING LIPID PANEL:  Lab Results   Component Value Date    CHOL 170 08/15/2019    HDL 38 08/15/2019    LDLCHOLESTEROL 80 08/15/2019    TRIG 259 08/15/2019    CHOLHDLRATIO 4.5 08/15/2019     LIVER PROFILE:No results for input(s): AST, ALT, LABALBU in the last 72 hours.       IMPRESSION:    Patient Active Problem List   Diagnosis    Chronic back pain    Neck pain, chronic    Brachial neuritis or radiculitis    Spinal stenosis, lumbar region, with neurogenic claudication    Displacement of cervical intervertebral disc without myelopathy    CAD (coronary artery disease)    GILL (dyspnea on exertion)    S/P lumbar spinal fusion    Obesity (BMI 35.0-39.9 without comorbidity)    HTN (hypertension)    Hyperlipidemia    Type 2 diabetes mellitus without complication (HCC)    Chronic bilateral low back pain with bilateral sciatica    Left hip pain    Acquired

## 2020-01-15 ENCOUNTER — OFFICE VISIT (OUTPATIENT)
Dept: INTERNAL MEDICINE | Age: 75
End: 2020-01-15
Payer: MEDICARE

## 2020-01-15 ENCOUNTER — HOSPITAL ENCOUNTER (OUTPATIENT)
Dept: LAB | Age: 75
Discharge: HOME | End: 2020-01-15
Payer: MEDICARE

## 2020-01-15 VITALS
RESPIRATION RATE: 16 BRPM | HEIGHT: 69 IN | WEIGHT: 209 LBS | BODY MASS INDEX: 30.96 KG/M2 | TEMPERATURE: 97.3 F | HEART RATE: 68 BPM | DIASTOLIC BLOOD PRESSURE: 68 MMHG | SYSTOLIC BLOOD PRESSURE: 112 MMHG | OXYGEN SATURATION: 97 %

## 2020-01-15 LAB — POTASSIUM SERPL-SCNC: 5 MMOL/L (ref 3.7–5.3)

## 2020-01-15 PROCEDURE — 99214 OFFICE O/P EST MOD 30 MIN: CPT | Performed by: NURSE PRACTITIONER

## 2020-01-15 PROCEDURE — G8427 DOCREV CUR MEDS BY ELIG CLIN: HCPCS | Performed by: NURSE PRACTITIONER

## 2020-01-15 PROCEDURE — 3017F COLORECTAL CA SCREEN DOC REV: CPT | Performed by: NURSE PRACTITIONER

## 2020-01-15 PROCEDURE — 4040F PNEUMOC VAC/ADMIN/RCVD: CPT | Performed by: NURSE PRACTITIONER

## 2020-01-15 PROCEDURE — 2022F DILAT RTA XM EVC RTNOPTHY: CPT | Performed by: NURSE PRACTITIONER

## 2020-01-15 PROCEDURE — G8482 FLU IMMUNIZE ORDER/ADMIN: HCPCS | Performed by: NURSE PRACTITIONER

## 2020-01-15 PROCEDURE — 36415 COLL VENOUS BLD VENIPUNCTURE: CPT

## 2020-01-15 PROCEDURE — 3046F HEMOGLOBIN A1C LEVEL >9.0%: CPT | Performed by: NURSE PRACTITIONER

## 2020-01-15 PROCEDURE — 1123F ACP DISCUSS/DSCN MKR DOCD: CPT | Performed by: NURSE PRACTITIONER

## 2020-01-15 PROCEDURE — 84132 ASSAY OF SERUM POTASSIUM: CPT

## 2020-01-15 PROCEDURE — G8417 CALC BMI ABV UP PARAM F/U: HCPCS | Performed by: NURSE PRACTITIONER

## 2020-01-15 PROCEDURE — 1036F TOBACCO NON-USER: CPT | Performed by: NURSE PRACTITIONER

## 2020-01-15 ASSESSMENT — ENCOUNTER SYMPTOMS
FACIAL SWELLING: 0
NAUSEA: 0
DIARRHEA: 0
TROUBLE SWALLOWING: 0
ABDOMINAL PAIN: 0
EYE PAIN: 0
SINUS PRESSURE: 0
WHEEZING: 0
VOMITING: 0
COLOR CHANGE: 0
BLOOD IN STOOL: 0
CHEST TIGHTNESS: 0
COUGH: 0
SORE THROAT: 0
SHORTNESS OF BREATH: 0
CONSTIPATION: 0
RHINORRHEA: 0

## 2020-01-15 NOTE — PROGRESS NOTES
01/15/20  Norberto Osullivan  1945      Chief Complaint:   1. Nasal septal spur    2. Pre-op evaluation    3. Coronary artery disease due to lipid rich plaque    4. Essential hypertension    5. Type 2 diabetes mellitus without complication, without long-term current use of insulin ([facility] [address]    6. Mixed hyperlipidemia    7. Hyperkalemia        HPI:  79-year-old patient with history of CAD, hypertension, diabetes mellitus, hyperlipidemia being seen for preoperative evaluation. Patient is scheduled for a septoplasty on 1/28/2020 due to nasal septal spur. Patient has been seen by cardiology yesterday. EKG reviewed. Cleared by cardiology. He denies any chest discomfort. No shortness of breath. Vitals stable in office. Continues on metformin for his diabetes mellitus. No hypoglycemic episodes. Last A1c stable. He continues on atorvastatin for his hyperlipidemia. Denies any significant myalgias. Labs completed per surgeon. Reviewed in office today. Potassium mildly elevated. Patient states he was taking oral potassium unsure why but he has stopped stop it now.       Allergies   Allergen Reactions    Crestor [Rosuvastatin] Other (See Comments)     Joint pain, muscle aches    Ibuprofen Other (See Comments)     bleeding    Lisinopril Hives    Effient [Prasugrel] Rash       Past Medical History:   Diagnosis Date    Asthma     CAD (coronary artery disease)     STENTS X 4    Cancer ([address]     THROAT, HX. RADIATION , LT EAR    Cervical radiculopathy     Chronic back pain     Colonic polyp     Degeneration of cervical intervertebral disc     [facility], 1990 C4/C5-C6/C7    Degeneration of cervical intervertebral disc     [facility] 1994, C3/C4    Depression     Diverticulosis     GERD (gastroesophageal reflux disease)     Glucose intolerance (impaired glucose tolerance)     IS NOT DIABETIC, PER PT    HTN (hypertension)     Hyperlipidemia     Lumbar radiculopathy     MI (myocardial infarction) ([address]     2011    metoprolol tartrate (LOPRESSOR) 25 MG tablet TAKE 1 TABLET TWICE A  tablet 3    atorvastatin (LIPITOR) 80 MG tablet TAKE 1 TABLET DAILY 90 tablet 3    pantoprazole (PROTONIX) 40 MG tablet Take 1 tablet by mouth daily 90 tablet 3    ferrous sulfate 325 (65 Fe) MG tablet Take 1 tablet by mouth 2 times daily With Vitamin C 500 mg bid (Patient taking differently: Take 325 mg by mouth daily With Vitamin C 500 mg qd) 60 tablet 11    losartan (COZAAR) 25 MG tablet Take 1 tablet by mouth daily 90 tablet 3    vitamin C (ASCORBIC ACID) 500 MG tablet Take 500 mg by mouth 2 times daily With ferrous sulfate      HYDROcodone-acetaminophen (NORCO) 5-325 MG per tablet Take 1 tablet by mouth every 8 hours as needed for Pain .  tiZANidine (ZANAFLEX) 4 MG tablet TAKE ONE TABLET BY MOUTH THREE TIMES DAILY 90 tablet 5    aspirin 81 MG tablet Take 81 mg by mouth daily      famotidine (PEPCID) 20 MG tablet Take 20 mg by mouth daily.        Current Facility-Administered Medications on File Prior to Visit   Medication Dose Route Frequency Provider Last Rate Last Dose    methylPREDNISolone acetate (DEPO-MEDROL) injection 40 mg  40 mg Intramuscular Once Taylor Mead MD           Social History     Socioeconomic History    Marital status:      Spouse name: Not on file    Number of children: Not on file    Years of education: Not on file    Highest education level: Not on file   Occupational History    Not on file   Social Needs    Financial resource strain: Not on file    Food insecurity:     Worry: Not on file     Inability: Not on file    Transportation needs:     Medical: Not on file     Non-medical: Not on file   Tobacco Use    Smoking status: Former Smoker     Packs/day: 1.00     Years: 40.00     Pack years: 40.00     Types: Cigarettes, Pipe, Cigars     Last attempt to quit: 2011     Years since quittin.7    Smokeless tobacco: Former User     Types: [address] date: 2011 light-headedness, numbness and headaches. Psychiatric/Behavioral: Negative for confusion and hallucinations. The patient is not nervous/anxious. Physical Exam  Vitals signs and nursing note reviewed. Constitutional:       General: He is not in acute distress. Appearance: He is well-developed. He is not diaphoretic. HENT:      Head: Normocephalic and atraumatic. Right Ear: External ear normal.      Left Ear: External ear normal.   Eyes:      General: Lids are normal.         Right eye: No discharge. Left eye: No discharge. Conjunctiva/sclera: Conjunctivae normal.      Pupils: Pupils are equal, round, and reactive to light. Neck:      Musculoskeletal: Normal range of motion and neck supple. No edema or erythema. Trachea: No tracheal deviation. Cardiovascular:      Rate and Rhythm: Normal rate and regular rhythm. Heart sounds: Normal heart sounds. No murmur. No friction rub. No gallop. Pulmonary:      Effort: Pulmonary effort is normal. No accessory muscle usage or respiratory distress. Breath sounds: Normal breath sounds. No wheezing or rales. Abdominal:      General: Bowel sounds are normal. There is no distension. Palpations: Abdomen is soft. Abdomen is not rigid. Musculoskeletal: Normal range of motion. Skin:     General: Skin is warm and dry. Capillary Refill: Capillary refill takes less than 2 seconds. Coloration: Skin is not pale. Findings: No erythema or rash. Neurological:      Mental Status: He is alert and oriented to person, place, and time. Cranial Nerves: No cranial nerve deficit. Sensory: No sensory deficit. Coordination: Coordination normal.   Psychiatric:         Behavior: Behavior normal.         Thought Content:  Thought content normal.         Judgment: Judgment normal.       Vitals:    01/15/20 1127   BP: 112/68   Site: Right Upper Arm   Position: Sitting   Cuff Size: Large Adult   Pulse: 68   Resp:

## 2020-02-03 RX ORDER — ATORVASTATIN CALCIUM 80 MG/1
TABLET, FILM COATED ORAL
Qty: 90 TABLET | Refills: 3 | Status: SHIPPED | OUTPATIENT
Start: 2020-02-03 | End: 2021-01-18 | Stop reason: SDUPTHER

## 2020-02-07 RX ORDER — PANTOPRAZOLE SODIUM 40 MG/1
TABLET, DELAYED RELEASE ORAL
Qty: 90 TABLET | Refills: 3 | Status: SHIPPED | OUTPATIENT
Start: 2020-02-07 | End: 2021-02-04

## 2020-02-17 ENCOUNTER — HOSPITAL ENCOUNTER (OUTPATIENT)
Dept: LAB | Age: 75
Discharge: HOME | End: 2020-02-17
Payer: MEDICARE

## 2020-02-17 LAB
ANION GAP SERPL CALCULATED.3IONS-SCNC: 13 MMOL/L (ref 9–17)
BUN BLDV-MCNC: 21 MG/DL (ref 8–23)
BUN/CREAT BLD: 24 (ref 9–20)
CALCIUM SERPL-MCNC: 9 MG/DL (ref 8.6–10.4)
CHLORIDE BLD-SCNC: 105 MMOL/L (ref 98–107)
CO2: 26 MMOL/L (ref 20–31)
CREAT SERPL-MCNC: 0.87 MG/DL (ref 0.7–1.2)
CREATININE URINE: 153 MG/DL (ref 39–259)
ESTIMATED AVERAGE GLUCOSE: 120 MG/DL
GFR AFRICAN AMERICAN: >60 ML/MIN
GFR NON-AFRICAN AMERICAN: >60 ML/MIN
GFR SERPL CREATININE-BSD FRML MDRD: ABNORMAL ML/MIN/{1.73_M2}
GFR SERPL CREATININE-BSD FRML MDRD: ABNORMAL ML/MIN/{1.73_M2}
GLUCOSE BLD-MCNC: 130 MG/DL (ref 70–99)
HBA1C MFR BLD: 5.8 % (ref 4.8–5.9)
HEMOGLOBIN: 12.4 G/DL (ref 13–17)
IRON SATURATION: 30 % (ref 20–55)
IRON: 104 UG/DL (ref 59–158)
MICROALBUMIN/CREAT 24H UR: <12 MG/L
MICROALBUMIN/CREAT UR-RTO: NORMAL MCG/MG CREAT
POTASSIUM SERPL-SCNC: 4.6 MMOL/L (ref 3.7–5.3)
SODIUM BLD-SCNC: 144 MMOL/L (ref 135–144)
TOTAL IRON BINDING CAPACITY: 349 UG/DL (ref 250–450)
UNSATURATED IRON BINDING CAPACITY: 245 UG/DL (ref 112–347)

## 2020-02-17 PROCEDURE — 82570 ASSAY OF URINE CREATININE: CPT

## 2020-02-17 PROCEDURE — 36415 COLL VENOUS BLD VENIPUNCTURE: CPT

## 2020-02-17 PROCEDURE — 82043 UR ALBUMIN QUANTITATIVE: CPT

## 2020-02-17 PROCEDURE — 85018 HEMOGLOBIN: CPT

## 2020-02-17 PROCEDURE — 83540 ASSAY OF IRON: CPT

## 2020-02-17 PROCEDURE — 83550 IRON BINDING TEST: CPT

## 2020-02-17 PROCEDURE — 80048 BASIC METABOLIC PNL TOTAL CA: CPT

## 2020-02-17 PROCEDURE — 83036 HEMOGLOBIN GLYCOSYLATED A1C: CPT

## 2020-02-24 ENCOUNTER — OFFICE VISIT (OUTPATIENT)
Dept: INTERNAL MEDICINE | Age: 75
End: 2020-02-24
Payer: MEDICARE

## 2020-02-24 VITALS
SYSTOLIC BLOOD PRESSURE: 130 MMHG | HEART RATE: 64 BPM | BODY MASS INDEX: 31.4 KG/M2 | WEIGHT: 212 LBS | DIASTOLIC BLOOD PRESSURE: 80 MMHG | RESPIRATION RATE: 16 BRPM | HEIGHT: 69 IN

## 2020-02-24 PROCEDURE — G8482 FLU IMMUNIZE ORDER/ADMIN: HCPCS | Performed by: INTERNAL MEDICINE

## 2020-02-24 PROCEDURE — 4040F PNEUMOC VAC/ADMIN/RCVD: CPT | Performed by: INTERNAL MEDICINE

## 2020-02-24 PROCEDURE — 99214 OFFICE O/P EST MOD 30 MIN: CPT | Performed by: INTERNAL MEDICINE

## 2020-02-24 PROCEDURE — 1123F ACP DISCUSS/DSCN MKR DOCD: CPT | Performed by: INTERNAL MEDICINE

## 2020-02-24 PROCEDURE — G8427 DOCREV CUR MEDS BY ELIG CLIN: HCPCS | Performed by: INTERNAL MEDICINE

## 2020-02-24 PROCEDURE — 2022F DILAT RTA XM EVC RTNOPTHY: CPT | Performed by: INTERNAL MEDICINE

## 2020-02-24 PROCEDURE — 1036F TOBACCO NON-USER: CPT | Performed by: INTERNAL MEDICINE

## 2020-02-24 PROCEDURE — G8417 CALC BMI ABV UP PARAM F/U: HCPCS | Performed by: INTERNAL MEDICINE

## 2020-02-24 PROCEDURE — G0439 PPPS, SUBSEQ VISIT: HCPCS | Performed by: INTERNAL MEDICINE

## 2020-02-24 PROCEDURE — 3017F COLORECTAL CA SCREEN DOC REV: CPT | Performed by: INTERNAL MEDICINE

## 2020-02-24 PROCEDURE — 3044F HG A1C LEVEL LT 7.0%: CPT | Performed by: INTERNAL MEDICINE

## 2020-02-24 ASSESSMENT — ENCOUNTER SYMPTOMS
COUGH: 0
EYE PAIN: 0
DIARRHEA: 0
NAUSEA: 0
ABDOMINAL PAIN: 0
BLOOD IN STOOL: 0
BACK PAIN: 0
SHORTNESS OF BREATH: 0
CONSTIPATION: 0
VOMITING: 0

## 2020-02-24 ASSESSMENT — LIFESTYLE VARIABLES
HOW OFTEN DURING THE LAST YEAR HAVE YOU HAD A FEELING OF GUILT OR REMORSE AFTER DRINKING: 0
HOW OFTEN DURING THE LAST YEAR HAVE YOU NEEDED AN ALCOHOLIC DRINK FIRST THING IN THE MORNING TO GET YOURSELF GOING AFTER A NIGHT OF HEAVY DRINKING: 0
HOW OFTEN DO YOU HAVE A DRINK CONTAINING ALCOHOL: 1
HAVE YOU OR SOMEONE ELSE BEEN INJURED AS A RESULT OF YOUR DRINKING: 0
HOW OFTEN DO YOU HAVE SIX OR MORE DRINKS ON ONE OCCASION: 0
HOW OFTEN DURING THE LAST YEAR HAVE YOU FOUND THAT YOU WERE NOT ABLE TO STOP DRINKING ONCE YOU HAD STARTED: 0
HOW OFTEN DURING THE LAST YEAR HAVE YOU FAILED TO DO WHAT WAS NORMALLY EXPECTED FROM YOU BECAUSE OF DRINKING: 0
AUDIT-C TOTAL SCORE: 1
HOW MANY STANDARD DRINKS CONTAINING ALCOHOL DO YOU HAVE ON A TYPICAL DAY: 0
HOW OFTEN DURING THE LAST YEAR HAVE YOU BEEN UNABLE TO REMEMBER WHAT HAPPENED THE NIGHT BEFORE BECAUSE YOU HAD BEEN DRINKING: 0
AUDIT TOTAL SCORE: 1
HAS A RELATIVE, FRIEND, DOCTOR, OR ANOTHER HEALTH PROFESSIONAL EXPRESSED CONCERN ABOUT YOUR DRINKING OR SUGGESTED YOU CUT DOWN: 0

## 2020-02-24 ASSESSMENT — PATIENT HEALTH QUESTIONNAIRE - PHQ9
SUM OF ALL RESPONSES TO PHQ QUESTIONS 1-9: 2
SUM OF ALL RESPONSES TO PHQ QUESTIONS 1-9: 2

## 2020-02-24 NOTE — PROGRESS NOTES
Medicare Annual Wellness Visit  Name: Ed Armstrong Date: 2020   MRN: [id number] Sex: Male   Age: 75 y.o. Ethnicity: Non-/Non    : 1945 Race: White      Norberto Osullivan is here for Medicare AWV; Hypertension (6 month appt); Hyperlipidemia; and Diabetes    Screenings for behavioral, psychosocial and functional/safety risks, and cognitive dysfunction are all negative except as indicated below. These results, as well as other patient data from the [address] form, are documented in Flowsheets linked to this Encounter. Allergies   Allergen Reactions    Crestor [Rosuvastatin] Other (See Comments)     Joint pain, muscle aches    Ibuprofen Other (See Comments)     bleeding    Lisinopril Hives    Effient [Prasugrel] Rash         Prior to Visit Medications    Medication Sig Taking?  Authorizing Provider   pantoprazole (PROTONIX) 40 MG tablet TAKE 1 TABLET BY MOUTH ONCE DAILY Yes Suzy Madrid MD   metoprolol tartrate (LOPRESSOR) 25 MG tablet TAKE 1 TABLET TWICE A DAY Yes Suzy Madrid MD   atorvastatin (LIPITOR) 80 MG tablet TAKE 1 TABLET DAILY Yes Suzy Madrid MD   clopidogrel (PLAVIX) 75 MG tablet TAKE 1 TABLET DAILY Yes Suzy Madrid MD   metFORMIN (GLUCOPHAGE) 500 MG tablet TAKE 1 TABLET TWICE DAILY  WITH MEALS Yes Suzy Madrid MD   PARoxetine (PAXIL) 20 MG tablet TAKE 1 TABLET BY MOUTH ONCE DAILY IN THE MORNING Yes Suzy Madrid MD   celecoxib (CELEBREX) 200 MG capsule TAKE 1 CAPSULE BY MOUTH TWICE DAILY Yes Suzy Madrid MD   ferrous sulfate 325 (65 Fe) MG tablet Take 1 tablet by mouth 2 times daily With Vitamin C 500 mg bid  Patient taking differently: Take 325 mg by mouth daily With Vitamin C 500 mg qd Yes Suzy Madrid MD   losartan (COZAAR) 25 MG tablet Take 1 tablet by mouth daily Yes Suzy Madrid MD   vitamin C (ASCORBIC ACID) 500 MG tablet Take 500 mg by mouth 2 times daily With ferrous sulfate Yes Historical Provider, MD HYDROcodone-acetaminophen (NORCO) 5-325 MG per tablet Take 1 tablet by mouth every 8 hours as needed for Pain . Yes Historical Provider, MD   tiZANidine (ZANAFLEX) 4 MG tablet TAKE ONE TABLET BY MOUTH THREE TIMES DAILY Yes LAWRENCE Figueroa CNP   aspirin 81 MG tablet Take 81 mg by mouth daily Yes Historical Provider, MD   famotidine (PEPCID) 20 MG tablet Take 20 mg by mouth daily.  Yes Historical Provider, MD   Handicap [employer] [address] [facility] by Does not apply route Expires: 7/5/2021  Chadwick Hughes MD         Past Medical History:   Diagnosis Date    Asthma     CAD (coronary artery disease)     STENTS X 4    Cancer ([facility] [address]     THROAT, HX. RADIATION , LT EAR    Cervical radiculopathy     Chronic back pain     Colonic polyp     Degeneration of cervical intervertebral disc     [facility], 1990 C4/C5-C6/C7    Degeneration of cervical intervertebral disc     [facility] 1994, C3/C4    Depression     Diverticulosis     GERD (gastroesophageal reflux disease)     Glucose intolerance (impaired glucose tolerance)     IS NOT DIABETIC, PER PT    HTN (hypertension)     Hyperlipidemia     Lumbar radiculopathy     MI (myocardial infarction) ([facility] [address]     2011    Numbness and tingling     HANDS    OA (osteoarthritis)     Pre-diabetes     Sacroiliac pain 11/4/2014    Vision abnormalities     WEARS GLASSES       Past Surgical History:   Procedure Laterality Date    ABSCESS DRAINAGE Right     ELBOW WITH CLOSURE    ANESTHESIA NERVE BLOCK Bilateral 6/2/2017    Bilat L1 L2 L3 L4 L5 Diagnostic Medial Branch Blk performed by Guerita Rene MD at [facility] Bilateral 6/9/2017    Bilat L1 L2 L3 L4 L5 Confirmatory Medial BB performed by Guerita Rene MD at [address] [place] [address]  06/01/2011    DR Fernandez No CARDIAC CATHETERIZATION  5-5-2011    STENTSx4    CERVICAL SPINE SURGERY  08/09/2001    Anterior Cervical Decompression and Fusion C3-4   

## 2020-02-24 NOTE — PROGRESS NOTES
[facility]  [address] [place] [address]  Dept: [phone]  Dept Fax: [phone]  Loc: [phone]     Nikia Long is a 75 y.o. male who presents today for his medical conditions/complaintsas noted below. Norberto Osullivan is c/o of   Chief Complaint   Patient presents with    Medicare AWV    Hypertension     6 month appt    Hyperlipidemia    Diabetes         HPI:     Hypertension   This is a chronic ( essential hypertension) problem. The current episode started more than 1 year ago. The problem has been waxing and waning since onset. The problem is controlled. Pertinent negatives include no chest pain, headaches, neck pain, palpitations or shortness of breath. Hyperlipidemia   This is a chronic problem. The current episode started more than 1 year ago. The problem is controlled. Recent lipid tests were reviewed and are variable. Pertinent negatives include no chest pain or shortness of breath. Diabetes   He presents for his follow-up diabetic visit. He has type 2 diabetes mellitus. His disease course has been fluctuating. Pertinent negatives for hypoglycemia include no confusion, dizziness, headaches, nervousness/anxiousness or pallor. Pertinent negatives for diabetes include no chest pain, no polydipsia, no polyuria and no weakness. Hemoglobin A1C (%)   Date Value   02/17/2020 5.8   08/15/2019 5.4   02/13/2019 5.6            Microalb/Crt.  Ratio (mcg/mg creat)   Date Value   02/17/2020 CANNOT BE CALCULATED     LDL Cholesterol (mg/dL)   Date Value   08/15/2019 80   08/09/2018 69   08/09/2017 75         AST (U/L)   Date Value   08/15/2019 16     ALT (U/L)   Date Value   08/15/2019 18     BUN (mg/dL)   Date Value   02/17/2020 21     BP Readings from Last 3 Encounters:   02/24/20 130/80   01/15/20 112/68   01/14/20 120/64              Past Medical History:   Diagnosis Date    Asthma     CAD (coronary artery gastritis, mild to moderate esophagitis, Dr. Reyna Puri       Family History   Problem Relation Age of Onset    Cancer Father         colon cancer          Social History     Tobacco Use    Smoking status: Former Smoker     Packs/day: 1.00     Years: 40.00     Pack years: 40.00     Types: Cigarettes, Pipe, Cigars     Last attempt to quit: 2011     Years since quittin.8    Smokeless tobacco: Former User     Types: [facility] date: 2011   Substance Use Topics    Alcohol use: Yes     Alcohol/week: 0.0 standard drinks     Comment: RARE         Current Outpatient Medications   Medication Sig Dispense Refill    pantoprazole (PROTONIX) 40 MG tablet TAKE 1 TABLET BY MOUTH ONCE DAILY 90 tablet 3    metoprolol tartrate (LOPRESSOR) 25 MG tablet TAKE 1 TABLET TWICE A  tablet 3    atorvastatin (LIPITOR) 80 MG tablet TAKE 1 TABLET DAILY 90 tablet 3    clopidogrel (PLAVIX) 75 MG tablet TAKE 1 TABLET DAILY 90 tablet 3    metFORMIN (GLUCOPHAGE) 500 MG tablet TAKE 1 TABLET TWICE DAILY  WITH MEALS 180 tablet 3    PARoxetine (PAXIL) 20 MG tablet TAKE 1 TABLET BY MOUTH ONCE DAILY IN THE MORNING 90 tablet 3    celecoxib (CELEBREX) 200 MG capsule TAKE 1 CAPSULE BY MOUTH TWICE DAILY 180 capsule 3    ferrous sulfate 325 (65 Fe) MG tablet Take 1 tablet by mouth 2 times daily With Vitamin C 500 mg bid (Patient taking differently: Take 325 mg by mouth daily With Vitamin C 500 mg qd) 60 tablet 11    losartan (COZAAR) 25 MG tablet Take 1 tablet by mouth daily 90 tablet 3    vitamin C (ASCORBIC ACID) 500 MG tablet Take 500 mg by mouth 2 times daily With ferrous sulfate      HYDROcodone-acetaminophen (NORCO) 5-325 MG per tablet Take 1 tablet by mouth every 8 hours as needed for Pain .  tiZANidine (ZANAFLEX) 4 MG tablet TAKE ONE TABLET BY MOUTH THREE TIMES DAILY 90 tablet 5    aspirin 81 MG tablet Take 81 mg by mouth daily      famotidine (PEPCID) 20 MG tablet Take 20 mg by mouth daily.       Handicap Placard and headaches. Hematological: Negative for adenopathy. Does not bruise/bleed easily. Psychiatric/Behavioral: Negative for confusion. The patient is not nervous/anxious. Objective:     Physical Exam  Vitals signs reviewed. Constitutional:       Appearance: He is well-developed. HENT:      Head: Normocephalic and atraumatic. Eyes:      Pupils: Pupils are equal, round, and reactive to light. Neck:      Musculoskeletal: Neck supple. Cardiovascular:      Rate and Rhythm: Normal rate and regular rhythm. Heart sounds: No murmur. No friction rub. No gallop. Pulmonary:      Effort: Pulmonary effort is normal.      Breath sounds: Normal breath sounds. No wheezing or rales. Abdominal:      General: There is no distension. Palpations: Abdomen is soft. There is no mass. Tenderness: There is no abdominal tenderness. There is no rebound. Musculoskeletal: Normal range of motion. Lymphadenopathy:      Cervical: No cervical adenopathy. Skin:     General: Skin is warm and dry. Findings: No rash. Neurological:      Mental Status: He is alert and oriented to person, place, and time. Cranial Nerves: No cranial nerve deficit (grossly). Psychiatric:         Thought Content: Thought content normal.        /80 (Site: Right Upper Arm, Position: Sitting, Cuff Size: Medium Adult)   Pulse 64   Resp 16   Ht 5' 9\" (1.753 m)   Wt 212 lb (96.2 kg)   BMI 31.31 kg/m²     Assessment:       Diagnosis Orders   1. Routine general medical examination at a health care facility  Comprehensive Metabolic Panel   2. Essential hypertension  Comprehensive Metabolic Panel   3. Type 2 diabetes mellitus without complication, without long-term current use of insulin (HCC)  Hemoglobin A1C   4. Mixed hyperlipidemia  Lipid Panel   5. Medicare annual wellness visit, subsequent     6. Special screening for malignant neoplasm of prostate  Psa screening   7.  Anemia, unspecified type  Hemoglobin    Iron

## 2020-02-24 NOTE — PATIENT INSTRUCTIONS
Personalized Preventive Plan for Norberto Osullivan - 2/24/2020  Medicare offers a range of preventive health benefits. Some of the tests and screenings are paid in full while other may be subject to a deductible, co-insurance, and/or copay. Some of these benefits include a comprehensive review of your medical history including lifestyle, illnesses that may run in your family, and various assessments and screenings as appropriate. After reviewing your medical record and screening and assessments performed today your provider may have ordered immunizations, labs, imaging, and/or referrals for you. A list of these orders (if applicable) as well as your Preventive Care list are included within your After Visit Summary for your review. Other Preventive Recommendations:    · A preventive eye exam performed by an eye specialist is recommended every 1-2 years to screen for glaucoma; cataracts, macular degeneration, and other eye disorders. · A preventive dental visit is recommended every 6 months. · Try to get at least 150 minutes of exercise per week or 10,000 steps per day on a pedometer . · Order or download the FREE \"Exercise & Physical Activity: Your Everyday Guide\" from [employer]. Call 2-497.317.8567 or search [employer] online. · You need 4516-4947 mg of calcium and 8646-9734 IU of vitamin D per day. It is possible to meet your calcium requirement with diet alone, but a vitamin D supplement is usually necessary to meet this goal.  · When exposed to the sun, use a sunscreen that protects against both UVA and UVB radiation with an SPF of 30 or greater. Reapply every 2 to 3 hours or after sweating, drying off with a towel, or swimming. · Always wear a seat belt when traveling in a car. Always wear a helmet when riding a bicycle or motorcycle.

## 2020-02-26 RX ORDER — FERROUS SULFATE 325(65) MG
325 TABLET ORAL DAILY
Qty: 90 TABLET | Refills: 3 | Status: SHIPPED | OUTPATIENT
Start: 2020-02-26 | End: 2020-08-18 | Stop reason: ALTCHOICE

## 2020-05-30 ENCOUNTER — APPOINTMENT (OUTPATIENT)
Dept: GENERAL RADIOLOGY | Age: 75
End: 2020-05-30
Payer: MEDICARE

## 2020-05-30 ENCOUNTER — HOSPITAL ENCOUNTER (EMERGENCY)
Age: 75
Discharge: HOME | End: 2020-05-30
Attending: EMERGENCY MEDICINE
Payer: MEDICARE

## 2020-05-30 VITALS
RESPIRATION RATE: 16 BRPM | SYSTOLIC BLOOD PRESSURE: 171 MMHG | OXYGEN SATURATION: 96 % | DIASTOLIC BLOOD PRESSURE: 97 MMHG | HEART RATE: 86 BPM | TEMPERATURE: 97.5 F

## 2020-05-30 PROCEDURE — 6370000000 HC RX 637 (ALT 250 FOR IP)

## 2020-05-30 PROCEDURE — 99283 EMERGENCY DEPT VISIT LOW MDM: CPT

## 2020-05-30 PROCEDURE — 6370000000 HC RX 637 (ALT 250 FOR IP): Performed by: EMERGENCY MEDICINE

## 2020-05-30 PROCEDURE — 94664 DEMO&/EVAL PT USE INHALER: CPT

## 2020-05-30 PROCEDURE — 71101 X-RAY EXAM UNILAT RIBS/CHEST: CPT

## 2020-05-30 RX ORDER — OXYCODONE HYDROCHLORIDE AND ACETAMINOPHEN 5; 325 MG/1; MG/1
4 TABLET ORAL ONCE
Status: COMPLETED | OUTPATIENT
Start: 2020-05-30 | End: 2020-05-30

## 2020-05-30 RX ORDER — OXYCODONE HYDROCHLORIDE AND ACETAMINOPHEN 5; 325 MG/1; MG/1
1-2 TABLET ORAL EVERY 6 HOURS PRN
Qty: 20 TABLET | Refills: 0 | Status: SHIPPED | OUTPATIENT
Start: 2020-05-30 | End: 2020-06-02

## 2020-05-30 RX ORDER — OXYCODONE HYDROCHLORIDE AND ACETAMINOPHEN 5; 325 MG/1; MG/1
2 TABLET ORAL ONCE
Status: COMPLETED | OUTPATIENT
Start: 2020-05-30 | End: 2020-05-30

## 2020-05-30 RX ADMIN — OXYCODONE HYDROCHLORIDE AND ACETAMINOPHEN 2 TABLET: 5; 325 TABLET ORAL at 22:50

## 2020-05-30 RX ADMIN — OXYCODONE HYDROCHLORIDE AND ACETAMINOPHEN 4 TABLET: 5; 325 TABLET ORAL at 23:50

## 2020-05-30 ASSESSMENT — PAIN DESCRIPTION - ORIENTATION: ORIENTATION: LEFT

## 2020-05-30 ASSESSMENT — PAIN DESCRIPTION - LOCATION: LOCATION: CHEST

## 2020-05-30 ASSESSMENT — ENCOUNTER SYMPTOMS
ABDOMINAL PAIN: 0
SHORTNESS OF BREATH: 0
VOMITING: 0
NAUSEA: 0
SORE THROAT: 0
DIARRHEA: 0

## 2020-05-30 ASSESSMENT — PAIN DESCRIPTION - DESCRIPTORS: DESCRIPTORS: SHARP

## 2020-05-30 ASSESSMENT — PAIN DESCRIPTION - PAIN TYPE: TYPE: ACUTE PAIN

## 2020-05-30 ASSESSMENT — PAIN SCALES - GENERAL
PAINLEVEL_OUTOF10: 10

## 2020-05-31 NOTE — ED PROVIDER NOTES
[facility] ED  [address]  Phone: [phone]    [place]          Pt Name: Maya Stevenson  MRN: [id number]  [place] 1945  Date of evaluation: 5/30/2020      CHIEF COMPLAINT       Chief Complaint   Patient presents with    Fall     fall around 1830 tonight onto corner of raised bed while outside to left chest.       HISTORY OF PRESENT ILLNESS       Norberto Osullivan is a 67 y.o. male who presents after falling onto a raised garden bed on the corner this evening. Was try to sleep but continues to have pain to the left upper anterior/lateral chest.  States he can feel 1 of his ribs moving with certain positions. Denies any head injury. Denies neck or back pain. Denies any other symptoms or concerns or injuries. REVIEW OF SYSTEMS       Review of Systems   Constitutional: Negative for chills and fever. HENT: Negative for sore throat. Respiratory: Negative for shortness of breath. Cardiovascular: Negative for chest pain. Gastrointestinal: Negative for abdominal pain, diarrhea, nausea and vomiting. Musculoskeletal: Negative for neck pain. Skin: Negative for rash. Neurological: Negative for weakness and numbness. PAST MEDICAL HISTORY    has a past medical history of Asthma, CAD (coronary artery disease), Cancer ([address] Cervical radiculopathy, Chronic back pain, Colonic polyp, Degeneration of cervical intervertebral disc, Degeneration of cervical intervertebral disc, Depression, Diverticulosis, GERD (gastroesophageal reflux disease), Glucose intolerance (impaired glucose tolerance), HTN (hypertension), Hyperlipidemia, Lumbar radiculopathy, MI (myocardial infarction) ([address] Numbness and tingling, OA (osteoarthritis), Pre-diabetes, Sacroiliac pain, and Vision abnormalities. SURGICAL HISTORY      has a past surgical history that includes Coronary angioplasty with stent; Finger trigger release (Right);  Abscess Drainage (Right); other surgical history (2/19/13, 5/14/13); Elbow bursa surgery (Right, 2012,2011); Infected skin debridement (Right); Colonoscopy (2007, 2003); Colonoscopy (9/9/13); Cervical spine surgery (08/09/2001); Cervical spine surgery (Left, 1982,5788,5813); Lumbar spine surgery (4791,6606); lumbar fusion (4/7/14); skin biopsy; other surgical history; other surgical history (Left, 09/27/2016); other surgical history (Bilateral, 11/29/2016); other surgical history (12/06/2016); other surgical history (Right, 12/12/2016); other surgical history (Left, 12/15/17); other surgical history (Left, 01/31/2017); Lumbar spine surgery (Left, 2/14/2017); Injection Procedure For Sacroiliac Joint (Bilateral, 3/14/2017); pr arthrocentesis aspir&/inj major jt/bursa w/o us (Left, 3/31/2017); pr arthrocentesis aspir&/inj major jt/bursa w/o us (Left, 4/14/2017); Lumbar spine surgery (Bilateral, 5/2/2017); Lumbar spine surgery (Bilateral, 5/9/2017); Anesthesia Nerve Block (Bilateral, 6/2/2017); Anesthesia Nerve Block (Bilateral, 6/9/2017); RADIOFREQUENCY ABLATION NERVES (Right, 6/29/2017); RADIOFREQUENCY ABLATION NERVES (Left, 7/6/2017); Cardiac catheterization; Cardiac catheterization (06/01/2011); Cardiac catheterization (5-5-2011); back surgery; lumbar fusion (N/A, 11/15/2017); Colonoscopy (N/A, 10/15/2018); pr hemorrhoidectomy,int/ext,1 column/group (N/A, 11/7/2018); and Upper gastrointestinal endoscopy (N/A, 9/30/2019). CURRENT MEDICATIONS       Previous Medications    ASPIRIN 81 MG TABLET    Take 81 mg by mouth daily    ATORVASTATIN (LIPITOR) 80 MG TABLET    TAKE 1 TABLET DAILY    CELECOXIB (CELEBREX) 200 MG CAPSULE    TAKE 1 CAPSULE BY MOUTH TWICE DAILY    CLOPIDOGREL (PLAVIX) 75 MG TABLET    TAKE 1 TABLET DAILY    FAMOTIDINE (PEPCID) 20 MG TABLET    Take 20 mg by mouth daily.     FERROUS SULFATE 325 (65 FE) MG TABLET    Take 1 tablet by mouth daily With Vitamin C 500 mg qd    HANDICAP PLACARD MISC    by Does not apply route given the following medications:  Orders Placed This Encounter   Medications    oxyCODONE-acetaminophen (PERCOCET) 5-325 MG per tablet 2 tablet    oxyCODONE-acetaminophen (PERCOCET) 5-325 MG per tablet 4 tablet    oxyCODONE-acetaminophen (PERCOCET) 5-325 MG per tablet     Sig: Take 1-2 tablets by mouth every 6 hours as needed for Pain for up to 3 days. WARNING:  May cause drowsiness. May impair ability to operate vehicles or machinery. Do not use in combination with alcohol. Dispense:  20 tablet     Refill:  0        Vitals:    Vitals:    05/30/20 2233 05/30/20 2254   BP: (!) 214/97 (!) 171/97   Pulse: 82    Resp: 16    Temp: 97.5 °F (36.4 °C)    TempSrc: Tympanic    SpO2: 96%      -------------------------  BP: (!) 171/97, Temp: 97.5 °F (36.4 °C), Pulse: 82, Resp: 16      Re-evaluation Notes    Patient does have a mildly displaced left fourth rib fracture. I discussed this with him and I feel he is appropriate for discharge and outpatient follow-up. Advised to follow-up with the PCP return right away if worsening or for new or concerning symptoms. He is not in any respiratory distress. Will provide Percocet. Was also given an incentive spirometer and the respiratory therapist reviewed how to use it with him. He does understand and knows to return right away for any new or concerning symptoms or worsening symptoms. I have reviewed the disposition diagnosis with the patient and or their family/guardian. I have answered their questions and given discharge instructions. They voiced understanding of these instructions and did not have any further questions or complaints. CONSULTS:    None    CRITICAL CARE:     None    PROCEDURES:    None    FINAL IMPRESSION      1.  Closed fracture of one rib of left side, initial encounter          DISPOSITION/PLAN   DISPOSITION Decision To Discharge 05/30/2020 11:26:54 PM      Condition on Disposition    Improved    PATIENT REFERRED TO:  Fredy Howe

## 2020-06-08 ENCOUNTER — TELEPHONE (OUTPATIENT)
Dept: INTERNAL MEDICINE | Age: 75
End: 2020-06-08

## 2020-06-08 RX ORDER — OXYCODONE HYDROCHLORIDE AND ACETAMINOPHEN 5; 325 MG/1; MG/1
1 TABLET ORAL EVERY 6 HOURS PRN
Qty: 30 TABLET | Refills: 0 | Status: SHIPPED | OUTPATIENT
Start: 2020-06-08 | End: 2020-10-12 | Stop reason: SDUPTHER

## 2020-06-08 NOTE — TELEPHONE ENCOUNTER
Patient was in ER about a week ago. Has broken rib. They gave him percocet but very little. Can he get short term script for this to help with pain?   Uses [facility]

## 2020-07-07 ENCOUNTER — OFFICE VISIT (OUTPATIENT)
Dept: CARDIOLOGY | Age: 75
End: 2020-07-07
Payer: MEDICARE

## 2020-07-07 VITALS
HEART RATE: 70 BPM | HEIGHT: 69 IN | WEIGHT: 221 LBS | DIASTOLIC BLOOD PRESSURE: 74 MMHG | SYSTOLIC BLOOD PRESSURE: 139 MMHG | BODY MASS INDEX: 32.73 KG/M2

## 2020-07-07 PROCEDURE — 99214 OFFICE O/P EST MOD 30 MIN: CPT | Performed by: INTERNAL MEDICINE

## 2020-07-07 PROCEDURE — 93005 ELECTROCARDIOGRAM TRACING: CPT | Performed by: INTERNAL MEDICINE

## 2020-07-07 PROCEDURE — G8417 CALC BMI ABV UP PARAM F/U: HCPCS | Performed by: INTERNAL MEDICINE

## 2020-07-07 PROCEDURE — 1123F ACP DISCUSS/DSCN MKR DOCD: CPT | Performed by: INTERNAL MEDICINE

## 2020-07-07 PROCEDURE — 93010 ELECTROCARDIOGRAM REPORT: CPT | Performed by: INTERNAL MEDICINE

## 2020-07-07 PROCEDURE — 4040F PNEUMOC VAC/ADMIN/RCVD: CPT | Performed by: INTERNAL MEDICINE

## 2020-07-07 PROCEDURE — 99214 OFFICE O/P EST MOD 30 MIN: CPT

## 2020-07-07 PROCEDURE — 1036F TOBACCO NON-USER: CPT | Performed by: INTERNAL MEDICINE

## 2020-07-07 PROCEDURE — G8427 DOCREV CUR MEDS BY ELIG CLIN: HCPCS | Performed by: INTERNAL MEDICINE

## 2020-07-07 NOTE — PROGRESS NOTES
Today's Date: 7/7/2020  Patient's Name: Lisa Bethea  Patient's age: 67 y.o., 1945    Subjective: Norberto Osullivan  is here for follow up. No chest pain, no dyspnea, no PND, no syncope or pre-syncope, no orthopnea. He is working in yard and around the house. Past Medical History:   has a past medical history of Asthma, CAD (coronary artery disease), Cancer ([facility] Cervical radiculopathy, Chronic back pain, Colonic polyp, Degeneration of cervical intervertebral disc, Degeneration of cervical intervertebral disc, Depression, Diverticulosis, GERD (gastroesophageal reflux disease), Glucose intolerance (impaired glucose tolerance), HTN (hypertension), Hyperlipidemia, Lumbar radiculopathy, MI (myocardial infarction) ([address] Numbness and tingling, OA (osteoarthritis), Pre-diabetes, Sacroiliac pain, and Vision abnormalities. Past Surgical History:   has a past surgical history that includes Coronary angioplasty with stent; Finger trigger release (Right); Abscess Drainage (Right); other surgical history (2/19/13, 5/14/13); Elbow bursa surgery (Right, 2012,2011); Infected skin debridement (Right); Colonoscopy (2007, 2003); Colonoscopy (9/9/13); Cervical spine surgery (08/09/2001); Cervical spine surgery (Left, 2927,2950,1880); Lumbar spine surgery (3930,9334); lumbar fusion (4/7/14); skin biopsy; other surgical history; other surgical history (Left, 09/27/2016); other surgical history (Bilateral, 11/29/2016); other surgical history (12/06/2016); other surgical history (Right, 12/12/2016); other surgical history (Left, 12/15/17); other surgical history (Left, 01/31/2017); Lumbar spine surgery (Left, 2/14/2017); Injection Procedure For Sacroiliac Joint (Bilateral, 3/14/2017); pr arthrocentesis aspir&/inj major jt/bursa w/o us (Left, 3/31/2017); pr arthrocentesis aspir&/inj major jt/bursa w/o us (Left, 4/14/2017); Lumbar spine surgery (Bilateral, 5/2/2017); Lumbar spine surgery (Bilateral, 5/9/2017);  Anesthesia Nerve Block (Bilateral, 6/2/2017); Anesthesia Nerve Block (Bilateral, 6/9/2017); RADIOFREQUENCY ABLATION NERVES (Right, 6/29/2017); RADIOFREQUENCY ABLATION NERVES (Left, 7/6/2017); Cardiac catheterization; Cardiac catheterization (06/01/2011); Cardiac catheterization (5-5-2011); back surgery; lumbar fusion (N/A, 11/15/2017); Colonoscopy (N/A, 10/15/2018); pr hemorrhoidectomy,int/ext,1 column/group (N/A, 11/7/2018); and Upper gastrointestinal endoscopy (N/A, 9/30/2019). Home Medications:  Prior to Admission medications    Medication Sig Start Date End Date Taking? Authorizing Provider   oxyCODONE-acetaminophen (PERCOCET) 5-325 MG per tablet Take 1 tablet by mouth every 6 hours as needed for Pain for up to 30 days.  6/8/20 7/8/20  Serg Bauer MD   ferrous sulfate 325 (65 Fe) MG tablet Take 1 tablet by mouth daily With Vitamin C 500 mg qd 2/26/20   Serg Bauer MD   pantoprazole (PROTONIX) 40 MG tablet TAKE 1 TABLET BY MOUTH ONCE DAILY 2/7/20   Serg Bauer MD   metoprolol tartrate (LOPRESSOR) 25 MG tablet TAKE 1 TABLET TWICE A DAY 2/3/20   Serg Bauer MD   atorvastatin (LIPITOR) 80 MG tablet TAKE 1 TABLET DAILY 2/3/20   Serg Bauer MD   clopidogrel (PLAVIX) 75 MG tablet TAKE 1 TABLET DAILY 1/2/20   Serg Bauer MD   metFORMIN (GLUCOPHAGE) 500 MG tablet TAKE 1 TABLET TWICE DAILY  WITH MEALS 12/9/19   Serg Bauer MD   PARoxetine (PAXIL) 20 MG tablet TAKE 1 TABLET BY MOUTH ONCE DAILY IN THE MORNING 9/4/19   Serg Bauer MD   celecoxib (CELEBREX) 200 MG capsule TAKE 1 CAPSULE BY MOUTH TWICE DAILY 8/20/19   Serg Bauer MD   Handicap Placard MISC by Does not apply route Expires: 7/5/2021 7/5/19   Serg Bauer MD   losartan (COZAAR) 25 MG tablet Take 1 tablet by mouth daily 3/12/18   Serg Bauer MD   vitamin C (ASCORBIC ACID) 500 MG tablet Take 500 mg by mouth 2 times daily With ferrous sulfate    Historical Provider, MD   HYDROcodone-acetaminophen ([facility]) 5-325 MG per age  [de-identified]: PERRL, no cervical lymphadenopathy. No masses palpable. Normal oral mucosa  Respiratory:  · Normal excursion and expansion without use of accessory muscles  · Resp Auscultation: Good respiratory effort. No for increased work of breathing. On auscultation: clear to auscultation bilaterally  Cardiovascular:  · Heart tones are crisp and normal. regular S1 and S2.  · Jugular venous pulsation Normal  · The carotid upstroke is normal in amplitude and contour without delay or bruit   Abdomen:   · soft  · Bowel sounds present  Extremities:  ·  No edema  Neurological:  · Alert and oriented. Cardiac Data:  EKG: SR, RBBB    Labs:     CBC: No results for input(s): WBC, HGB, HCT, PLT in the last 72 hours. BMP: No results for input(s): NA, K, CO2, BUN, CREATININE, LABGLOM, GLUCOSE in the last 72 hours. PT/INR: No results for input(s): PROTIME, INR in the last 72 hours. FASTING LIPID PANEL:  Lab Results   Component Value Date    HDL 38 08/15/2019    TRIG 259 08/15/2019     LIVER PROFILE:No results for input(s): AST, ALT, LABALBU in the last 72 hours. TTE 10/17/2014  1.   Left ventricle is normal in dimension with normal left ventricular systolic function.  Ejection fraction is 50-55%. 2.   Biatrial enlargement. 3.   Right ventricle is dilated with normal function. 4.   Mitral valve leaflets are thickened with trivial regurgitation. 5.   Mildly sclerotic aortic valve with no stenosis or regurgitation. 6.   Trivial tricuspid regurgitation.  RVSP is 57mm Hg. 7.   Grade I diastolic dysfunction. 8.   No pericardial effusion. Assessment and plan:      -CAD- STEMI 5/5/2011 with thrombectomy and MELBA to RCA followed by staged PCI of LAD with MELBA 5/31/2011. Continue antiplatelet therapy. Counseled to go to ER immediately if develops chest pain. -HTN- failry well controlled at this time. Continue current therapy.   -Obesity - encouraged diet, exercise, and discussed weight loss extensively.   -Hyperlipidemia- continue statin. -DM- management per PCP. -RTC 6 months.     Rachel Alvarez [id number]           [phone]

## 2020-08-17 ENCOUNTER — HOSPITAL ENCOUNTER (OUTPATIENT)
Dept: LAB | Age: 75
Discharge: HOME | End: 2020-08-17
Payer: MEDICARE

## 2020-08-17 ENCOUNTER — TELEPHONE (OUTPATIENT)
Dept: INTERNAL MEDICINE | Age: 75
End: 2020-08-17

## 2020-08-17 LAB
ALBUMIN SERPL-MCNC: 4.2 G/DL (ref 3.5–5.2)
ALBUMIN/GLOBULIN RATIO: 1.8 (ref 1–2.5)
ALP BLD-CCNC: 60 U/L (ref 40–129)
ALT SERPL-CCNC: 22 U/L (ref 5–41)
ANION GAP SERPL CALCULATED.3IONS-SCNC: 9 MMOL/L (ref 9–17)
AST SERPL-CCNC: 17 U/L
BILIRUB SERPL-MCNC: 0.27 MG/DL (ref 0.3–1.2)
BUN BLDV-MCNC: 20 MG/DL (ref 8–23)
BUN/CREAT BLD: 22 (ref 9–20)
CALCIUM SERPL-MCNC: 9.2 MG/DL (ref 8.6–10.4)
CHLORIDE BLD-SCNC: 104 MMOL/L (ref 98–107)
CHOLESTEROL/HDL RATIO: 4.4
CHOLESTEROL: 190 MG/DL
CO2: 28 MMOL/L (ref 20–31)
CREAT SERPL-MCNC: 0.92 MG/DL (ref 0.7–1.2)
ESTIMATED AVERAGE GLUCOSE: 114 MG/DL
GFR AFRICAN AMERICAN: >60 ML/MIN
GFR NON-AFRICAN AMERICAN: >60 ML/MIN
GFR SERPL CREATININE-BSD FRML MDRD: ABNORMAL ML/MIN/{1.73_M2}
GFR SERPL CREATININE-BSD FRML MDRD: ABNORMAL ML/MIN/{1.73_M2}
GLUCOSE BLD-MCNC: 134 MG/DL (ref 70–99)
HBA1C MFR BLD: 5.6 % (ref 4.8–5.9)
HDLC SERPL-MCNC: 43 MG/DL
HEMOGLOBIN: 13.4 G/DL (ref 13–17)
LDL CHOLESTEROL: 89 MG/DL (ref 0–130)
POTASSIUM SERPL-SCNC: 4.8 MMOL/L (ref 3.7–5.3)
SODIUM BLD-SCNC: 141 MMOL/L (ref 135–144)
TOTAL PROTEIN: 6.6 G/DL (ref 6.4–8.3)
TRIGL SERPL-MCNC: 292 MG/DL
VLDLC SERPL CALC-MCNC: ABNORMAL MG/DL (ref 1–30)

## 2020-08-17 PROCEDURE — 36415 COLL VENOUS BLD VENIPUNCTURE: CPT

## 2020-08-17 PROCEDURE — 83036 HEMOGLOBIN GLYCOSYLATED A1C: CPT

## 2020-08-17 PROCEDURE — 83550 IRON BINDING TEST: CPT

## 2020-08-17 PROCEDURE — 80061 LIPID PANEL: CPT

## 2020-08-17 PROCEDURE — 83540 ASSAY OF IRON: CPT

## 2020-08-17 PROCEDURE — 85018 HEMOGLOBIN: CPT

## 2020-08-17 PROCEDURE — 80053 COMPREHEN METABOLIC PANEL: CPT

## 2020-08-17 PROCEDURE — G0103 PSA SCREENING: HCPCS

## 2020-08-17 RX ORDER — FENOFIBRATE 50 MG/1
100 CAPSULE ORAL DAILY
Qty: 180 CAPSULE | Refills: 3 | Status: CANCELLED | OUTPATIENT
Start: 2020-08-17

## 2020-08-17 RX ORDER — FENOFIBRATE 160 MG/1
160 TABLET ORAL DAILY
Qty: 90 TABLET | Refills: 3 | Status: SHIPPED | OUTPATIENT
Start: 2020-08-17 | End: 2020-08-18

## 2020-08-17 RX ORDER — FENOFIBRATE 160 MG/1
160 TABLET ORAL DAILY
Qty: 90 TABLET | Refills: 1 | Status: CANCELLED | OUTPATIENT
Start: 2020-08-17

## 2020-08-17 NOTE — TELEPHONE ENCOUNTER
Patient stated that he will go ahead and start on the medication due to already watching diet. Pended to [facility] per pts request. Please review for accuracy.  (corrected to the 160 mg)

## 2020-08-17 NOTE — TELEPHONE ENCOUNTER
----- Message from Silvia Arteaga MD sent at 8/17/2020  4:41 PM EDT -----  Triglycerides high ,  otherwise labs okay. Offer patient fenofibrate 100 mg daily   versus further decrease fats and fried foods in the diet.

## 2020-08-18 LAB
IRON SATURATION: 37 % (ref 20–55)
IRON: 137 UG/DL (ref 59–158)
PROSTATE SPECIFIC ANTIGEN: 1.12 UG/L
TOTAL IRON BINDING CAPACITY: 367 UG/DL (ref 250–450)
UNSATURATED IRON BINDING CAPACITY: 230 UG/DL (ref 112–347)

## 2020-08-24 ENCOUNTER — OFFICE VISIT (OUTPATIENT)
Dept: INTERNAL MEDICINE | Age: 75
End: 2020-08-24
Payer: MEDICARE

## 2020-08-24 VITALS
RESPIRATION RATE: 16 BRPM | SYSTOLIC BLOOD PRESSURE: 112 MMHG | HEIGHT: 69 IN | WEIGHT: 218 LBS | HEART RATE: 76 BPM | DIASTOLIC BLOOD PRESSURE: 80 MMHG | BODY MASS INDEX: 32.29 KG/M2

## 2020-08-24 PROBLEM — C32.9 CARCINOMA LARYNX (HCC): Status: ACTIVE | Noted: 2020-08-24

## 2020-08-24 PROCEDURE — 1036F TOBACCO NON-USER: CPT | Performed by: INTERNAL MEDICINE

## 2020-08-24 PROCEDURE — 4040F PNEUMOC VAC/ADMIN/RCVD: CPT | Performed by: INTERNAL MEDICINE

## 2020-08-24 PROCEDURE — G8427 DOCREV CUR MEDS BY ELIG CLIN: HCPCS | Performed by: INTERNAL MEDICINE

## 2020-08-24 PROCEDURE — 99214 OFFICE O/P EST MOD 30 MIN: CPT | Performed by: INTERNAL MEDICINE

## 2020-08-24 PROCEDURE — 1123F ACP DISCUSS/DSCN MKR DOCD: CPT | Performed by: INTERNAL MEDICINE

## 2020-08-24 PROCEDURE — 99214 OFFICE O/P EST MOD 30 MIN: CPT

## 2020-08-24 PROCEDURE — G8417 CALC BMI ABV UP PARAM F/U: HCPCS | Performed by: INTERNAL MEDICINE

## 2020-08-24 RX ORDER — CELECOXIB 200 MG/1
CAPSULE ORAL
Qty: 180 CAPSULE | Refills: 3 | Status: SHIPPED | OUTPATIENT
Start: 2020-08-24 | End: 2021-09-07 | Stop reason: SDUPTHER

## 2020-08-24 RX ORDER — PAROXETINE HYDROCHLORIDE 20 MG/1
TABLET, FILM COATED ORAL
Qty: 90 TABLET | Refills: 3 | Status: SHIPPED | OUTPATIENT
Start: 2020-08-24 | End: 2021-09-01

## 2020-08-24 RX ORDER — LOSARTAN POTASSIUM 25 MG/1
25 TABLET ORAL DAILY
Qty: 90 TABLET | Refills: 3 | Status: SHIPPED | OUTPATIENT
Start: 2020-08-24 | End: 2021-08-06

## 2020-08-24 ASSESSMENT — ENCOUNTER SYMPTOMS
ABDOMINAL PAIN: 0
CONSTIPATION: 0
SHORTNESS OF BREATH: 0
DIARRHEA: 0
BACK PAIN: 0
NAUSEA: 0
EYE PAIN: 0
COUGH: 0
BLOOD IN STOOL: 0
VOMITING: 0

## 2020-08-24 NOTE — PROGRESS NOTES
[place] [address]  Dept: [phone]  Dept Fax: [phone]  Loc: [phone]     Norberto Plummer is a 67 y.o. male who presents today for his medical conditions/complaintsas noted below. Norberto Osullivan is c/o of   Chief Complaint   Patient presents with    Hypertension     6 month appt    Hyperlipidemia    Diabetes         HPI:     Hypertension   This is a chronic (essential htn) problem. The current episode started more than 1 year ago. The problem has been waxing and waning since onset. The problem is controlled. Pertinent negatives include no chest pain, headaches, neck pain, palpitations or shortness of breath. Hyperlipidemia   This is a chronic problem. The current episode started more than 1 year ago. The problem is controlled. Recent lipid tests were reviewed and are variable. Pertinent negatives include no chest pain or shortness of breath. Diabetes   He presents for his follow-up diabetic visit. He has type 2 diabetes mellitus. His disease course has been fluctuating. Pertinent negatives for hypoglycemia include no confusion, dizziness, headaches, nervousness/anxiousness or pallor. Pertinent negatives for diabetes include no chest pain, no polydipsia, no polyuria and no weakness. Other   This is a chronic (4-depression, better now, 5-carcinoma larynx. Many years ago. Regis Peoples ) problem. The current episode started more than 1 month ago. The problem occurs constantly. The problem has been waxing and waning. Pertinent negatives include no abdominal pain, arthralgias, chest pain, chills, coughing, fever, headaches, nausea, neck pain, numbness, rash, vomiting or weakness. Hemoglobin A1C (%)   Date Value   08/17/2020 5.6   02/17/2020 5.8   08/15/2019 5.4            Microalb/Crt.  Ratio (mcg/mg creat)   Date Value   02/17/2020 CANNOT BE CALCULATED     LDL Cholesterol (mg/dL)   Date Value 08/17/2020 89   08/15/2019 80   08/09/2018 69         AST (U/L)   Date Value   08/17/2020 17     ALT (U/L)   Date Value   08/17/2020 22     BUN (mg/dL)   Date Value   08/17/2020 20     BP Readings from Last 3 Encounters:   08/24/20 112/80   07/07/20 139/74   05/30/20 (!) 171/97              Past Medical History:   Diagnosis Date    Asthma     CAD (coronary artery disease)     STENTS X 4    Cancer (HCC)     THROAT, HX. RADIATION , LT EAR    Cervical radiculopathy     Chronic back pain     Colonic polyp     Degeneration of cervical intervertebral disc     [facility], 1990 C4/C5-C6/C7    Degeneration of cervical intervertebral disc     [facility] 1994, C3/C4    Depression     Diverticulosis     GERD (gastroesophageal reflux disease)     Glucose intolerance (impaired glucose tolerance)     IS NOT DIABETIC, PER PT    HTN (hypertension)     Hyperlipidemia     Lumbar radiculopathy     MI (myocardial infarction) ([facility] [address]     2011    Numbness and tingling     HANDS    OA (osteoarthritis)     Pre-diabetes     Sacroiliac pain 11/4/2014    Vision abnormalities     WEARS GLASSES      Past Surgical History:   Procedure Laterality Date    ABSCESS DRAINAGE Right     ELBOW WITH CLOSURE    ANESTHESIA NERVE BLOCK Bilateral 6/2/2017    Bilat L1 L2 L3 L4 L5 Diagnostic Medial Branch Blk performed by Bina Casillas MD at [facility] Bilateral 6/9/2017    Bilat L1 L2 L3 L4 L5 Confirmatory Medial BB performed by Bina Casillas MD at [facility]  06/01/2011    DR [place] CARDIAC CATHETERIZATION  5-5-2011    STENTSx4    CERVICAL SPINE SURGERY  08/09/2001    Anterior Cervical Decompression and Fusion C3-4    CERVICAL SPINE SURGERY Left [id number]    Cervical C6-C7 Discectomy and Foraminotomy    COLONOSCOPY  2007, 2003    POLYPS    COLONOSCOPY  9/9/13    hyperplastic polyp x 3    COLONOSCOPY N/A 10/15/2018    hyperplastic polyp, severe sigmoid diverticulosis, large ext. hemorrhoid, Dr Hanh Ortiz Right 2012,2011    FINGER TRIGGER RELEASE Right     THIRD FINGER    [facility]. INJECTION PROCEDURE FOR SACROILIAC JOINT Bilateral 3/14/2017    Bilat Sacroiliac & Piriformis Inj's performed by Yasmine Villasenor MD at [address] Right     ELBOW    LUMBAR FUSION  4/7/14    lumbar decompression and fusion    LUMBAR FUSION N/A 11/15/2017    LUMBAR DECOMPRESSION POSTERIOR W/FUSION L3 L4 ( with SPINAL CORD MONITOR ) performed by Leona Rosales MD at [address] [facility]  [phone]    Fusion    LUMBAR SPINE SURGERY Left 2/14/2017    Left L4 & L5 Transforaminal ANITHA performed by Yasmine Villasenor MD at [id number] [place] [address] Bilateral 5/2/2017    Bilat L5 Transforaminal ANITHA performed by Yasmine Villasenor MD at [address] [place] [address] Bilateral 5/9/2017    Bilat L5 Transforaminal ANITHA performed by Yasmine Villasenor MD at [address]  2/19/13, 5/14/13    L1/L2 IESI    OTHER SURGICAL HISTORY      Hardware correction, patient had 1 broken screw and to loose screws in back     OTHER SURGICAL HISTORY Left 09/27/2016     C6 TFE    OTHER SURGICAL HISTORY Bilateral 11/29/2016    L3, L4 L5 Diagnostic Medial Branch Block    OTHER SURGICAL HISTORY  12/06/2016    jovani L3 L4 L5 conf MBB    OTHER SURGICAL HISTORY Right 12/12/2016    L3,L4,L5 RFA    OTHER SURGICAL HISTORY Left 12/15/17    L3.  L4, L5 RFA    OTHER SURGICAL HISTORY Left 01/31/2017    L4 & L5 TFE    OR ARTHROCENTESIS ASPIR&/INJ MAJOR JT/BURSA W/O US Left 3/31/2017    Left Hip Inj performed by Yasmine Villasenor MD at [address] ARTHROCENTESIS ASPIR&/INJ MAJOR JT/BURSA W/O US Left 4/14/2017    Left Hip Inj performed by Yasmine Villasenor MD at [address] HEMORRHOIDECTOMY,INT/EXT,1 COLUMN/GROUP N/A 11/7/2018    External HEMORRHOIDECTOMY performed by Isrrael Marquez MD at [address] Right 2017    Right L1 L2 L3 L4 L5 Radiofrequency Ablation performed by Lukas Zamora MD at [address] Left 2017    Left L1 L2 L3 L4 L5 Radiofrequency performed by Lukas Zamora MD at [address] [facility]    UPPER GASTROINTESTINAL ENDOSCOPY N/A 2019    mild gastritis, mild to moderate esophagitis, Dr. Velma Hope       Family History   Problem Relation Age of Onset   Yusra Latin Cancer Father         colon cancer          Social History     Tobacco Use    Smoking status: Former Smoker     Packs/day: 1.00     Years: 40.00     Pack years: 40.00     Types: Cigarettes, Pipe, Cigars     Last attempt to quit: 2011     Years since quittin.3    Smokeless tobacco: Former User     Types: [address] [place] [address] date: 2011   Substance Use Topics    Alcohol use: Yes     Alcohol/week: 0.0 standard drinks     Comment: RARE         Current Outpatient Medications   Medication Sig Dispense Refill    celecoxib (CELEBREX) 200 MG capsule TAKE 1 CAPSULE BY MOUTH TWICE DAILY 180 capsule 3    PARoxetine (PAXIL) 20 MG tablet TAKE 1 TABLET BY MOUTH ONCE DAILY IN THE MORNING 90 tablet 3    losartan (COZAAR) 25 MG tablet Take 1 tablet by mouth daily 90 tablet 3    pantoprazole (PROTONIX) 40 MG tablet TAKE 1 TABLET BY MOUTH ONCE DAILY 90 tablet 3    metoprolol tartrate (LOPRESSOR) 25 MG tablet TAKE 1 TABLET TWICE A  tablet 3    atorvastatin (LIPITOR) 80 MG tablet TAKE 1 TABLET DAILY 90 tablet 3    clopidogrel (PLAVIX) 75 MG tablet TAKE 1 TABLET DAILY 90 tablet 3    metFORMIN (GLUCOPHAGE) 500 MG tablet TAKE 1 TABLET TWICE DAILY  WITH MEALS 180 tablet 3    HYDROcodone-acetaminophen (NORCO) 5-325 MG per tablet Take 1 tablet by mouth every 8 hours as needed for Pain .  aspirin 81 MG tablet Take 81 mg by mouth daily      famotidine (PEPCID) 20 MG tablet Take 20 mg by mouth daily.       Handicap Placard MISC by Does not apply route Expires: 7/5/2021 2 each 0    tiZANidine (ZANAFLEX) 4 MG tablet TAKE ONE TABLET BY MOUTH THREE TIMES DAILY (Patient not taking: Reported on 8/24/2020) 90 tablet 5     Current Facility-Administered Medications   Medication Dose Route Frequency Provider Last Rate Last Dose    methylPREDNISolone acetate (DEPO-MEDROL) injection 40 mg  40 mg Intramuscular Once Diana Stevenson MD         Allergies   Allergen Reactions    Crestor [Rosuvastatin] Other (See Comments)     Joint pain, muscle aches    Ibuprofen Other (See Comments)     bleeding    Lisinopril Hives    Effient [Prasugrel] Rash       Health Maintenance   Topic Date Due    DTaP/Tdap/Td vaccine (1 - Tdap) 03[birth year]20/1964    Low dose CT lung screening  03/20/1999    Shingles Vaccine (2 of 3) 01/03/2014    Flu vaccine (1) 09/01/2020    Lipid screen  08/17/2021    Potassium monitoring  08/17/2021    Creatinine monitoring  08/17/2021    Pneumococcal 65+ years Vaccine  Completed    Hepatitis A vaccine  Aged Out    Hib vaccine  Aged Out    Meningococcal (ACWY) vaccine  Aged Out       Subjective:      Review of Systems   Constitutional: Negative for chills and fever. HENT: Negative for hearing loss. Eyes: Negative for pain and visual disturbance. Respiratory: Negative for cough and shortness of breath. Cardiovascular: Negative for chest pain, palpitations and leg swelling. Gastrointestinal: Negative for abdominal pain, blood in stool, constipation, diarrhea, nausea and vomiting. Endocrine: Negative for cold intolerance, polydipsia and polyuria. Genitourinary: Negative for difficulty urinating, dysuria and hematuria. Musculoskeletal: Negative for arthralgias, back pain, gait problem and neck pain. Skin: Negative for pallor and rash. Neurological: Negative for dizziness, weakness, numbness and headaches. Hematological: Negative for adenopathy. Does not bruise/bleed easily. Psychiatric/Behavioral: Negative for confusion.  The Hyperlipidemia, Diabetes. Orders Placed This Encounter   Procedures    Hemoglobin A1C     Standing Status:   Future     Standing Expiration Date:   8/24/2021    Microalbumin, Ur     Standing Status:   Future     Standing Expiration Date:   8/24/2021    Hemoglobin     Standing Status:   Future     Standing Expiration Date:   8/24/2021    Iron and TIBC     Standing Status:   Future     Standing Expiration Date:   8/24/2021     Order Specific Question:   Is Patient Fasting? Answer:   no     Order Specific Question:   No of Hours? Answer:   no     Orders Placed This Encounter   Medications    celecoxib (CELEBREX) 200 MG capsule     Sig: TAKE 1 CAPSULE BY MOUTH TWICE DAILY     Dispense:  180 capsule     Refill:  3     Please consider 90 day supplies to promote better adherence    PARoxetine (PAXIL) 20 MG tablet     Sig: TAKE 1 TABLET BY MOUTH ONCE DAILY IN THE MORNING     Dispense:  90 tablet     Refill:  3    losartan (COZAAR) 25 MG tablet     Sig: Take 1 tablet by mouth daily     Dispense:  90 tablet     Refill:  3       Patientgiven educational materials - see patient instructions. Discussed use, benefit,and side effects of prescribed medications. All patient questions answered. Ptvoiced understanding. Reviewed health maintenance. Instructed to continue currentmedications, diet and exercise. Patient agreed with treatment plan. Follow up asdirected.      Electronically signed by Patel Zaragoza MD on 8/24/2020 at 10:21 AM

## 2020-09-29 ENCOUNTER — IMMUNIZATION (OUTPATIENT)
Dept: LAB | Age: 75
End: 2020-09-29
Payer: MEDICARE

## 2020-09-29 PROCEDURE — PBSHW INFLUENZA, QUADV, ADJUVANTED, 65 YRS +, IM, PF, PREFILL SYR, 0.5ML (FLUAD): Performed by: INTERNAL MEDICINE

## 2020-09-29 PROCEDURE — 90694 VACC AIIV4 NO PRSRV 0.5ML IM: CPT | Performed by: INTERNAL MEDICINE

## 2020-10-12 RX ORDER — OXYCODONE HYDROCHLORIDE AND ACETAMINOPHEN 5; 325 MG/1; MG/1
1 TABLET ORAL EVERY 6 HOURS PRN
Qty: 30 TABLET | Refills: 0 | Status: SHIPPED | OUTPATIENT
Start: 2020-10-12 | End: 2020-11-19 | Stop reason: SDUPTHER

## 2020-10-12 NOTE — TELEPHONE ENCOUNTER
Last Appt:  8/24/2020  Next Appt:   2/25/2021  Med verified in [facility]

## 2020-11-18 ENCOUNTER — TELEPHONE (OUTPATIENT)
Dept: INTERNAL MEDICINE | Age: 75
End: 2020-11-18

## 2020-11-18 ENCOUNTER — HOSPITAL ENCOUNTER (OUTPATIENT)
Dept: LAB | Age: 75
Discharge: HOME | End: 2020-11-18
Payer: MEDICARE

## 2020-11-18 LAB
HEMOGLOBIN: 12 G/DL (ref 13–17)
IRON SATURATION: 10 % (ref 20–55)
IRON: 40 UG/DL (ref 59–158)
TOTAL IRON BINDING CAPACITY: 408 UG/DL (ref 250–450)
UNSATURATED IRON BINDING CAPACITY: 368 UG/DL (ref 112–347)

## 2020-11-18 PROCEDURE — 83550 IRON BINDING TEST: CPT

## 2020-11-18 PROCEDURE — 36415 COLL VENOUS BLD VENIPUNCTURE: CPT

## 2020-11-18 PROCEDURE — 83540 ASSAY OF IRON: CPT

## 2020-11-18 PROCEDURE — 85018 HEMOGLOBIN: CPT

## 2020-11-19 RX ORDER — OXYCODONE HYDROCHLORIDE AND ACETAMINOPHEN 5; 325 MG/1; MG/1
1 TABLET ORAL EVERY 6 HOURS PRN
Qty: 30 TABLET | Refills: 0 | Status: SHIPPED | OUTPATIENT
Start: 2020-11-19 | End: 2021-02-25 | Stop reason: SDUPTHER

## 2020-11-19 RX ORDER — FERROUS SULFATE 325(65) MG
TABLET ORAL
Qty: 90 TABLET | Refills: 0 | OUTPATIENT
Start: 2020-11-19

## 2020-11-20 ENCOUNTER — HOSPITAL ENCOUNTER (OUTPATIENT)
Dept: GENERAL RADIOLOGY | Age: 75
Discharge: HOME | End: 2020-11-22
Payer: MEDICARE

## 2020-11-20 ENCOUNTER — OFFICE VISIT (OUTPATIENT)
Dept: ORTHOPEDIC SURGERY | Age: 75
End: 2020-11-20
Payer: MEDICARE

## 2020-11-20 VITALS
SYSTOLIC BLOOD PRESSURE: 137 MMHG | DIASTOLIC BLOOD PRESSURE: 69 MMHG | WEIGHT: 218 LBS | BODY MASS INDEX: 32.29 KG/M2 | HEIGHT: 69 IN | HEART RATE: 84 BPM

## 2020-11-20 PROCEDURE — 20610 DRAIN/INJ JOINT/BURSA W/O US: CPT | Performed by: PHYSICIAN ASSISTANT

## 2020-11-20 PROCEDURE — 4040F PNEUMOC VAC/ADMIN/RCVD: CPT | Performed by: PHYSICIAN ASSISTANT

## 2020-11-20 PROCEDURE — 73502 X-RAY EXAM HIP UNI 2-3 VIEWS: CPT

## 2020-11-20 PROCEDURE — G8484 FLU IMMUNIZE NO ADMIN: HCPCS | Performed by: PHYSICIAN ASSISTANT

## 2020-11-20 PROCEDURE — G8427 DOCREV CUR MEDS BY ELIG CLIN: HCPCS | Performed by: PHYSICIAN ASSISTANT

## 2020-11-20 PROCEDURE — 99213 OFFICE O/P EST LOW 20 MIN: CPT | Performed by: PHYSICIAN ASSISTANT

## 2020-11-20 PROCEDURE — 1123F ACP DISCUSS/DSCN MKR DOCD: CPT | Performed by: PHYSICIAN ASSISTANT

## 2020-11-20 PROCEDURE — 1036F TOBACCO NON-USER: CPT | Performed by: PHYSICIAN ASSISTANT

## 2020-11-20 PROCEDURE — G8417 CALC BMI ABV UP PARAM F/U: HCPCS | Performed by: PHYSICIAN ASSISTANT

## 2020-11-20 RX ORDER — METHYLPREDNISOLONE ACETATE 40 MG/ML
80 INJECTION, SUSPENSION INTRA-ARTICULAR; INTRALESIONAL; INTRAMUSCULAR; SOFT TISSUE ONCE
Status: COMPLETED | OUTPATIENT
Start: 2020-11-20 | End: 2020-11-20

## 2020-11-20 RX ORDER — LIDOCAINE HYDROCHLORIDE 10 MG/ML
2 INJECTION, SOLUTION INFILTRATION; PERINEURAL ONCE
Status: COMPLETED | OUTPATIENT
Start: 2020-11-20 | End: 2020-11-20

## 2020-11-20 RX ORDER — BUPIVACAINE HYDROCHLORIDE 5 MG/ML
2 INJECTION, SOLUTION PERINEURAL ONCE
Status: COMPLETED | OUTPATIENT
Start: 2020-11-20 | End: 2020-11-20

## 2020-11-20 RX ADMIN — METHYLPREDNISOLONE ACETATE 80 MG: 40 INJECTION, SUSPENSION INTRA-ARTICULAR; INTRALESIONAL; INTRAMUSCULAR; SOFT TISSUE at 14:39

## 2020-11-20 RX ADMIN — BUPIVACAINE HYDROCHLORIDE 10 MG: 5 INJECTION, SOLUTION PERINEURAL at 14:31

## 2020-11-20 RX ADMIN — LIDOCAINE HYDROCHLORIDE 2 ML: 10 INJECTION, SOLUTION INFILTRATION; PERINEURAL at 14:39

## 2020-11-20 NOTE — PROGRESS NOTES
NERVE BLOCK Bilateral 6/9/2017    Bilat L1 L2 L3 L4 L5 Confirmatory Medial BB performed by Sheila Montero MD at [address] [facility]  06/01/2011    DR Ceci Cameron CARDIAC CATHETERIZATION  5-5-2011    STENTSx4    CERVICAL SPINE SURGERY  08/09/2001    Anterior Cervical Decompression and Fusion C3-4    CERVICAL SPINE SURGERY Left [id number]    Cervical C6-C7 Discectomy and Foraminotomy    COLONOSCOPY  2007, 2003    POLYPS    COLONOSCOPY  9/9/13    hyperplastic polyp x 3    COLONOSCOPY N/A 10/15/2018    hyperplastic polyp, severe sigmoid diverticulosis, large ext. hemorrhoid, Dr Marisel Velarde Right 2012,2011    FINGER TRIGGER RELEASE Right     THIRD [facility]. INJECTION PROCEDURE FOR SACROILIAC JOINT Bilateral 3/14/2017    Bilat Sacroiliac & Piriformis Inj's performed by Sheila Montero MD at [address] Right     ELBOW    LUMBAR FUSION  4/7/14    lumbar decompression and fusion    LUMBAR FUSION N/A 11/15/2017    LUMBAR DECOMPRESSION POSTERIOR W/FUSION L3 L4 ( with SPINAL CORD MONITOR ) performed by Alfonzo Avila MD at [address] [place] [address] Avenue  [phone]    Fusion    LUMBAR SPINE SURGERY Left 2/14/2017    Left L4 & L5 Transforaminal ANITHA performed by Sheila Montero MD at [place] [address] Bilateral 5/2/2017    Bilat L5 Transforaminal ANITHA performed by Sheila Montero MD at [place] [address] Bilateral 5/9/2017    Bilat L5 Transforaminal ANITHA performed by Sheila Montero MD at [address] [facility]  2/19/13, 5/14/13    L1/L2 IESI    OTHER SURGICAL HISTORY      Hardware correction, patient had 1 broken screw and to loose screws in back     OTHER SURGICAL HISTORY Left 09/27/2016     C6 TFE    OTHER SURGICAL HISTORY Bilateral 11/29/2016    L3, L4 L5 Diagnostic Medial Branch Block    OTHER SURGICAL HISTORY  12/06/2016    jovani L3 L4 L5 conf MBB    OTHER SURGICAL HISTORY Right 12/12/2016    L3,L4,L5 RFA    OTHER SURGICAL HISTORY Left 12/15/17    L3. L4, L5 RFA    OTHER SURGICAL HISTORY Left 01/31/2017    L4 & L5 TFE    LA ARTHROCENTESIS ASPIR&/INJ MAJOR JT/BURSA W/O US Left 3/31/2017    Left Hip Inj performed by Savana Moraes MD at [address] ARTHROCENTESIS ASPIR&/INJ MAJOR JT/BURSA W/O US Left 4/14/2017    Left Hip Inj performed by Savana Moraes MD at [address] HEMORRHOIDECTOMY,INT/EXT,1 COLUMN/GROUP N/A 11/7/2018    External HEMORRHOIDECTOMY performed by Escobar Houston MD at [address] Right 6/29/2017    Right L1 L2 L3 L4 L5 Radiofrequency Ablation performed by Savana Moraes MD at [address] Left 7/6/2017    Left L1 L2 L3 L4 L5 Radiofrequency performed by Savana Moraes MD at [facility]    UPPER GASTROINTESTINAL ENDOSCOPY N/A 9/30/2019    mild gastritis, mild to moderate esophagitis, Dr. Teetee Campbell       Medications Prior to Admission:   Current Outpatient Medications   Medication Sig Dispense Refill    metFORMIN (GLUCOPHAGE) 500 MG tablet TAKE 1 TABLET TWICE DAILY  WITH MEALS. 180 tablet 3    oxyCODONE-acetaminophen (PERCOCET) 5-325 MG per tablet Take 1 tablet by mouth every 6 hours as needed for Pain for up to 30 days.  30 tablet 0    celecoxib (CELEBREX) 200 MG capsule TAKE 1 CAPSULE BY MOUTH TWICE DAILY 180 capsule 3    PARoxetine (PAXIL) 20 MG tablet TAKE 1 TABLET BY MOUTH ONCE DAILY IN THE MORNING 90 tablet 3    losartan (COZAAR) 25 MG tablet Take 1 tablet by mouth daily 90 tablet 3    pantoprazole (PROTONIX) 40 MG tablet TAKE 1 TABLET BY MOUTH ONCE DAILY 90 tablet 3    metoprolol tartrate (LOPRESSOR) 25 MG tablet TAKE 1 TABLET TWICE A  tablet 3    atorvastatin (LIPITOR) 80 MG tablet TAKE 1 TABLET DAILY 90 tablet 3    clopidogrel (PLAVIX) 75 MG tablet TAKE 1 TABLET DAILY 90 tablet 3    Handicap Placard MISC by Does not apply route Expires: 2021 2 each 0    HYDROcodone-acetaminophen (NORCO) 5-325 MG per tablet Take 1 tablet by mouth every 8 hours as needed for Pain .  tiZANidine (ZANAFLEX) 4 MG tablet TAKE ONE TABLET BY MOUTH THREE TIMES DAILY 90 tablet 5    aspirin 81 MG tablet Take 81 mg by mouth daily      famotidine (PEPCID) 20 MG tablet Take 20 mg by mouth daily. Current Facility-Administered Medications   Medication Dose Route Frequency Provider Last Rate Last Dose    methylPREDNISolone acetate (DEPO-MEDROL) injection 40 mg  40 mg Intramuscular Once Roger Moraes MD           Allergies:  Crestor [rosuvastatin]; Ibuprofen; Lisinopril; and Effient [prasugrel]    Social History:   Social History     Tobacco Use   Smoking Status Former Smoker    Packs/day: 1.00    Years: 40.00    Pack years: 40.00    Types: Cigarettes, Pipe, Cigars    Last attempt to quit: 2011    Years since quittin.5   Smokeless Tobacco Former User    Types: Maggi Shannon Quit date: 2011     Social History     Substance and Sexual Activity   Alcohol Use Yes    Alcohol/week: 0.0 standard drinks    Comment: RARE     Social History     Substance and Sexual Activity   Drug Use No       Family History:  Family History   Problem Relation Age of Onset    Cancer Father         colon cancer         REVIEW OF SYSTEMS:  Please see the ROS form attached to today's encounter. I have reviewed it with the patient during the visit. All other systems were reviewed and are negative. PHYSICAL EXAM:  Patient is a age-appropriate 58-year-old male, alert and oriented ×3 and in no acute distress. Well-dressed and well-groomed and stands with normal body position. In a calm mood. Patient is normocephalic and exhibits nonlabored respirations. Clear conjunctiva and pupils that are reactive. The patient has painless range of motion through the hip in the groin. Logroll test is negative.   Straight leg raise is positive. He has no hip instability. He is tender to palpation over the trochanteric bursa and this reproduces a portion of his symptoms. Patient is tender to palpation through the sciatic notch. He has discomfort with lumbar range of motion. Tenderness along the lower lumbar spine. Normal muscle tone and bulk. No lymphadenopathy. No open wounds, masses, or skin lesions. Deep tendon reflexes are intact and symmetric. Skin is intact. Palpable pulses distally. Brisk capillary refill. No palpable cords or calf tenderness. Ambulates today with a nonantalgic gait. Radiology:  2 views of the left hip and an AP of the pelvis were obtained today in clinic. Radiographs show no evidence of fracture. There is degenerative change with hardware through the lumbar spine in the limited views available. With respect to the hip there is overall good preservation of joint space and there is no evidence of AVN or collapse. ASSESSMENT/PLAN:  1. Trochanteric bursitis of left hip    2. Lumbar radiculopathy        We have discussed continued treatment with the patient at length. I have recommended conservative treatment. I have recommended a corticosteroid injection. Risks and benefits were discussed with the patient and they have elected to proceed with the injection. Injection was performed into the left trochanteric bursa using a combination of 2cc of Depo-Medrol and local anesthetic. The patient tolerated the injection well. We will follow up in 1 month for recheck. We did discuss if his symptoms persist that is likely from his lumbar spine and we would obtain lumbar films prior to his visit. No orders of the defined types were placed in this encounter.        John Rodriguez PA-C

## 2020-11-24 ENCOUNTER — TELEPHONE (OUTPATIENT)
Dept: INTERNAL MEDICINE | Age: 75
End: 2020-11-24

## 2020-11-24 RX ORDER — FERROUS SULFATE 325(65) MG
325 TABLET ORAL
Qty: 90 TABLET | Refills: 3 | Status: SHIPPED | OUTPATIENT
Start: 2020-11-24 | End: 2021-02-18 | Stop reason: DRUGHIGH

## 2020-11-24 NOTE — TELEPHONE ENCOUNTER
Patient states you wanted him back on iron tablets. You were going to send rx in to [facility] but they do not have a script.

## 2020-12-17 RX ORDER — CLOPIDOGREL BISULFATE 75 MG/1
TABLET ORAL
Qty: 90 TABLET | Refills: 3 | Status: SHIPPED | OUTPATIENT
Start: 2020-12-17 | End: 2021-12-08

## 2021-01-16 DIAGNOSIS — E78.2 MIXED HYPERLIPIDEMIA: ICD-10-CM

## 2021-01-18 DIAGNOSIS — I10 ESSENTIAL HYPERTENSION: ICD-10-CM

## 2021-01-18 DIAGNOSIS — E78.2 MIXED HYPERLIPIDEMIA: ICD-10-CM

## 2021-01-18 RX ORDER — ATORVASTATIN CALCIUM 80 MG/1
TABLET, FILM COATED ORAL
Qty: 90 TABLET | Refills: 3 | Status: SHIPPED | OUTPATIENT
Start: 2021-01-18 | End: 2022-01-04

## 2021-01-18 RX ORDER — ATORVASTATIN CALCIUM 80 MG/1
TABLET, FILM COATED ORAL
Qty: 90 TABLET | Refills: 3 | OUTPATIENT
Start: 2021-01-18

## 2021-01-19 ENCOUNTER — OFFICE VISIT (OUTPATIENT)
Dept: CARDIOLOGY | Age: 76
End: 2021-01-19
Payer: MEDICARE

## 2021-01-19 VITALS
WEIGHT: 220 LBS | HEART RATE: 61 BPM | BODY MASS INDEX: 32.58 KG/M2 | DIASTOLIC BLOOD PRESSURE: 59 MMHG | HEIGHT: 69 IN | SYSTOLIC BLOOD PRESSURE: 114 MMHG

## 2021-01-19 DIAGNOSIS — Z98.890 S/P CARDIAC CATH: ICD-10-CM

## 2021-01-19 DIAGNOSIS — Z95.5 S/P CORONARY ARTERY STENT PLACEMENT: ICD-10-CM

## 2021-01-19 DIAGNOSIS — I10 ESSENTIAL HYPERTENSION: Primary | ICD-10-CM

## 2021-01-19 DIAGNOSIS — R06.02 SOB (SHORTNESS OF BREATH): ICD-10-CM

## 2021-01-19 DIAGNOSIS — I25.10 CORONARY ARTERY DISEASE INVOLVING NATIVE CORONARY ARTERY OF NATIVE HEART WITHOUT ANGINA PECTORIS: ICD-10-CM

## 2021-01-19 DIAGNOSIS — I10 HYPERTENSION, ESSENTIAL: ICD-10-CM

## 2021-01-19 DIAGNOSIS — E11.9 CONTROLLED TYPE 2 DIABETES MELLITUS WITHOUT COMPLICATION, UNSPECIFIED WHETHER LONG TERM INSULIN USE (HCC): ICD-10-CM

## 2021-01-19 DIAGNOSIS — E78.2 MIXED HYPERLIPIDEMIA: ICD-10-CM

## 2021-01-19 DIAGNOSIS — E66.01 CLASS 2 SEVERE OBESITY DUE TO EXCESS CALORIES WITH SERIOUS COMORBIDITY IN ADULT, UNSPECIFIED BMI (HCC): ICD-10-CM

## 2021-01-19 PROCEDURE — G8484 FLU IMMUNIZE NO ADMIN: HCPCS | Performed by: INTERNAL MEDICINE

## 2021-01-19 PROCEDURE — 4040F PNEUMOC VAC/ADMIN/RCVD: CPT | Performed by: INTERNAL MEDICINE

## 2021-01-19 PROCEDURE — G8417 CALC BMI ABV UP PARAM F/U: HCPCS | Performed by: INTERNAL MEDICINE

## 2021-01-19 PROCEDURE — 99214 OFFICE O/P EST MOD 30 MIN: CPT | Performed by: INTERNAL MEDICINE

## 2021-01-19 PROCEDURE — G8427 DOCREV CUR MEDS BY ELIG CLIN: HCPCS | Performed by: INTERNAL MEDICINE

## 2021-01-19 PROCEDURE — 1123F ACP DISCUSS/DSCN MKR DOCD: CPT | Performed by: INTERNAL MEDICINE

## 2021-01-19 PROCEDURE — 1036F TOBACCO NON-USER: CPT | Performed by: INTERNAL MEDICINE

## 2021-01-19 PROCEDURE — 93005 ELECTROCARDIOGRAM TRACING: CPT | Performed by: INTERNAL MEDICINE

## 2021-01-19 PROCEDURE — 93010 ELECTROCARDIOGRAM REPORT: CPT | Performed by: INTERNAL MEDICINE

## 2021-01-19 NOTE — PROGRESS NOTES
Today's Date: 1/19/2021  Patient's Name: Yisel Mcfarlane  Patient's age: 67 y.o., 1945    CC:  Barajas, cad, htn, hlp    HPI:  Norberto Osullivan  is here for follow up. No chest pain, no dyspnea, no PND, no syncope or pre-syncope, no orthopnea. He is working in yard and around the house. Past Medical History:   has a past medical history of Asthma, CAD (coronary artery disease), Cancer ([facility] [address] Cervical radiculopathy, Chronic back pain, Colonic polyp, Degeneration of cervical intervertebral disc, Degeneration of cervical intervertebral disc, Depression, Diverticulosis, GERD (gastroesophageal reflux disease), Glucose intolerance (impaired glucose tolerance), HTN (hypertension), Hyperlipidemia, Lumbar radiculopathy, MI (myocardial infarction) ([facility] [address] Numbness and tingling, OA (osteoarthritis), Pre-diabetes, Sacroiliac pain, and Vision abnormalities. Past Surgical History:   has a past surgical history that includes Coronary angioplasty with stent; Finger trigger release (Right); Abscess Drainage (Right); other surgical history (2/19/13, 5/14/13); Elbow bursa surgery (Right, 2012,2011); Infected skin debridement (Right); Colonoscopy (2007, 2003); Colonoscopy (9/9/13); Cervical spine surgery (08/09/2001); Cervical spine surgery (Left, 4809,4620,6529); Lumbar spine surgery (8109,9100); lumbar fusion (4/7/14); skin biopsy; other surgical history; other surgical history (Left, 09/27/2016); other surgical history (Bilateral, 11/29/2016); other surgical history (12/06/2016); other surgical history (Right, 12/12/2016); other surgical history (Left, 12/15/17); other surgical history (Left, 01/31/2017); Lumbar spine surgery (Left, 2/14/2017); Injection Procedure For Sacroiliac Joint (Bilateral, 3/14/2017); pr arthrocentesis aspir&/inj major jt/bursa w/o us (Left, 3/31/2017); pr arthrocentesis aspir&/inj major jt/bursa w/o us (Left, 4/14/2017); Lumbar spine surgery (Bilateral, 5/2/2017);  Lumbar spine surgery (Bilateral, 5/9/2017); Anesthesia Nerve Block (Bilateral, 6/2/2017); Anesthesia Nerve Block (Bilateral, 6/9/2017); RADIOFREQUENCY ABLATION NERVES (Right, 6/29/2017); RADIOFREQUENCY ABLATION NERVES (Left, 7/6/2017); Cardiac catheterization; Cardiac catheterization (06/01/2011); Cardiac catheterization (5-5-2011); back surgery; lumbar fusion (N/A, 11/15/2017); Colonoscopy (N/A, 10/15/2018); pr hemorrhoidectomy,int/ext,1 column/group (N/A, 11/7/2018); and Upper gastrointestinal endoscopy (N/A, 9/30/2019). Home Medications:  Prior to Admission medications    Medication Sig Start Date End Date Taking? Authorizing Provider   metoprolol tartrate (LOPRESSOR) 25 MG tablet TAKE 1 TABLET TWICE A DAY 1/18/21  Yes Lacye Saenz MD   atorvastatin (LIPITOR) 80 MG tablet TAKE 1 TABLET DAILY 1/18/21  Yes Lacey Saenz MD   clopidogrel (PLAVIX) 75 MG tablet TAKE 1 TABLET DAILY 12/17/20  Yes Lacey Saenz MD   ferrous sulfate (IRON 325) 325 (65 Fe) MG tablet Take 1 tablet by mouth daily (with breakfast) 11/24/20  Yes Lacey Saenz MD   metFORMIN (GLUCOPHAGE) 500 MG tablet TAKE 1 TABLET TWICE DAILY  WITH MEALS. 11/20/20  Yes Lacey Saenz MD   celecoxib (CELEBREX) 200 MG capsule TAKE 1 CAPSULE BY MOUTH TWICE DAILY 8/24/20  Yes Lacey Saenz MD   PARoxetine (PAXIL) 20 MG tablet TAKE 1 TABLET BY MOUTH ONCE DAILY IN THE MORNING 8/24/20  Yes Lacey Saenz MD   losartan (COZAAR) 25 MG tablet Take 1 tablet by mouth daily 8/24/20  Yes Lacey Saenz MD   pantoprazole (PROTONIX) 40 MG tablet TAKE 1 TABLET BY MOUTH ONCE DAILY 2/7/20  Yes Lacey Saenz MD   Handicap Placard MISC by Does not apply route Expires: 7/5/2021 7/5/19  Yes Lacey Saenz MD   HYDROcodone-acetaminophen (NORCO) 5-325 MG per tablet Take 1 tablet by mouth every 8 hours as needed for Pain .    Yes Historical Provider, MD   tiZANidine (ZANAFLEX) 4 MG tablet TAKE ONE TABLET BY MOUTH THREE TIMES DAILY 5/11/17  Yes LAWRENCE Barros - CNP aspirin 81 MG tablet Take 81 mg by mouth daily   Yes Historical Provider, MD   famotidine (PEPCID) 20 MG tablet Take 20 mg by mouth daily. Yes Historical Provider, MD       Allergies:  Crestor [rosuvastatin], Ibuprofen, Lisinopril, and Effient [prasugrel]    Social History:   reports that he quit smoking about 9 years ago. His smoking use included cigarettes, pipe, and cigars. He has a 40.00 pack-year smoking history. He quit smokeless tobacco use about 9 years ago. His smokeless tobacco use included chew. He reports current alcohol use. He reports that he does not use drugs. Family History: family history includes Cancer in his father. No h/o sudden cardiac death. No for premature CAD    REVIEW OF SYSTEMS:    · Constitutional: there has been no unanticipated weight loss. There's been No change in energy level, No change in activity level. · Eyes: No visual changes or diplopia. No scleral icterus. · ENT: No Headaches, hearing loss or vertigo. No mouth sores or sore throat. · Cardiovascular: see above  · Respiratory: see above  · Gastrointestinal: No abdominal pain, appetite loss, blood in stools. · Genitourinary: No dysuria, trouble voiding, or hematuria. · Musculoskeletal:  Lower Back pain reported  · Integumentary: No rash or pruritis. · Neurological: No headache or diplopia. No tingling  · Psychiatric: No anxiety, or depression. · Endocrine: No temperature intolerance. · Hematologic/Lymphatic: No abnormal bruising or bleeding, blood clots or swollen lymph nodes. · Allergic/Immunologic: No nasal congestion or hives. PHYSICAL EXAM:      BP (!) 114/59   Pulse 61   Ht 5' 9\" (1.753 m)   Wt 220 lb (99.8 kg)   BMI 32.49 kg/m²     General appearance: alert and cooperative with exam  HEENT: Head: Normocephalic, no lesions, without obvious abnormality.   Eyes: PERRL, EOMI  Ears: Not obvious deformations or lack of hearing  Neck: no carotid bruit, no JVD  Lungs: clear to auscultation bilaterally  Heart: regular rate and rhythm, S1, S2 normal, no murmur, click, rub or gallop  Abdomen: soft, non-tender; bowel sounds normal; no masses,  no organomegaly  Extremities: extremities normal, atraumatic, no cyanosis or edema  Neurologic: Mental status: Alert, oriented, thought content appropriate  Skin: WNL for age and condition, no obvious rashes or leasions      Cardiac Data:  EKG: SR, RBBB    Labs:     CBC: No results for input(s): WBC, HGB, HCT, PLT in the last 72 hours. BMP: No results for input(s): NA, K, CO2, BUN, CREATININE, LABGLOM, GLUCOSE in the last 72 hours. PT/INR: No results for input(s): PROTIME, INR in the last 72 hours. FASTING LIPID PANEL:  Lab Results   Component Value Date    HDL 43 08/17/2020    TRIG 292 08/17/2020     LIVER PROFILE:No results for input(s): AST, ALT, LABALBU in the last 72 hours. TTE 10/17/2014  1.   Left ventricle is normal in dimension with normal left ventricular systolic function.  Ejection fraction is 50-55%. 2.   Biatrial enlargement. 3.   Right ventricle is dilated with normal function. 4.   Mitral valve leaflets are thickened with trivial regurgitation. 5.   Mildly sclerotic aortic valve with no stenosis or regurgitation. 6.   Trivial tricuspid regurgitation.  RVSP is 57mm Hg. 7.   Grade I diastolic dysfunction. 8.   No pericardial effusion. Assessment and plan:    -CAD- STEMI 5/5/2011 with thrombectomy and MELBA to RCA followed by staged PCI of LAD with MELBA 5/31/2011. Continue antiplatelet therapy. Counseled to go to ER immediately if develops chest pain. -HTN- failry well controlled at this time. Continue current therapy.   -Obesity - encouraged diet, exercise, and discussed weight loss extensively. -Hyperlipidemia- continue statin. -DM- management per PCP. -RTC 6 months. Thank you for allowing me to participate in the care of this patient, please do not hesitate to call if you have any questions.     [facility]  [facility]  [phone]

## 2021-02-01 ENCOUNTER — IMMUNIZATION (OUTPATIENT)
Dept: LAB | Age: 76
End: 2021-02-01
Payer: MEDICARE

## 2021-02-01 PROCEDURE — 91301 COVID-19, MODERNA VACCINE 100MCG/0.5ML DOSE: CPT

## 2021-02-04 RX ORDER — PANTOPRAZOLE SODIUM 40 MG/1
TABLET, DELAYED RELEASE ORAL
Qty: 90 TABLET | Refills: 0 | Status: SHIPPED | OUTPATIENT
Start: 2021-02-04 | End: 2021-05-12

## 2021-02-04 NOTE — TELEPHONE ENCOUNTER
Al called requesting a refill of the below medication which has been pended for you:     Requested Prescriptions     Pending Prescriptions Disp Refills    pantoprazole (PROTONIX) 40 MG tablet [Pharmacy Med Name: Pantoprazole Sodium 40 MG Oral Tablet Delayed Release] 90 tablet 0     Sig: Take 1 tablet by mouth once daily       Last Appointment Date: 8/24/2020  Next Appointment Date: 2/25/2021    Allergies   Allergen Reactions    Crestor [Rosuvastatin] Other (See Comments)     Joint pain, muscle aches    Ibuprofen Other (See Comments)     bleeding    Lisinopril Hives    Effient [Prasugrel] Rash

## 2021-02-18 ENCOUNTER — HOSPITAL ENCOUNTER (OUTPATIENT)
Dept: LAB | Age: 76
Discharge: HOME | End: 2021-02-18
Payer: MEDICARE

## 2021-02-18 DIAGNOSIS — D50.9 IRON DEFICIENCY ANEMIA, UNSPECIFIED IRON DEFICIENCY ANEMIA TYPE: ICD-10-CM

## 2021-02-18 DIAGNOSIS — E11.9 TYPE 2 DIABETES MELLITUS WITHOUT COMPLICATION, WITHOUT LONG-TERM CURRENT USE OF INSULIN (HCC): ICD-10-CM

## 2021-02-18 DIAGNOSIS — D50.8 OTHER IRON DEFICIENCY ANEMIA: ICD-10-CM

## 2021-02-18 LAB
CREATININE URINE: 230.4 MG/DL (ref 39–259)
ESTIMATED AVERAGE GLUCOSE: 126 MG/DL
HBA1C MFR BLD: 6 % (ref 4–6)
HEMOGLOBIN: 12.5 G/DL (ref 13–17)
IRON SATURATION: 12 % (ref 20–55)
IRON: 49 UG/DL (ref 59–158)
MICROALBUMIN/CREAT 24H UR: 35 MG/L
MICROALBUMIN/CREAT UR-RTO: 15 MCG/MG CREAT
TOTAL IRON BINDING CAPACITY: 395 UG/DL (ref 250–450)
UNSATURATED IRON BINDING CAPACITY: 346 UG/DL (ref 112–347)

## 2021-02-18 PROCEDURE — 83540 ASSAY OF IRON: CPT

## 2021-02-18 PROCEDURE — 85018 HEMOGLOBIN: CPT

## 2021-02-18 PROCEDURE — 82570 ASSAY OF URINE CREATININE: CPT

## 2021-02-18 PROCEDURE — 36415 COLL VENOUS BLD VENIPUNCTURE: CPT

## 2021-02-18 PROCEDURE — 82043 UR ALBUMIN QUANTITATIVE: CPT

## 2021-02-18 PROCEDURE — 83036 HEMOGLOBIN GLYCOSYLATED A1C: CPT

## 2021-02-18 PROCEDURE — 83550 IRON BINDING TEST: CPT

## 2021-02-18 RX ORDER — FERROUS SULFATE 325(65) MG
325 TABLET ORAL 2 TIMES DAILY
COMMUNITY
End: 2021-02-25 | Stop reason: SDUPTHER

## 2021-02-18 NOTE — RESULT ENCOUNTER NOTE
Patient notified per phone call. Pt voices understanding. EMR updated. Pt has appt next week.
---,   [facility] outpatient clinic  Phone: (   )    -  Fax: (   )    -  Follow Up Time:

## 2021-02-25 ENCOUNTER — OFFICE VISIT (OUTPATIENT)
Dept: INTERNAL MEDICINE | Age: 76
End: 2021-02-25
Payer: MEDICARE

## 2021-02-25 VITALS
SYSTOLIC BLOOD PRESSURE: 118 MMHG | DIASTOLIC BLOOD PRESSURE: 70 MMHG | HEIGHT: 69 IN | WEIGHT: 222 LBS | BODY MASS INDEX: 32.88 KG/M2 | RESPIRATION RATE: 16 BRPM | HEART RATE: 63 BPM

## 2021-02-25 DIAGNOSIS — E11.9 TYPE 2 DIABETES MELLITUS WITHOUT COMPLICATION, WITHOUT LONG-TERM CURRENT USE OF INSULIN (HCC): ICD-10-CM

## 2021-02-25 DIAGNOSIS — Z00.00 MEDICARE ANNUAL WELLNESS VISIT, SUBSEQUENT: ICD-10-CM

## 2021-02-25 DIAGNOSIS — S22.39XD CLOSED FRACTURE OF ONE RIB WITH ROUTINE HEALING, UNSPECIFIED LATERALITY, SUBSEQUENT ENCOUNTER: ICD-10-CM

## 2021-02-25 DIAGNOSIS — I10 ESSENTIAL HYPERTENSION: ICD-10-CM

## 2021-02-25 DIAGNOSIS — Z00.00 ROUTINE GENERAL MEDICAL EXAMINATION AT A HEALTH CARE FACILITY: Primary | ICD-10-CM

## 2021-02-25 DIAGNOSIS — E78.49 OTHER HYPERLIPIDEMIA: ICD-10-CM

## 2021-02-25 DIAGNOSIS — Z12.5 SPECIAL SCREENING FOR MALIGNANT NEOPLASM OF PROSTATE: ICD-10-CM

## 2021-02-25 DIAGNOSIS — D50.9 IRON DEFICIENCY ANEMIA, UNSPECIFIED IRON DEFICIENCY ANEMIA TYPE: ICD-10-CM

## 2021-02-25 DIAGNOSIS — I25.10 CORONARY ARTERY DISEASE DUE TO LIPID RICH PLAQUE: ICD-10-CM

## 2021-02-25 DIAGNOSIS — I25.83 CORONARY ARTERY DISEASE DUE TO LIPID RICH PLAQUE: ICD-10-CM

## 2021-02-25 PROCEDURE — G8427 DOCREV CUR MEDS BY ELIG CLIN: HCPCS | Performed by: INTERNAL MEDICINE

## 2021-02-25 PROCEDURE — G0463 HOSPITAL OUTPT CLINIC VISIT: HCPCS

## 2021-02-25 PROCEDURE — 4040F PNEUMOC VAC/ADMIN/RCVD: CPT | Performed by: INTERNAL MEDICINE

## 2021-02-25 PROCEDURE — G0439 PPPS, SUBSEQ VISIT: HCPCS | Performed by: INTERNAL MEDICINE

## 2021-02-25 PROCEDURE — G8484 FLU IMMUNIZE NO ADMIN: HCPCS | Performed by: INTERNAL MEDICINE

## 2021-02-25 PROCEDURE — 1036F TOBACCO NON-USER: CPT | Performed by: INTERNAL MEDICINE

## 2021-02-25 PROCEDURE — 99214 OFFICE O/P EST MOD 30 MIN: CPT | Performed by: INTERNAL MEDICINE

## 2021-02-25 PROCEDURE — G0439 PPPS, SUBSEQ VISIT: HCPCS

## 2021-02-25 PROCEDURE — 1123F ACP DISCUSS/DSCN MKR DOCD: CPT | Performed by: INTERNAL MEDICINE

## 2021-02-25 PROCEDURE — G8417 CALC BMI ABV UP PARAM F/U: HCPCS | Performed by: INTERNAL MEDICINE

## 2021-02-25 PROCEDURE — 99397 PER PM REEVAL EST PAT 65+ YR: CPT

## 2021-02-25 RX ORDER — OXYCODONE HYDROCHLORIDE AND ACETAMINOPHEN 5; 325 MG/1; MG/1
1 TABLET ORAL EVERY 6 HOURS PRN
Qty: 90 TABLET | Refills: 0 | Status: SHIPPED | OUTPATIENT
Start: 2021-02-25 | End: 2021-05-28 | Stop reason: SDUPTHER

## 2021-02-25 RX ORDER — NITROGLYCERIN 0.4 MG/1
0.4 TABLET SUBLINGUAL EVERY 5 MIN PRN
Qty: 25 TABLET | Refills: 3 | Status: SHIPPED | OUTPATIENT
Start: 2021-02-25 | End: 2022-05-27 | Stop reason: SDUPTHER

## 2021-02-25 RX ORDER — FERROUS SULFATE 325(65) MG
325 TABLET ORAL 2 TIMES DAILY
Qty: 120 TABLET | Refills: 3 | Status: SHIPPED | OUTPATIENT
Start: 2021-02-25

## 2021-02-25 RX ORDER — NITROGLYCERIN 0.4 MG/1
0.4 TABLET SUBLINGUAL EVERY 5 MIN PRN
COMMUNITY
End: 2021-02-25 | Stop reason: SDUPTHER

## 2021-02-25 ASSESSMENT — ENCOUNTER SYMPTOMS
EYE PAIN: 0
CONSTIPATION: 0
SHORTNESS OF BREATH: 0
VOMITING: 0
NAUSEA: 0
BACK PAIN: 0
COUGH: 0
BLOOD IN STOOL: 0
ABDOMINAL PAIN: 0
DIARRHEA: 0

## 2021-02-25 ASSESSMENT — PATIENT HEALTH QUESTIONNAIRE - PHQ9: 1. LITTLE INTEREST OR PLEASURE IN DOING THINGS: 0

## 2021-02-25 ASSESSMENT — LIFESTYLE VARIABLES
AUDIT-C TOTAL SCORE: 1
HOW MANY STANDARD DRINKS CONTAINING ALCOHOL DO YOU HAVE ON A TYPICAL DAY: 0

## 2021-02-25 NOTE — PATIENT INSTRUCTIONS
Personalized Preventive Plan for Norberto Osullivan - 2/25/2021  Medicare offers a range of preventive health benefits. Some of the tests and screenings are paid in full while other may be subject to a deductible, co-insurance, and/or copay. Some of these benefits include a comprehensive review of your medical history including lifestyle, illnesses that may run in your family, and various assessments and screenings as appropriate. After reviewing your medical record and screening and assessments performed today your provider may have ordered immunizations, labs, imaging, and/or referrals for you. A list of these orders (if applicable) as well as your Preventive Care list are included within your After Visit Summary for your review. Other Preventive Recommendations:    · A preventive eye exam performed by an eye specialist is recommended every 1-2 years to screen for glaucoma; cataracts, macular degeneration, and other eye disorders. · A preventive dental visit is recommended every 6 months. · Try to get at least 150 minutes of exercise per week or 10,000 steps per day on a pedometer . · Order or download the FREE \"Exercise & Physical Activity: Your Everyday Guide\" from [employer]. Call 8-160.503.8907 or search [employer] online. · You need 1204-9588 mg of calcium and 2354-6288 IU of vitamin D per day. It is possible to meet your calcium requirement with diet alone, but a vitamin D supplement is usually necessary to meet this goal.  · When exposed to the sun, use a sunscreen that protects against both UVA and UVB radiation with an SPF of 30 or greater. Reapply every 2 to 3 hours or after sweating, drying off with a towel, or swimming. · Always wear a seat belt when traveling in a car. Always wear a helmet when riding a bicycle or motorcycle.

## 2021-02-25 NOTE — PROGRESS NOTES
[place] [address]  Dept: [phone]  Dept Fax: [phone]  Loc: [phone]     Norberto Quiroz is a 67 y.o. male who presents today for his medical conditions/complaintsas noted below. Norberto Osullivan is c/o of   Chief Complaint   Patient presents with    Medicare AWV     6 month    Hypertension    Hyperlipidemia    Diabetes         HPI:     Hypertension  This is a chronic (essential htn) problem. The current episode started more than 1 year ago. The problem has been waxing and waning since onset. The problem is controlled. Pertinent negatives include no chest pain, headaches, neck pain, palpitations or shortness of breath. Hyperlipidemia  This is a chronic problem. The current episode started more than 1 year ago. The problem is controlled. Recent lipid tests were reviewed and are variable. He has no history of hypothyroidism. Pertinent negatives include no chest pain or shortness of breath. Diabetes  He presents for his follow-up diabetic visit. He has type 2 diabetes mellitus. His disease course has been fluctuating. Pertinent negatives for hypoglycemia include no confusion, dizziness, headaches, nervousness/anxiousness or pallor. Pertinent negatives for diabetes include no chest pain, no polydipsia, no polyuria and no weakness. Other  This is a recurrent (-4 General medical exam) problem. The current episode started today. The problem occurs intermittently. The problem has been waxing and waning. Pertinent negatives include no abdominal pain, arthralgias, chest pain, chills, coughing, fever, headaches, nausea, neck pain, numbness, rash, vomiting or weakness. Hemoglobin A1C (%)   Date Value   02/18/2021 6.0   08/17/2020 5.6   02/17/2020 5.8            Microalb/Crt.  Ratio (mcg/mg creat)   Date Value   02/18/2021 15     LDL Cholesterol (mg/dL)   Date Value   08/17/2020 89 08/15/2019 80   08/09/2018 69         AST (U/L)   Date Value   08/17/2020 17     ALT (U/L)   Date Value   08/17/2020 22     BUN (mg/dL)   Date Value   08/17/2020 20     BP Readings from Last 3 Encounters:   02/25/21 118/70   01/19/21 (!) 114/59   11/20/20 137/69              Past Medical History:   Diagnosis Date    Asthma     CAD (coronary artery disease)     STENTS X 4    Cancer (HCC)     THROAT, HX. RADIATION , LT EAR    Cervical radiculopathy     Chronic back pain     Colonic polyp     Degeneration of cervical intervertebral disc     [facility], 1990 C4/C5-C6/C7    Degeneration of cervical intervertebral disc     [facility] 1994, C3/C4    Depression     Diverticulosis     GERD (gastroesophageal reflux disease)     Glucose intolerance (impaired glucose tolerance)     IS NOT DIABETIC, PER PT    HTN (hypertension)     Hyperlipidemia     Lumbar radiculopathy     MI (myocardial infarction) ([facility] [address]     2011    Numbness and tingling     HANDS    OA (osteoarthritis)     Pre-diabetes     Sacroiliac pain 11/4/2014    Vision abnormalities     WEARS GLASSES      Past Surgical History:   Procedure Laterality Date    ABSCESS DRAINAGE Right     ELBOW WITH CLOSURE    ANESTHESIA NERVE BLOCK Bilateral 6/2/2017    Bilat L1 L2 L3 L4 L5 Diagnostic Medial Branch Blk performed by Barbara Saavedra MD at [address] Bilateral 6/9/2017    Bilat L1 L2 L3 L4 L5 Confirmatory Medial BB performed by Barbara Saavedra MD at [facility]  06/01/2011    DR Ghazal Batres CARDIAC CATHETERIZATION  5-5-2011    STENTSx4    CERVICAL SPINE SURGERY  08/09/2001    Anterior Cervical Decompression and Fusion C3-4    CERVICAL SPINE SURGERY Left [id number]    Cervical C6-C7 Discectomy and Foraminotomy    COLONOSCOPY  2007, 2003    POLYPS    COLONOSCOPY  9/9/13    hyperplastic polyp x 3    COLONOSCOPY N/A 10/15/2018 hyperplastic polyp, severe sigmoid diverticulosis, large ext. hemorrhoid, Dr Alex Ohara Right 2012,2011    FINGER TRIGGER RELEASE Right     THIRD FINGER    [facility] INJECTION PROCEDURE FOR SACROILIAC JOINT Bilateral 3/14/2017    Bilat Sacroiliac & Piriformis Inj's performed by Brenda Christian MD at [address] Right     ELBOW    LUMBAR FUSION  4/7/14    lumbar decompression and fusion    LUMBAR FUSION N/A 11/15/2017    LUMBAR DECOMPRESSION POSTERIOR W/FUSION L3 L4 ( with SPINAL CORD MONITOR ) performed by Mariaelena Hurst MD at [address]  [phone]    Fusion    LUMBAR SPINE SURGERY Left 2/14/2017    Left L4 & L5 Transforaminal ANITHA performed by Brenda Christian MD at [address] Bilateral 5/2/2017    Bilat L5 Transforaminal ANITHA performed by Brenda Christian MD at [address] Bilateral 5/9/2017    Bilat L5 Transforaminal ANITHA performed by Brenda Christian MD at [address]  2/19/13, 5/14/13    L1/L2 IESI    OTHER SURGICAL HISTORY      Hardware correction, patient had 1 broken screw and to loose screws in back     OTHER SURGICAL HISTORY Left 09/27/2016     C6 TFE    OTHER SURGICAL HISTORY Bilateral 11/29/2016    L3, L4 L5 Diagnostic Medial Branch Block    OTHER SURGICAL HISTORY  12/06/2016    jovani L3 L4 L5 conf MBB    OTHER SURGICAL HISTORY Right 12/12/2016    L3,L4,L5 RFA    OTHER SURGICAL HISTORY Left 12/15/17    L3.  L4, L5 RFA    OTHER SURGICAL HISTORY Left 01/31/2017    L4 & L5 TFE    VT ARTHROCENTESIS ASPIR&/INJ MAJOR JT/BURSA W/O US Left 3/31/2017    Left Hip Inj performed by Brenda Christian MD at [address] ARTHROCENTESIS ASPIR&/INJ MAJOR JT/BURSA W/O US Left 4/14/2017    Left Hip Inj performed by Brenda Christian MD at [address] HEMORRHOIDECTOMY,INT/EXT,1 COLUMN/GROUP N/A 11/7/2018 External HEMORRHOIDECTOMY performed by Bernarda Manzano MD at [address] Right 2017    Right L1 L2 L3 L4 L5 Radiofrequency Ablation performed by Flor Myers MD at [address] Left 2017    Left L1 L2 L3 L4 L5 Radiofrequency performed by Flor Myers MD at [facility]    UPPER GASTROINTESTINAL ENDOSCOPY N/A 2019    mild gastritis, mild to moderate esophagitis, Dr. Jim Barrios       Family History   Problem Relation Age of Onset    Cancer Father         colon cancer          Social History     Tobacco Use    Smoking status: Former Smoker     Packs/day: 1.00     Years: 40.00     Pack years: 40.00     Types: Cigarettes, Pipe, Cigars     Quit date: 2011     Years since quittin.8    Smokeless tobacco: Former User     Types: [address] [place] [address] date: 2011   Substance Use Topics    Alcohol use: Yes     Alcohol/week: 0.0 standard drinks     Comment: RARE         Current Outpatient Medications   Medication Sig Dispense Refill    oxyCODONE-acetaminophen (PERCOCET) 5-325 MG per tablet Take 1 tablet by mouth every 6 hours as needed for Pain for up to 30 days. 90 tablet 0    nitroGLYCERIN (NITROSTAT) 0.4 MG SL tablet Place 1 tablet under the tongue every 5 minutes as needed for Chest pain up to max of 3 total doses.  If no relief after 1 dose, call 911. 25 tablet 3    ferrous sulfate (IRON 325) 325 (65 Fe) MG tablet Take 1 tablet by mouth 2 times daily 120 tablet 3    pantoprazole (PROTONIX) 40 MG tablet Take 1 tablet by mouth once daily 90 tablet 0    metoprolol tartrate (LOPRESSOR) 25 MG tablet TAKE 1 TABLET TWICE A  tablet 3    atorvastatin (LIPITOR) 80 MG tablet TAKE 1 TABLET DAILY 90 tablet 3    clopidogrel (PLAVIX) 75 MG tablet TAKE 1 TABLET DAILY 90 tablet 3    metFORMIN (GLUCOPHAGE) 500 MG tablet TAKE 1 TABLET TWICE DAILY  WITH MEALS. 180 tablet 3  celecoxib (CELEBREX) 200 MG capsule TAKE 1 CAPSULE BY MOUTH TWICE DAILY 180 capsule 3    PARoxetine (PAXIL) 20 MG tablet TAKE 1 TABLET BY MOUTH ONCE DAILY IN THE MORNING 90 tablet 3    losartan (COZAAR) 25 MG tablet Take 1 tablet by mouth daily 90 tablet 3    tiZANidine (ZANAFLEX) 4 MG tablet TAKE ONE TABLET BY MOUTH THREE TIMES DAILY 90 tablet 5    aspirin 81 MG tablet Take 81 mg by mouth daily      famotidine (PEPCID) 20 MG tablet Take 20 mg by mouth daily.  Handicap Placard MISC by Does not apply route Expires: 7/5/2021 2 each 0     Current Facility-Administered Medications   Medication Dose Route Frequency Provider Last Rate Last Admin    methylPREDNISolone acetate (DEPO-MEDROL) injection 40 mg  40 mg Intramuscular Once [place] MD Alex         Allergies   Allergen Reactions    Crestor [Rosuvastatin] Other (See Comments)     Joint pain, muscle aches    Ibuprofen Other (See Comments)     bleeding    Lisinopril Hives    Effient [Prasugrel] Rash       Health Maintenance   Topic Date Due    Hepatitis C screen  1945    DTaP/Tdap/Td vaccine (1 - Tdap) 03/20/1963    Low dose CT lung screening  03/20/1999    Shingles Vaccine (2 of 3) 01/03/2014    COVID-19 Vaccine (2 of 2 - Moderna series) 03/01/2021    Lipid screen  08/17/2021    Potassium monitoring  08/17/2021    Creatinine monitoring  08/17/2021    Flu vaccine  Completed    Pneumococcal 65+ years Vaccine  Completed    Hepatitis A vaccine  Aged Out    Hib vaccine  Aged Out    Meningococcal (ACWY) vaccine  Aged Out       Subjective:      Review of Systems   Constitutional: Negative for chills and fever. HENT: Negative for hearing loss. Eyes: Negative for pain and visual disturbance. Respiratory: Negative for cough and shortness of breath. Cardiovascular: Negative for chest pain, palpitations and leg swelling. Gastrointestinal: Negative for abdominal pain, blood in stool, constipation, diarrhea, nausea and vomiting. Endocrine: Negative for cold intolerance, polydipsia and polyuria. Genitourinary: Negative for difficulty urinating, dysuria and hematuria. Musculoskeletal: Negative for arthralgias, back pain, gait problem and neck pain. Skin: Negative for pallor and rash. Neurological: Negative for dizziness, weakness, numbness and headaches. Hematological: Negative for adenopathy. Does not bruise/bleed easily. Psychiatric/Behavioral: Negative for confusion. The patient is not nervous/anxious. Objective:     Physical Exam  Vitals signs reviewed. Constitutional:       Appearance: He is well-developed. HENT:      Head: Normocephalic and atraumatic. Eyes:      Pupils: Pupils are equal, round, and reactive to light. Neck:      Musculoskeletal: Neck supple. Cardiovascular:      Rate and Rhythm: Normal rate and regular rhythm. Heart sounds: No murmur. No friction rub. No gallop. Pulmonary:      Effort: Pulmonary effort is normal.      Breath sounds: Normal breath sounds. No wheezing or rales. Abdominal:      General: There is no distension. Palpations: Abdomen is soft. There is no mass. Tenderness: There is no abdominal tenderness. There is no rebound. Musculoskeletal: Normal range of motion. Lymphadenopathy:      Cervical: No cervical adenopathy. Skin:     General: Skin is warm and dry. Findings: No rash. Neurological:      Mental Status: He is alert and oriented to person, place, and time. Cranial Nerves: No cranial nerve deficit (grossly). Psychiatric:         Thought Content:  Thought content normal.        /70 (Site: Left Upper Arm, Position: Sitting, Cuff Size: Medium Adult)   Pulse 63   Resp 16   Ht 5' 9\" (1.753 m)   Wt 222 lb (100.7 kg)   BMI 32.78 kg/m²     Assessment:       Diagnosis Orders 1. Routine general medical examination at a health care facility  Comprehensive Metabolic Panel, Fasting   2. Closed fracture of one rib with routine healing, unspecified laterality, subsequent encounter  oxyCODONE-acetaminophen (PERCOCET) 5-325 MG per tablet   3. Essential hypertension     4. Type 2 diabetes mellitus without complication, without long-term current use of insulin (HCC)  Hemoglobin A1C   5. Other hyperlipidemia  Lipid Panel   6. Medicare annual wellness visit, subsequent     7. Special screening for malignant neoplasm of prostate  PSA Screening   8. Iron deficiency anemia, unspecified iron deficiency anemia type  Hemoglobin    Iron And TIBC    ferrous sulfate (IRON 325) 325 (65 Fe) MG tablet   9. Coronary artery disease due to lipid rich plaque  nitroGLYCERIN (NITROSTAT) 0.4 MG SL tablet      Reviewed multiple test results for this appointment. 2/18/2021 okay hemoglobin A1c at 6.0.    2/18/2021 iron equal 49.    2021 hemoglobin 1212.5. Patient told to continue the Protonix indefinitely. Patient also given Percocet for his chronic back pain. Plan:       Return in about 26 weeks (around 8/26/2021) for Hypertension, Hyperlipidemia, Diabetes. Orders Placed This Encounter   Procedures    Comprehensive Metabolic Panel, Fasting     Standing Status:   Future     Standing Expiration Date:   2/25/2022    Lipid Panel     Standing Status:   Future     Standing Expiration Date:   2/25/2022     Order Specific Question:   Is Patient Fasting?/# of Hours     Answer:   yes    PSA Screening     Standing Status:   Future     Standing Expiration Date:   2/25/2022    Hemoglobin A1C     Standing Status:   Future     Standing Expiration Date:   2/25/2022    Hemoglobin     Standing Status:   Future     Standing Expiration Date:   2/25/2022    Iron And TIBC     Standing Status:   Future     Standing Expiration Date:   2/25/2022     Order Specific Question:   Is Patient Fasting?      Answer:   na Order Specific Question:   No of Hours? Answer:   na     Orders Placed This Encounter   Medications    oxyCODONE-acetaminophen (PERCOCET) 5-325 MG per tablet     Sig: Take 1 tablet by mouth every 6 hours as needed for Pain for up to 30 days. Dispense:  90 tablet     Refill:  0     Reduce doses taken as pain becomes manageable    nitroGLYCERIN (NITROSTAT) 0.4 MG SL tablet     Sig: Place 1 tablet under the tongue every 5 minutes as needed for Chest pain up to max of 3 total doses. If no relief after 1 dose, call 911. Dispense:  25 tablet     Refill:  3    ferrous sulfate (IRON 325) 325 (65 Fe) MG tablet     Sig: Take 1 tablet by mouth 2 times daily     Dispense:  120 tablet     Refill:  3       Patientgiven educational materials - see patient instructions. Discussed use, benefit,and side effects of prescribed medications. All patient questions answered. Ptvoiced understanding. Reviewed health maintenance. Instructed to continue currentmedications, diet and exercise. Patient agreed with treatment plan. Follow up asdirected.      Electronically signed by Daniel Causey MD on 2/25/2021 at 10:32 AM

## 2021-02-25 NOTE — PROGRESS NOTES
Medicare Annual Wellness Visit  Name: Brigid Aldridge Date: 2021   MRN: [id number] Sex: Male   Age: 67 y.o. Ethnicity: Non-/Non    : 1945 Race: White      Norberto Osullivan is here for Medicare AWV (6 month), Hypertension, Hyperlipidemia, and Diabetes    Screenings for behavioral, psychosocial and functional/safety risks, and cognitive dysfunction are all negative except as indicated below. These results, as well as other patient data from the [address] form, are documented in Flowsheets linked to this Encounter. Allergies   Allergen Reactions    Crestor [Rosuvastatin] Other (See Comments)     Joint pain, muscle aches    Ibuprofen Other (See Comments)     bleeding    Lisinopril Hives    Effient [Prasugrel] Rash         Prior to Visit Medications    Medication Sig Taking? Authorizing Provider   oxyCODONE-acetaminophen (PERCOCET) 5-325 MG per tablet Take 1 tablet by mouth every 6 hours as needed for Pain for up to 30 days. Yes Matthew Mcmanus MD   nitroGLYCERIN (NITROSTAT) 0.4 MG SL tablet Place 1 tablet under the tongue every 5 minutes as needed for Chest pain up to max of 3 total doses. If no relief after 1 dose, call 911. Yes Matthew Mcmanus MD   ferrous sulfate (IRON 325) 325 (65 Fe) MG tablet Take 1 tablet by mouth 2 times daily Yes Matthew Mcmanus MD   pantoprazole (PROTONIX) 40 MG tablet Take 1 tablet by mouth once daily Yes Matthew Mcmanus MD   metoprolol tartrate (LOPRESSOR) 25 MG tablet TAKE 1 TABLET TWICE A DAY Yes Matthew Mcmanus MD   atorvastatin (LIPITOR) 80 MG tablet TAKE 1 TABLET DAILY Yes Matthew Mcmanus MD   clopidogrel (PLAVIX) 75 MG tablet TAKE 1 TABLET DAILY Yes Matthew Mcmanus MD   metFORMIN (GLUCOPHAGE) 500 MG tablet TAKE 1 TABLET TWICE DAILY  WITH MEALS.  Yes Matthew Mcmanus MD   celecoxib (CELEBREX) 200 MG capsule TAKE 1 CAPSULE BY MOUTH TWICE DAILY Yes Matthew Mcmanus MD PARoxetine (PAXIL) 20 MG tablet TAKE 1 TABLET BY MOUTH ONCE DAILY IN THE MORNING Yes Bimal Shell MD   losartan (COZAAR) 25 MG tablet Take 1 tablet by mouth daily Yes Bimal Shell MD   tiZANidine (ZANAFLEX) 4 MG tablet TAKE ONE TABLET BY MOUTH THREE TIMES DAILY Yes Paola Schaeffer, APRN - CNP   aspirin 81 MG tablet Take 81 mg by mouth daily Yes Historical Provider, MD   famotidine (PEPCID) 20 MG tablet Take 20 mg by mouth daily.  Yes Historical Provider, MD   Handicap Placard MISC by Does not apply route Expires: 7/5/2021  Bimal Shell MD         Past Medical History:   Diagnosis Date    Asthma     CAD (coronary artery disease)     STENTS X 4    Cancer ([facility] [address]     THROAT, HX. RADIATION , LT EAR    Cervical radiculopathy     Chronic back pain     Colonic polyp     Degeneration of cervical intervertebral disc     [facility], 1990 C4/C5-C6/C7    Degeneration of cervical intervertebral disc     [facility] 1994, C3/C4    Depression     Diverticulosis     GERD (gastroesophageal reflux disease)     Glucose intolerance (impaired glucose tolerance)     IS NOT DIABETIC, PER PT    HTN (hypertension)     Hyperlipidemia     Lumbar radiculopathy     MI (myocardial infarction) ([facility] [address]     2011    Numbness and tingling     HANDS    OA (osteoarthritis)     Pre-diabetes     Sacroiliac pain 11/4/2014    Vision abnormalities     WEARS GLASSES       Past Surgical History:   Procedure Laterality Date    ABSCESS DRAINAGE Right     ELBOW WITH CLOSURE    ANESTHESIA NERVE BLOCK Bilateral 6/2/2017    Bilat L1 L2 L3 L4 L5 Diagnostic Medial Branch Blk performed by Eyad Wolf MD at [address] [facility] Bilateral 6/9/2017    Bilat L1 L2 L3 L4 L5 Confirmatory Medial BB performed by Eyad Wolf MD at [address] [facility]  06/01/2011    DR Samano Balloon CARDIAC CATHETERIZATION  5-5-2011    STENTSx4    CERVICAL SPINE SURGERY  08/09/2001 Anterior Cervical Decompression and Fusion C3-4    CERVICAL SPINE SURGERY Left [phone]    Cervical C6-C7 Discectomy and Foraminotomy    COLONOSCOPY  2007, 2003    POLYPS    COLONOSCOPY  9/9/13    hyperplastic polyp x 3    COLONOSCOPY N/A 10/15/2018    hyperplastic polyp, severe sigmoid diverticulosis, large ext. hemorrhoid, Dr Chica Tom Right 2012,2011    FINGER TRIGGER RELEASE Right     THIRD FINGER    [facility] INJECTION PROCEDURE FOR SACROILIAC JOINT Bilateral 3/14/2017    Bilat Sacroiliac & Piriformis Inj's performed by Mirlande Hoffmann MD at [address] Right     ELBOW    LUMBAR FUSION  4/7/14    lumbar decompression and fusion    LUMBAR FUSION N/A 11/15/2017    LUMBAR DECOMPRESSION POSTERIOR W/FUSION L3 L4 ( with SPINAL CORD MONITOR ) performed by Arash Busch MD at [address] [place] [address]  [phone]    Fusion    LUMBAR SPINE SURGERY Left 2/14/2017    Left L4 & L5 Transforaminal ANITHA performed by Mirlande Hoffmann MD at [address] [place] [address] Bilateral 5/2/2017    Bilat L5 Transforaminal ANITHA performed by Mirlande Hoffmann MD at [address] [place] [address] Bilateral 5/9/2017    Bilat L5 Transforaminal ANITHA performed by Mirlande Hoffmann MD at [address] [facility]  2/19/13, 5/14/13    L1/L2 IESI    OTHER SURGICAL HISTORY      Hardware correction, patient had 1 broken screw and to loose screws in back     OTHER SURGICAL HISTORY Left 09/27/2016     C6 TFE    OTHER SURGICAL HISTORY Bilateral 11/29/2016    L3, L4 L5 Diagnostic Medial Branch Block    OTHER SURGICAL HISTORY  12/06/2016    jovani L3 L4 L5 conf MBB    OTHER SURGICAL HISTORY Right 12/12/2016    L3,L4,L5 RFA    OTHER SURGICAL HISTORY Left 12/15/17    L3.  L4, L5 RFA    OTHER SURGICAL HISTORY Left 01/31/2017    L4 & L5 TFE    CA ARTHROCENTESIS ASPIR&/INJ MAJOR JT/BURSA W/O US Left 3/31/2017 Left Hip Inj performed by Tete Gordon MD at [address] ARTHROCENTESIS ASPIR&/INJ MAJOR JT/BURSA W/O US Left 4/14/2017    Left Hip Inj performed by Tete Gordon MD at [address] HEMORRHOIDECTOMY,INT/EXT,1 COLUMN/GROUP N/A 11/7/2018    External HEMORRHOIDECTOMY performed by Josselyn Oro MD at [address] [facility] Right 6/29/2017    Right L1 L2 L3 L4 L5 Radiofrequency Ablation performed by Tete Gordon MD at [address] [facility] Left 7/6/2017    Left L1 L2 L3 L4 L5 Radiofrequency performed by Tete Gordon MD at [facility]    UPPER GASTROINTESTINAL ENDOSCOPY N/A 9/30/2019    mild gastritis, mild to moderate esophagitis, Dr. Drucella Siemens         Family History   Problem Relation Age of Onset    Cancer Father         colon cancer       CareTeam (Including outside providers/suppliers regularly involved in providing care):   Patient Care Team:  Crispin Conway MD as PCP - General (Internal Medicine)  Crispin Conway MD as PCP - REHABILITATION [facility] Provider    Wt Readings from Last 3 Encounters:   02/25/21 222 lb (100.7 kg)   01/19/21 220 lb (99.8 kg)   11/20/20 218 lb (98.9 kg)     Vitals:    02/25/21 1000   BP: 118/70   Site: Left Upper Arm   Position: Sitting   Cuff Size: Medium Adult   Pulse: 63   Resp: 16   Weight: 222 lb (100.7 kg)   Height: 5' 9\" (1.753 m)     Body mass index is 32.78 kg/m². Based upon direct observation of the patient, evaluation of cognition reveals none. Patient's complete Health Risk Assessment and screening values have been reviewed and are found in Flowsheets. The following problems were reviewed today and where indicated follow up appointments were made and/or referrals ordered.     Positive Risk Factor Screenings with Interventions:     Fall Risk:  2 or more falls in past year?: (!) yes  Fall with injury in past year?: no  Fall Risk Interventions: · patient tripped over the metal strip on the bottom of the door, denies any injury with the fall          General Health and ACP:  General  In general, how would you say your health is?: Good  In the past 7 days, have you experienced any of the following? New or Increased Pain, New or Increased Fatigue, Loneliness, Social Isolation, Stress or Anger?: None of These  Do you get the social and emotional support that you need?: Yes  Do you have a Living Will?: Yes  Advance Directives     Power of [address] [place] [address] Will ACP-Advance Directive ACP-Power of     Not on File Not on File Not on File Not on File      General Health Risk Interventions:  · na    Health Habits/Nutrition:  Health Habits/Nutrition  Do you exercise for at least 20 minutes 2-3 times per week?: (!) No  Have you lost any weight without trying in the past 3 months?: No  Do you eat only one meal per day?: No  Have you seen the dentist within the past year?: Yes  Body mass index: (!) 32.78  Health Habits/Nutrition Interventions:  · Inadequate physical activity:  unable to excercise due to pain in his back    Hearing/Vision:  No exam data present  Hearing/Vision  Do you or your family notice any trouble with your hearing that hasn't been managed with hearing aids?: (!) Yes  Do you have difficulty driving, watching TV, or doing any of your daily activities because of your eyesight?: No  Have you had an eye exam within the past year?: Yes  Hearing/Vision Interventions:  · Hearing concerns:  has hearing aides but does not wear them all the time  · .       Personalized Preventive Plan   Current Health Maintenance Status  Immunization History   Administered Date(s) Administered    COVID-19, Moderna, 100mcg/0.5ml 02/01/2021    Influenza Virus Vaccine 10/10/2012    Influenza, High Dose (Fluzone 65 yrs and older) 10/03/2013, 11/05/2014, 10/19/2015, 11/11/2016, 11/28/2017, 10/02/2018    Influenza, Quadv, adjuvanted, 65 yrs +, IM, PF (Fluad) 09/29/2020  Influenza, Triv, inactivated, subunit, adjuvanted, IM (Fluad 65 yrs and older) 10/17/2019    Pneumococcal Conjugate 13-valent (Lockie Hardy) 10/19/2015, 08/14/2017    Pneumococcal Polysaccharide (Hphfsdick12) 08/16/2018    Zoster Live (Zostavax) 11/08/2013        Health Maintenance   Topic Date Due    Hepatitis C screen  1945    DTaP/Tdap/Td vaccine (1 - Tdap) 03/20/1963    Low dose CT lung screening  03/20/1999    Shingles Vaccine (2 of 3) 01/03/2014    COVID-19 Vaccine (2 of 2 - Moderna series) 03/01/2021    Lipid screen  08/17/2021    Potassium monitoring  08/17/2021    Creatinine monitoring  08/17/2021    Flu vaccine  Completed    Pneumococcal 65+ years Vaccine  Completed    Hepatitis A vaccine  Aged Out    Hib vaccine  Aged Out    Meningococcal (ACWY) vaccine  Aged Out     Recommendations for Osen Due: see orders and patient instructions/AVS.  . Recommended screening schedule for the next 5-10 years is provided to the patient in written form: see Patient Instructions/AVS.    ITosha LPN, 9/25/7009, performed the documented evaluation under the direct supervision of the attending physician. I, Dr. Mirza Shepard, directly supervised the performance of this Medicare annual wellness visit.

## 2021-03-01 ENCOUNTER — IMMUNIZATION (OUTPATIENT)
Dept: LAB | Age: 76
End: 2021-03-01
Payer: MEDICARE

## 2021-03-01 PROCEDURE — 91301 COVID-19, MODERNA VACCINE 100MCG/0.5ML DOSE: CPT

## 2021-03-26 ENCOUNTER — HOSPITAL ENCOUNTER (OUTPATIENT)
Dept: CT IMAGING | Age: 76
Discharge: HOME | End: 2021-03-28
Payer: MEDICARE

## 2021-03-26 ENCOUNTER — HOSPITAL ENCOUNTER (OUTPATIENT)
Age: 76
Setting detail: SPECIMEN
Discharge: HOME | End: 2021-03-26
Payer: MEDICARE

## 2021-03-26 ENCOUNTER — OFFICE VISIT (OUTPATIENT)
Dept: PRIMARY CARE CLINIC | Age: 76
End: 2021-03-26
Payer: MEDICARE

## 2021-03-26 VITALS
DIASTOLIC BLOOD PRESSURE: 74 MMHG | HEART RATE: 74 BPM | OXYGEN SATURATION: 93 % | TEMPERATURE: 98.2 F | BODY MASS INDEX: 32.49 KG/M2 | WEIGHT: 220 LBS | SYSTOLIC BLOOD PRESSURE: 128 MMHG

## 2021-03-26 DIAGNOSIS — R31.9 HEMATURIA, UNSPECIFIED TYPE: Primary | ICD-10-CM

## 2021-03-26 DIAGNOSIS — R31.9 HEMATURIA, UNSPECIFIED TYPE: ICD-10-CM

## 2021-03-26 LAB
-: ABNORMAL
AMORPHOUS: ABNORMAL
BACTERIA: ABNORMAL
BILIRUBIN URINE: NEGATIVE
CASTS UA: ABNORMAL /LPF (ref 0–2)
COLOR: ABNORMAL
COMMENT UA: ABNORMAL
CRYSTALS, UA: ABNORMAL /HPF
EPITHELIAL CELLS UA: ABNORMAL /HPF (ref 0–5)
GLUCOSE URINE: NEGATIVE
KETONES, URINE: NEGATIVE
LEUKOCYTE ESTERASE, URINE: NEGATIVE
MUCUS: ABNORMAL
NITRITE, URINE: NEGATIVE
OTHER OBSERVATIONS UA: ABNORMAL
PH UA: 6 (ref 5–6)
PROTEIN UA: ABNORMAL
RBC UA: >100 /HPF (ref 0–4)
RENAL EPITHELIAL, UA: ABNORMAL /HPF
SPECIFIC GRAVITY UA: 1.03 (ref 1.01–1.02)
TRICHOMONAS: ABNORMAL
TURBIDITY: ABNORMAL
URINE HGB: ABNORMAL
UROBILINOGEN, URINE: NORMAL
WBC UA: ABNORMAL /HPF (ref 0–4)
YEAST: ABNORMAL

## 2021-03-26 PROCEDURE — 99214 OFFICE O/P EST MOD 30 MIN: CPT | Performed by: FAMILY MEDICINE

## 2021-03-26 PROCEDURE — 1036F TOBACCO NON-USER: CPT | Performed by: FAMILY MEDICINE

## 2021-03-26 PROCEDURE — 81001 URINALYSIS AUTO W/SCOPE: CPT

## 2021-03-26 PROCEDURE — G8427 DOCREV CUR MEDS BY ELIG CLIN: HCPCS | Performed by: FAMILY MEDICINE

## 2021-03-26 PROCEDURE — G8417 CALC BMI ABV UP PARAM F/U: HCPCS | Performed by: FAMILY MEDICINE

## 2021-03-26 PROCEDURE — 87086 URINE CULTURE/COLONY COUNT: CPT

## 2021-03-26 PROCEDURE — 4040F PNEUMOC VAC/ADMIN/RCVD: CPT | Performed by: FAMILY MEDICINE

## 2021-03-26 PROCEDURE — G8484 FLU IMMUNIZE NO ADMIN: HCPCS | Performed by: FAMILY MEDICINE

## 2021-03-26 PROCEDURE — 74176 CT ABD & PELVIS W/O CONTRAST: CPT

## 2021-03-26 PROCEDURE — 1123F ACP DISCUSS/DSCN MKR DOCD: CPT | Performed by: FAMILY MEDICINE

## 2021-03-26 ASSESSMENT — PATIENT HEALTH QUESTIONNAIRE - PHQ9
1. LITTLE INTEREST OR PLEASURE IN DOING THINGS: 0
SUM OF ALL RESPONSES TO PHQ QUESTIONS 1-9: 0
SUM OF ALL RESPONSES TO PHQ QUESTIONS 1-9: 0

## 2021-03-26 ASSESSMENT — ENCOUNTER SYMPTOMS
EYE DISCHARGE: 0
CONSTIPATION: 0
EYE REDNESS: 0
TROUBLE SWALLOWING: 0
SINUS PRESSURE: 0
ABDOMINAL PAIN: 1
VOMITING: 0
SORE THROAT: 0
SHORTNESS OF BREATH: 0
RHINORRHEA: 0
COUGH: 0
WHEEZING: 0
NAUSEA: 0
DIARRHEA: 0

## 2021-03-26 NOTE — PROGRESS NOTES
3/26/2021     Norberto Osullivan (:  1945) is a 67 y.o. male, here for evaluation of the following medical concerns:    Hematuria  This is a new problem. The current episode started today. The problem is unchanged. He reports clotting at the end of his urine stream. He is experiencing no pain. Irritative symptoms include frequency (slight increased frequency). Irritative symptoms do not include urgency. Associated symptoms include abdominal pain (has had some discomfort in his lower abdomen in the last couple of days). Pertinent negatives include no chills, dysuria, fever, flank pain, inability to urinate, nausea or vomiting. Did review patient's med list, allergies, social history,pmhx and pshx today as noted in the record. Review of Systems   Constitutional: Negative for chills, fatigue and fever. HENT: Negative for congestion, ear pain, postnasal drip, rhinorrhea, sinus pressure, sore throat and trouble swallowing. Eyes: Negative for discharge and redness. Respiratory: Negative for cough, shortness of breath and wheezing. Cardiovascular: Negative for chest pain. Gastrointestinal: Positive for abdominal pain (has had some discomfort in his lower abdomen in the last couple of days). Negative for constipation, diarrhea, nausea and vomiting. Genitourinary: Positive for frequency (slight increased frequency) and hematuria. Negative for dysuria, flank pain and urgency. Musculoskeletal: Negative for arthralgias, myalgias and neck pain. Skin: Negative for rash and wound. Allergic/Immunologic: Negative for environmental allergies. Neurological: Negative for dizziness, weakness, light-headedness and headaches. Hematological: Negative for adenopathy. Psychiatric/Behavioral: Negative. Prior to Visit Medications    Medication Sig Taking?  Authorizing Provider   oxyCODONE-acetaminophen (PERCOCET) 5-325 MG per tablet Take 1 tablet by mouth every 6 hours as needed for Pain for up to 30 days. Yes Tashia Welsh MD   nitroGLYCERIN (NITROSTAT) 0.4 MG SL tablet Place 1 tablet under the tongue every 5 minutes as needed for Chest pain up to max of 3 total doses. If no relief after 1 dose, call 911. Yes Tashia Welsh MD   ferrous sulfate (IRON 325) 325 (65 Fe) MG tablet Take 1 tablet by mouth 2 times daily Yes Tashia Welsh MD   pantoprazole (PROTONIX) 40 MG tablet Take 1 tablet by mouth once daily Yes Tashia Welsh MD   metoprolol tartrate (LOPRESSOR) 25 MG tablet TAKE 1 TABLET TWICE A DAY Yes Tashia Welsh MD   atorvastatin (LIPITOR) 80 MG tablet TAKE 1 TABLET DAILY Yes Tashia Welsh MD   clopidogrel (PLAVIX) 75 MG tablet TAKE 1 TABLET DAILY Yes Tashia Welsh MD   metFORMIN (GLUCOPHAGE) 500 MG tablet TAKE 1 TABLET TWICE DAILY  WITH MEALS. Yes Tashia Welsh MD   celecoxib (CELEBREX) 200 MG capsule TAKE 1 CAPSULE BY MOUTH TWICE DAILY Yes Tashia Welsh MD   PARoxetine (PAXIL) 20 MG tablet TAKE 1 TABLET BY MOUTH ONCE DAILY IN THE MORNING Yes Tashia Welsh MD   losartan (COZAAR) 25 MG tablet Take 1 tablet by mouth daily Yes Tashia Welsh MD   Handicap Placard MISC by Does not apply route Expires: 2021 Yes Tashia Welsh MD   tiZANidine (ZANAFLEX) 4 MG tablet TAKE ONE TABLET BY MOUTH THREE TIMES DAILY Yes LAWRENCE Garcia - CNP   aspirin 81 MG tablet Take 81 mg by mouth daily Yes Historical Provider, MD   famotidine (PEPCID) 20 MG tablet Take 20 mg by mouth daily. Yes Historical Provider, MD        Social History     Tobacco Use    Smoking status: Former Smoker     Packs/day: 1.00     Years: 40.00     Pack years: 40.00     Types: Cigarettes, Pipe, Cigars     Quit date: 2011     Years since quittin.8    Smokeless tobacco: Former User     Types: [address] date: 2011   Substance Use Topics    Alcohol use:  Yes     Alcohol/week: 0.0 standard drinks     Comment: RARE        Vitals:    21 1215   BP: 128/74   Site: Left Upper Arm Position: Sitting   Cuff Size: Large Adult   Pulse: 74   Temp: 98.2 °F (36.8 °C)   TempSrc: Tympanic   SpO2: 93%   Weight: 220 lb (99.8 kg)     Estimated body mass index is 32.49 kg/m² as calculated from the following:    Height as of 2/25/21: 5' 9\" (1.753 m). Weight as of this encounter: 220 lb (99.8 kg). Physical Exam  Vitals signs and nursing note reviewed. Constitutional:       General: He is not in acute distress. Appearance: Normal appearance. He is well-developed. He is not diaphoretic. HENT:      Head: Normocephalic and atraumatic. Nose: Nose normal.   Eyes:      General:         Right eye: No discharge. Left eye: No discharge. Conjunctiva/sclera: Conjunctivae normal.   Neck:      Musculoskeletal: Normal range of motion and neck supple. No neck rigidity. Cardiovascular:      Rate and Rhythm: Normal rate and regular rhythm. Pulmonary:      Effort: Pulmonary effort is normal. No respiratory distress. Breath sounds: Normal breath sounds. No wheezing. Abdominal:      General: Bowel sounds are normal. There is no distension. Palpations: Abdomen is soft. There is no mass. Tenderness: There is no abdominal tenderness. There is no right CVA tenderness, left CVA tenderness, guarding or rebound. Lymphadenopathy:      Cervical: No cervical adenopathy. Skin:     General: Skin is warm and dry. Neurological:      Mental Status: He is alert and oriented to person, place, and time. Psychiatric:         Behavior: Behavior normal.         Thought Content: Thought content normal.         Judgment: Judgment normal.         ASSESSMENT/PLAN:    Encounter Diagnosis   Name Primary?  Hematuria, unspecified type Yes     No orders of the defined types were placed in this encounter.     Orders Placed This Encounter   Procedures    Culture, Urine     Standing Status:   Future     Number of Occurrences:   1     Standing Expiration Date:   3/26/2022     Order Specific Question:   Specify (ex-cath, midstream, cysto, etc)? Answer:   midstream    CT ABDOMEN PELVIS WO CONTRAST Additional Contrast? None     Standing Status:   Future     Number of Occurrences:   1     Standing Expiration Date:   3/26/2022     Order Specific Question:   Additional Contrast?     Answer:   None     Order Specific Question:   Reason for exam:     Answer:   hematuria    Urinalysis With Microscopic     Standing Status:   Future     Number of Occurrences:   1     Standing Expiration Date:   3/26/2022     Order Specific Question:   SPECIFY(EX-CATH,MIDSTREAM,CYSTO,ETC)? Answer:   midstream   Shandra Bullock MD, Urology, [place]     Referral Priority:   Routine     Referral Type:   Eval and Treat     Referral Reason:   Specialty Services Required     Referred to Provider:   Kobe Briggs MD     Requested Specialty:   Urology     Number of Visits Requested:   1     UA is reviewed and does not show obvious infection, but does show blood. CT ABDOMEN PELVIS WO CONTRAST Additional Contrast? None  Narrative: EXAMINATION:  CT OF THE ABDOMEN AND PELVIS WITHOUT CONTRAST 3/26/2021 12:47 pm    TECHNIQUE:  CT of the abdomen and pelvis was performed without the administration of  intravenous contrast. Multiplanar reformatted images are provided for review. Dose modulation, iterative reconstruction, and/or weight based adjustment of  the mA/kV was utilized to reduce the radiation dose to as low as reasonably  achievable. COMPARISON:  CT abdomen pelvis November 13, 2018. HISTORY:  ORDERING SYSTEM PROVIDED HISTORY: Hematuria, unspecified type  TECHNOLOGIST PROVIDED HISTORY:    hematuria  Reason for Exam: Blood in urine, low abd pain  Acuity: Acute  Type of Exam: Initial    FINDINGS:  Lower Chest: Minimal scarring. Organs: Suboptimal evaluation due to lack of IV contrast.  Liver gallbladder  pancreas spleen and adrenal glands all appear unremarkable.   Kidneys  demonstrate no evidence of stone or hydronephrosis. No ureteral calculus is  seen. Fusiform type infrarenal abdominal aortic aneurysm measuring 3 cm with  associated calcification. Finding is unchanged from prior study. GI/Bowel: Stomach is grossly unremarkable. Small bowel appears nondilated. Colonic diverticulosis. No acute colonic abnormality is seen. Appendix is  normal.    Pelvis: Left-sided fat filled inguinal hernia. Prostate gland is normal in  size. Urinary bladder is mildly distended without focal abnormality. Peritoneum/Retroperitoneum: No free air, free fluid or lymphadenopathy. Bones/Soft Tissues: Abdominal wall demonstrates no acute findings. Fat  filled umbilical hernia. Osseous structures demonstrate degenerative change. Hardware fixation lumbar spine. Impression: *No CT abnormality is identified to explain the patient's hematuria. *Stable 3 cm fusiform type infrarenal abdominal aortic aneurysm. Please see  follow-up recommendations below. *Colonic diverticulosis. *Uncomplicated left-sided fat filled inguinal hernia. RECOMMENDATIONS:  For management of fusiform AAA:    3.0-3.4 cm AAA, recommend follow-up every 3 years. Note: Recommend Vascular consultation if a fusiform AAA enlarges by >0.5 cm  in 6 months or >1 cm in 1 year or for a saccular AAA of any size. References: Benjamin Bolk Radiol 2013; 61(77):181-516; J Vasc Surg. 2018; 67:2-77    CT does not show obvious stone or other obvious abnormality in the urinary tract contributing to the hematuria. Would have patient hold plavix and aspirin over the weekend and monitor blood in urine. Has been anemic for awhile, so possible urinary blood loss contributing to issue? May need cystoscopy for further evaluation. If bleeding worsens or persists past this weekend, then should come in for reassessment. If bleeding improves, can restart plavix and aspirin on Monday and monitor urine for recurrence.     Will place referral for urology for opinion. Patient is advised to drink plenty of water. Return  if no improvement in symptoms or if any further symptoms arise. No follow-ups on file. An electronic signature was used to authenticate this note.     --Bryant Douglass DO on 3/26/2021 at 12:33 PM

## 2021-03-27 LAB
CULTURE: NORMAL
Lab: NORMAL
SPECIMEN DESCRIPTION: NORMAL

## 2021-03-31 ENCOUNTER — OFFICE VISIT (OUTPATIENT)
Dept: UROLOGY | Age: 76
End: 2021-03-31
Payer: MEDICARE

## 2021-03-31 VITALS
HEART RATE: 66 BPM | HEIGHT: 69 IN | BODY MASS INDEX: 32.58 KG/M2 | SYSTOLIC BLOOD PRESSURE: 122 MMHG | DIASTOLIC BLOOD PRESSURE: 80 MMHG | WEIGHT: 220 LBS

## 2021-03-31 DIAGNOSIS — R31.0 GROSS HEMATURIA: Primary | ICD-10-CM

## 2021-03-31 DIAGNOSIS — N40.1 BPH WITH OBSTRUCTION/LOWER URINARY TRACT SYMPTOMS: ICD-10-CM

## 2021-03-31 DIAGNOSIS — N13.8 BPH WITH OBSTRUCTION/LOWER URINARY TRACT SYMPTOMS: ICD-10-CM

## 2021-03-31 PROCEDURE — 1036F TOBACCO NON-USER: CPT | Performed by: UROLOGY

## 2021-03-31 PROCEDURE — 1123F ACP DISCUSS/DSCN MKR DOCD: CPT | Performed by: UROLOGY

## 2021-03-31 PROCEDURE — 4040F PNEUMOC VAC/ADMIN/RCVD: CPT | Performed by: UROLOGY

## 2021-03-31 PROCEDURE — G8417 CALC BMI ABV UP PARAM F/U: HCPCS | Performed by: UROLOGY

## 2021-03-31 PROCEDURE — 99214 OFFICE O/P EST MOD 30 MIN: CPT | Performed by: UROLOGY

## 2021-03-31 PROCEDURE — G8427 DOCREV CUR MEDS BY ELIG CLIN: HCPCS | Performed by: UROLOGY

## 2021-03-31 PROCEDURE — G8484 FLU IMMUNIZE NO ADMIN: HCPCS | Performed by: UROLOGY

## 2021-03-31 PROCEDURE — 99215 OFFICE O/P EST HI 40 MIN: CPT | Performed by: UROLOGY

## 2021-03-31 NOTE — PROGRESS NOTES
measuring 3 cm with associated calcification. Finding is unchanged from prior study. GI/Bowel: Stomach is grossly unremarkable. Small bowel appears nondilated. Colonic diverticulosis. No acute colonic abnormality is seen. Appendix is normal. Pelvis: Left-sided fat filled inguinal hernia. Prostate gland is normal in size. Urinary bladder is mildly distended without focal abnormality. Peritoneum/Retroperitoneum: No free air, free fluid or lymphadenopathy. Bones/Soft Tissues: Abdominal wall demonstrates no acute findings. Fat filled umbilical hernia. Osseous structures demonstrate degenerative change. Hardware fixation lumbar spine. *No CT abnormality is identified to explain the patient's hematuria. *Stable 3 cm fusiform type infrarenal abdominal aortic aneurysm. Please see follow-up recommendations below. *Colonic diverticulosis. *Uncomplicated left-sided fat filled inguinal hernia. RECOMMENDATIONS: For management of fusiform AAA: 3.0-3.4 cm AAA, recommend follow-up every 3 years. Note: Recommend Vascular consultation if a fusiform AAA enlarges by >0.5 cm in 6 months or >1 cm in 1 year or for a saccular AAA of any size. References: Aaron Sutton Radiol 2013; 50(69):801-893; J Vasc Surg. 2018; 67:2-77         Last several PSA's:  Lab Results   Component Value Date    PSA 1.12 08/17/2020    PSA 1.00 08/15/2019    PSA 0.93 08/09/2018       Last total testosterone:  No results found for: TESTOSTERONE    Urinalysis today:  No results found for this visit on 03/31/21.       Last BUN and creatinine:  Lab Results   Component Value Date    BUN 20 08/17/2020     Lab Results   Component Value Date    CREATININE 0.92 08/17/2020       Imaging Reviewed during this Office Visit:   (results were independently reviewed by physician and radiology report verified)    PAST MEDICAL, FAMILY AND SOCIAL HISTORY:  Past Medical History:   Diagnosis Date    Asthma     CAD (coronary artery disease)     STENTS X 4    Cancer ([facility] [address] THROAT, HX. RADIATION , LT EAR    Cervical radiculopathy     Chronic back pain     Colonic polyp     Degeneration of cervical intervertebral disc     [facility], 1990 C4/C5-C6/C7    Degeneration of cervical intervertebral disc     [facility] 1994, C3/C4    Depression     Diverticulosis     GERD (gastroesophageal reflux disease)     Glucose intolerance (impaired glucose tolerance)     IS NOT DIABETIC, PER PT    HTN (hypertension)     Hyperlipidemia     Lumbar radiculopathy     MI (myocardial infarction) ([facility] [address]     2011    Numbness and tingling     HANDS    OA (osteoarthritis)     Pre-diabetes     Sacroiliac pain 11/4/2014    Vision abnormalities     WEARS GLASSES     Past Surgical History:   Procedure Laterality Date    ABSCESS DRAINAGE Right     ELBOW WITH CLOSURE    ANESTHESIA NERVE BLOCK Bilateral 6/2/2017    Bilat L1 L2 L3 L4 L5 Diagnostic Medial Branch Blk performed by Yung Cook MD at [address] [facility] Bilateral 6/9/2017    Bilat L1 L2 L3 L4 L5 Confirmatory Medial BB performed by Yung Cook MD at [address] [facility]  06/01/2011    DR Hang Bell CARDIAC CATHETERIZATION  5-5-2011    STENTSx4    CERVICAL SPINE SURGERY  08/09/2001    Anterior Cervical Decompression and Fusion C3-4    CERVICAL SPINE SURGERY Left [id number]    Cervical C6-C7 Discectomy and Foraminotomy    COLONOSCOPY  2007, 2003    POLYPS    COLONOSCOPY  9/9/13    hyperplastic polyp x 3    COLONOSCOPY N/A 10/15/2018    hyperplastic polyp, severe sigmoid diverticulosis, large ext. hemorrhoid, Dr Wendi Clancy Right 2012,2011    FINGER TRIGGER RELEASE Right     THIRD FINGER    [facility] INJECTION PROCEDURE FOR SACROILIAC JOINT Bilateral 3/14/2017    Bilat Sacroiliac & Piriformis Inj's performed by Yung Cook MD at [address] Right     ELBOW    LUMBAR FUSION  4/7/14    lumbar decompression and fusion    LUMBAR FUSION N/A 11/15/2017    LUMBAR DECOMPRESSION POSTERIOR W/FUSION L3 L4 ( with SPINAL CORD MONITOR ) performed by Isrrael Alston MD at [address] [place] [address] Avenue  [phone]    Fusion    LUMBAR SPINE SURGERY Left 2/14/2017    Left L4 & L5 Transforaminal ANITHA performed by Candance Fishman, MD at [address] [place] [address] [facility] [place] Bilateral 5/2/2017    Bilat L5 Transforaminal ANITHA performed by Candance Fishman, MD at [address] [place] [address] [place] Bilateral 5/9/2017    Bilat L5 Transforaminal ANITHA performed by Candance Fishman, MD at [address]  2/19/13, 5/14/13    L1/L2 IESI    OTHER SURGICAL HISTORY      Hardware correction, patient had 1 broken screw and to loose screws in back     OTHER SURGICAL HISTORY Left 09/27/2016     C6 TFE    OTHER SURGICAL HISTORY Bilateral 11/29/2016    L3, L4 L5 Diagnostic Medial Branch Block    OTHER SURGICAL HISTORY  12/06/2016    jovani L3 L4 L5 conf MBB    OTHER SURGICAL HISTORY Right 12/12/2016    L3,L4,L5 RFA    OTHER SURGICAL HISTORY Left 12/15/17    L3.  L4, L5 RFA    OTHER SURGICAL HISTORY Left 01/31/2017    L4 & L5 TFE    MO ARTHROCENTESIS ASPIR&/INJ MAJOR JT/BURSA W/O US Left 3/31/2017    Left Hip Inj performed by Candance Fishman, MD at [address] ARTHROCENTESIS ASPIR&/INJ MAJOR JT/BURSA W/O US Left 4/14/2017    Left Hip Inj performed by Candance Fishman, MD at [address] HEMORRHOIDECTOMY,INT/EXT,1 COLUMN/GROUP N/A 11/7/2018    External HEMORRHOIDECTOMY performed by Reyes Shingles, MD at [address] Right 6/29/2017    Right L1 L2 L3 L4 L5 Radiofrequency Ablation performed by Candance Fishman, MD at [address] Left 7/6/2017    Left L1 L2 L3 L4 L5 Radiofrequency performed by Candance Fishman, MD at [facility] EAR    UPPER GASTROINTESTINAL ENDOSCOPY N/A 9/30/2019    mild gastritis, mild to moderate esophagitis,  Alcohol/week: 0.0 standard drinks    Comment: RARE       REVIEW OF SYSTEMS:  Constitutional: negative  Eyes: negative  Respiratory: negative  Cardiovascular: negative  Gastrointestinal: negative  Musculoskeletal: negative  Genitourinary: negative except for what is in HPI  Skin: negative   Neurological: negative  Hematological/Lymphatic: negative  Psychological: negative    Physical Exam:    This a 67 y.o. male   Vitals:    03/31/21 1205   BP: 122/80   Pulse: 66     Constitutional: Patient in no acute distress; Neuro: alert and oriented to person place and time. Psych: Mood and affect normal.  Skin: Normal  Lungs: Respiratory effort normal  Cardiovascular:  Normal peripheral pulses  Abdomen: Soft, non-tender, non-distended   Bladder non-tender and not distended. Lymphatics: no palpable lymphadenopathy  Gait is within normal limits  Musculoskeletal: Normal range of motion       Assessment and Plan      1. Gross hematuria    2.  BPH with obstruction/lower urinary tract symptoms         Cystoscopy bilateral retrograde pyelogram, possible bladder biopsy under Aurther MD Nancie Fuentes M.D, MD [facility]

## 2021-04-12 ENCOUNTER — TELEPHONE (OUTPATIENT)
Dept: UROLOGY | Age: 76
End: 2021-04-12

## 2021-04-12 NOTE — TELEPHONE ENCOUNTER
[address]         Patient:Norberto Osullivan           UKP:[id number]           Surgical/Procedure Planned: cystoscopy with bilateral retogrades possible bladder biopsy    Date & Location: 5/19/21 [facility]       Outpatient   Planned Length of OR: 1hour    Sedation: MAC      Estimated Cardiac Risk for Non-Cardiac Surgery/Procedure     Low__X____ Moderate______ High______    Medication Instructions - Clarification needed by this date:   -Insulin:  -Other medications:    ASA 81 mg/325 mg Hold _5__ Days  Plavix   Hold __5_ Days      Resume medications:     Lovenox indicated: _____Yes __X___NO    Provider: Dr. Rebecca Garcia of Provider Giving Orders for Medication holds:    ____Yolanda Heredia DO_____________________________

## 2021-04-13 ENCOUNTER — TELEPHONE (OUTPATIENT)
Dept: INTERNAL MEDICINE | Age: 76
End: 2021-04-13

## 2021-04-13 NOTE — TELEPHONE ENCOUNTER
Diverticulosis     GERD (gastroesophageal reflux disease)     Glucose intolerance (impaired glucose tolerance)     IS NOT DIABETIC, PER PT    HTN (hypertension)     Hyperlipidemia     Lumbar radiculopathy     MI (myocardial infarction) ([facility] [address]     2011    Numbness and tingling     HANDS    OA (osteoarthritis)     Pre-diabetes     Sacroiliac pain 11/4/2014    Vision abnormalities     WEARS GLASSES     Past Surgical History:   Procedure Laterality Date    ABSCESS DRAINAGE Right     ELBOW WITH CLOSURE    ANESTHESIA NERVE BLOCK Bilateral 6/2/2017    Bilat L1 L2 L3 L4 L5 Diagnostic Medial Branch Blk performed by Altagracia Anand MD at [address] Bilateral 6/9/2017    Bilat L1 L2 L3 L4 L5 Confirmatory Medial BB performed by Altagracia Anand MD at [address] [facility]  06/01/2011    DR Obdulio Smith CARDIAC CATHETERIZATION  5-5-2011    STENTSx4    CERVICAL SPINE SURGERY  08/09/2001    Anterior Cervical Decompression and Fusion C3-4    CERVICAL SPINE SURGERY Left [id number]    Cervical C6-C7 Discectomy and Foraminotomy    COLONOSCOPY  2007, 2003    POLYPS    COLONOSCOPY  9/9/13    hyperplastic polyp x 3    COLONOSCOPY N/A 10/15/2018    hyperplastic polyp, severe sigmoid diverticulosis, large ext. hemorrhoid, Dr Fernanda [place] Right 2012,2011    FINGER TRIGGER RELEASE Right     [facility] INJECTION PROCEDURE FOR SACROILIAC JOINT Bilateral 3/14/2017    Bilat Sacroiliac & Piriformis Inj's performed by Altagracia Anand MD at [address] Right     ELBOW    LUMBAR FUSION  4/7/14    lumbar decompression and fusion    LUMBAR FUSION N/A 11/15/2017    LUMBAR DECOMPRESSION POSTERIOR W/FUSION L3 L4 ( with SPINAL CORD MONITOR ) performed by Kingsley Elaine MD at [address] [place] [address] Avenue  [phone]    Fusion    LUMBAR SPINE SURGERY Left 2017    Left L4 & L5 Transforaminal ANITHA performed by Rachelle Thakur MD at [address] [place] [address]  Bilateral 2017    Bilat L5 Transforaminal ANITHA performed by Rachelle Thakur MD at [address] [place] [address] Dr Bilateral 2017    Bilat L5 Transforaminal ANITHA performed by Rachelle Thakur MD at [address] [facility] [address] [address]  13, 13    L1/L2 IESI    OTHER SURGICAL HISTORY      Hardware correction, patient had 1 broken screw and to loose screws in back     OTHER SURGICAL HISTORY Left 2016     C6 TFE    OTHER SURGICAL HISTORY Bilateral 2016    L3, L4 L5 Diagnostic Medial Branch Block    OTHER SURGICAL HISTORY  2016    jovani L3 L4 L5 conf MBB    OTHER SURGICAL HISTORY Right 2016    L3,L4,L5 RFA    OTHER SURGICAL HISTORY Left 12/15/17    L3.  L4, L5 RFA    OTHER SURGICAL HISTORY Left 2017    L4 & L5 TFE    NM ARTHROCENTESIS ASPIR&/INJ MAJOR JT/BURSA W/O US Left 3/31/2017    Left Hip Inj performed by Rachelle Thakur MD at [address] ARTHROCENTESIS ASPIR&/INJ MAJOR JT/BURSA W/O US Left 2017    Left Hip Inj performed by Rachelle Thakur MD at [address] HEMORRHOIDECTOMY,INT/EXT,1 COLUMN/GROUP N/A 2018    External HEMORRHOIDECTOMY performed by Leidy Juárez MD at [address] [place] [address] Right 2017    Right L1 L2 L3 L4 L5 Radiofrequency Ablation performed by Rachelle Thakur MD at [address] [place] [address] Left 2017    Left L1 L2 L3 L4 L5 Radiofrequency performed by Rachelle Thakur MD at [address] [place] [address]    UPPER GASTROINTESTINAL ENDOSCOPY N/A 2019    mild gastritis, mild to moderate esophagitis, Dr. [place] Age     Social History     Tobacco Use    Smoking status: Former Smoker     Packs/day: 1.00     Years: 40.00     Pack years: 40.00     Types: Cigarettes, Pipe, Cigars     Quit date: 2011     Years since quittin.9    Smokeless tobacco: Former User Types: Stephan Guadalupe     Quit date: 5/5/2011   Substance Use Topics    Alcohol use: Yes     Alcohol/week: 0.0 standard drinks     Comment: RARE    Drug use: No     Medications:  Current Outpatient Medications   Medication Sig Dispense Refill    nitroGLYCERIN (NITROSTAT) 0.4 MG SL tablet Place 1 tablet under the tongue every 5 minutes as needed for Chest pain up to max of 3 total doses. If no relief after 1 dose, call 911. 25 tablet 3    ferrous sulfate (IRON 325) 325 (65 Fe) MG tablet Take 1 tablet by mouth 2 times daily 120 tablet 3    pantoprazole (PROTONIX) 40 MG tablet Take 1 tablet by mouth once daily 90 tablet 0    metoprolol tartrate (LOPRESSOR) 25 MG tablet TAKE 1 TABLET TWICE A  tablet 3    atorvastatin (LIPITOR) 80 MG tablet TAKE 1 TABLET DAILY 90 tablet 3    clopidogrel (PLAVIX) 75 MG tablet TAKE 1 TABLET DAILY 90 tablet 3    metFORMIN (GLUCOPHAGE) 500 MG tablet TAKE 1 TABLET TWICE DAILY  WITH MEALS. 180 tablet 3    celecoxib (CELEBREX) 200 MG capsule TAKE 1 CAPSULE BY MOUTH TWICE DAILY 180 capsule 3    PARoxetine (PAXIL) 20 MG tablet TAKE 1 TABLET BY MOUTH ONCE DAILY IN THE MORNING 90 tablet 3    losartan (COZAAR) 25 MG tablet Take 1 tablet by mouth daily 90 tablet 3    Handicap Placard MISC by Does not apply route Expires: 7/5/2021 2 each 0    tiZANidine (ZANAFLEX) 4 MG tablet TAKE ONE TABLET BY MOUTH THREE TIMES DAILY 90 tablet 5    aspirin 81 MG tablet Take 81 mg by mouth daily      famotidine (PEPCID) 20 MG tablet Take 20 mg by mouth daily. Current Facility-Administered Medications   Medication Dose Route Frequency Provider Last Rate Last Admin    methylPREDNISolone acetate (DEPO-MEDROL) injection 40 mg  40 mg Intramuscular Once Malick Mera MD         Scheduled Meds:   methylPREDNISolone acetate  40 mg Intramuscular Once     Continuous Infusions:  PRN Meds:. Prior to Admission medications    Medication Sig Start Date End Date Taking?  Authorizing Provider   nitroGLYCERIN (NITROSTAT) 0.4 MG SL tablet Place 1 tablet under the tongue every 5 minutes as needed for Chest pain up to max of 3 total doses. If no relief after 1 dose, call 911. 2/25/21   Geeta Varner MD   ferrous sulfate (IRON 325) 325 (65 Fe) MG tablet Take 1 tablet by mouth 2 times daily 2/25/21   Geeta Varner MD   pantoprazole (PROTONIX) 40 MG tablet Take 1 tablet by mouth once daily 2/4/21   Geeta Varner MD   metoprolol tartrate (LOPRESSOR) 25 MG tablet TAKE 1 TABLET TWICE A DAY 1/18/21   Geeta Varner MD   atorvastatin (LIPITOR) 80 MG tablet TAKE 1 TABLET DAILY 1/18/21   Geeta Varner MD   clopidogrel (PLAVIX) 75 MG tablet TAKE 1 TABLET DAILY 12/17/20   Geeta Varner MD   metFORMIN (GLUCOPHAGE) 500 MG tablet TAKE 1 TABLET TWICE DAILY  WITH MEALS. 11/20/20   Geeta Varner MD   celecoxib (CELEBREX) 200 MG capsule TAKE 1 CAPSULE BY MOUTH TWICE DAILY 8/24/20   Geeta Varner MD   PARoxetine (PAXIL) 20 MG tablet TAKE 1 TABLET BY MOUTH ONCE DAILY IN THE MORNING 8/24/20   Geeta Varner MD   losartan (COZAAR) 25 MG tablet Take 1 tablet by mouth daily 8/24/20   Geeta Varner MD   Handicap Placard MISC by Does not apply route Expires: 7/5/2021 7/5/19   Geeta Varner MD   tiZANidine (ZANAFLEX) 4 MG tablet TAKE ONE TABLET BY MOUTH THREE TIMES DAILY 5/11/17   Yamel Mg, APRN - CNP   aspirin 81 MG tablet Take 81 mg by mouth daily    Historical Provider, MD   famotidine (PEPCID) 20 MG tablet Take 20 mg by mouth daily. Historical Provider, MD     Vital Signs (Current) [unfilled]    Weight:   Wt Readings from Last 1 Encounters:   03/31/21 220 lb (99.8 kg)     Height:   Ht Readings from Last 1 Encounters:   03/31/21 5' 9\" (1.753 m)      BMI:  There is no height or weight on file to calculate BMI. Estimated body mass index is 32.49 kg/m² as calculated from the following:    Height as of 3/31/21: 5' 9\" (1.753 m). Weight as of 3/31/21: 220 lb (99.8 kg).   body mass index is unknown because there is no height or weight on file. Cardiac Clearance: cleared by Hector Erazo   Appointment for surgery Clearance scheduled for:None     Preoperative Testing: These are the current and completed labs:  CBC:   Lab Results   Component Value Date    WBC 10.1 11/13/2018    RBC 4.48 11/13/2018    HGB 12.5 02/18/2021    HCT 41.1 11/13/2018    MCV 91.6 11/13/2018    RDW 14.3 11/13/2018     11/13/2018     CMP:   Lab Results   Component Value Date     08/17/2020    K 4.8 08/17/2020     08/17/2020    CO2 28 08/17/2020    BUN 20 08/17/2020    CREATININE 0.92 08/17/2020    GFRAA >60 08/17/2020    LABGLOM >60 08/17/2020    GLUCOSE 134 08/17/2020    PROT 6.6 08/17/2020    CALCIUM 9.2 08/17/2020    BILITOT 0.27 08/17/2020    ALKPHOS 60 08/17/2020    AST 17 08/17/2020    ALT 22 08/17/2020     POC Tests: No results for input(s): POCGLU, POCNA, POCK, POCCL, POCBUN, POCHEMO, POCHCT in the last 72 hours.   Coags    Lab Results   Component Value Date    PROTIME 10.6 09/07/2017    INR 1.0 09/07/2017    APTT 28.0 96/68/8554     HCG (If Applicable) No results found for: PREGTESTUR, PREGSERUM, HCG, HCGQUANT   ABGs No results found for: PHART, PO2ART, NJS0YVD, UND6ZKX, BEART, D7LJGJFS   Type & Screen (If Applicable)  No results found for: Kelechi Harrison    Additional ordered pre-operative testing:  []CBC    []ABG      [] BMP   []URINALYSIS   []CMP    []HCG   []COAGS PT/INR  []T&C  []LFTs   []TYPE AND SCREEN    [] EKG  [] Chest X-Ray  [] Other Radiology    [] Sent to Hospitalist None  [] Sent to Anesthesia for your review: None   [] Additional Orders: None     Comments:None   Requests: No Special requests    Signed: Leti Haddad LPN 3/07/1534 5:29 PM

## 2021-05-04 ENCOUNTER — TELEPHONE (OUTPATIENT)
Dept: PREADMISSION TESTING | Age: 76
End: 2021-05-04

## 2021-05-13 RX ORDER — PANTOPRAZOLE SODIUM 40 MG/1
TABLET, DELAYED RELEASE ORAL
Qty: 90 TABLET | Refills: 3 | Status: SHIPPED | OUTPATIENT
Start: 2021-05-13 | End: 2022-06-14

## 2021-05-14 ENCOUNTER — HOSPITAL ENCOUNTER (OUTPATIENT)
Dept: PREADMISSION TESTING | Age: 76
Setting detail: SPECIMEN
Discharge: HOME | End: 2021-05-18
Payer: MEDICARE

## 2021-05-14 DIAGNOSIS — Z11.59 ENCOUNTER FOR SCREENING FOR OTHER VIRAL DISEASES: Primary | ICD-10-CM

## 2021-05-14 PROCEDURE — U0003 INFECTIOUS AGENT DETECTION BY NUCLEIC ACID (DNA OR RNA); SEVERE ACUTE RESPIRATORY SYNDROME CORONAVIRUS 2 (SARS-COV-2) (CORONAVIRUS DISEASE [COVID-19]), AMPLIFIED PROBE TECHNIQUE, MAKING USE OF HIGH THROUGHPUT TECHNOLOGIES AS DESCRIBED BY CMS-2020-01-R: HCPCS

## 2021-05-14 PROCEDURE — U0005 INFEC AGEN DETEC AMPLI PROBE: HCPCS

## 2021-05-16 LAB
SARS-COV-2: NORMAL
SARS-COV-2: NOT DETECTED
SOURCE: NORMAL

## 2021-05-19 ENCOUNTER — HOSPITAL ENCOUNTER (OUTPATIENT)
Age: 76
Setting detail: OUTPATIENT SURGERY
Discharge: HOME | End: 2021-05-19
Attending: UROLOGY | Admitting: UROLOGY
Payer: MEDICARE

## 2021-05-19 ENCOUNTER — ANESTHESIA EVENT (OUTPATIENT)
Dept: OPERATING ROOM | Age: 76
End: 2021-05-19
Payer: MEDICARE

## 2021-05-19 ENCOUNTER — APPOINTMENT (OUTPATIENT)
Dept: GENERAL RADIOLOGY | Age: 76
End: 2021-05-19
Attending: UROLOGY
Payer: MEDICARE

## 2021-05-19 ENCOUNTER — ANESTHESIA (OUTPATIENT)
Dept: OPERATING ROOM | Age: 76
End: 2021-05-19
Payer: MEDICARE

## 2021-05-19 VITALS
OXYGEN SATURATION: 99 % | SYSTOLIC BLOOD PRESSURE: 104 MMHG | RESPIRATION RATE: 11 BRPM | DIASTOLIC BLOOD PRESSURE: 68 MMHG

## 2021-05-19 VITALS
TEMPERATURE: 97.3 F | SYSTOLIC BLOOD PRESSURE: 167 MMHG | WEIGHT: 212 LBS | HEART RATE: 80 BPM | RESPIRATION RATE: 16 BRPM | DIASTOLIC BLOOD PRESSURE: 87 MMHG | HEIGHT: 69 IN | OXYGEN SATURATION: 96 % | BODY MASS INDEX: 31.4 KG/M2

## 2021-05-19 PROCEDURE — 2580000003 HC RX 258: Performed by: NURSE ANESTHETIST, CERTIFIED REGISTERED

## 2021-05-19 PROCEDURE — 6360000002 HC RX W HCPCS: Performed by: NURSE ANESTHETIST, CERTIFIED REGISTERED

## 2021-05-19 PROCEDURE — 7100000010 HC PHASE II RECOVERY - FIRST 15 MIN: Performed by: UROLOGY

## 2021-05-19 PROCEDURE — 6370000000 HC RX 637 (ALT 250 FOR IP): Performed by: UROLOGY

## 2021-05-19 PROCEDURE — 7100000001 HC PACU RECOVERY - ADDTL 15 MIN: Performed by: UROLOGY

## 2021-05-19 PROCEDURE — C1713 ANCHOR/SCREW BN/BN,TIS/BN: HCPCS | Performed by: UROLOGY

## 2021-05-19 PROCEDURE — 7100000000 HC PACU RECOVERY - FIRST 15 MIN: Performed by: UROLOGY

## 2021-05-19 PROCEDURE — 3600000012 HC SURGERY LEVEL 2 ADDTL 15MIN: Performed by: UROLOGY

## 2021-05-19 PROCEDURE — 2709999900 HC NON-CHARGEABLE SUPPLY: Performed by: UROLOGY

## 2021-05-19 PROCEDURE — 3209999900 FLUORO FOR SURGICAL PROCEDURES

## 2021-05-19 PROCEDURE — C1758 CATHETER, URETERAL: HCPCS | Performed by: UROLOGY

## 2021-05-19 PROCEDURE — 3600000002 HC SURGERY LEVEL 2 BASE: Performed by: UROLOGY

## 2021-05-19 PROCEDURE — 6360000004 HC RX CONTRAST MEDICATION: Performed by: UROLOGY

## 2021-05-19 PROCEDURE — 3700000000 HC ANESTHESIA ATTENDED CARE: Performed by: UROLOGY

## 2021-05-19 PROCEDURE — 3700000001 HC ADD 15 MINUTES (ANESTHESIA): Performed by: UROLOGY

## 2021-05-19 PROCEDURE — 7100000011 HC PHASE II RECOVERY - ADDTL 15 MIN: Performed by: UROLOGY

## 2021-05-19 PROCEDURE — 2500000003 HC RX 250 WO HCPCS: Performed by: NURSE ANESTHETIST, CERTIFIED REGISTERED

## 2021-05-19 PROCEDURE — 6360000002 HC RX W HCPCS: Performed by: UROLOGY

## 2021-05-19 RX ORDER — ONDANSETRON 2 MG/ML
INJECTION INTRAMUSCULAR; INTRAVENOUS PRN
Status: DISCONTINUED | OUTPATIENT
Start: 2021-05-19 | End: 2021-05-19 | Stop reason: SDUPTHER

## 2021-05-19 RX ORDER — PROPOFOL 10 MG/ML
INJECTION, EMULSION INTRAVENOUS PRN
Status: DISCONTINUED | OUTPATIENT
Start: 2021-05-19 | End: 2021-05-19 | Stop reason: SDUPTHER

## 2021-05-19 RX ORDER — FENTANYL CITRATE 50 UG/ML
INJECTION, SOLUTION INTRAMUSCULAR; INTRAVENOUS PRN
Status: DISCONTINUED | OUTPATIENT
Start: 2021-05-19 | End: 2021-05-19 | Stop reason: SDUPTHER

## 2021-05-19 RX ORDER — LIDOCAINE HYDROCHLORIDE 20 MG/ML
JELLY TOPICAL PRN
Status: DISCONTINUED | OUTPATIENT
Start: 2021-05-19 | End: 2021-05-19 | Stop reason: ALTCHOICE

## 2021-05-19 RX ORDER — PHENAZOPYRIDINE HYDROCHLORIDE 200 MG/1
200 TABLET, FILM COATED ORAL 3 TIMES DAILY PRN
Qty: 9 TABLET | Refills: 0 | Status: SHIPPED | OUTPATIENT
Start: 2021-05-19 | End: 2022-01-28

## 2021-05-19 RX ORDER — SODIUM CHLORIDE 9 MG/ML
25 INJECTION, SOLUTION INTRAVENOUS PRN
Status: DISCONTINUED | OUTPATIENT
Start: 2021-05-19 | End: 2021-05-19 | Stop reason: HOSPADM

## 2021-05-19 RX ORDER — DEXAMETHASONE SODIUM PHOSPHATE 4 MG/ML
INJECTION, SOLUTION INTRA-ARTICULAR; INTRALESIONAL; INTRAMUSCULAR; INTRAVENOUS; SOFT TISSUE PRN
Status: DISCONTINUED | OUTPATIENT
Start: 2021-05-19 | End: 2021-05-19 | Stop reason: SDUPTHER

## 2021-05-19 RX ORDER — FINASTERIDE 5 MG/1
5 TABLET, FILM COATED ORAL DAILY
Qty: 90 TABLET | Refills: 1 | Status: SHIPPED | OUTPATIENT
Start: 2021-05-19 | End: 2022-01-18

## 2021-05-19 RX ORDER — SODIUM CHLORIDE, SODIUM LACTATE, POTASSIUM CHLORIDE, CALCIUM CHLORIDE 600; 310; 30; 20 MG/100ML; MG/100ML; MG/100ML; MG/100ML
INJECTION, SOLUTION INTRAVENOUS CONTINUOUS PRN
Status: DISCONTINUED | OUTPATIENT
Start: 2021-05-19 | End: 2021-05-19 | Stop reason: SDUPTHER

## 2021-05-19 RX ORDER — SODIUM CHLORIDE 0.9 % (FLUSH) 0.9 %
5-40 SYRINGE (ML) INJECTION PRN
Status: DISCONTINUED | OUTPATIENT
Start: 2021-05-19 | End: 2021-05-19 | Stop reason: HOSPADM

## 2021-05-19 RX ORDER — LIDOCAINE HYDROCHLORIDE 20 MG/ML
INJECTION, SOLUTION EPIDURAL; INFILTRATION; INTRACAUDAL; PERINEURAL PRN
Status: DISCONTINUED | OUTPATIENT
Start: 2021-05-19 | End: 2021-05-19 | Stop reason: SDUPTHER

## 2021-05-19 RX ORDER — SODIUM CHLORIDE 0.9 % (FLUSH) 0.9 %
5-40 SYRINGE (ML) INJECTION EVERY 12 HOURS SCHEDULED
Status: DISCONTINUED | OUTPATIENT
Start: 2021-05-19 | End: 2021-05-19 | Stop reason: HOSPADM

## 2021-05-19 RX ORDER — METOPROLOL TARTRATE 5 MG/5ML
INJECTION INTRAVENOUS PRN
Status: DISCONTINUED | OUTPATIENT
Start: 2021-05-19 | End: 2021-05-19 | Stop reason: SDUPTHER

## 2021-05-19 RX ADMIN — SODIUM CHLORIDE, POTASSIUM CHLORIDE, SODIUM LACTATE AND CALCIUM CHLORIDE: 600; 310; 30; 20 INJECTION, SOLUTION INTRAVENOUS at 12:53

## 2021-05-19 RX ADMIN — LIDOCAINE HYDROCHLORIDE 80 MG: 20 INJECTION, SOLUTION EPIDURAL; INFILTRATION; INTRACAUDAL; PERINEURAL at 13:11

## 2021-05-19 RX ADMIN — PROPOFOL 200 MG: 10 INJECTION, EMULSION INTRAVENOUS at 13:11

## 2021-05-19 RX ADMIN — METOPROLOL TARTRATE 2 MG: 5 INJECTION INTRAVENOUS at 12:59

## 2021-05-19 RX ADMIN — ONDANSETRON 4 MG: 2 INJECTION INTRAMUSCULAR; INTRAVENOUS at 13:23

## 2021-05-19 RX ADMIN — DEXAMETHASONE SODIUM PHOSPHATE 4 MG: 4 INJECTION, SOLUTION INTRAMUSCULAR; INTRAVENOUS at 13:23

## 2021-05-19 RX ADMIN — Medication 2 G: at 13:16

## 2021-05-19 RX ADMIN — FENTANYL CITRATE 50 MCG: 50 INJECTION, SOLUTION INTRAMUSCULAR; INTRAVENOUS at 13:07

## 2021-05-19 ASSESSMENT — PAIN SCALES - GENERAL
PAINLEVEL_OUTOF10: 0

## 2021-05-19 ASSESSMENT — PULMONARY FUNCTION TESTS
PIF_VALUE: 2
PIF_VALUE: 9
PIF_VALUE: 10
PIF_VALUE: 19
PIF_VALUE: 10
PIF_VALUE: 1
PIF_VALUE: 9
PIF_VALUE: 12
PIF_VALUE: 1

## 2021-05-19 ASSESSMENT — ENCOUNTER SYMPTOMS: SHORTNESS OF BREATH: 1

## 2021-05-19 NOTE — ANESTHESIA PRE PROCEDURE
Department of Anesthesiology  Preprocedure Note       Name:  Kelby Avila   Age:  67 y.o.  :  1945                                          MRN:  [id number]         Date:  2021      Surgeon: Diana Peterson):  Dilia Rock MD    Procedure: Procedure(s):  CYSTO Bilateral Retrogrades with possible bladder biopsy    Medications prior to admission:   Prior to Admission medications    Medication Sig Start Date End Date Taking? Authorizing Provider   phenazopyridine (PYRIDIUM) 200 MG tablet Take 1 tablet by mouth 3 times daily as needed for Pain 21  Yes Dilia Rock MD   pantoprazole (PROTONIX) 40 MG tablet Take 1 tablet by mouth once daily 21   MD Piedad Macario by Does not apply route EXPIRES 2026   Alexis Metzger MD   nitroGLYCERIN (NITROSTAT) 0.4 MG SL tablet Place 1 tablet under the tongue every 5 minutes as needed for Chest pain up to max of 3 total doses.  If no relief after 1 dose, call 911. 21   Alexis Mtezger MD   ferrous sulfate (IRON 325) 325 (65 Fe) MG tablet Take 1 tablet by mouth 2 times daily 21   Alexis Metzger MD   metoprolol tartrate (LOPRESSOR) 25 MG tablet TAKE 1 TABLET TWICE A DAY 21   Alexis Metzger MD   atorvastatin (LIPITOR) 80 MG tablet TAKE 1 TABLET DAILY 21   Alexis Metzger MD   clopidogrel (PLAVIX) 75 MG tablet TAKE 1 TABLET DAILY 20   Alexis Metzger MD   metFORMIN (GLUCOPHAGE) 500 MG tablet TAKE 1 TABLET TWICE DAILY  WITH MEALS. 20   Alexis Metzger MD   celecoxib (CELEBREX) 200 MG capsule TAKE 1 CAPSULE BY MOUTH TWICE DAILY 20   Alexis Metzger MD   PARoxetine (PAXIL) 20 MG tablet TAKE 1 TABLET BY MOUTH ONCE DAILY IN THE MORNING 20   Alexis Metzger MD   losartan (COZAAR) 25 MG tablet Take 1 tablet by mouth daily 20   MD Piedad Macario by Does not apply route Expires: 2021   Alexis Metzger MD   tiZANidine (ZANAFLEX) 4 MG  Degeneration of cervical intervertebral disc     [facility], 1990 C4/C5-C6/C7    Degeneration of cervical intervertebral disc     [facility] 1994, C3/C4    Depression     Diverticulosis     GERD (gastroesophageal reflux disease)     Glucose intolerance (impaired glucose tolerance)     IS NOT DIABETIC, PER PT    HTN (hypertension)     Hyperlipidemia     Lumbar radiculopathy     MI (myocardial infarction) ([facility] [address]     2011    Numbness and tingling     HANDS    OA (osteoarthritis)     Pre-diabetes     Sacroiliac pain 11/4/2014    Vision abnormalities     WEARS GLASSES       Past Surgical History:        Procedure Laterality Date    ABSCESS DRAINAGE Right     ELBOW WITH CLOSURE    ANESTHESIA NERVE BLOCK Bilateral 6/2/2017    Bilat L1 L2 L3 L4 L5 Diagnostic Medial Branch Blk performed by Horacio Cabrera MD at [address] [facility] Bilateral 6/9/2017    Bilat L1 L2 L3 L4 L5 Confirmatory Medial BB performed by Horacio Cabrera MD at [facility]  06/01/2011    DR Familia Guevarahal CARDIAC CATHETERIZATION  5-5-2011    STENTSx4    CERVICAL SPINE SURGERY  08/09/2001    Anterior Cervical Decompression and Fusion C3-4    CERVICAL SPINE SURGERY Left [id number],[id number]    Cervical C6-C7 Discectomy and Foraminotomy    COLONOSCOPY  2007, 2003    POLYPS    COLONOSCOPY  9/9/13    hyperplastic polyp x 3    COLONOSCOPY N/A 10/15/2018    hyperplastic polyp, severe sigmoid diverticulosis, large ext. hemorrhoid, Dr Kristina Ozuna Right 2012,2011    FINGER TRIGGER RELEASE Right     THIRD FINGER    [facility], [facility]. INJECTION PROCEDURE FOR SACROILIAC JOINT Bilateral 3/14/2017    Bilat Sacroiliac & Piriformis Inj's performed by Horacio Cabrera MD at [address] Right     ELBOW    LUMBAR FUSION  4/7/14    lumbar decompression and fusion    LUMBAR FUSION N/A 11/15/2017    LUMBAR Smoker     Packs/day: 1.00     Years: 40.00     Pack years: 40.00     Types: Cigarettes, Pipe, Cigars     Quit date: 5/5/2011     Years since quitting: 10.0    Smokeless tobacco: Former User     Types: [address] date: 5/5/2011   Substance Use Topics    Alcohol use: Yes     Alcohol/week: 0.0 standard drinks     Comment: RARE                                Counseling given: Not Answered      Vital Signs (Current):   Vitals:    05/19/21 1217   BP: (!) 184/93   Pulse: 102   Resp: 14   Temp: 35.9 °C (96.7 °F)   TempSrc: Temporal   SpO2: 95%   Weight: 212 lb (96.2 kg)   Height: 5' 9\" (1.753 m)                                              BP Readings from Last 3 Encounters:   05/19/21 (!) 184/93   03/31/21 122/80   03/26/21 128/74       NPO Status: Time of last liquid consumption: 2100                        Time of last solid consumption: 2100                        Date of last liquid consumption: 05/18/21                        Date of last solid food consumption: 05/18/21    BMI:   Wt Readings from Last 3 Encounters:   05/19/21 212 lb (96.2 kg)   03/31/21 220 lb (99.8 kg)   03/26/21 220 lb (99.8 kg)     Body mass index is 31.31 kg/m². CBC:   Lab Results   Component Value Date    WBC 10.1 11/13/2018    RBC 4.48 11/13/2018    HGB 12.5 02/18/2021    HCT 41.1 11/13/2018    MCV 91.6 11/13/2018    RDW 14.3 11/13/2018     11/13/2018       CMP:   Lab Results   Component Value Date     08/17/2020    K 4.8 08/17/2020     08/17/2020    CO2 28 08/17/2020    BUN 20 08/17/2020    CREATININE 0.92 08/17/2020    GFRAA >60 08/17/2020    LABGLOM >60 08/17/2020    GLUCOSE 134 08/17/2020    PROT 6.6 08/17/2020    CALCIUM 9.2 08/17/2020    BILITOT 0.27 08/17/2020    ALKPHOS 60 08/17/2020    AST 17 08/17/2020    ALT 22 08/17/2020       POC Tests: No results for input(s): POCGLU, POCNA, POCK, POCCL, POCBUN, POCHEMO, POCHCT in the last 72 hours.     Coags:   Lab Results   Component Value Date    PROTIME 10.6 09/07/2017    INR 1.0 09/07/2017    APTT 28.0 09/07/2017       HCG (If Applicable): No results found for: PREGTESTUR, PREGSERUM, HCG, HCGQUANT     ABGs: No results found for: PHART, PO2ART, TNM8QOE, SDW7JXP, BEART, C7BUCTSV     Type & Screen (If Applicable):  No results found for: LABABO, LABRH    Drug/Infectious Status (If Applicable):  No results found for: HIV, HEPCAB    COVID-19 Screening (If Applicable):   Lab Results   Component Value Date    COVID19 Not Detected 05/14/2021           Anesthesia Evaluation  Patient summary reviewed no history of anesthetic complications:   Airway: Mallampati: II  TM distance: >3 FB   Neck ROM: full  Mouth opening: > = 3 FB Dental:    (+) caps      Pulmonary:normal exam    (+) shortness of breath: chronic,  asthma:                            Cardiovascular:    (+) hypertension: no interval change, past MI: > 6 months, CAD: non-obstructive, GILL:,          Beta Blocker:  Dose within 24 Hrs         Neuro/Psych:   (+) neuromuscular disease:, psychiatric history: stable with treatmentdepression/anxiety             GI/Hepatic/Renal:   (+) GERD:,           Endo/Other:    (+) DiabetesType II DM, , .                 Abdominal:           Vascular:                                        Anesthesia Plan      general     ASA 3       Induction: intravenous. MIPS: Prophylactic antiemetics administered. Anesthetic plan and risks discussed with patient.       Plan discussed with surgical team.                  LAWRENCE Maldonado - CRNA   5/19/2021

## 2021-05-19 NOTE — ANESTHESIA POSTPROCEDURE EVALUATION
Department of Anesthesiology  Postprocedure Note    Patient: Elvie Armando  MRN: [id number]  YOB: 1945  Date of evaluation: 5/19/2021  Time:  1:48 PM     Procedure Summary     Date: 05/19/21 Room / Location: [address] [place] [address]    Anesthesia Start: 3965 Anesthesia Stop: 8245    Procedure: CYSTO Bilateral Retrogrades (Bilateral ) Diagnosis: (hematuria)    Surgeons: Kelli Mccabe MD Responsible Provider: LAWRENCE Byers CRNA    Anesthesia Type: general ASA Status: 3          Anesthesia Type: general    Johna Phase I: Johan Score: 10    Johan Phase II:      Last vitals: Reviewed and per EMR flowsheets.        Anesthesia Post Evaluation    Patient location during evaluation: bedside  Patient participation: complete - patient participated  Level of consciousness: sleepy but conscious  Pain score: 0  Airway patency: patent  Nausea & Vomiting: no nausea and no vomiting  Complications: no  Cardiovascular status: blood pressure returned to baseline and hemodynamically stable  Respiratory status: acceptable  Hydration status: euvolemic

## 2021-05-19 NOTE — H&P
History and Physical    Patient: Awais Edge  MRN: [id number]  YOB: 1945    CHIEF COMPLAINT:  Gross hematuria    HISTORY OF PRESENT ILLNESS:   The patient is a 67 y.o. male who presents with as above    Patient's old records, notes and chart reviewed and summarized above.      Past Medical History:    Past Medical History:   Diagnosis Date    Asthma     CAD (coronary artery disease)     STENTS X 4    Cancer ([facility] [address]     THROAT, HX. RADIATION , LT EAR    Cervical radiculopathy     Chronic back pain     Colonic polyp     Degeneration of cervical intervertebral disc     [facility], 1990 C4/C5-C6/C7    Degeneration of cervical intervertebral disc     [facility] 1994, C3/C4    Depression     Diverticulosis     GERD (gastroesophageal reflux disease)     Glucose intolerance (impaired glucose tolerance)     IS NOT DIABETIC, PER PT    HTN (hypertension)     Hyperlipidemia     Lumbar radiculopathy     MI (myocardial infarction) ([facility] [address]     2011    Numbness and tingling     HANDS    OA (osteoarthritis)     Pre-diabetes     Sacroiliac pain 11/4/2014    Vision abnormalities     WEARS GLASSES       Past Surgical History:    Past Surgical History:   Procedure Laterality Date    ABSCESS DRAINAGE Right     ELBOW WITH CLOSURE    ANESTHESIA NERVE BLOCK Bilateral 6/2/2017    Bilat L1 L2 L3 L4 L5 Diagnostic Medial Branch Blk performed by Antonieta Melgar MD at [address] Bilateral 6/9/2017    Bilat L1 L2 L3 L4 L5 Confirmatory Medial BB performed by Antonieta Melgar MD at [facility]  06/01/2011    DR Wing Michelle CARDIAC CATHETERIZATION  5-5-2011    STENTSx4    CERVICAL SPINE SURGERY  08/09/2001    Anterior Cervical Decompression and Fusion C3-4    CERVICAL SPINE SURGERY Left [id number],8131,7191    Cervical C6-C7 Discectomy and Foraminotomy    COLONOSCOPY  2007, 2003    POLYPS    COLONOSCOPY  9/9/13    hyperplastic polyp x 3  COLONOSCOPY N/A 10/15/2018    hyperplastic polyp, severe sigmoid diverticulosis, large ext. hemorrhoid, Dr Robbins Ran Right 2012,2011    FINGER TRIGGER RELEASE Right     [facility], [facility]. INJECTION PROCEDURE FOR SACROILIAC JOINT Bilateral 3/14/2017    Bilat Sacroiliac & Piriformis Inj's performed by Ashley Rivera MD at [address] Right     ELBOW    LUMBAR FUSION  4/7/14    lumbar decompression and fusion    LUMBAR FUSION N/A 11/15/2017    LUMBAR DECOMPRESSION POSTERIOR W/FUSION L3 L4 ( with SPINAL CORD MONITOR ) performed by Ki Bruno MD at [address]  [id number],[phone]    Fusion    LUMBAR SPINE SURGERY Left 2/14/2017    Left L4 & L5 Transforaminal ANITHA performed by Ashley Rivera MD at [address] Bilateral 5/2/2017    Bilat L5 Transforaminal ANITHA performed by Ashley Rivera MD at [address] Bilateral 5/9/2017    Bilat L5 Transforaminal ANITHA performed by Ashley Rivera MD at [address]  2/19/13, 5/14/13    L1/L2 IESI    OTHER SURGICAL HISTORY      Hardware correction, patient had 1 broken screw and to loose screws in back     OTHER SURGICAL HISTORY Left 09/27/2016     C6 TFE    OTHER SURGICAL HISTORY Bilateral 11/29/2016    L3, L4 L5 Diagnostic Medial Branch Block    OTHER SURGICAL HISTORY  12/06/2016    jovani L3 L4 L5 conf MBB    OTHER SURGICAL HISTORY Right 12/12/2016    L3,L4,L5 RFA    OTHER SURGICAL HISTORY Left 12/15/17    L3.  L4, L5 RFA    OTHER SURGICAL HISTORY Left 01/31/2017    L4 & L5 TFE    IA ARTHROCENTESIS ASPIR&/INJ MAJOR JT/BURSA W/O US Left 3/31/2017    Left Hip Inj performed by Ashley Rivera MD at [address] ARTHROCENTESIS ASPIR&/INJ MAJOR JT/BURSA W/O US Left 4/14/2017    Left Hip Inj performed by Ashley Rivera MD at [address] HEMORRHOIDECTOMY,INT/EXT,1 COLUMN/GROUP N/A 11/7/2018    External HEMORRHOIDECTOMY performed by Speedy Ortiz MD at [address] Right 6/29/2017    Right L1 L2 L3 L4 L5 Radiofrequency Ablation performed by Macho Scherer MD at [address] Left 7/6/2017    Left L1 L2 L3 L4 L5 Radiofrequency performed by Macho Scherer MD at [facility]    UPPER GASTROINTESTINAL ENDOSCOPY N/A 9/30/2019    mild gastritis, mild to moderate esophagitis, Dr. Jesús Estrada     Medications Prior to Admission:    Prior to Admission medications    Medication Sig Start Date End Date Taking? Authorizing Provider   pantoprazole (PROTONIX) 40 MG tablet Take 1 tablet by mouth once daily 5/13/21   MD Piedad Liu MISC by Does not apply route EXPIRES 04/13/2026 4/13/21   Jose Enrique Gruber MD   nitroGLYCERIN (NITROSTAT) 0.4 MG SL tablet Place 1 tablet under the tongue every 5 minutes as needed for Chest pain up to max of 3 total doses.  If no relief after 1 dose, call 911. 2/25/21   Jose Enrique Gruber MD   ferrous sulfate (IRON 325) 325 (65 Fe) MG tablet Take 1 tablet by mouth 2 times daily 2/25/21   Jose Enrique Gruber MD   metoprolol tartrate (LOPRESSOR) 25 MG tablet TAKE 1 TABLET TWICE A DAY 1/18/21   Jose Enrique Gruber MD   atorvastatin (LIPITOR) 80 MG tablet TAKE 1 TABLET DAILY 1/18/21   Jose Enrique Gruber MD   clopidogrel (PLAVIX) 75 MG tablet TAKE 1 TABLET DAILY 12/17/20   Jose Enrique Gruber MD   metFORMIN (GLUCOPHAGE) 500 MG tablet TAKE 1 TABLET TWICE DAILY  WITH MEALS. 11/20/20   Jose Enrique Gruber MD   celecoxib (CELEBREX) 200 MG capsule TAKE 1 CAPSULE BY MOUTH TWICE DAILY 8/24/20   Jose Enrique Gruber MD   PARoxetine (PAXIL) 20 MG tablet TAKE 1 TABLET BY MOUTH ONCE DAILY IN THE MORNING 8/24/20   Jose Enrique Gruber MD   losartan (COZAAR) 25 MG tablet Take 1 tablet by mouth daily 8/24/20   MD Piedad Liu MISC by Does not apply route Expires: 7/5/2021 7/5/19   Jose Enrique Gruber MD   tiZANidine Fear of Current or Ex-Partner:     Emotionally Abused:     Physically Abused:     Sexually Abused:        Family History:    Family History   Problem Relation Age of Onset    Cancer Father         colon cancer       REVIEW OF SYSTEMS:  Constitutional: negative  Eyes: negative  Respiratory: negative  Cardiovascular: negative  Gastrointestinal: negative  Genitourinary: see HPI  Musculoskeletal: negative  Skin: negative   Neurological: negative  Hematological/Lymphatic: negative  Psychological: negative    Physical Exam:      Patient Vitals for the past 24 hrs:   BP Temp Temp src Pulse Resp SpO2 Height Weight   05/19/21 1217 (!) 184/93 96.7 °F (35.9 °C) Temporal 102 14 95 % 5' 9\" (1.753 m) 212 lb (96.2 kg)     Constitutional: Patient in no acute distress; Neuro: alert and oriented to person place and time. Psych: Mood and affect normal.  Skin: Normal  Lungs: Respiratory effort normal, CTA  Cardiovascular:  Normal peripheral pulses; no murmur  Abdomen: Soft, non-tender, non-distended with no CVA, flank pain, hepatosplenomegaly or hernia. Kidneys normal.  Bladder non-tender and not distended. LABS:   No results for input(s): WBC, HGB, HCT, MCV, PLT in the last 72 hours. No results for input(s): NA, K, CL, CO2, PHOS, BUN, CREATININE in the last 72 hours. Invalid input(s): CA  Lab Results   Component Value Date    PSA 1.12 08/17/2020    PSA 1.00 08/15/2019    PSA 0.93 08/09/2018           Urinalysis: No results for input(s): COLORU, PHUR, LABCAST, WBCUA, RBCUA, MUCUS, TRICHOMONAS, YEAST, BACTERIA, CLARITYU, SPECGRAV, LEUKOCYTESUR, UROBILINOGEN, BILIRUBINUR, BLOODU in the last 72 hours.     Invalid input(s): NITRATE, GLUCOSEUKETONESUAMORPHOUS     -----------------------------------------------------------------      Assessment and Plan   Impression:    Patient Active Problem List   Diagnosis    Chronic back pain    Neck pain, chronic    Brachial neuritis or radiculitis    Spinal stenosis, lumbar region, with neurogenic claudication    Displacement of cervical intervertebral disc without myelopathy    CAD (coronary artery disease)    GILL (dyspnea on exertion)    S/P lumbar spinal fusion    Obesity (BMI 35.0-39.9 without comorbidity)    HTN (hypertension)    Hyperlipidemia    Type 2 diabetes mellitus without complication (HCC)    Chronic bilateral low back pain with bilateral sciatica    Left hip pain    Acquired spondylolisthesis    Back pain    Grade III hemorrhoids    Recurrent major depressive disorder, [facility] partial remission ([facility] [address]    Carcinoma larynx (HCC)       Plan:   Risks: I discussed all the risks, benefits, alternatives and possible complications or surgery as well as expectations and post-op recovery.       Consent obtained; cysto, bilateral retrogrades, possible bladder bx in OR today    Rossana Boyer M.D, MD  12:49 PM 5/19/2021

## 2021-05-19 NOTE — OP NOTE
Mario Osullivan  1945  [id number]    DATE: 05/19/21  SURGEON:  Dr. Ra Escoto M.D, MD MD  PREOPERATIVE DIAGNOSIS:  [place] hematuria.    POSTOPERATIVE DIAGNOSIS:  Gross hematuria.   PROCEDURES PERFORMED:  1. Cystourethroscopy. 2. Bilateral retrograde pyelogram.  ANESTHESIA:  MAC    COMPLICATIONS:  None.   DRAINS:  None. ESTIMATED BLOOD LOSS:  Less than 5 mL.      INDICATIONS FOR THE PROCEDURE:  Norberto Osullivan is a 67 y.o. male presents with a history of  gross hematuria. The patient follows up today for cystourethroscopy with bilateral retrograde pyelogram to complete the hematuria workup. The risks and benefits of the procedure, as well as possible alternatives and complications were discussed and he consented.          DETAILS OF THE PROCEDURE:  The patient was correctly identified in the preoperative holding area. The patient  was brought back to the operating room and placed in the dorsal lithotomy  position. Anesthesia was administered; antibiotics administered by Anesthesia. EPC cuffs  were on and functional. The patient was then prepped and draped in the usual sterile fashion. Once an appropriate time out had been performed, with all parties  consenting, a 25 [place] cystoscope with a 30-degree lens was placed through  the urethra into the bladder. The right ureteral orifice was cannulated with a 5 [place] ureteral catheter. Contrast was injected and the right ureter and renal pelvis were identified, with  no abnormalities or filling defects. The catheter was removed and placed in the left ureteral orifice. The procedure was repeated once again with  no abnormalities or filling defects present. A thorough and complete cystoscopy was performed with no abnormalities involving the bladder or mucosa. The bladder was drained and the cystoscope was removed.  The patient tolerated the procedure well and was sent to the PACU for postoperative monitoring.      Findings: Enlarged vascular prostate with elevated

## 2021-05-19 NOTE — FLOWSHEET NOTE
rounding on Surgery    Assessment: Patient is waiting for procedure to begin. He expressed some anxiety and enjoyed being distracted by conversation. His wife is present and attentive. They are well connected to their Oriental orthodox. Intervention: Engaged in conversation.  offered prayer and blessed patient. Patient expressed appreciation for visit and offer of continued prayer. Plan: Chaplains are available on site or on call 24/7 for spiritual and emotional support. 05/19/21 1254   Encounter Summary   Services provided to: Patient and family together   Referral/Consult From: [address] [place] [address]; Family members   Place of [address] [place] [address]   Continue Visiting   (5/19/21)   Complexity of Encounter Moderate   Length of Encounter 30 minutes   Spiritual Assessment Completed Yes   Spiritual/Amish   Type Spiritual support   Assessment Approachable   Intervention Active listening;Explored feelings, thoughts, concerns;Prayer   Outcome Expressed gratitude;Engaged in conversation

## 2021-05-28 DIAGNOSIS — S22.39XD CLOSED FRACTURE OF ONE RIB WITH ROUTINE HEALING, UNSPECIFIED LATERALITY, SUBSEQUENT ENCOUNTER: ICD-10-CM

## 2021-05-28 RX ORDER — OXYCODONE HYDROCHLORIDE AND ACETAMINOPHEN 5; 325 MG/1; MG/1
1 TABLET ORAL EVERY 6 HOURS PRN
Qty: 90 TABLET | Refills: 0 | Status: SHIPPED | OUTPATIENT
Start: 2021-05-28 | End: 2021-07-29 | Stop reason: SDUPTHER

## 2021-07-06 ENCOUNTER — OFFICE VISIT (OUTPATIENT)
Dept: CARDIOLOGY | Age: 76
End: 2021-07-06
Payer: MEDICARE

## 2021-07-06 VITALS
HEIGHT: 69 IN | SYSTOLIC BLOOD PRESSURE: 129 MMHG | HEART RATE: 62 BPM | DIASTOLIC BLOOD PRESSURE: 68 MMHG | BODY MASS INDEX: 31.84 KG/M2 | WEIGHT: 215 LBS

## 2021-07-06 DIAGNOSIS — I10 HYPERTENSION, ESSENTIAL: ICD-10-CM

## 2021-07-06 DIAGNOSIS — E11.9 CONTROLLED TYPE 2 DIABETES MELLITUS WITHOUT COMPLICATION, UNSPECIFIED WHETHER LONG TERM INSULIN USE (HCC): ICD-10-CM

## 2021-07-06 DIAGNOSIS — E66.01 CLASS 2 SEVERE OBESITY DUE TO EXCESS CALORIES WITH SERIOUS COMORBIDITY IN ADULT, UNSPECIFIED BMI (HCC): ICD-10-CM

## 2021-07-06 DIAGNOSIS — Z98.890 S/P CARDIAC CATH: ICD-10-CM

## 2021-07-06 DIAGNOSIS — E78.2 MIXED HYPERLIPIDEMIA: ICD-10-CM

## 2021-07-06 DIAGNOSIS — I10 ESSENTIAL HYPERTENSION: Primary | ICD-10-CM

## 2021-07-06 DIAGNOSIS — I21.11 ST ELEVATION MYOCARDIAL INFARCTION INVOLVING RIGHT CORONARY ARTERY (HCC): ICD-10-CM

## 2021-07-06 DIAGNOSIS — I25.10 CORONARY ARTERY DISEASE INVOLVING NATIVE CORONARY ARTERY OF NATIVE HEART WITHOUT ANGINA PECTORIS: ICD-10-CM

## 2021-07-06 DIAGNOSIS — Z95.5 S/P CORONARY ARTERY STENT PLACEMENT: ICD-10-CM

## 2021-07-06 PROCEDURE — 1123F ACP DISCUSS/DSCN MKR DOCD: CPT | Performed by: INTERNAL MEDICINE

## 2021-07-06 PROCEDURE — G8427 DOCREV CUR MEDS BY ELIG CLIN: HCPCS | Performed by: INTERNAL MEDICINE

## 2021-07-06 PROCEDURE — 93005 ELECTROCARDIOGRAM TRACING: CPT | Performed by: INTERNAL MEDICINE

## 2021-07-06 PROCEDURE — G8417 CALC BMI ABV UP PARAM F/U: HCPCS | Performed by: INTERNAL MEDICINE

## 2021-07-06 PROCEDURE — 4040F PNEUMOC VAC/ADMIN/RCVD: CPT | Performed by: INTERNAL MEDICINE

## 2021-07-06 PROCEDURE — 93010 ELECTROCARDIOGRAM REPORT: CPT | Performed by: INTERNAL MEDICINE

## 2021-07-06 PROCEDURE — 99214 OFFICE O/P EST MOD 30 MIN: CPT | Performed by: INTERNAL MEDICINE

## 2021-07-06 PROCEDURE — 1036F TOBACCO NON-USER: CPT | Performed by: INTERNAL MEDICINE

## 2021-07-06 NOTE — PROGRESS NOTES
Today's Date: 7/6/2021  Patient's Name: Aggie Ryan  Patient's age: 67 y. o., 1945    CC:  Barajas, cad, htn, hlp    HPI:  Norberto Osullivan  is here for follow up. No chest pain, no dyspnea, no PND, no syncope or pre-syncope, no orthopnea. He is working in yard and around the house. Past Medical History:   has a past medical history of Asthma, CAD (coronary artery disease), Cancer ([facility] [address] Cervical radiculopathy, Chronic back pain, Colonic polyp, Degeneration of cervical intervertebral disc, Degeneration of cervical intervertebral disc, Depression, Diverticulosis, GERD (gastroesophageal reflux disease), Glucose intolerance (impaired glucose tolerance), HTN (hypertension), Hyperlipidemia, Lumbar radiculopathy, MI (myocardial infarction) ([address] Numbness and tingling, OA (osteoarthritis), Pre-diabetes, Sacroiliac pain, and Vision abnormalities. Past Surgical History:   has a past surgical history that includes Coronary angioplasty with stent; Finger trigger release (Right); Abscess Drainage (Right); other surgical history (2/19/13, 5/14/13); Elbow bursa surgery (Right, 2012,2011); Infected skin debridement (Right); Colonoscopy (2007, 2003); Colonoscopy (9/9/13); Cervical spine surgery (08/09/2001); Cervical spine surgery (Left, [id number]); Lumbar spine surgery ([id number],9155); lumbar fusion (4/7/14); skin biopsy; other surgical history; other surgical history (Left, 09/27/2016); other surgical history (Bilateral, 11/29/2016); other surgical history (12/06/2016); other surgical history (Right, 12/12/2016); other surgical history (Left, 12/15/17); other surgical history (Left, 01/31/2017); Lumbar spine surgery (Left, 2/14/2017); Injection Procedure For Sacroiliac Joint (Bilateral, 3/14/2017); pr arthrocentesis aspir&/inj major jt/bursa w/o us (Left, 3/31/2017); pr arthrocentesis aspir&/inj major jt/bursa w/o us (Left, 4/14/2017); Lumbar spine surgery (Bilateral, 5/2/2017);  Lumbar spine surgery (Bilateral, 5/9/2017); Anesthesia Nerve Block (Bilateral, 6/2/2017); Anesthesia Nerve Block (Bilateral, 6/9/2017); RADIOFREQUENCY ABLATION NERVES (Right, 6/29/2017); RADIOFREQUENCY ABLATION NERVES (Left, 7/6/2017); Cardiac catheterization; Cardiac catheterization (06/01/2011); Cardiac catheterization (5-5-2011); back surgery; lumbar fusion (N/A, 11/15/2017); Colonoscopy (N/A, 10/15/2018); pr hemorrhoidectomy,int/ext,1 column/group (N/A, 11/7/2018); Upper gastrointestinal endoscopy (N/A, 9/30/2019); and Cystoscopy (Bilateral, 5/19/2021). Home Medications:  Prior to Admission medications    Medication Sig Start Date End Date Taking? Authorizing Provider   phenazopyridine (PYRIDIUM) 200 MG tablet Take 1 tablet by mouth 3 times daily as needed for Pain 5/19/21  Yes Selina Archibald MD   finasteride (PROSCAR) 5 MG tablet Take 1 tablet by mouth daily 5/19/21 8/17/21 Yes Selina Archibald MD   pantoprazole (PROTONIX) 40 MG tablet Take 1 tablet by mouth once daily 5/13/21  Yes MD Piedad Galindo MISC by Does not apply route EXPIRES 04/13/2026 4/13/21  Yes Edward Pelletier MD   nitroGLYCERIN (NITROSTAT) 0.4 MG SL tablet Place 1 tablet under the tongue every 5 minutes as needed for Chest pain up to max of 3 total doses.  If no relief after 1 dose, call 911. 2/25/21  Yes Edward Pelletier MD   ferrous sulfate (IRON 325) 325 (65 Fe) MG tablet Take 1 tablet by mouth 2 times daily 2/25/21  Yes Edward Pelletier MD   metoprolol tartrate (LOPRESSOR) 25 MG tablet TAKE 1 TABLET TWICE A DAY 1/18/21  Yes Edward Pelletier MD   atorvastatin (LIPITOR) 80 MG tablet TAKE 1 TABLET DAILY 1/18/21  Yes Edward Pelletier MD   clopidogrel (PLAVIX) 75 MG tablet TAKE 1 TABLET DAILY 12/17/20  Yes Edward Pelletier MD   metFORMIN (GLUCOPHAGE) 500 MG tablet TAKE 1 TABLET TWICE DAILY  WITH MEALS. 11/20/20  Yes Edward Pelletier MD   celecoxib (CELEBREX) 200 MG capsule TAKE 1 CAPSULE BY MOUTH TWICE DAILY 8/24/20  Yes Edward Pelletier MD PARoxetine (PAXIL) 20 MG tablet TAKE 1 TABLET BY MOUTH ONCE DAILY IN THE MORNING 8/24/20  Yes Miroslava Licea MD   losartan (COZAAR) 25 MG tablet Take 1 tablet by mouth daily 8/24/20  Yes Miroslava Licea MD   Handicap Placard MISC by Does not apply route Expires: 7/5/2021 7/5/19  Yes Miroslava Licea MD   tiZANidine (ZANAFLEX) 4 MG tablet TAKE ONE TABLET BY MOUTH THREE TIMES DAILY 5/11/17  Yes LAWRENCE Rodarte CNP   aspirin 81 MG tablet Take 81 mg by mouth daily   Yes Historical Provider, MD   famotidine (PEPCID) 20 MG tablet Take 20 mg by mouth daily. Yes Historical Provider, MD       Allergies:  Crestor [rosuvastatin], Ibuprofen, Lisinopril, and Effient [prasugrel]    Social History:   reports that he quit smoking about 10 years ago. His smoking use included cigarettes, pipe, and cigars. He has a 40.00 pack-year smoking history. He quit smokeless tobacco use about 10 years ago. His smokeless tobacco use included chew. He reports current alcohol use. He reports that he does not use drugs. Family History: family history includes Cancer in his father. No h/o sudden cardiac death. No for premature CAD    REVIEW OF SYSTEMS:    · Constitutional: there has been no unanticipated weight loss. There's been No change in energy level, No change in activity level. · Eyes: No visual changes or diplopia. No scleral icterus. · ENT: No Headaches, hearing loss or vertigo. No mouth sores or sore throat. · Cardiovascular: see above  · Respiratory: see above  · Gastrointestinal: No abdominal pain, appetite loss, blood in stools. · Genitourinary: No dysuria, trouble voiding, or hematuria. · Musculoskeletal:  Lower Back pain reported  · Integumentary: No rash or pruritis. · Neurological: No headache or diplopia. No tingling  · Psychiatric: No anxiety, or depression. · Endocrine: No temperature intolerance.    · Hematologic/Lymphatic: No abnormal bruising or bleeding, blood clots or swollen lymph nodes.  · Allergic/Immunologic: No nasal congestion or hives. PHYSICAL EXAM:      /68   Pulse 62   Ht 5' 9\" (1.753 m)   Wt 215 lb (97.5 kg)   BMI 31.75 kg/m²     General appearance: alert and cooperative with exam  HEENT: Head: Normocephalic, no lesions, without obvious abnormality. Eyes: PERRL, EOMI  Ears: Not obvious deformations or lack of hearing  Neck: no carotid bruit, no JVD  Lungs: clear to auscultation bilaterally  Heart: regular rate and rhythm, S1, S2 normal, no murmur, click, rub or gallop  Abdomen: soft, non-tender; bowel sounds normal; no masses,  no organomegaly  Extremities: extremities normal, atraumatic, no cyanosis or edema  Neurologic: Mental status: Alert, oriented, thought content appropriate  Skin: WNL for age and condition, no obvious rashes or leasions      Cardiac Data:  EKG: SR, RBBB    Labs:     CBC: No results for input(s): WBC, HGB, HCT, PLT in the last 72 hours. BMP: No results for input(s): NA, K, CO2, BUN, CREATININE, LABGLOM, GLUCOSE in the last 72 hours. PT/INR: No results for input(s): PROTIME, INR in the last 72 hours. FASTING LIPID PANEL:  Lab Results   Component Value Date    HDL 43 08/17/2020    TRIG 292 08/17/2020     LIVER PROFILE:No results for input(s): AST, ALT, LABALBU in the last 72 hours. TTE 10/17/2014  1.   Left ventricle is normal in dimension with normal left ventricular systolic function.  Ejection fraction is 50-55%. 2.   Biatrial enlargement. 3.   Right ventricle is dilated with normal function. 4.   Mitral valve leaflets are thickened with trivial regurgitation. 5.   Mildly sclerotic aortic valve with no stenosis or regurgitation. 6.   Trivial tricuspid regurgitation.  RVSP is 57mm Hg. 7.   Grade I diastolic dysfunction. 8.   No pericardial effusion. Assessment and plan:    -CAD- STEMI 5/5/2011 with thrombectomy and MELBA to RCA followed by staged PCI of LAD with MELBA 5/31/2011. Continue antiplatelet therapy.  Counseled to go to ER immediately if develops chest pain. -HTN- failry well controlled at this time. Continue current therapy.   -Obesity - encouraged diet, exercise, and discussed weight loss extensively. -Hyperlipidemia- continue statin. -DM- management per PCP. -RTC 6 months. Thank you for allowing me to participate in the care of this patient, please do not hesitate to call if you have any questions. Kitty Bunch, [id number] [facility]  [facility]. [facility]  [phone]

## 2021-07-29 DIAGNOSIS — S22.39XD CLOSED FRACTURE OF ONE RIB WITH ROUTINE HEALING, UNSPECIFIED LATERALITY, SUBSEQUENT ENCOUNTER: ICD-10-CM

## 2021-07-29 RX ORDER — OXYCODONE HYDROCHLORIDE AND ACETAMINOPHEN 5; 325 MG/1; MG/1
1 TABLET ORAL EVERY 6 HOURS PRN
Qty: 90 TABLET | Refills: 0 | Status: SHIPPED | OUTPATIENT
Start: 2021-07-29 | End: 2021-09-21 | Stop reason: SDUPTHER

## 2021-08-06 DIAGNOSIS — I10 ESSENTIAL HYPERTENSION: ICD-10-CM

## 2021-08-06 RX ORDER — LOSARTAN POTASSIUM 25 MG/1
TABLET ORAL
Qty: 90 TABLET | Refills: 3 | Status: SHIPPED | OUTPATIENT
Start: 2021-08-06 | End: 2022-04-14

## 2021-08-20 ENCOUNTER — HOSPITAL ENCOUNTER (OUTPATIENT)
Dept: LAB | Age: 76
Discharge: HOME | End: 2021-08-20
Payer: MEDICARE

## 2021-08-20 DIAGNOSIS — E11.9 TYPE 2 DIABETES MELLITUS WITHOUT COMPLICATION, WITHOUT LONG-TERM CURRENT USE OF INSULIN (HCC): ICD-10-CM

## 2021-08-20 DIAGNOSIS — D50.9 IRON DEFICIENCY ANEMIA, UNSPECIFIED IRON DEFICIENCY ANEMIA TYPE: ICD-10-CM

## 2021-08-20 DIAGNOSIS — E78.49 OTHER HYPERLIPIDEMIA: ICD-10-CM

## 2021-08-20 DIAGNOSIS — Z00.00 ROUTINE GENERAL MEDICAL EXAMINATION AT A HEALTH CARE FACILITY: ICD-10-CM

## 2021-08-20 DIAGNOSIS — D50.8 OTHER IRON DEFICIENCY ANEMIA: ICD-10-CM

## 2021-08-20 DIAGNOSIS — Z12.5 SPECIAL SCREENING FOR MALIGNANT NEOPLASM OF PROSTATE: ICD-10-CM

## 2021-08-20 LAB
ALBUMIN SERPL-MCNC: 4.3 G/DL (ref 3.5–5.2)
ALBUMIN/GLOBULIN RATIO: 1.7 (ref 1–2.5)
ALP BLD-CCNC: 67 U/L (ref 40–129)
ALT SERPL-CCNC: 18 U/L (ref 5–41)
ANION GAP SERPL CALCULATED.3IONS-SCNC: 6 MMOL/L (ref 9–17)
AST SERPL-CCNC: 15 U/L
BILIRUB SERPL-MCNC: 0.29 MG/DL (ref 0.3–1.2)
BUN BLDV-MCNC: 22 MG/DL (ref 8–23)
BUN/CREAT BLD: 23 (ref 9–20)
CALCIUM SERPL-MCNC: 9.3 MG/DL (ref 8.6–10.4)
CHLORIDE BLD-SCNC: 103 MMOL/L (ref 98–107)
CHOLESTEROL/HDL RATIO: 4.3
CHOLESTEROL: 186 MG/DL
CO2: 30 MMOL/L (ref 20–31)
CREAT SERPL-MCNC: 0.96 MG/DL (ref 0.7–1.2)
ESTIMATED AVERAGE GLUCOSE: 114 MG/DL
GFR AFRICAN AMERICAN: >60 ML/MIN
GFR NON-AFRICAN AMERICAN: >60 ML/MIN
GFR SERPL CREATININE-BSD FRML MDRD: ABNORMAL ML/MIN/{1.73_M2}
GFR SERPL CREATININE-BSD FRML MDRD: ABNORMAL ML/MIN/{1.73_M2}
GLUCOSE FASTING: 129 MG/DL (ref 70–99)
HBA1C MFR BLD: 5.6 % (ref 4–6)
HDLC SERPL-MCNC: 43 MG/DL
HEMOGLOBIN: 13.6 G/DL (ref 13–17)
IRON SATURATION: 12 % (ref 20–55)
IRON: 43 UG/DL (ref 59–158)
LDL CHOLESTEROL: 93 MG/DL (ref 0–130)
POTASSIUM SERPL-SCNC: 4.9 MMOL/L (ref 3.7–5.3)
PROSTATE SPECIFIC ANTIGEN: 0.53 UG/L
SODIUM BLD-SCNC: 139 MMOL/L (ref 135–144)
TOTAL IRON BINDING CAPACITY: 355 UG/DL (ref 250–450)
TOTAL PROTEIN: 6.9 G/DL (ref 6.4–8.3)
TRIGL SERPL-MCNC: 248 MG/DL
UNSATURATED IRON BINDING CAPACITY: 312 UG/DL (ref 112–347)
VLDLC SERPL CALC-MCNC: ABNORMAL MG/DL (ref 1–30)

## 2021-08-20 PROCEDURE — 83540 ASSAY OF IRON: CPT

## 2021-08-20 PROCEDURE — 83550 IRON BINDING TEST: CPT

## 2021-08-20 PROCEDURE — 80053 COMPREHEN METABOLIC PANEL: CPT

## 2021-08-20 PROCEDURE — 83036 HEMOGLOBIN GLYCOSYLATED A1C: CPT

## 2021-08-20 PROCEDURE — 36415 COLL VENOUS BLD VENIPUNCTURE: CPT

## 2021-08-20 PROCEDURE — G0103 PSA SCREENING: HCPCS

## 2021-08-20 PROCEDURE — 85018 HEMOGLOBIN: CPT

## 2021-08-20 PROCEDURE — 80061 LIPID PANEL: CPT

## 2021-08-31 DIAGNOSIS — F33.41 RECURRENT MAJOR DEPRESSIVE DISORDER, IN PARTIAL REMISSION (HCC): ICD-10-CM

## 2021-09-01 RX ORDER — PAROXETINE HYDROCHLORIDE 20 MG/1
TABLET, FILM COATED ORAL
Qty: 90 TABLET | Refills: 0 | Status: SHIPPED | OUTPATIENT
Start: 2021-09-01 | End: 2021-09-21 | Stop reason: SDUPTHER

## 2021-09-01 NOTE — TELEPHONE ENCOUNTER
Pharmacy requesting a refill of the below medication which has been pended for you:     Requested Prescriptions     Pending Prescriptions Disp Refills    PARoxetine (PAXIL) 20 MG tablet [Pharmacy Med Name: PARoxetine HCl 20 MG Oral Tablet] 90 tablet 0     Sig: TAKE 1 TABLET BY MOUTH ONCE DAILY IN THE MORNING       Last Appointment Date: 2/25/2021  Next Appointment Date: 9/21/2021    Allergies   Allergen Reactions    Crestor [Rosuvastatin] Other (See Comments)     Joint pain, muscle aches    Ibuprofen Other (See Comments)     bleeding    Lisinopril Hives    Effient [Prasugrel] Rash

## 2021-09-07 DIAGNOSIS — M54.41 CHRONIC BILATERAL LOW BACK PAIN WITH BILATERAL SCIATICA: ICD-10-CM

## 2021-09-07 DIAGNOSIS — M54.42 CHRONIC BILATERAL LOW BACK PAIN WITH BILATERAL SCIATICA: ICD-10-CM

## 2021-09-07 DIAGNOSIS — G89.29 CHRONIC BILATERAL LOW BACK PAIN WITH BILATERAL SCIATICA: ICD-10-CM

## 2021-09-07 RX ORDER — CELECOXIB 200 MG/1
CAPSULE ORAL
Qty: 180 CAPSULE | Refills: 3 | Status: SHIPPED | OUTPATIENT
Start: 2021-09-07

## 2021-09-07 NOTE — TELEPHONE ENCOUNTER
Patient requesting refill     medication pended if agreeable.     Last Appt:  2/25/2021  Next Appt:   9/21/2021  Med verified in [address] [place] [address] Rd

## 2021-09-21 ENCOUNTER — OFFICE VISIT (OUTPATIENT)
Dept: INTERNAL MEDICINE | Age: 76
End: 2021-09-21
Payer: MEDICARE

## 2021-09-21 VITALS
HEIGHT: 69 IN | HEART RATE: 63 BPM | DIASTOLIC BLOOD PRESSURE: 70 MMHG | SYSTOLIC BLOOD PRESSURE: 118 MMHG | RESPIRATION RATE: 16 BRPM | BODY MASS INDEX: 31.84 KG/M2 | WEIGHT: 215 LBS

## 2021-09-21 DIAGNOSIS — S22.39XD CLOSED FRACTURE OF ONE RIB WITH ROUTINE HEALING, UNSPECIFIED LATERALITY, SUBSEQUENT ENCOUNTER: ICD-10-CM

## 2021-09-21 DIAGNOSIS — C32.9 CARCINOMA LARYNX (HCC): ICD-10-CM

## 2021-09-21 DIAGNOSIS — D50.9 IRON DEFICIENCY ANEMIA, UNSPECIFIED IRON DEFICIENCY ANEMIA TYPE: ICD-10-CM

## 2021-09-21 DIAGNOSIS — E11.9 TYPE 2 DIABETES MELLITUS WITHOUT COMPLICATION, WITHOUT LONG-TERM CURRENT USE OF INSULIN (HCC): ICD-10-CM

## 2021-09-21 DIAGNOSIS — F33.41 RECURRENT MAJOR DEPRESSIVE DISORDER, IN PARTIAL REMISSION (HCC): ICD-10-CM

## 2021-09-21 DIAGNOSIS — E78.49 OTHER HYPERLIPIDEMIA: ICD-10-CM

## 2021-09-21 DIAGNOSIS — I10 ESSENTIAL HYPERTENSION: Primary | ICD-10-CM

## 2021-09-21 PROCEDURE — 99214 OFFICE O/P EST MOD 30 MIN: CPT | Performed by: INTERNAL MEDICINE

## 2021-09-21 PROCEDURE — 4040F PNEUMOC VAC/ADMIN/RCVD: CPT | Performed by: INTERNAL MEDICINE

## 2021-09-21 PROCEDURE — G8417 CALC BMI ABV UP PARAM F/U: HCPCS | Performed by: INTERNAL MEDICINE

## 2021-09-21 PROCEDURE — G8427 DOCREV CUR MEDS BY ELIG CLIN: HCPCS | Performed by: INTERNAL MEDICINE

## 2021-09-21 PROCEDURE — 1036F TOBACCO NON-USER: CPT | Performed by: INTERNAL MEDICINE

## 2021-09-21 PROCEDURE — 1123F ACP DISCUSS/DSCN MKR DOCD: CPT | Performed by: INTERNAL MEDICINE

## 2021-09-21 RX ORDER — OXYCODONE HYDROCHLORIDE AND ACETAMINOPHEN 5; 325 MG/1; MG/1
1 TABLET ORAL EVERY 6 HOURS PRN
Qty: 90 TABLET | Refills: 0 | Status: SHIPPED | OUTPATIENT
Start: 2021-09-21 | End: 2021-11-19 | Stop reason: SDUPTHER

## 2021-09-21 RX ORDER — PAROXETINE HYDROCHLORIDE 20 MG/1
TABLET, FILM COATED ORAL
Qty: 90 TABLET | Refills: 3 | Status: SHIPPED | OUTPATIENT
Start: 2021-09-21 | End: 2021-11-29 | Stop reason: SDUPTHER

## 2021-09-21 SDOH — ECONOMIC STABILITY: FOOD INSECURITY: WITHIN THE PAST 12 MONTHS, THE FOOD YOU BOUGHT JUST DIDN'T LAST AND YOU DIDN'T HAVE MONEY TO GET MORE.: NEVER TRUE

## 2021-09-21 SDOH — ECONOMIC STABILITY: FOOD INSECURITY: WITHIN THE PAST 12 MONTHS, YOU WORRIED THAT YOUR FOOD WOULD RUN OUT BEFORE YOU GOT MONEY TO BUY MORE.: NEVER TRUE

## 2021-09-21 ASSESSMENT — ENCOUNTER SYMPTOMS
SHORTNESS OF BREATH: 0
EYE PAIN: 0
VOMITING: 0
NAUSEA: 0
CONSTIPATION: 0
COUGH: 0
BLOOD IN STOOL: 0
DIARRHEA: 0
ABDOMINAL PAIN: 0
BACK PAIN: 0

## 2021-09-21 ASSESSMENT — SOCIAL DETERMINANTS OF HEALTH (SDOH): HOW HARD IS IT FOR YOU TO PAY FOR THE VERY BASICS LIKE FOOD, HOUSING, MEDICAL CARE, AND HEATING?: NOT HARD AT ALL

## 2021-09-21 NOTE — PROGRESS NOTES
[place] [address]  Dept: [phone]  Dept Fax: [phone]  Loc: [phone]     Karis Camp is a 67 y.o. male who presents today for his medical conditions/complaintsas noted below. Norberto Osullivan is c/o of   Chief Complaint   Patient presents with    Hypertension     6 month    Hyperlipidemia    Diabetes         HPI:     Hypertension  This is a chronic problem. The current episode started more than 1 year ago. The problem has been waxing and waning since onset. The problem is controlled. Pertinent negatives include no chest pain, headaches, neck pain, palpitations or shortness of breath. Hyperlipidemia  This is a chronic problem. The current episode started more than 1 year ago. The problem is controlled. Recent lipid tests were reviewed and are variable. Pertinent negatives include no chest pain or shortness of breath. Diabetes  He presents for his follow-up diabetic visit. He has type 2 diabetes mellitus. His disease course has been fluctuating. Pertinent negatives for hypoglycemia include no confusion, dizziness, headaches, nervousness/anxiousness or pallor. Pertinent negatives for diabetes include no chest pain, no polydipsia, no polyuria and no weakness. Other  This is a chronic (4-carcinoma of larynx treated many years ago. No evidence of recurrent disease) problem. The current episode started more than 1 year ago. The problem occurs constantly. The problem has been unchanged. Pertinent negatives include no abdominal pain, arthralgias, chest pain, chills, coughing, fever, headaches, nausea, neck pain, numbness, rash, vomiting or weakness. Hemoglobin A1C (%)   Date Value   08/20/2021 5.6   02/18/2021 6.0   08/17/2020 5.6            Microalb/Crt.  Ratio (mcg/mg creat)   Date Value   02/18/2021 15     LDL Cholesterol (mg/dL)   Date Value   08/20/2021 93   08/17/2020 89   08/15/2019 80         AST (U/L)   Date Value   08/20/2021 15     ALT (U/L)   Date Value   08/20/2021 18     BUN (mg/dL)   Date Value   08/20/2021 22     BP Readings from Last 3 Encounters:   09/21/21 118/70   07/06/21 129/68   05/19/21 (!) 167/87              Past Medical History:   Diagnosis Date    Asthma     CAD (coronary artery disease)     STENTS X 4    Cancer (HCC)     THROAT, HX. RADIATION , LT EAR    Cervical radiculopathy     Chronic back pain     Colonic polyp     Degeneration of cervical intervertebral disc     [facility], 1990 C4/C5-C6/C7    Degeneration of cervical intervertebral disc     [facility] 1994, C3/C4    Depression     Diverticulosis     GERD (gastroesophageal reflux disease)     Glucose intolerance (impaired glucose tolerance)     IS NOT DIABETIC, PER PT    HTN (hypertension)     Hyperlipidemia     Lumbar radiculopathy     MI (myocardial infarction) ([facility] [address]     2011    Numbness and tingling     HANDS    OA (osteoarthritis)     Pre-diabetes     Sacroiliac pain 11/4/2014    Vision abnormalities     WEARS GLASSES      Past Surgical History:   Procedure Laterality Date    ABSCESS DRAINAGE Right     ELBOW WITH CLOSURE    ANESTHESIA NERVE BLOCK Bilateral 6/2/2017    Bilat L1 L2 L3 L4 L5 Diagnostic Medial Branch Blk performed by Raymond Henriquez MD at [address] [facility] Bilateral 6/9/2017    Bilat L1 L2 L3 L4 L5 Confirmatory Medial BB performed by Raymond Henriquez MD at [facility]  06/01/2011    DR Portillo Pulse CARDIAC CATHETERIZATION  5-5-2011    STENTSx4    CERVICAL SPINE SURGERY  08/09/2001    Anterior Cervical Decompression and Fusion C3-4    CERVICAL SPINE SURGERY Left [id number]    Cervical C6-C7 Discectomy and Foraminotomy    COLONOSCOPY  2007, 2003    POLYPS    COLONOSCOPY  9/9/13    hyperplastic polyp x 3    COLONOSCOPY N/A 10/15/2018    hyperplastic polyp, severe sigmoid diverticulosis, large ext. 11/7/2018    External HEMORRHOIDECTOMY performed by Escobar Houston MD at [address] Right 6/29/2017    Right L1 L2 L3 L4 L5 Radiofrequency Ablation performed by Savana Moraes MD at [address] Left 7/6/2017    Left L1 L2 L3 L4 L5 Radiofrequency performed by Savana Moraes MD at [facility]    UPPER GASTROINTESTINAL ENDOSCOPY N/A 9/30/2019    mild gastritis, mild to moderate esophagitis, Dr. Teetee Campbell       Family History   Problem Relation Age of Onset    Cancer Father         colon cancer          Social History     Tobacco Use    Smoking status: Former Smoker     Packs/day: 1.00     Years: 40.00     Pack years: 40.00     Types: Cigarettes, Pipe, Cigars     Quit date: 5/5/2011     Years since quitting: 10.3    Smokeless tobacco: Former User     Types: [address] [place] [address] date: 5/5/2011   Substance Use Topics    Alcohol use: Yes     Alcohol/week: 0.0 standard drinks     Comment: RARE         Current Outpatient Medications   Medication Sig Dispense Refill    PARoxetine (PAXIL) 20 MG tablet TAKE 1 TABLET BY MOUTH ONCE DAILY IN THE MORNING 90 tablet 3    oxyCODONE-acetaminophen (PERCOCET) 5-325 MG per tablet Take 1 tablet by mouth every 6 hours as needed for Pain for up to 30 days.  90 tablet 0    celecoxib (CELEBREX) 200 MG capsule TAKE 1 CAPSULE BY MOUTH TWICE DAILY 180 capsule 3    losartan (COZAAR) 25 MG tablet TAKE 1 TABLET DAILY 90 tablet 3    finasteride (PROSCAR) 5 MG tablet Take 1 tablet by mouth daily 90 tablet 1    metoprolol tartrate (LOPRESSOR) 25 MG tablet TAKE 1 TABLET TWICE A  tablet 3    atorvastatin (LIPITOR) 80 MG tablet TAKE 1 TABLET DAILY 90 tablet 3    clopidogrel (PLAVIX) 75 MG tablet TAKE 1 TABLET DAILY 90 tablet 3    metFORMIN (GLUCOPHAGE) 500 MG tablet TAKE 1 TABLET TWICE DAILY  WITH MEALS. 180 tablet 3    phenazopyridine (PYRIDIUM) 200 MG tablet Take 1 tablet by mouth 3 times daily as needed for Pain 9 tablet 0    pantoprazole (PROTONIX) 40 MG tablet Take 1 tablet by mouth once daily 90 tablet 3    Handicap Placard MISC by Does not apply route EXPIRES 04/13/2026 2 each 0    nitroGLYCERIN (NITROSTAT) 0.4 MG SL tablet Place 1 tablet under the tongue every 5 minutes as needed for Chest pain up to max of 3 total doses. If no relief after 1 dose, call 911. 25 tablet 3    ferrous sulfate (IRON 325) 325 (65 Fe) MG tablet Take 1 tablet by mouth 2 times daily 120 tablet 3    Handicap Placard MISC by Does not apply route Expires: 7/5/2021 2 each 0    tiZANidine (ZANAFLEX) 4 MG tablet TAKE ONE TABLET BY MOUTH THREE TIMES DAILY 90 tablet 5    aspirin 81 MG tablet Take 81 mg by mouth daily      famotidine (PEPCID) 20 MG tablet Take 20 mg by mouth daily. Current Facility-Administered Medications   Medication Dose Route Frequency Provider Last Rate Last Admin    methylPREDNISolone acetate (DEPO-MEDROL) injection 40 mg  40 mg IntraMUSCular Once Gennaro Gerard MD         Allergies   Allergen Reactions    Crestor [Rosuvastatin] Other (See Comments)     Joint pain, muscle aches    Ibuprofen Other (See Comments)     bleeding    Lisinopril Hives    Effient [Prasugrel] Rash       Health Maintenance   Topic Date Due    Hepatitis C screen  Never done    DTaP/Tdap/Td vaccine (1 - Tdap) Never done    Low dose CT lung screening  Never done    Shingles Vaccine (2 of 3) 01/03/2014    Flu vaccine (1) 09/01/2021    Lipid screen  08/20/2022    Potassium monitoring  08/20/2022    Creatinine monitoring  08/20/2022    Pneumococcal 65+ years Vaccine  Completed    COVID-19 Vaccine  Completed    Hepatitis A vaccine  Aged Out    Hib vaccine  Aged Out    Meningococcal (ACWY) vaccine  Aged Out       Subjective:      Review of Systems   Constitutional: Negative for chills and fever. HENT: Negative for hearing loss. Eyes: Negative for pain and visual disturbance.    Respiratory: Negative for cough and BMI 31.75 kg/m²     Assessment:       Diagnosis Orders   1. Essential hypertension     2. Recurrent major depressive disorder, in partial remission (HCC)  PARoxetine (PAXIL) 20 MG tablet   3. Closed fracture of one rib with routine healing, unspecified laterality, subsequent encounter  oxyCODONE-acetaminophen (PERCOCET) 5-325 MG per tablet   4. Carcinoma larynx ([address]     5. Type 2 diabetes mellitus without complication, without long-term current use of insulin (HCC)  Hemoglobin A1C    Microalbumin, Ur   6. Other hyperlipidemia     7. Iron deficiency anemia, unspecified iron deficiency anemia type  Hemoglobin    Iron And TIBC   Reviewed multiple test results for this appointment. 8/20/2021 normal creatinine 0.96.    8/20/2021 normal total cholesterol 186.    8/20/2021 normal hemoglobin A1c at 5.6. Patient told to continue Protonix indefinitely with history of previous gastric bleeding. Plan:       Return in about 6 months (around 3/21/2022) for medicare AWV, Hypertension, Hyperlipidemia, Diabetes. Orders Placed This Encounter   Procedures    Hemoglobin A1C     Standing Status:   Future     Standing Expiration Date:   9/21/2022    Microalbumin, Ur     Standing Status:   Future     Standing Expiration Date:   9/21/2022    Hemoglobin     Standing Status:   Future     Standing Expiration Date:   9/21/2022    Iron And TIBC     Standing Status:   Future     Standing Expiration Date:   9/21/2022     Order Specific Question:   Is Patient Fasting? Answer:   na     Order Specific Question:   No of Hours? Answer:   na     Orders Placed This Encounter   Medications    PARoxetine (PAXIL) 20 MG tablet     Sig: TAKE 1 TABLET BY MOUTH ONCE DAILY IN THE MORNING     Dispense:  90 tablet     Refill:  3    oxyCODONE-acetaminophen (PERCOCET) 5-325 MG per tablet     Sig: Take 1 tablet by mouth every 6 hours as needed for Pain for up to 30 days.      Dispense:  90 tablet     Refill:  0     Reduce doses taken as pain becomes manageable       Patientgiven educational materials - see patient instructions. Discussed use, benefit,and side effects of prescribed medications. All patient questions answered. Ptvoiced understanding. Reviewed health maintenance. Instructed to continue currentmedications, diet and exercise. Patient agreed with treatment plan. Follow up asdirected.      Electronically signed by Katerin Stevens MD on 9/21/2021 at 10:15 AM

## 2021-10-06 ENCOUNTER — IMMUNIZATION (OUTPATIENT)
Dept: LAB | Age: 76
End: 2021-10-06
Payer: MEDICARE

## 2021-10-06 PROCEDURE — PBSHW INFLUENZA, QUADV, ADJUVANTED, 65 YRS +, IM, PF, PREFILL SYR, 0.5ML (FLUAD): Performed by: INTERNAL MEDICINE

## 2021-10-06 PROCEDURE — G0008 ADMIN INFLUENZA VIRUS VAC: HCPCS | Performed by: INTERNAL MEDICINE

## 2021-10-29 DIAGNOSIS — E11.9 TYPE 2 DIABETES MELLITUS WITHOUT COMPLICATION, WITHOUT LONG-TERM CURRENT USE OF INSULIN (HCC): ICD-10-CM

## 2021-11-19 DIAGNOSIS — S22.39XD CLOSED FRACTURE OF ONE RIB WITH ROUTINE HEALING, UNSPECIFIED LATERALITY, SUBSEQUENT ENCOUNTER: ICD-10-CM

## 2021-11-19 RX ORDER — OXYCODONE HYDROCHLORIDE AND ACETAMINOPHEN 5; 325 MG/1; MG/1
1 TABLET ORAL EVERY 6 HOURS PRN
Qty: 90 TABLET | Refills: 0 | Status: SHIPPED | OUTPATIENT
Start: 2021-11-19 | End: 2022-01-06 | Stop reason: SDUPTHER

## 2021-11-19 NOTE — TELEPHONE ENCOUNTER
Refill request     Medication pended if agreeable    Last Appt:  9/21/2021  Next Appt:   3/24/2022  Med verified in Epic

## 2021-11-29 DIAGNOSIS — F33.41 RECURRENT MAJOR DEPRESSIVE DISORDER, IN PARTIAL REMISSION (HCC): ICD-10-CM

## 2021-11-29 RX ORDER — PAROXETINE HYDROCHLORIDE 20 MG/1
TABLET, FILM COATED ORAL
Qty: 90 TABLET | Refills: 3 | Status: SHIPPED | OUTPATIENT
Start: 2021-11-29

## 2021-12-08 DIAGNOSIS — I25.10 CORONARY ARTERY DISEASE DUE TO LIPID RICH PLAQUE: ICD-10-CM

## 2021-12-08 DIAGNOSIS — I10 ESSENTIAL HYPERTENSION: ICD-10-CM

## 2021-12-08 DIAGNOSIS — I25.83 CORONARY ARTERY DISEASE DUE TO LIPID RICH PLAQUE: ICD-10-CM

## 2021-12-08 RX ORDER — CLOPIDOGREL BISULFATE 75 MG/1
TABLET ORAL
Qty: 90 TABLET | Refills: 3 | Status: SHIPPED | OUTPATIENT
Start: 2021-12-08 | End: 2022-04-18 | Stop reason: ALTCHOICE

## 2022-01-04 DIAGNOSIS — I10 ESSENTIAL HYPERTENSION: ICD-10-CM

## 2022-01-04 DIAGNOSIS — E78.2 MIXED HYPERLIPIDEMIA: ICD-10-CM

## 2022-01-04 RX ORDER — ATORVASTATIN CALCIUM 80 MG/1
TABLET, FILM COATED ORAL
Qty: 90 TABLET | Refills: 3 | Status: SHIPPED | OUTPATIENT
Start: 2022-01-04

## 2022-01-05 DIAGNOSIS — S22.39XD CLOSED FRACTURE OF ONE RIB WITH ROUTINE HEALING, UNSPECIFIED LATERALITY, SUBSEQUENT ENCOUNTER: ICD-10-CM

## 2022-01-05 NOTE — TELEPHONE ENCOUNTER
Patient requesting a refill on percocet sent to [facility]     Medication pended If agreeable    Last Appt:  9/21/2021  Next Appt:   3/24/2022  Med verified in [facility] to wait until Dr. Caitlin Mckeon is back in office

## 2022-01-06 RX ORDER — OXYCODONE HYDROCHLORIDE AND ACETAMINOPHEN 5; 325 MG/1; MG/1
1 TABLET ORAL EVERY 6 HOURS PRN
Qty: 90 TABLET | Refills: 0 | Status: SHIPPED | OUTPATIENT
Start: 2022-01-06 | End: 2022-02-04 | Stop reason: SDUPTHER

## 2022-01-18 ENCOUNTER — OFFICE VISIT (OUTPATIENT)
Dept: CARDIOLOGY | Age: 77
End: 2022-01-18
Payer: MEDICARE

## 2022-01-18 VITALS
WEIGHT: 210.4 LBS | OXYGEN SATURATION: 96 % | DIASTOLIC BLOOD PRESSURE: 70 MMHG | BODY MASS INDEX: 31.16 KG/M2 | HEIGHT: 69 IN | HEART RATE: 59 BPM | SYSTOLIC BLOOD PRESSURE: 110 MMHG

## 2022-01-18 DIAGNOSIS — I10 ESSENTIAL HYPERTENSION: Primary | ICD-10-CM

## 2022-01-18 DIAGNOSIS — I25.10 CORONARY ARTERY DISEASE INVOLVING NATIVE CORONARY ARTERY OF NATIVE HEART WITHOUT ANGINA PECTORIS: ICD-10-CM

## 2022-01-18 DIAGNOSIS — E11.9 CONTROLLED TYPE 2 DIABETES MELLITUS WITHOUT COMPLICATION, UNSPECIFIED WHETHER LONG TERM INSULIN USE (HCC): ICD-10-CM

## 2022-01-18 DIAGNOSIS — Z98.61 CORONARY ANGIOPLASTY STATUS: ICD-10-CM

## 2022-01-18 DIAGNOSIS — E78.2 MIXED HYPERLIPIDEMIA: ICD-10-CM

## 2022-01-18 PROCEDURE — 93010 ELECTROCARDIOGRAM REPORT: CPT | Performed by: INTERNAL MEDICINE

## 2022-01-18 PROCEDURE — 99214 OFFICE O/P EST MOD 30 MIN: CPT | Performed by: INTERNAL MEDICINE

## 2022-01-18 PROCEDURE — G8484 FLU IMMUNIZE NO ADMIN: HCPCS | Performed by: INTERNAL MEDICINE

## 2022-01-18 PROCEDURE — 4040F PNEUMOC VAC/ADMIN/RCVD: CPT | Performed by: INTERNAL MEDICINE

## 2022-01-18 PROCEDURE — G8417 CALC BMI ABV UP PARAM F/U: HCPCS | Performed by: INTERNAL MEDICINE

## 2022-01-18 PROCEDURE — 93005 ELECTROCARDIOGRAM TRACING: CPT | Performed by: INTERNAL MEDICINE

## 2022-01-18 PROCEDURE — 1036F TOBACCO NON-USER: CPT | Performed by: INTERNAL MEDICINE

## 2022-01-18 PROCEDURE — G8427 DOCREV CUR MEDS BY ELIG CLIN: HCPCS | Performed by: INTERNAL MEDICINE

## 2022-01-18 PROCEDURE — 1123F ACP DISCUSS/DSCN MKR DOCD: CPT | Performed by: INTERNAL MEDICINE

## 2022-01-18 NOTE — PROGRESS NOTES
Today's Date: 1/18/2022  Patient's Name: Kelli Evans  Patient's age: 67 y. o., 1945    CC:  GILL, CAD, HTN, DL    HPI:  Norberto Osullivan  is here for follow up. Denies any cp, sob, orthopnea, pnd, le edema. Not very active. Mows grass okay, blows snow, and is doing some trim around the house. Past Medical History:   has a past medical history of Asthma, CAD (coronary artery disease), Cancer ([address] Cervical radiculopathy, Chronic back pain, Colonic polyp, Degeneration of cervical intervertebral disc, Degeneration of cervical intervertebral disc, Depression, Diverticulosis, GERD (gastroesophageal reflux disease), Glucose intolerance (impaired glucose tolerance), HTN (hypertension), Hyperlipidemia, Lumbar radiculopathy, MI (myocardial infarction) ([address] Numbness and tingling, OA (osteoarthritis), Pre-diabetes, Sacroiliac pain, and Vision abnormalities. Past Surgical History:   has a past surgical history that includes Coronary angioplasty with stent; Finger trigger release (Right); Abscess Drainage (Right); other surgical history (2/19/13, 5/14/13); Elbow bursa surgery (Right, 2012,2011); Infected skin debridement (Right); Colonoscopy (2007, 2003); Colonoscopy (9/9/13); Cervical spine surgery (08/09/2001); Cervical spine surgery (Left, 8369,[id number],5231); Lumbar spine surgery (7115,3497); lumbar fusion (4/7/14); skin biopsy; other surgical history; other surgical history (Left, 09/27/2016); other surgical history (Bilateral, 11/29/2016); other surgical history (12/06/2016); other surgical history (Right, 12/12/2016); other surgical history (Left, 12/15/17); other surgical history (Left, 01/31/2017); Lumbar spine surgery (Left, 2/14/2017); Injection Procedure For Sacroiliac Joint (Bilateral, 3/14/2017); pr arthrocentesis aspir&/inj major jt/bursa w/o us (Left, 3/31/2017); pr arthrocentesis aspir&/inj major jt/bursa w/o us (Left, 4/14/2017); Lumbar spine surgery (Bilateral, 5/2/2017);  Lumbar spine surgery (Bilateral, 5/9/2017); Anesthesia Nerve Block (Bilateral, 6/2/2017); Anesthesia Nerve Block (Bilateral, 6/9/2017); RADIOFREQUENCY ABLATION NERVES (Right, 6/29/2017); RADIOFREQUENCY ABLATION NERVES (Left, 7/6/2017); Cardiac catheterization; Cardiac catheterization (06/01/2011); Cardiac catheterization (5-5-2011); back surgery; lumbar fusion (N/A, 11/15/2017); Colonoscopy (N/A, 10/15/2018); pr hemorrhoidectomy,int/ext,1 column/group (N/A, 11/7/2018); Upper gastrointestinal endoscopy (N/A, 9/30/2019); and Cystoscopy (Bilateral, 5/19/2021). Home Medications:  Prior to Admission medications    Medication Sig Start Date End Date Taking? Authorizing Provider   oxyCODONE-acetaminophen (PERCOCET) 5-325 MG per tablet Take 1 tablet by mouth every 6 hours as needed for Pain for up to 30 days. 1/6/22 2/5/22 Yes Maycol Rodriguez MD   atorvastatin (LIPITOR) 80 MG tablet TAKE 1 TABLET DAILY 1/4/22  Yes Maycol Rodriguez MD   metoprolol tartrate (LOPRESSOR) 25 MG tablet TAKE 1 TABLET TWICE A DAY 1/4/22  Yes Maycol Rodriguez MD   clopidogrel (PLAVIX) 75 MG tablet TAKE 1 TABLET DAILY 12/8/21  Yes LAWRENCE Canales - CNP   PARoxetine (PAXIL) 20 MG tablet TAKE 1 TABLET BY MOUTH ONCE DAILY IN THE MORNING 11/29/21  Yes Maycol Rodriguez MD   metFORMIN (GLUCOPHAGE) 500 MG tablet TAKE 1 TABLET TWICE DAILY  WITH MEALS.  10/29/21  Yes Maycol Rodriguez MD   celecoxib (CELEBREX) 200 MG capsule TAKE 1 CAPSULE BY MOUTH TWICE DAILY 9/7/21  Yes Maycol Rodriguez MD   losartan (COZAAR) 25 MG tablet TAKE 1 TABLET DAILY 8/6/21  Yes Maycol Rodriguez MD   phenazopyridine (PYRIDIUM) 200 MG tablet Take 1 tablet by mouth 3 times daily as needed for Pain 5/19/21  Yes Robert Carter MD   pantoprazole (PROTONIX) 40 MG tablet Take 1 tablet by mouth once daily 5/13/21  Yes Maycol Rodriguez MD   Handicap Placard MISC by Does not apply route EXPIRES 04/13/2026 4/13/21  Yes Maycol Rodriguez MD   nitroGLYCERIN (NITROSTAT) 0.4 MG SL tablet Place 1 tablet under the tongue every 5 minutes as needed for Chest pain up to max of 3 total doses. If no relief after 1 dose, call 911. 2/25/21  Yes Lilo Barron MD   ferrous sulfate (IRON 325) 325 (65 Fe) MG tablet Take 1 tablet by mouth 2 times daily 2/25/21  Yes Lilo Barron MD   Piedad Johnson MISC by Does not apply route Expires: 7/5/2021 7/5/19  Yes Lilo Barron MD   tiZANidine (ZANAFLEX) 4 MG tablet TAKE ONE TABLET BY MOUTH THREE TIMES DAILY 5/11/17  Yes [facility], APRN - CNP   aspirin 81 MG tablet Take 81 mg by mouth daily   Yes Historical Provider, MD   famotidine (PEPCID) 20 MG tablet Take 20 mg by mouth daily. Yes Historical Provider, MD       Allergies:  Crestor [rosuvastatin], Ibuprofen, Lisinopril, and Effient [prasugrel]    Social History:   reports that he quit smoking about 10 years ago. His smoking use included cigarettes, pipe, and cigars. He has a 40.00 pack-year smoking history. He quit smokeless tobacco use about 10 years ago. His smokeless tobacco use included chew. He reports current alcohol use. He reports that he does not use drugs. Family History: family history includes Cancer in his father. No h/o sudden cardiac death. No for premature CAD    REVIEW OF SYSTEMS:    · Constitutional: there has been no unanticipated weight loss. There's been No change in energy level, No change in activity level. · Eyes: No visual changes or diplopia. No scleral icterus. · ENT: No Headaches, hearing loss or vertigo. No mouth sores or sore throat. · Cardiovascular: see above  · Respiratory: see above  · Gastrointestinal: No abdominal pain, appetite loss, blood in stools. · Genitourinary: No dysuria, trouble voiding, or hematuria. · Musculoskeletal:  Lower Back pain reported  · Integumentary: No rash or pruritis. · Neurological: No headache or diplopia. No tingling  · Psychiatric: No anxiety, or depression. · Endocrine: No temperature intolerance.    · Hematologic/Lymphatic: No abnormal bruising or bleeding, blood clots or swollen lymph nodes. · Allergic/Immunologic: No nasal congestion or hives. PHYSICAL EXAM:      /70   Pulse 59   Ht 5' 9\" (1.753 m)   Wt 210 lb 6.4 oz (95.4 kg)   SpO2 96%   BMI 31.07 kg/m²   General appearance: alert and cooperative with exam  HEENT: Head: Normocephalic, no lesions, without obvious abnormality. Eyes: PERRL, EOMI  Ears: Not obvious deformations or lack of hearing  Neck: no carotid bruit, no JVD  Lungs: clear to auscultation bilaterally  Heart: regular rate and rhythm, S1, S2 normal, no murmur, click, rub or gallop  Abdomen: soft, non-tender; bowel sounds normal; no masses,  no organomegaly  Extremities: extremities normal, atraumatic, no cyanosis or edema  Neurologic: Mental status: Alert, oriented, thought content appropriate  Skin: WNL for age and condition, no obvious rashes or leasions    Labs:   Lab Results   Component Value Date    CHOL 186 08/20/2021    TRIG 248 (H) 08/20/2021    HDL 43 08/20/2021    LDLCHOLESTEROL 93 08/20/2021    VLDL NOT REPORTED (H) 08/20/2021    CHOLHDLRATIO 4.3 08/20/2021     Lab Results   Component Value Date     08/20/2021    K 4.9 08/20/2021     08/20/2021    CO2 30 08/20/2021    BUN 22 08/20/2021    CREATININE 0.96 08/20/2021    GLUCOSE 134 (H) 08/17/2020    CALCIUM 9.3 08/20/2021    PROT 6.9 08/20/2021    LABALBU 4.3 08/20/2021    BILITOT 0.29 (L) 08/20/2021    ALKPHOS 67 08/20/2021    AST 15 08/20/2021    ALT 18 08/20/2021    LABGLOM >60 08/20/2021    GFRAA >60 08/20/2021     Cardiac Data:  EKG: SR, RBBB    TTE 10/17/2014  1.   Left ventricle is normal in dimension with normal left ventricular systolic function.  Ejection fraction is 50-55%. 2.   Biatrial enlargement. 3.   Right ventricle is dilated with normal function. 4.   Mitral valve leaflets are thickened with trivial regurgitation. 5.   Mildly sclerotic aortic valve with no stenosis or regurgitation.   6.   Trivial tricuspid

## 2022-01-24 ENCOUNTER — HOSPITAL ENCOUNTER (OUTPATIENT)
Dept: NON INVASIVE DIAGNOSTICS | Age: 77
Discharge: HOME | End: 2022-01-24
Payer: MEDICARE

## 2022-01-24 ENCOUNTER — HOSPITAL ENCOUNTER (OUTPATIENT)
Dept: NUCLEAR MEDICINE | Age: 77
Discharge: HOME | End: 2022-01-26
Payer: MEDICARE

## 2022-01-24 ENCOUNTER — TELEPHONE (OUTPATIENT)
Dept: CARDIOLOGY | Age: 77
End: 2022-01-24

## 2022-01-24 DIAGNOSIS — E11.9 CONTROLLED TYPE 2 DIABETES MELLITUS WITHOUT COMPLICATION, UNSPECIFIED WHETHER LONG TERM INSULIN USE (HCC): ICD-10-CM

## 2022-01-24 DIAGNOSIS — I10 ESSENTIAL HYPERTENSION: ICD-10-CM

## 2022-01-24 DIAGNOSIS — Z98.61 CORONARY ANGIOPLASTY STATUS: ICD-10-CM

## 2022-01-24 DIAGNOSIS — E78.2 MIXED HYPERLIPIDEMIA: ICD-10-CM

## 2022-01-24 DIAGNOSIS — I25.10 CORONARY ARTERY DISEASE INVOLVING NATIVE CORONARY ARTERY OF NATIVE HEART WITHOUT ANGINA PECTORIS: ICD-10-CM

## 2022-01-24 DIAGNOSIS — R94.39 ABNORMAL STRESS TEST: Primary | ICD-10-CM

## 2022-01-24 PROCEDURE — A9500 TC99M SESTAMIBI: HCPCS | Performed by: INTERNAL MEDICINE

## 2022-01-24 PROCEDURE — 6360000002 HC RX W HCPCS: Performed by: INTERNAL MEDICINE

## 2022-01-24 PROCEDURE — 93017 CV STRESS TEST TRACING ONLY: CPT

## 2022-01-24 PROCEDURE — 3430000000 HC RX DIAGNOSTIC RADIOPHARMACEUTICAL: Performed by: INTERNAL MEDICINE

## 2022-01-24 PROCEDURE — 93018 CV STRESS TEST I&R ONLY: CPT | Performed by: INTERNAL MEDICINE

## 2022-01-24 PROCEDURE — 78452 HT MUSCLE IMAGE SPECT MULT: CPT

## 2022-01-24 RX ADMIN — TETRAKIS(2-METHOXYISOBUTYLISOCYANIDE)COPPER(I) TETRAFLUOROBORATE 30 MILLICURIE: 1 INJECTION, POWDER, LYOPHILIZED, FOR SOLUTION INTRAVENOUS at 11:38

## 2022-01-24 RX ADMIN — REGADENOSON 0.4 MG: 0.08 INJECTION, SOLUTION INTRAVENOUS at 10:08

## 2022-01-24 RX ADMIN — TETRAKIS(2-METHOXYISOBUTYLISOCYANIDE)COPPER(I) TETRAFLUOROBORATE 10 MILLICURIE: 1 INJECTION, POWDER, LYOPHILIZED, FOR SOLUTION INTRAVENOUS at 11:38

## 2022-01-24 NOTE — PROGRESS NOTES
Patient Name:  Burak Rizo MRN:  [id number]   :  1945  Age:  67 y.o. Sex: male   Ordering Provider: Rui Dodd DO  Referring Provider: Rui Dodd DO  Primary Care Provider:  Odie Meigs, MD     Indications: CAD     Medications:     Current Outpatient Medications:     oxyCODONE-acetaminophen (PERCOCET) 5-325 MG per tablet, Take 1 tablet by mouth every 6 hours as needed for Pain for up to 30 days. , Disp: 90 tablet, Rfl: 0    atorvastatin (LIPITOR) 80 MG tablet, TAKE 1 TABLET DAILY, Disp: 90 tablet, Rfl: 3    metoprolol tartrate (LOPRESSOR) 25 MG tablet, TAKE 1 TABLET TWICE A DAY, Disp: 180 tablet, Rfl: 3    clopidogrel (PLAVIX) 75 MG tablet, TAKE 1 TABLET DAILY, Disp: 90 tablet, Rfl: 3    PARoxetine (PAXIL) 20 MG tablet, TAKE 1 TABLET BY MOUTH ONCE DAILY IN THE MORNING, Disp: 90 tablet, Rfl: 3    metFORMIN (GLUCOPHAGE) 500 MG tablet, TAKE 1 TABLET TWICE DAILY  WITH MEALS., Disp: 180 tablet, Rfl: 3    celecoxib (CELEBREX) 200 MG capsule, TAKE 1 CAPSULE BY MOUTH TWICE DAILY, Disp: 180 capsule, Rfl: 3    losartan (COZAAR) 25 MG tablet, TAKE 1 TABLET DAILY, Disp: 90 tablet, Rfl: 3    phenazopyridine (PYRIDIUM) 200 MG tablet, Take 1 tablet by mouth 3 times daily as needed for Pain, Disp: 9 tablet, Rfl: 0    pantoprazole (PROTONIX) 40 MG tablet, Take 1 tablet by mouth once daily, Disp: 90 tablet, Rfl: 3    Handicap Placard MISC, by Does not apply route EXPIRES 2026, Disp: 2 each, Rfl: 0    nitroGLYCERIN (NITROSTAT) 0.4 MG SL tablet, Place 1 tablet under the tongue every 5 minutes as needed for Chest pain up to max of 3 total doses.  If no relief after 1 dose, call 911., Disp: 25 tablet, Rfl: 3    ferrous sulfate (IRON 325) 325 (65 Fe) MG tablet, Take 1 tablet by mouth 2 times daily, Disp: 120 tablet, Rfl: 3    Handicap Placard MISC, by Does not apply route Expires: 2021, Disp: 2 each, Rfl: 0    tiZANidine (ZANAFLEX) 4 MG tablet, TAKE ONE TABLET BY MOUTH THREE TIMES DAILY, Disp: 90 tablet, Rfl: 5    aspirin 81 MG tablet, Take 81 mg by mouth daily, Disp: , Rfl:     famotidine (PEPCID) 20 MG tablet, Take 20 mg by mouth daily. , Disp: , Rfl:     Current Facility-Administered Medications:     regadenoson (LEXISCAN) injection 0.4 mg, 0.4 mg, IntraVENous, Once, Nick Heredia DO    methylPREDNISolone acetate (DEPO-MEDROL) injection 40 mg, 40 mg, IntraMUSCular, Once, Asad Alvarenga MD    Facility-Administered Medications Ordered in Other Encounters:     technetium sestamibi (CARDIOLITE) injection 30 millicurie, 30 millicurie, IntraVENous, ONCE PRN, Nick Heredia DO    technetium sestamibi (CARDIOLITE) injection 10 millicurie, 10 millicurie, IntraVENous, ONCE PRN, Nick Heredia DO      ----------------------------------------------------------------------------------------------------------                Lexiscan 0.4 mg injected over 10 seconds. IV Cardiolite injected 20 seconds post Lexiscan injection. Heart Rate:  61  Resting Blood Pressure:  144/72   ----------------------------------------------------------------------------------------------------------     HR BP   1 min 77 123/58   2 min     3 min 77 132/62   4 min     5 min 69 136/64   6 min     7 min 67 131/58   8 min     9 min 73 127/62   10 min       Symptoms:  Chest Pain:  No      Nausea:  No     Headache:  No    Shortness of Breath:  No     Other:  No      Electronically signed by Laureen Rees RN on 1/24/22 at 9:59 AM EST  ----------------------------------------------------------------------------------------------------------  Resting EKG:  NSR, RBBB    Arrhythmias:  None     EKG Changes:  None     Interpretation:   - Negative ECG Portion of Lexiscan Stress Test  - Await Nuclear Portion    Supervising Physician:    Tobias Boudreaux, [address], [address], [place] [address] [facility]  [facility] [employer]  [phone]

## 2022-01-24 NOTE — PROCEDURES
[facility] [address]                 [address] [place] [id number]                              CARDIAC STRESS TEST    PATIENT NAME: KEDAR Rutherford                         :        1945  MED REC NO:   [id number]                             ROOM:  ACCOUNT NO:   [de-identified]                           ADMIT DATE: 2022  PROVIDER:     Helen Prater DO    DATE OF STUDY:  2022    STRESS TEST    Ordering Provider:  Helen Prater DO    Primary Care Provider:  Lilo Barron MD    Patient's Age: 77     Height: 5 feet 9 inches  Weight: 210 pounds    INDICATION:  Hypertension, coronary artery disease. Lexiscan 0.4 mg injected over 10 seconds. IV Cardiolite injected 20 seconds post Lexiscan injection. Heart Rate: 61 Resting blood pressure:  144/72              HR   BP  1 min          77   123/58  2 min  3 min          77   132/62  4 min  5 min          69   136/64  6 min  7 min          67   131/58  8 min  9 min          73   127/62  10 min    Symptoms:  Chest Pain: No  Nausea: No  Headache:  No  Shortness of  breath: No  Other: No    Resting EKG:  Normal sinus rhythm, right bundle branch block. Arrhythmias:  None. EKG changes:  None. INTERPRETATION:  1. Negative ECG portion of Lexiscan stress test.  2.  Await nuclear portion. Nuclear Myocardial Perfusion Imaging (SPECT)    TESTING METHOD  STRESS:   Lexiscan  AGENT:    Cardiolite  REST:          Injection Date:  2022  Time:  849  Amount:  10.31  mCi  STRESS:   Injection Date:  2022  Time:  957  Amount:  32.1 mCi  IMAGE TIME:    Rest:  935  Stress:  1050    EF:  67%  TID:  1.19  LHR:  0.72    FINDINGS:  Ischemia (Reversible Defect):           Yes  Infarction (Irreversible Defect):       Yes  Ejection fraction Calculated:           Yes  Segmental wall motion:                  Yes    IMPRESSION:  1. Moderate size/intensity mid/apical inferior ischemia around old infarction  (more basal).   2. Normal LVEF 67%. 3.  Normal wall motion.         SUSHIL HEREDIA DO    D: 01/24/2022 14:45:54       T: 01/24/2022 14:47:39     /[email or web address]  Job#: [id number]     Doc#: Unknown    CC:  Sushil Heredia DO

## 2022-01-25 DIAGNOSIS — R94.39 ABNORMAL STRESS TEST: ICD-10-CM

## 2022-01-25 NOTE — TELEPHONE ENCOUNTER
Cath ordered
Please review abnormal stress test.
Sent
Spoke to patient who stated he wanted to proceed with cath. He is having intermittent S.O.B. and chest pain. Advise to go to ER for persistent or worsening of symptoms. Patient agrees. Please place order.
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
Unable to obtain at this time; pt is uncooperative

## 2022-01-28 ENCOUNTER — HOSPITAL ENCOUNTER (OUTPATIENT)
Dept: GENERAL RADIOLOGY | Age: 77
Discharge: HOME | End: 2022-01-30
Payer: MEDICARE

## 2022-01-28 ENCOUNTER — HOSPITAL ENCOUNTER (OUTPATIENT)
Age: 77
Discharge: HOME | End: 2022-01-30
Payer: MEDICARE

## 2022-01-28 ENCOUNTER — OFFICE VISIT (OUTPATIENT)
Dept: PRIMARY CARE CLINIC | Age: 77
End: 2022-01-28
Payer: MEDICARE

## 2022-01-28 VITALS
WEIGHT: 210 LBS | SYSTOLIC BLOOD PRESSURE: 102 MMHG | BODY MASS INDEX: 31.1 KG/M2 | HEIGHT: 69 IN | HEART RATE: 70 BPM | DIASTOLIC BLOOD PRESSURE: 52 MMHG | TEMPERATURE: 96.2 F | OXYGEN SATURATION: 96 %
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
## 2022-01-28 DIAGNOSIS — M25.522 PAIN AND SWELLING OF LEFT ELBOW: Primary | ICD-10-CM

## 2022-01-28 DIAGNOSIS — M25.422 PAIN AND SWELLING OF LEFT ELBOW: ICD-10-CM

## 2022-01-28 DIAGNOSIS — M70.22 OLECRANON BURSITIS OF LEFT ELBOW: ICD-10-CM

## 2022-01-28 DIAGNOSIS — M25.422 PAIN AND SWELLING OF LEFT ELBOW: Primary | ICD-10-CM

## 2022-01-28 DIAGNOSIS — M25.522 PAIN AND SWELLING OF LEFT ELBOW: ICD-10-CM

## 2022-01-28 PROCEDURE — 99213 OFFICE O/P EST LOW 20 MIN: CPT | Performed by: NURSE PRACTITIONER

## 2022-01-28 PROCEDURE — 1123F ACP DISCUSS/DSCN MKR DOCD: CPT | Performed by: NURSE PRACTITIONER

## 2022-01-28 PROCEDURE — 99214 OFFICE O/P EST MOD 30 MIN: CPT | Performed by: NURSE PRACTITIONER

## 2022-01-28 PROCEDURE — 73080 X-RAY EXAM OF ELBOW: CPT

## 2022-01-28 PROCEDURE — 4040F PNEUMOC VAC/ADMIN/RCVD: CPT | Performed by: NURSE PRACTITIONER

## 2022-01-28 PROCEDURE — G8484 FLU IMMUNIZE NO ADMIN: HCPCS | Performed by: NURSE PRACTITIONER

## 2022-01-28 PROCEDURE — 20605 DRAIN/INJ JOINT/BURSA W/O US: CPT | Performed by: NURSE PRACTITIONER

## 2022-01-28 PROCEDURE — 20600 DRAIN/INJ JOINT/BURSA W/O US: CPT | Performed by: NURSE PRACTITIONER

## 2022-01-28 PROCEDURE — G8427 DOCREV CUR MEDS BY ELIG CLIN: HCPCS | Performed by: NURSE PRACTITIONER

## 2022-01-28 PROCEDURE — 1036F TOBACCO NON-USER: CPT | Performed by: NURSE PRACTITIONER

## 2022-01-28 PROCEDURE — G8417 CALC BMI ABV UP PARAM F/U: HCPCS | Performed by: NURSE PRACTITIONER

## 2022-01-28 ASSESSMENT — ENCOUNTER SYMPTOMS: RESPIRATORY NEGATIVE: 1

## 2022-01-29 NOTE — PATIENT INSTRUCTIONS
Patient Education        Bursitis of the Elbow: Care Instructions  Your Care Instructions  Bursitis is pain and swelling of the bursae. These are sacs of fluid that help your joints move smoothly. Olecranon bursitis is a type of bursitis that affects the back of the elbow. This is sometimes called Franky elbow because the bump that develops looks like the cartoon character Franky's elbow. Injury, overuse, or prolonged pressure on your elbow can cause this form of bursitis. Sometimes it happens when people have arthritis. It also can occur for unknown reasons. Treatment may include draining fluid from the bursa with a needle. If your doctor thought there was infection, he or she may have prescribed antibiotics. You also may get shots of medicine into the bursa to help the swelling go down. Your elbow should get better in a few days or weeks. Follow-up care is a key part of your treatment and safety. Be sure to make and go to all appointments, and call your doctor if you are having problems. It's also a good idea to know your test results and keep a list of the medicines you take. How can you care for yourself at home? · Take pain medicines exactly as directed. ? If the doctor gave you a prescription medicine for pain, take it as prescribed. ? If you are not taking a prescription pain medicine, ask your doctor if you can take an over-the-counter medicine. ? Do not take two or more pain medicines at the same time unless the doctor told you to. Many pain medicines have acetaminophen, which is Tylenol. Too much acetaminophen (Tylenol) can be harmful. · If your doctor prescribed antibiotics, take them as directed. Do not stop taking them just because you feel better. You need to take the full course of antibiotics. · If your doctor gave you a sling, an elastic bandage, or a compression sleeve, wear it exactly as instructed. · Put ice or a cold pack on your elbow for 10 to 20 minutes at a time.  Try to do this every 1 to 2 hours for the next 3 days (when you are awake) or until the swelling goes down. Put a thin cloth between the ice and your skin. · After 3 days, you can try heat, or alternate heat and ice. · Rest your elbow. Try to stop or reduce any activity that causes pain. · Wear elbow pads during physical activity to prevent injury. · Do not lean your elbows on tables or armrests. When should you call for help? Call your doctor now or seek immediate medical care if:    · You have new or worse symptoms of infection, such as:  ? Increased pain, swelling, warmth, or redness. ? Red streaks leading from the area. ? Pus draining from the area. ? A fever. Watch closely for changes in your health, and be sure to contact your doctor if:    · You do not get better as expected. Where can you learn more? Go to https://Medpricer.compepiceweb.[email or web address] org and sign in to your [employer] account. Enter  in the [employer] box to learn more about \"Bursitis of the Elbow: Care Instructions. \"     If you do not have an account, please click on the \"Sign Up Now\" link. Current as of: July 1, 2021               Content Version: 13.1  © 2006-2021 [employer]. Care instructions adapted under license by [facility] ([facility]). If you have questions about a medical condition or this instruction, always ask your healthcare professional. [place] [address] any warranty or liability for your use of this information. Patient Education        Joint Pain: Care Instructions  Your Care Instructions     Many people have small aches and pains from overuse or injury to muscles and joints. Joint injuries often happen during sports or recreation, work tasks, or projects around the home. An overuse injury can happen when you put too much stress on a joint or when you do an activity that stresses the joint over and over, such as using the computer or rowing a boat.   You can take action at home to help your muscles and joints get better. You should feel better in 1 to 2 weeks, but it can take 3 months or more to heal completely. Follow-up care is a key part of your treatment and safety. Be sure to make and go to all appointments, and call your doctor if you are having problems. It's also a good idea to know your test results and keep a list of the medicines you take. How can you care for yourself at home? · Do not put weight on the injured joint for at least a day or two. · For the first day or two after an injury, do not take hot showers or baths, and do not use hot packs. The heat could make swelling worse. · Put ice or a cold pack on the sore joint for 10 to 20 minutes at a time. Try to do this every 1 to 2 hours for the next 3 days (when you are awake) or until the swelling goes down. Put a thin cloth between the ice and your skin. · Wrap the injury in an elastic bandage. Do not wrap it too tightly because this can cause more swelling. · Prop up the sore joint on a pillow when you ice it or anytime you sit or lie down during the next 3 days. Try to keep it above the level of your heart. This will help reduce swelling. · Take an over-the-counter pain medicine, such as acetaminophen (Tylenol), ibuprofen (Advil, Motrin), or naproxen (Aleve). Read and follow all instructions on the label. · After 1 or 2 days of rest, begin moving the joint gently. While the joint is still healing, you can begin to exercise using activities that do not strain or hurt the painful joint. When should you call for help? Call your doctor now or seek immediate medical care if:    · You have signs of infection, such as:  ? Increased pain, swelling, warmth, and redness. ? Red streaks leading from the joint. ? A fever. Watch closely for changes in your health, and be sure to contact your doctor if:    · Your movement or symptoms are not getting better after 1 to 2 weeks of home treatment. Where can you learn more?   Go to https://chpepiceweb.healthboosk. org and sign in to your [employer] account. Enter P205 in the [employer] box to learn more about \"Joint Pain: Care Instructions. \"     If you do not have an account, please click on the \"Sign Up Now\" link. Current as of: July 1, 2021               Content Version: 13.1  © 6305-2704 [employer], [facility]. Care instructions adapted under license by [facility] ([facility]). If you have questions about a medical condition or this instruction, always ask your healthcare professional. [place] [address] any warranty or liability for your use of this information.

## 2022-02-04 DIAGNOSIS — S22.39XD CLOSED FRACTURE OF ONE RIB WITH ROUTINE HEALING, UNSPECIFIED LATERALITY, SUBSEQUENT ENCOUNTER: ICD-10-CM

## 2022-02-04 RX ORDER — OXYCODONE HYDROCHLORIDE AND ACETAMINOPHEN 5; 325 MG/1; MG/1
1 TABLET ORAL EVERY 6 HOURS PRN
Qty: 90 TABLET | Refills: 0 | Status: SHIPPED | OUTPATIENT
Start: 2022-02-04 | End: 2022-05-10 | Stop reason: SDUPTHER

## 2022-02-07 ENCOUNTER — HOSPITAL ENCOUNTER (EMERGENCY)
Age: 77
Discharge: HOME | End: 2022-02-07
Attending: EMERGENCY MEDICINE
Payer: MEDICARE

## 2022-02-07 VITALS
HEART RATE: 69 BPM | OXYGEN SATURATION: 95 % | DIASTOLIC BLOOD PRESSURE: 87 MMHG | TEMPERATURE: 96.8 F | SYSTOLIC BLOOD PRESSURE: 143 MMHG | RESPIRATION RATE: 16 BRPM

## 2022-02-07 DIAGNOSIS — M70.22 OLECRANON BURSITIS OF LEFT ELBOW: Primary | ICD-10-CM

## 2022-02-07 PROCEDURE — 20605 DRAIN/INJ JOINT/BURSA W/O US: CPT

## 2022-02-07 PROCEDURE — 99283 EMERGENCY DEPT VISIT LOW MDM: CPT

## 2022-02-07 ASSESSMENT — PAIN DESCRIPTION - FREQUENCY: FREQUENCY: CONTINUOUS

## 2022-02-07 ASSESSMENT — ENCOUNTER SYMPTOMS
RESPIRATORY NEGATIVE: 1
EYES NEGATIVE: 1
GASTROINTESTINAL NEGATIVE: 1
ALLERGIC/IMMUNOLOGIC NEGATIVE: 1

## 2022-02-07 ASSESSMENT — PAIN DESCRIPTION - LOCATION: LOCATION: ELBOW

## 2022-02-07 ASSESSMENT — PAIN DESCRIPTION - DESCRIPTORS: DESCRIPTORS: ACHING

## 2022-02-07 ASSESSMENT — PAIN DESCRIPTION - ORIENTATION: ORIENTATION: LEFT

## 2022-02-07 ASSESSMENT — PAIN SCALES - GENERAL
PAINLEVEL_OUTOF10: 5
PAINLEVEL_OUTOF10: 2

## 2022-02-07 ASSESSMENT — PAIN - FUNCTIONAL ASSESSMENT: PAIN_FUNCTIONAL_ASSESSMENT: 0-10

## 2022-02-07 ASSESSMENT — PAIN DESCRIPTION - PAIN TYPE: TYPE: ACUTE PAIN

## 2022-02-08 ENCOUNTER — OFFICE VISIT (OUTPATIENT)
Dept: PRIMARY CARE CLINIC | Age: 77
End: 2022-02-08
Payer: MEDICARE

## 2022-02-08 VITALS
BODY MASS INDEX: 30.75 KG/M2 | RESPIRATION RATE: 20 BRPM | DIASTOLIC BLOOD PRESSURE: 70 MMHG | OXYGEN SATURATION: 97 % | WEIGHT: 208.2 LBS | TEMPERATURE: 97.1 F | SYSTOLIC BLOOD PRESSURE: 140 MMHG | HEART RATE: 70 BPM

## 2022-02-08 DIAGNOSIS — M70.22 OLECRANON BURSITIS OF LEFT ELBOW: Primary | ICD-10-CM

## 2022-02-08 PROCEDURE — 99213 OFFICE O/P EST LOW 20 MIN: CPT | Performed by: FAMILY MEDICINE

## 2022-02-08 PROCEDURE — 99212 OFFICE O/P EST SF 10 MIN: CPT | Performed by: FAMILY MEDICINE

## 2022-02-08 PROCEDURE — G8484 FLU IMMUNIZE NO ADMIN: HCPCS | Performed by: FAMILY MEDICINE

## 2022-02-08 PROCEDURE — 1123F ACP DISCUSS/DSCN MKR DOCD: CPT | Performed by: FAMILY MEDICINE

## 2022-02-08 PROCEDURE — 1036F TOBACCO NON-USER: CPT | Performed by: FAMILY MEDICINE

## 2022-02-08 PROCEDURE — G8417 CALC BMI ABV UP PARAM F/U: HCPCS | Performed by: FAMILY MEDICINE

## 2022-02-08 PROCEDURE — 4040F PNEUMOC VAC/ADMIN/RCVD: CPT | Performed by: FAMILY MEDICINE

## 2022-02-08 PROCEDURE — G8427 DOCREV CUR MEDS BY ELIG CLIN: HCPCS | Performed by: FAMILY MEDICINE

## 2022-02-08 NOTE — ED PROVIDER NOTES
eMERGENCY dEPARTMENT eNCOUnter      Pt Name: Raudel Tobar  MRN: [id number]  [place] 1945  Date of evaluation: 2/7/2022      CHIEF COMPLAINT       Chief Complaint   Patient presents with    Arm Injury     left          HISTORY OF PRESENT ILLNESS    Norberto Osullivan is a 67 y.o. male who presents to the emergency department for evaluation of swelling around his left elbow. Patient has no acute injury however was seen at urgent care a few days ago diagnosed with olecranon bursitis and did have a bit of a tap at that point in time  it's reaccumulated here tonight. REVIEW OF SYSTEMS         Review of Systems   Constitutional: Negative. HENT: Negative. Eyes: Negative. Respiratory: Negative. Cardiovascular: Negative. Gastrointestinal: Negative. Endocrine: Negative. Genitourinary: Negative. Musculoskeletal: Positive for arthralgias. Skin: Negative. Allergic/Immunologic: Negative. Neurological: Negative. Hematological: Negative. Psychiatric/Behavioral: Negative. PAST MEDICAL HISTORY    has a past medical history of Asthma, CAD (coronary artery disease), Cancer ([address] Cervical radiculopathy, Chronic back pain, Colonic polyp, Degeneration of cervical intervertebral disc, Degeneration of cervical intervertebral disc, Depression, Diverticulosis, GERD (gastroesophageal reflux disease), Glucose intolerance (impaired glucose tolerance), HTN (hypertension), Hyperlipidemia, Lumbar radiculopathy, MI (myocardial infarction) ([address] Numbness and tingling, OA (osteoarthritis), Pre-diabetes, Sacroiliac pain, and Vision abnormalities. SURGICAL HISTORY      has a past surgical history that includes Coronary angioplasty with stent; Finger trigger release (Right); Abscess Drainage (Right); other surgical history (2/19/13, 5/14/13); Elbow bursa surgery (Right, 2012,2011); Infected skin debridement (Right); Colonoscopy (2007, 2003); Colonoscopy (9/9/13);  Cervical spine surgery (08/09/2001); Cervical spine surgery (Left, 5784,[id number],[id number]); Lumbar spine surgery (9751,0929); lumbar fusion (4/7/14); skin biopsy; other surgical history; other surgical history (Left, 09/27/2016); other surgical history (Bilateral, 11/29/2016); other surgical history (12/06/2016); other surgical history (Right, 12/12/2016); other surgical history (Left, 12/15/17); other surgical history (Left, 01/31/2017); Lumbar spine surgery (Left, 2/14/2017); Injection Procedure For Sacroiliac Joint (Bilateral, 3/14/2017); pr arthrocentesis aspir&/inj major jt/bursa w/o us (Left, 3/31/2017); pr arthrocentesis aspir&/inj major jt/bursa w/o us (Left, 4/14/2017); Lumbar spine surgery (Bilateral, 5/2/2017); Lumbar spine surgery (Bilateral, 5/9/2017); Anesthesia Nerve Block (Bilateral, 6/2/2017); Anesthesia Nerve Block (Bilateral, 6/9/2017); RADIOFREQUENCY ABLATION NERVES (Right, 6/29/2017); RADIOFREQUENCY ABLATION NERVES (Left, 7/6/2017); Cardiac catheterization; Cardiac catheterization (06/01/2011); Cardiac catheterization (5-5-2011); back surgery; lumbar fusion (N/A, 11/15/2017); Colonoscopy (N/A, 10/15/2018); pr hemorrhoidectomy,int/ext,1 column/group (N/A, 11/7/2018); Upper gastrointestinal endoscopy (N/A, 9/30/2019); and Cystoscopy (Bilateral, 5/19/2021). CURRENT MEDICATIONS       Previous Medications    ASPIRIN 81 MG TABLET    Take 81 mg by mouth daily    ATORVASTATIN (LIPITOR) 80 MG TABLET    TAKE 1 TABLET DAILY    CELECOXIB (CELEBREX) 200 MG CAPSULE    TAKE 1 CAPSULE BY MOUTH TWICE DAILY    CLOPIDOGREL (PLAVIX) 75 MG TABLET    TAKE 1 TABLET DAILY    FAMOTIDINE (PEPCID) 20 MG TABLET    Take 20 mg by mouth daily.     FERROUS SULFATE (IRON 325) 325 (65 FE) MG TABLET    Take 1 tablet by mouth 2 times daily    HANDICAP PLACARD MISC    by Does not apply route Expires: 7/5/2021    HANDICAP PLACARD MISC    by Does not apply route EXPIRES 04/13/2026    LOSARTAN (COZAAR) 25 MG TABLET    TAKE 1 TABLET DAILY    METFORMIN (GLUCOPHAGE) 500 MG TABLET auscultation bilaterally no wheezes, rhonchi, rales, no respiratory distress  Gastrointestinal: Soft, nontender, nondistended, positive bowel sounds. No rebound, rigidity, or guarding. Musculoskeletal: Examination of the left elbow reveals quite a bit of swelling around the elbow itself and extending distally. Neurovascularly intact. Neurologic: Moving all 4 extremities without difficulty there are no gross focal neurologic deficits  Skin: Warm and dry      DIFFERENTIAL DIAGNOSIS/ MDM:     Probable olecranon bursitis secondary to either bleed or synovium I will go ahead and do a tap. DIAGNOSTIC RESULTS     EKG: All EKG's are interpreted by the Emergency Department Physician who either signs or Co-signs this chart in the absence of a cardiologist.        Not indicated unless otherwise documented above    LABS:  No results found for this visit on 02/07/22. Not indicated unless otherwise documented above    RADIOLOGY:   I reviewed the radiologist interpretations:  No orders to display       Not indicated unless otherwise documented above    EMERGENCY DEPARTMENT COURSE:     The patient was given the following medications:  No orders of the defined types were placed in this encounter. Vitals:    Vitals:    02/07/22 2004   BP: (!) 148/90   Pulse: 85   Resp: 18   Temp: 96.8 °F (36 °C)   TempSrc: Tympanic   SpO2: 97%     -------------------------  BP (!) 148/90   Pulse 85   Temp 96.8 °F (36 °C) (Tympanic)   Resp 18   SpO2 97%         I have reviewed the disposition diagnosis with the patient and or their family/guardian. I have answered their questions and given discharge instructions. They voiced understanding of these instructions and did not have any further questions or complaints. CRITICAL CARE:    None    CONSULTS:    None    PROCEDURES:    Elbow tap: Patient's elbow was anesthetized 1% lidocaine with epinephrine then cleansed with Betadine.   It was then entered with an 18-gauge needle and approximately 30 cc of blood was returned with subsequent decrease in the size of the hematoma and effusion. Patient tolerated procedure well. OARRS Report if indicated             FINAL IMPRESSION      1. Olecranon bursitis of left elbow          DISPOSITION/PLAN   DISPOSITION Decision To Discharge    I have reviewed the disposition diagnosis with the patient and or their family/guardian. I have answered their questions and given discharge instructions. They voiced understanding of these instructions and did not have any further questions or complaints. Reevaluation: Patient had his olecranon bursitis tapped 30 cc of blood was obtained patient tolerated this procedure quite well [place] wrap him up in a pressure dressing that he is good to be discharged home to follow-up with his own doctor tomorrow.       PATIENT REFERRED TO:  Lv Eden MD  [facility] [phone]    In 1 day        DISCHARGE MEDICATIONS:  New Prescriptions    No medications on file       (Please note that portions of this note were completed with a voice recognition program.  Efforts were made to edit the dictations but occasionally words are mis-transcribed.)    Dionicio Acuna MD,, MD  Attending Emergency Physician            Dionicio Acuna MD  02/07/22 [id number]

## 2022-02-08 NOTE — PROGRESS NOTES
[facility]gent [facility]             [address] [facility], [address]                      Telephone [phone]             Fax [phone]     Norberto Osullivan  :  1945  Age:  67 y.o. MRN:  [id number]  Date of visit:  2022       Assessment and Plan:    Olecranon bursitis of left elbow  As he has had re-accumulation of the fluid after aspiration, I have recommended evaluation by orthopedic surgery. A referral was ordered:  - Ambulatory referral to Orthopedic Surgery      He was advised to follow up if symptoms worsen or do not resolve. Subjective: Burak Rizo is a 67 y.o. male who presents to [facility] today (2022) for evaluation of: Other (was seen in ER last night for left elbow swelling/pain drained elbow was told to follow up in urgent care today )      He was seen at Urgent Care on 2022 for left elbow swelling. An x-ray done at that visit was negative for fracture. An aspiration was done, with drainage of bloody fluid. He states that he was doing well after the procedure until yesterday. He states that he felt a \"pop,\" and then he had increased swelling of the elbow. An aspiration was done at [facility] ED last night, and he was advised to follow up today.       He has the following problem list:  Patient Active Problem List   Diagnosis    Chronic back pain    Neck pain, chronic    Brachial neuritis or radiculitis    Spinal stenosis, lumbar region, with neurogenic claudication    Displacement of cervical intervertebral disc without myelopathy    CAD (coronary artery disease)    GILL (dyspnea on exertion)    S/P lumbar spinal fusion    Obesity (BMI 35.0-39.9 without comorbidity)    HTN (hypertension)    Hyperlipidemia    Type 2 diabetes mellitus without complication (HCC)    Chronic bilateral low back pain with bilateral sciatica    Left hip pain    Acquired ago. His smoking use included cigarettes, pipe, and cigars. He has a 40.00 pack-year smoking history. He quit smokeless tobacco use about 10 years ago. His smokeless tobacco use included chew. Objective:    Vitals:    02/08/22 1013   BP: (!) 140/70   Pulse: 70   Resp: 20   Temp: 97.1 °F (36.2 °C)   SpO2: 97%   Weight: 208 lb 3.2 oz (94.4 kg)     Body mass index is 30.75 kg/m². Well-nourished, well-developed obese elderly male, healthy-appearing, alert, cooperative and in no acute distress. The left elbow has normal range of motion. There is mild tenderness to palpation over the olecranon of the left elbow. There is moderate swelling over the olecranon of the left elbow. There is no increased warmth to palpation over the left elbow. I reviewed the notes from the recent ED and Urgent Care visits.            (Please note that portions of this note were completed with a voice-recognition program. Efforts were made to edit the dictation but occasionally words are mis-transcribed.)

## 2022-02-09 ENCOUNTER — HOSPITAL ENCOUNTER (OUTPATIENT)
Dept: CARDIAC CATH/INVASIVE PROCEDURES | Age: 77
Discharge: HOME | End: 2022-02-09
Payer: MEDICARE

## 2022-02-09 VITALS
DIASTOLIC BLOOD PRESSURE: 73 MMHG | HEIGHT: 69 IN | HEART RATE: 117 BPM | BODY MASS INDEX: 30.51 KG/M2 | SYSTOLIC BLOOD PRESSURE: 145 MMHG | TEMPERATURE: 97.9 F | RESPIRATION RATE: 25 BRPM | OXYGEN SATURATION: 95 % | WEIGHT: 206 LBS

## 2022-02-09 LAB
GFR NON-AFRICAN AMERICAN: >60 ML/MIN
GFR SERPL CREATININE-BSD FRML MDRD: >60 ML/MIN
GFR SERPL CREATININE-BSD FRML MDRD: NORMAL ML/MIN/{1.73_M2}
GLUCOSE BLD-MCNC: 127 MG/DL (ref 74–100)
PLATELET # BLD: 263 K/UL (ref 138–453)
POC BUN: 16 MG/DL (ref 8–26)
POC CHLORIDE: 107 MMOL/L (ref 98–107)
POC CREATININE: 0.79 MG/DL (ref 0.51–1.19)
POC HEMATOCRIT: 39 % (ref 41–53)
POC HEMOGLOBIN: 13.3 G/DL (ref 13.5–17.5)
POC POTASSIUM: 4.8 MMOL/L (ref 3.5–4.5)
POC SODIUM: 143 MMOL/L (ref 138–146)

## 2022-02-09 PROCEDURE — 82435 ASSAY OF BLOOD CHLORIDE: CPT

## 2022-02-09 PROCEDURE — 2580000003 HC RX 258: Performed by: INTERNAL MEDICINE

## 2022-02-09 PROCEDURE — 85049 AUTOMATED PLATELET COUNT: CPT

## 2022-02-09 PROCEDURE — 7100000001 HC PACU RECOVERY - ADDTL 15 MIN

## 2022-02-09 PROCEDURE — 82565 ASSAY OF CREATININE: CPT

## 2022-02-09 PROCEDURE — 84295 ASSAY OF SERUM SODIUM: CPT

## 2022-02-09 PROCEDURE — 82947 ASSAY GLUCOSE BLOOD QUANT: CPT

## 2022-02-09 PROCEDURE — 2500000003 HC RX 250 WO HCPCS

## 2022-02-09 PROCEDURE — C1894 INTRO/SHEATH, NON-LASER: HCPCS

## 2022-02-09 PROCEDURE — C1769 GUIDE WIRE: HCPCS

## 2022-02-09 PROCEDURE — 85014 HEMATOCRIT: CPT

## 2022-02-09 PROCEDURE — 2709999900 HC NON-CHARGEABLE SUPPLY

## 2022-02-09 PROCEDURE — 84520 ASSAY OF UREA NITROGEN: CPT

## 2022-02-09 PROCEDURE — 84132 ASSAY OF SERUM POTASSIUM: CPT

## 2022-02-09 PROCEDURE — 7100000000 HC PACU RECOVERY - FIRST 15 MIN

## 2022-02-09 PROCEDURE — 99153 MOD SED SAME PHYS/QHP EA: CPT

## 2022-02-09 PROCEDURE — 6360000004 HC RX CONTRAST MEDICATION

## 2022-02-09 PROCEDURE — 6360000002 HC RX W HCPCS

## 2022-02-09 PROCEDURE — 93454 CORONARY ARTERY ANGIO S&I: CPT

## 2022-02-09 PROCEDURE — 99152 MOD SED SAME PHYS/QHP 5/>YRS: CPT

## 2022-02-09 RX ORDER — SODIUM CHLORIDE 0.9 % (FLUSH) 0.9 %
5-40 SYRINGE (ML) INJECTION PRN
Status: CANCELLED | OUTPATIENT
Start: 2022-02-09

## 2022-02-09 RX ORDER — ASCORBIC ACID 500 MG
500 TABLET ORAL 2 TIMES DAILY
COMMUNITY

## 2022-02-09 RX ORDER — SODIUM CHLORIDE 9 MG/ML
25 INJECTION, SOLUTION INTRAVENOUS PRN
Status: CANCELLED | OUTPATIENT
Start: 2022-02-09

## 2022-02-09 RX ORDER — ACETAMINOPHEN 325 MG/1
650 TABLET ORAL EVERY 4 HOURS PRN
Status: CANCELLED | OUTPATIENT
Start: 2022-02-09

## 2022-02-09 RX ORDER — SODIUM CHLORIDE 0.9 % (FLUSH) 0.9 %
5-40 SYRINGE (ML) INJECTION EVERY 12 HOURS SCHEDULED
Status: CANCELLED | OUTPATIENT
Start: 2022-02-09

## 2022-02-09 RX ORDER — SODIUM CHLORIDE 9 MG/ML
INJECTION, SOLUTION INTRAVENOUS CONTINUOUS
Status: DISCONTINUED | OUTPATIENT
Start: 2022-02-09 | End: 2022-02-10 | Stop reason: HOSPADM

## 2022-02-09 RX ADMIN — SODIUM CHLORIDE: 9 INJECTION, SOLUTION INTRAVENOUS at 10:35

## 2022-02-09 NOTE — OP NOTE
[facility]    CARDIAC CATHETERIZATION    Date:   2/9/2022  Patient name: Madonna Osullivan  Date of admission:  2/9/2022  9:41 AM  MRN:   [id number]  YOB: 1945    Operators:  Primary:   Shelbi Bender MD (Attending Physician)    Assistant/CV fellow:  Chester Méndez MD      Procedure performed:     [x] Left Heart Catheterization. [] Graft Angiography.  [] Left Ventriculography. [] Right Heart Catheterization. [x] Coronary Angiography. [] Aortic Valve Studies. [] PCI:      [] Other:       Pre Procedure Conscious Sedation Data:  ASA Class:    [] I [x] II [] III [] IV    Mallampati Class:  [] I [x] II [] III [] IV      Indication:  [] STEMI      [x] + Stress test  [] ACS      [] + EKG Changes  [] Non Q MI       [] Significant Risk Factors  [] Recurrent Angina             [] Diabetes Mellitus    [] New LBBB      [] Uncontrolled HTN. [] CHF / Low EF changes     [] Abnormal CTA / Ca Score      Procedure:  Access:  [x] Femoral  [] Radial  artery       [] Right  [x] Left    Procedure: After informed consent was obtained with explanation of the risks and benefits, patient was brought to the cath lab. The access area was prepped and draped in sterile fashion. 1% lidocaine was used for local block. The artery was cannulated with 6  Fr sheath with brisk arterial blood return. The side port was frequently flushed and aspirated with normal saline. Findings:    Angiographic Findings        Cardiac Arteries and Lesion Findings       LMCA: Mild irregularities 10-20%. LAD: Mild irregularities 10-20%. patent mid stents     LCx: Mild irregularities 10-20%. RCA: Mild irregularities 20-30%. patent proximal and mid stents. Coronary Tree        Dominance: Right        Procedure Summary        patent LAD and RCA stents with nonobstructive CAD. Recommendations        Medical therapy as needed.            Estimated Blood Loss:            [x] Minimal 10-25 cc   [] Minimal < 25 cc [] Moderate 25-50 cc         [] >50 cc        ____________________________________________________________________    History and Risk Factors    [] Hypertension     [] Family history of CAD  [] Hyperlipidemia     [] Cerebrovascular Disease   [] Prior MI       [] Peripheral Vascular disease   [] Prior PCI              [] Diabetes Mellitus    [] Left Main PCI. [] Currently on Dialysis. [] Prior CABG. [] Currently smoker. [] Cardiac Arrest outside of healthcare facility. [] Yes    [] No        Witnessed     [] Yes   [] No     Arrest after arrival of EMS  [] Yes   [] No     [] Cardiac Arrest at other Facility. [] Yes   [] No    Pre-Procedure Information. Heart Failure       [] Yes    [] No        Class  [] I      [] II  [] III    [] IV. New Diagnosis    [] Yes  [] No    HF Type      [] Systolic   [] Diastolic          [] Unknown. Diagnostic Test:   EKG       [] Normal   [] Abnormal    New antiarrhythmia medications:    [] Yes   [] No   New onset atrial fibrillation / Flutter     [] Yes   [] No   ECG Abnormalities:      [] V. Fib   [] Sirisha V. Tach           [] NS V. T   [] New LBBB           [] T. Inv  []  ST dev > 0.5 mm         [] PVC's freq  [] PVC's infrequent    Stress Test Performed:      [] Yes    [] No     Type:     [] Stress Echo   [] Exercise Stress Test (no imaging)      [] Stress Nuclear  [] Stress Imaging     Results   [] Negative   [] Positive        [] Indeterminate  [] Unavailable     If Positive/ Risk / Extent of Ischemia:       [] Low  [] Intermediate         [] High  [] Unavailable      Cardiac CTA Performed:     [] Yes    [] No      Results   [] CAD   [] Non obstructive CAD      [] No CAD   [] Uncertain      [] Unknown   [] Structural Disease.      Pre Procedure Medications:   [] Yes    [] No         [] ASA   [] Beta Blockers      [] Nitrate   [] Ca Channel Blockers      [] Ranolazine   [] Statin       [] Plavix/Others antiplatelets      Electronically signed on 2/9/2022 at 2:47 PM by:    Sherry Vazquez MD  Fellow, [address] [facility]. Ernie Hartman, [address].

## 2022-02-09 NOTE — PROGRESS NOTES
Patient admitted, consent signed and questions answered. Patient ready for procedure. Call light to reach with side rails up 2 of 2. Bilat groin hairs and right wrist clipped. Family at bedside with patient. History and physical needing update.

## 2022-02-09 NOTE — PROGRESS NOTES
Received post CATH procedure to [facility] room 7. Assessment obtained. Restrictions reviewed with patient. Post procedure pathway initiated. GROINS site soft , CLEAN dry and intact. No hematoma noted. Family at side. Patient without complaints. Head of bed flat with LEGS straight.

## 2022-02-09 NOTE — H&P
[facility]  Pre- Procedure History and Physical/Update          Patient Name:  Warren Gowers  MRN:    [id number]  YOB: 1945  Date of evaluation:  2/9/2022       Please refer to the consult note / H&P completed By Dr. Reagan Service on 1/24/22 in the medical record and note that:       [x] I have examined the patient and reviewed the H&P/Consult and there are no changes to be made to the assessment or plan.     [] I have examined the patient and reviewed the H&P/Consult and have noted the following changes:        Past Medical History:   Diagnosis Date    Asthma     CAD (coronary artery disease)     STENTS X 4    Cancer ([facility]     THROAT, HX. RADIATION , LT EAR    Cervical radiculopathy     Chronic back pain     Colonic polyp     Degeneration of cervical intervertebral disc     [facility], 1990 C4/C5-C6/C7    Degeneration of cervical intervertebral disc     [facility] 1994, C3/C4    Depression     Diverticulosis     GERD (gastroesophageal reflux disease)     Glucose intolerance (impaired glucose tolerance)     IS NOT DIABETIC, PER PT    HTN (hypertension)     Hyperlipidemia     Lumbar radiculopathy     MI (myocardial infarction) ([facility]     2011    Numbness and tingling     HANDS    OA (osteoarthritis)     Pre-diabetes     Sacroiliac pain 11/4/2014    Vision abnormalities     WEARS GLASSES       Past Surgical History:   Procedure Laterality Date    ABSCESS DRAINAGE Right     ELBOW WITH CLOSURE    ANESTHESIA NERVE BLOCK Bilateral 6/2/2017    Bilat L1 L2 L3 L4 L5 Diagnostic Medial Branch Blk performed by Opla Coles MD at [address] [facility] Bilateral 6/9/2017    Bilat L1 L2 L3 L4 L5 Confirmatory Medial BB performed by Opal Coles MD at [address] [facility]  06/01/2011    DR Keisha Acosta CARDIAC CATHETERIZATION  5-5-2011    STENTSx4    CERVICAL SPINE SURGERY  08/09/2001    Anterior Cervical Decompression and Fusion C3-4    CERVICAL SPINE SURGERY Left [id number]    Cervical C6-C7 Discectomy and Foraminotomy    COLONOSCOPY  2007, 2003    POLYPS    COLONOSCOPY  9/9/13    hyperplastic polyp x 3    COLONOSCOPY N/A 10/15/2018    hyperplastic polyp, severe sigmoid diverticulosis, large ext. hemorrhoid, Dr Dante Kaur Bilateral 5/19/2021    CYSTO Bilateral Retrogrades performed by Destiny Cobian MD at [address] Right 2012,2011    FINGER TRIGGER RELEASE Right     THIRD FINGER    [facility]. INJECTION PROCEDURE FOR SACROILIAC JOINT Bilateral 3/14/2017    Bilat Sacroiliac & Piriformis Inj's performed by Ana Zhang MD at [address] Right     ELBOW    LUMBAR FUSION  4/7/14    lumbar decompression and fusion    LUMBAR FUSION N/A 11/15/2017    LUMBAR DECOMPRESSION POSTERIOR W/FUSION L3 L4 ( with SPINAL CORD MONITOR ) performed by Divine Jaime MD at [address]  [phone]    Fusion    LUMBAR SPINE SURGERY Left 2/14/2017    Left L4 & L5 Transforaminal ANITHA performed by Ana Zhang MD at [address] Bilateral 5/2/2017    Bilat L5 Transforaminal ANITHA performed by Ana Zhang MD at [address] Bilateral 5/9/2017    Bilat L5 Transforaminal ANITHA performed by Ana Zhang MD at [address] [place] [address]  2/19/13, 5/14/13    L1/L2 IESI    OTHER SURGICAL HISTORY      Hardware correction, patient had 1 broken screw and to loose screws in back     OTHER SURGICAL HISTORY Left 09/27/2016     C6 TFE    OTHER SURGICAL HISTORY Bilateral 11/29/2016    L3, L4 L5 Diagnostic Medial Branch Block    OTHER SURGICAL HISTORY  12/06/2016    jovani L3 L4 L5 conf MBB    OTHER SURGICAL HISTORY Right 12/12/2016    L3,L4,L5 RFA    OTHER SURGICAL HISTORY Left 12/15/17    L3.  L4, L5 RFA    OTHER SURGICAL HISTORY Left 01/31/2017    L4 & L5 TFE    NC ARTHROCENTESIS ASPIR&/INJ MAJOR JT/BURSA W/O US Left 3/31/2017    Left Hip Inj performed by Marcial Essex, MD at [address] ARTHROCENTESIS ASPIR&/INJ MAJOR JT/BURSA W/O US Left 4/14/2017    Left Hip Inj performed by Marcial Essex, MD at [address] HEMORRHOIDECTOMY,INT/EXT,1 COLUMN/GROUP N/A 11/7/2018    External HEMORRHOIDECTOMY performed by Wilbert Arthur MD at [address] [place] [address] Right 6/29/2017    Right L1 L2 L3 L4 L5 Radiofrequency Ablation performed by Marcial Essex, MD at [address] [place] [address] Left 7/6/2017    Left L1 L2 L3 L4 L5 Radiofrequency performed by Marcial Essex, MD at [address] [place] [address]    UPPER GASTROINTESTINAL ENDOSCOPY N/A 9/30/2019    mild gastritis, mild to moderate esophagitis, Dr. Lanie Edwards        reports that he quit smoking about 10 years ago. His smoking use included cigarettes, pipe, and cigars. He has a 40.00 pack-year smoking history. He quit smokeless tobacco use about 10 years ago. His smokeless tobacco use included chew. He reports current alcohol use. He reports that he does not use drugs. Prior to Admission medications    Medication Sig Start Date End Date Taking? Authorizing Provider   oxyCODONE-acetaminophen (PERCOCET) 5-325 MG per tablet Take 1 tablet by mouth every 6 hours as needed for Pain for up to 30 days. 2/4/22 3/6/22  Swetha Salas MD   atorvastatin (LIPITOR) 80 MG tablet TAKE 1 TABLET DAILY 1/4/22   Swetha Salas MD   metoprolol tartrate (LOPRESSOR) 25 MG tablet TAKE 1 TABLET TWICE A DAY 1/4/22   Swetha Salas MD   clopidogrel (PLAVIX) 75 MG tablet TAKE 1 TABLET DAILY 12/8/21   LAWRENCE Montes CNP   PARoxetine (PAXIL) 20 MG tablet TAKE 1 TABLET BY MOUTH ONCE DAILY IN THE MORNING 11/29/21   Swetha Salas MD   metFORMIN (GLUCOPHAGE) 500 MG tablet TAKE 1 TABLET TWICE DAILY  WITH MEALS.  10/29/21   Swetha Salas MD   celecoxib (CELEBREX) 200 MG capsule TAKE 1 CAPSULE BY MOUTH TWICE DAILY 9/7/21   Lilo Barron MD   losartan (COZAAR) 25 MG tablet TAKE 1 TABLET DAILY 8/6/21   Lilo Barron MD   pantoprazole (PROTONIX) 40 MG tablet Take 1 tablet by mouth once daily 5/13/21   MD Piedad Talbot by Does not apply route EXPIRES 04/13/2026 4/13/21   Lilo Barron MD   nitroGLYCERIN (NITROSTAT) 0.4 MG SL tablet Place 1 tablet under the tongue every 5 minutes as needed for Chest pain up to max of 3 total doses. If no relief after 1 dose, call 911. 2/25/21   Lilo Barron MD   ferrous sulfate (IRON 325) 325 (65 Fe) MG tablet Take 1 tablet by mouth 2 times daily 2/25/21   MD Piedad Talbot by Does not apply route Expires: 7/5/2021 7/5/19   Lilo Barron MD   tiZANidine (ZANAFLEX) 4 MG tablet TAKE ONE TABLET BY MOUTH THREE TIMES DAILY 5/11/17   [facility], APRN - CNP   aspirin 81 MG tablet Take 81 mg by mouth daily    Historical Provider, MD   famotidine (PEPCID) 20 MG tablet Take 20 mg by mouth daily. Historical Provider, MD       Allergies   Allergen Reactions    Crestor [Rosuvastatin] Other (See Comments)     Joint pain, muscle aches    Ibuprofen Other (See Comments)     bleeding    Lisinopril Hives    Effient [Prasugrel] Rash         REVIEW OF SYSTEMS:     A detailed review of system was performed as already noted and is otherwise as above. PHYSICAL EXAM:     There were no vitals filed for this visit. Constitutional and General Appearance: Alert. Not in acute stress. Respiratory:  · Clear to auscultation b/l. No wheeze or crackles. Cardiovascular:  · Regular rate and rhythm. No murmurs. · Jugular venous pulsation is normal  Abdomen:  · No masses or tenderness  Extremities:  · Lower extremity edema - No  · Skin: Warm and dry  Neurological:  · Alert and oriented. · Moves all extremities well      Active Problems:    * No active hospital problems.  *  Resolved Problems:    * No resolved hospital

## 2022-02-15 ENCOUNTER — HOSPITAL ENCOUNTER (OUTPATIENT)
Age: 77
Setting detail: SPECIMEN
Discharge: HOME | End: 2022-02-15
Payer: MEDICARE

## 2022-02-15 ENCOUNTER — OFFICE VISIT (OUTPATIENT)
Dept: ORTHOPEDIC SURGERY | Age: 77
End: 2022-02-15
Payer: MEDICARE

## 2022-02-15 VITALS
HEIGHT: 69 IN | DIASTOLIC BLOOD PRESSURE: 82 MMHG | WEIGHT: 208 LBS | BODY MASS INDEX: 30.81 KG/M2 | HEART RATE: 64 BPM | SYSTOLIC BLOOD PRESSURE: 126 MMHG

## 2022-02-15 DIAGNOSIS — M70.32 BURSITIS OF LEFT ELBOW, UNSPECIFIED BURSA: ICD-10-CM

## 2022-02-15 DIAGNOSIS — M70.32 BURSITIS OF LEFT ELBOW, UNSPECIFIED BURSA: Primary | ICD-10-CM

## 2022-02-15 LAB
CULTURE: NORMAL
DIRECT EXAM: NORMAL
Lab: NORMAL
SPECIMEN DESCRIPTION: NORMAL

## 2022-02-15 PROCEDURE — 4040F PNEUMOC VAC/ADMIN/RCVD: CPT | Performed by: PHYSICIAN ASSISTANT

## 2022-02-15 PROCEDURE — G8417 CALC BMI ABV UP PARAM F/U: HCPCS | Performed by: PHYSICIAN ASSISTANT

## 2022-02-15 PROCEDURE — 87205 SMEAR GRAM STAIN: CPT

## 2022-02-15 PROCEDURE — 20605 DRAIN/INJ JOINT/BURSA W/O US: CPT | Performed by: PHYSICIAN ASSISTANT

## 2022-02-15 PROCEDURE — 87075 CULTR BACTERIA EXCEPT BLOOD: CPT

## 2022-02-15 PROCEDURE — 1036F TOBACCO NON-USER: CPT | Performed by: PHYSICIAN ASSISTANT

## 2022-02-15 PROCEDURE — 99214 OFFICE O/P EST MOD 30 MIN: CPT | Performed by: PHYSICIAN ASSISTANT

## 2022-02-15 PROCEDURE — 20610 DRAIN/INJ JOINT/BURSA W/O US: CPT | Performed by: PHYSICIAN ASSISTANT

## 2022-02-15 PROCEDURE — 87070 CULTURE OTHR SPECIMN AEROBIC: CPT

## 2022-02-15 PROCEDURE — G8427 DOCREV CUR MEDS BY ELIG CLIN: HCPCS | Performed by: PHYSICIAN ASSISTANT

## 2022-02-15 PROCEDURE — 1123F ACP DISCUSS/DSCN MKR DOCD: CPT | Performed by: PHYSICIAN ASSISTANT

## 2022-02-15 PROCEDURE — G8484 FLU IMMUNIZE NO ADMIN: HCPCS | Performed by: PHYSICIAN ASSISTANT

## 2022-02-15 PROCEDURE — 99203 OFFICE O/P NEW LOW 30 MIN: CPT | Performed by: PHYSICIAN ASSISTANT

## 2022-02-15 PROCEDURE — PBSHW PBB SHADOW CHARGE: Performed by: PHYSICIAN ASSISTANT

## 2022-02-15 RX ORDER — TRIAMCINOLONE ACETONIDE 40 MG/ML
40 INJECTION, SUSPENSION INTRA-ARTICULAR; INTRAMUSCULAR ONCE
Status: COMPLETED | OUTPATIENT
Start: 2022-02-15 | End: 2022-02-15

## 2022-02-15 RX ORDER — BUPIVACAINE HYDROCHLORIDE 5 MG/ML
3 INJECTION, SOLUTION PERINEURAL ONCE
Status: COMPLETED | OUTPATIENT
Start: 2022-02-15 | End: 2022-02-15

## 2022-02-15 RX ADMIN — TRIAMCINOLONE ACETONIDE 40 MG: 200 INJECTION, SUSPENSION INTRA-ARTICULAR; INTRAMUSCULAR at 09:46

## 2022-02-15 RX ADMIN — BUPIVACAINE HYDROCHLORIDE 15 MG: 5 INJECTION, SOLUTION PERINEURAL at 09:46

## 2022-02-15 NOTE — PROGRESS NOTES
Orthopaedic Progress Note      CHIEF COMPLAINT:  Left elbow bursitis     HISTORY OF PRESENT ILLNESS:       Mr. Beverley Montoya  is a 67 y.o. male  who presents with injury to left elbow. Patient had a fall landing on left elbow. Noted swelling to elbow went to the ED noted no acute fractures. Patient had a history of bursectomy right elbow with 1 year wound complication. Discussed treatment options with patient and family. Elected to proceed with aspiration of elbow and injection of corticosteroid.        Past Medical History:    Past Medical History:   Diagnosis Date    Abnormal cardiovascular stress test 01/2022    Asthma     CAD (coronary artery disease)     STENTS X 4    Cancer (HCC)     THROAT, HX. RADIATION , LT EAR    Cervical radiculopathy     Chronic back pain     Colonic polyp     Degeneration of cervical intervertebral disc     [facility], 1990 C4/C5-C6/C7    Degeneration of cervical intervertebral disc     [facility] 1994, C3/C4    Depression     Diverticulosis     GERD (gastroesophageal reflux disease)     Glucose intolerance (impaired glucose tolerance)     IS NOT DIABETIC, PER PT    HTN (hypertension)     Hyperlipidemia     Lumbar radiculopathy     MI (myocardial infarction) ([facility] [address]     2011    Numbness and tingling     HANDS    OA (osteoarthritis)     Pre-diabetes     Sacroiliac pain 11/04/2014    Vision abnormalities     WEARS GLASSES       Past Surgical History:    Past Surgical History:   Procedure Laterality Date    ABSCESS DRAINAGE Right     ELBOW WITH CLOSURE    ANESTHESIA NERVE BLOCK Bilateral 06/02/2017    Bilat L1 L2 L3 L4 L5 Diagnostic Medial Branch Blk performed by Candace Wilkinson MD at [address] [place] [address] Bilateral 06/09/2017    Bilat L1 L2 L3 L4 L5 Confirmatory Medial BB performed by Candace Wilkinson MD at [address]  02/09/2022    CERVICAL SPINE SURGERY  08/09/2001    Anterior Cervical Decompression and Fusion C3-4    CERVICAL SPINE SURGERY Left [id number]    Cervical C6-C7 Discectomy and Foraminotomy    COLONOSCOPY  2007, 2003    POLYPS    COLONOSCOPY  09/09/2013    hyperplastic polyp x 3    COLONOSCOPY N/A 10/15/2018    hyperplastic polyp, severe sigmoid diverticulosis, large ext. hemorrhoid, Dr Cotter Diver  05/05/2011    PROX RCA X2    CORONARY ANGIOPLASTY WITH STENT PLACEMENT  05/31/2011    XIENCE TO MID LAD X2    CYSTOSCOPY Bilateral 05/19/2021    CYSTO Bilateral Retrogrades performed by Jose Waldrop MD at [address] [facility] Right 2012,2011    FINGER TRIGGER RELEASE Right     THIRD FINGER    [facility] INJECTION PROCEDURE FOR SACROILIAC JOINT Bilateral 03/14/2017    Bilat Sacroiliac & Piriformis Inj's performed by Robert Vick MD at [address] Right     ELBOW    LUMBAR FUSION  04/07/2014    lumbar decompression and fusion    LUMBAR FUSION N/A 11/15/2017    LUMBAR DECOMPRESSION POSTERIOR W/FUSION L3 L4 ( with SPINAL CORD MONITOR ) performed by Esvin Bañuelos MD at [address] [facility]  [id number]    Fusion    LUMBAR SPINE SURGERY Left 02/14/2017    Left L4 & L5 Transforaminal ANITHA performed by Robert Vick MD at [address] [facility] Bilateral 05/02/2017    Bilat L5 Transforaminal ANITHA performed by Robert Vick MD at [address] [place] [address] [facility] [place] Bilateral 05/09/2017    Bilat L5 Transforaminal ANITHA performed by Robert Vick MD at [address] [facility]  2/19/13, 5/14/13    L1/L2 IESI    OTHER SURGICAL HISTORY      Hardware correction, patient had 1 broken screw and to loose screws in back     OTHER SURGICAL HISTORY Left 09/27/2016     C6 TFE    OTHER SURGICAL HISTORY Bilateral 11/29/2016    L3, L4 L5 Diagnostic Medial Branch Block    OTHER SURGICAL HISTORY  12/06/2016    jovani L3 L4 L5 conf MBB    OTHER SURGICAL HISTORY Right 12/12/2016    L3,L4,L5 RFA    OTHER SURGICAL HISTORY Left 12/15/2017    L3. L4, L5 RFA    OTHER SURGICAL HISTORY Left 01/31/2017    L4 & L5 TFE    TX ARTHROCENTESIS ASPIR&/INJ MAJOR JT/BURSA W/O US Left 03/31/2017    Left Hip Inj performed by Racheal Tilley MD at [address] ARTHROCENTESIS ASPIR&/INJ MAJOR JT/BURSA W/O US Left 04/14/2017    Left Hip Inj performed by Racheal Tilley MD at [address] HEMORRHOIDECTOMY,INT/EXT,1 COLUMN/GROUP N/A 11/07/2018    External HEMORRHOIDECTOMY performed by Jocelin Cuba MD at [address] Right 06/29/2017    Right L1 L2 L3 L4 L5 Radiofrequency Ablation performed by Racheal Tilley MD at [address] Left 07/06/2017    Left L1 L2 L3 L4 L5 Radiofrequency performed by Racheal Tilley MD at [address] [facility]    UPPER GASTROINTESTINAL ENDOSCOPY N/A 09/30/2019    mild gastritis, mild to moderate esophagitis, Dr. Kimi Burch         Current  Medications:  Current Outpatient Medications   Medication Sig Dispense Refill    vitamin C (ASCORBIC ACID) 500 MG tablet Take 500 mg by mouth 2 times daily      oxyCODONE-acetaminophen (PERCOCET) 5-325 MG per tablet Take 1 tablet by mouth every 6 hours as needed for Pain for up to 30 days.  90 tablet 0    atorvastatin (LIPITOR) 80 MG tablet TAKE 1 TABLET DAILY 90 tablet 3    metoprolol tartrate (LOPRESSOR) 25 MG tablet TAKE 1 TABLET TWICE A  tablet 3    clopidogrel (PLAVIX) 75 MG tablet TAKE 1 TABLET DAILY 90 tablet 3    PARoxetine (PAXIL) 20 MG tablet TAKE 1 TABLET BY MOUTH ONCE DAILY IN THE MORNING 90 tablet 3    metFORMIN (GLUCOPHAGE) 500 MG tablet TAKE 1 TABLET TWICE DAILY  WITH MEALS. 180 tablet 3    celecoxib (CELEBREX) 200 MG capsule TAKE 1 CAPSULE BY MOUTH TWICE DAILY 180 capsule 3    losartan (COZAAR) 25 MG tablet TAKE 1 TABLET DAILY 90 tablet 3    pantoprazole (PROTONIX) 40 MG tablet Take 1 tablet by mouth once daily 90 tablet 3    Handicap Placard MISC by Does not apply route EXPIRES 04/13/2026 2 11/13/2018    HGB 13.6 08/20/2021     02/09/2022     BMP:    Lab Results   Component Value Date     08/20/2021    K 4.9 08/20/2021     08/20/2021    CO2 30 08/20/2021    BUN 22 08/20/2021    CREATININE 0.79 02/09/2022    CREATININE 0.96 08/20/2021    CALCIUM 9.3 08/20/2021    GLUCOSE 134 08/17/2020     PT/INR:    Lab Results   Component Value Date    PROTIME 10.6 09/07/2017    INR 1.0 09/07/2017     Troponin:  No results found for: TROPONINI  No results for input(s): LIPASE, AMYLASE in the last 72 hours. No results for input(s): AST, ALT, BILIDIR, BILITOT, ALKPHOS in the last 72 hours. Uric Acid:  No components found for: URIC  Urine Culture:  No components found for: CURINE    Radiology:   XR ELBOW LEFT (MIN 3 VIEWS)    Result Date: 1/28/2022  EXAMINATION: THREE XRAY VIEWS OF THE LEFT ELBOW 1/28/2022 4:02 pm COMPARISON: None. HISTORY: ORDERING SYSTEM PROVIDED HISTORY: Pain and swelling of left elbow TECHNOLOGIST PROVIDED HISTORY: fall from step stool landing on left elbow Reason for Exam: fall onto left elbow, swelling and pain FINDINGS: No acute fracture. Alignment is anatomic. No elbow effusion. Small curvilinear amorphous calcification with rounded margins posterior to the olecranon may represent hydroxyapatite deposition or possibly a small fragmented enthesophyte. Marked posterior soft tissue swelling and increased soft tissue density overlying the olecranon and extending into the lower portion of the upper arm and along the proximal dorsal forearm. No soft tissue gas or radiopaque retained foreign body. Marked posterior soft tissue swelling at the elbow centered about the olecranon. No soft tissue gas or retained foreign body. Either hydroxyapatite deposition in the distal triceps tendon versus a small chronically fragmented olecranon enthesophyte. No acute fracture, dislocation, or elbow effusion.      NM MYOCARDIAL SPECT REST EXERCISE OR RX    Result Date: 1/24/2022  Radiology exam is complete. No Radiologist dictation. Please follow up with ordering provider. Diagnosis Orders   1. Bursitis of left elbow, unspecified bursa  Culture, Body Fluid         PLAN:  Proceed with drainage of elbow and injection with corticosteroid injection with continuous compression sleeve. No orders of the defined types were placed in this encounter. Procedure:      Left elbow prepped in sterile fashion alcohol topical numbing spray 18-gauge needle was introduced into the elbow bursa approximately 30 cc of blood tinged fluid was removed 3 cc of Marcaine 1 cc Kenalog was then injected into the bursa. This fluid was sent off for cultures.   Electronically signed by Jake Stone PA-C on 2/15/2022 at 8:45 AM

## 2022-02-20 LAB
CULTURE: ABNORMAL
DIRECT EXAM: ABNORMAL
DIRECT EXAM: ABNORMAL
Lab: ABNORMAL
SPECIMEN DESCRIPTION: ABNORMAL

## 2022-03-17 ENCOUNTER — HOSPITAL ENCOUNTER (OUTPATIENT)
Dept: LAB | Age: 77
Discharge: HOME | End: 2022-03-17
Payer: MEDICARE

## 2022-03-17 DIAGNOSIS — E11.9 TYPE 2 DIABETES MELLITUS WITHOUT COMPLICATION, WITHOUT LONG-TERM CURRENT USE OF INSULIN (HCC): ICD-10-CM

## 2022-03-17 DIAGNOSIS — D50.9 IRON DEFICIENCY ANEMIA, UNSPECIFIED IRON DEFICIENCY ANEMIA TYPE: ICD-10-CM

## 2022-03-17 LAB
CREATININE URINE: 142.1 MG/DL (ref 39–259)
ESTIMATED AVERAGE GLUCOSE: 117 MG/DL
HBA1C MFR BLD: 5.7 % (ref 4–6)
HEMOGLOBIN: 13.6 G/DL (ref 13–17)
IRON SATURATION: 19 % (ref 20–55)
IRON: 72 UG/DL (ref 59–158)
MICROALBUMIN/CREAT 24H UR: <12 MG/L
MICROALBUMIN/CREAT UR-RTO: NORMAL MCG/MG CREAT
TOTAL IRON BINDING CAPACITY: 376 UG/DL (ref 250–450)
UNSATURATED IRON BINDING CAPACITY: 304 UG/DL (ref 112–347)

## 2022-03-17 PROCEDURE — 83540 ASSAY OF IRON: CPT

## 2022-03-17 PROCEDURE — 82570 ASSAY OF URINE CREATININE: CPT

## 2022-03-17 PROCEDURE — 36415 COLL VENOUS BLD VENIPUNCTURE: CPT

## 2022-03-17 PROCEDURE — 85018 HEMOGLOBIN: CPT

## 2022-03-17 PROCEDURE — 83036 HEMOGLOBIN GLYCOSYLATED A1C: CPT

## 2022-03-17 PROCEDURE — 82043 UR ALBUMIN QUANTITATIVE: CPT

## 2022-03-17 PROCEDURE — 83550 IRON BINDING TEST: CPT

## 2022-03-24 ENCOUNTER — OFFICE VISIT (OUTPATIENT)
Dept: INTERNAL MEDICINE | Age: 77
End: 2022-03-24
Payer: MEDICARE

## 2022-03-24 VITALS
RESPIRATION RATE: 16 BRPM | WEIGHT: 201 LBS | SYSTOLIC BLOOD PRESSURE: 116 MMHG | DIASTOLIC BLOOD PRESSURE: 64 MMHG | HEIGHT: 69 IN | BODY MASS INDEX: 29.77 KG/M2 | HEART RATE: 57 BPM

## 2022-03-24 DIAGNOSIS — Z00.00 MEDICARE ANNUAL WELLNESS VISIT, SUBSEQUENT: ICD-10-CM

## 2022-03-24 DIAGNOSIS — D50.9 IRON DEFICIENCY ANEMIA, UNSPECIFIED IRON DEFICIENCY ANEMIA TYPE: ICD-10-CM

## 2022-03-24 DIAGNOSIS — C32.9 CARCINOMA LARYNX (HCC): ICD-10-CM

## 2022-03-24 DIAGNOSIS — E78.49 OTHER HYPERLIPIDEMIA: ICD-10-CM

## 2022-03-24 DIAGNOSIS — F33.41 RECURRENT MAJOR DEPRESSIVE DISORDER, IN PARTIAL REMISSION (HCC): ICD-10-CM

## 2022-03-24 DIAGNOSIS — I10 PRIMARY HYPERTENSION: ICD-10-CM

## 2022-03-24 DIAGNOSIS — Z12.5 SPECIAL SCREENING FOR MALIGNANT NEOPLASM OF PROSTATE: ICD-10-CM

## 2022-03-24 DIAGNOSIS — Z00.00 GENERAL MEDICAL EXAM: Primary | ICD-10-CM

## 2022-03-24 DIAGNOSIS — E11.9 TYPE 2 DIABETES MELLITUS WITHOUT COMPLICATION, WITHOUT LONG-TERM CURRENT USE OF INSULIN (HCC): ICD-10-CM

## 2022-03-24 PROCEDURE — 99214 OFFICE O/P EST MOD 30 MIN: CPT | Performed by: INTERNAL MEDICINE

## 2022-03-24 PROCEDURE — 1036F TOBACCO NON-USER: CPT | Performed by: INTERNAL MEDICINE

## 2022-03-24 PROCEDURE — 3044F HG A1C LEVEL LT 7.0%: CPT | Performed by: INTERNAL MEDICINE

## 2022-03-24 PROCEDURE — G8417 CALC BMI ABV UP PARAM F/U: HCPCS | Performed by: INTERNAL MEDICINE

## 2022-03-24 PROCEDURE — G0439 PPPS, SUBSEQ VISIT: HCPCS | Performed by: INTERNAL MEDICINE

## 2022-03-24 PROCEDURE — 1123F ACP DISCUSS/DSCN MKR DOCD: CPT | Performed by: INTERNAL MEDICINE

## 2022-03-24 PROCEDURE — 99397 PER PM REEVAL EST PAT 65+ YR: CPT | Performed by: INTERNAL MEDICINE

## 2022-03-24 PROCEDURE — G8484 FLU IMMUNIZE NO ADMIN: HCPCS | Performed by: INTERNAL MEDICINE

## 2022-03-24 PROCEDURE — G0463 HOSPITAL OUTPT CLINIC VISIT: HCPCS | Performed by: INTERNAL MEDICINE

## 2022-03-24 PROCEDURE — 4040F PNEUMOC VAC/ADMIN/RCVD: CPT | Performed by: INTERNAL MEDICINE

## 2022-03-24 PROCEDURE — G8427 DOCREV CUR MEDS BY ELIG CLIN: HCPCS | Performed by: INTERNAL MEDICINE

## 2022-03-24 ASSESSMENT — ENCOUNTER SYMPTOMS
ABDOMINAL PAIN: 0
NAUSEA: 0
BACK PAIN: 0
VOMITING: 0
CONSTIPATION: 0
SHORTNESS OF BREATH: 0
COUGH: 0
BLOOD IN STOOL: 0
EYE PAIN: 0
DIARRHEA: 0

## 2022-03-24 ASSESSMENT — PATIENT HEALTH QUESTIONNAIRE - PHQ9
SUM OF ALL RESPONSES TO PHQ QUESTIONS 1-9: 0
2. FEELING DOWN, DEPRESSED OR HOPELESS: 0
SUM OF ALL RESPONSES TO PHQ QUESTIONS 1-9: 0
SUM OF ALL RESPONSES TO PHQ QUESTIONS 1-9: 0
1. LITTLE INTEREST OR PLEASURE IN DOING THINGS: 0
SUM OF ALL RESPONSES TO PHQ9 QUESTIONS 1 & 2: 0
SUM OF ALL RESPONSES TO PHQ QUESTIONS 1-9: 0

## 2022-03-24 ASSESSMENT — LIFESTYLE VARIABLES
HOW OFTEN DO YOU HAVE A DRINK CONTAINING ALCOHOL: MONTHLY OR LESS
HOW MANY STANDARD DRINKS CONTAINING ALCOHOL DO YOU HAVE ON A TYPICAL DAY: 1 OR 2

## 2022-03-24 NOTE — PROGRESS NOTES
Medicare Annual Wellness Visit    Norberto Osullivan is here for Medicare AWV (6 month), Hypertension, Hyperlipidemia, and Diabetes    Assessment & Plan   General medical exam  Medicare annual wellness visit, subsequent  Other hyperlipidemia  -     Lipid Panel; Future  Recurrent major depressive disorder, in partial remission ([facility] [address]  Type 2 diabetes mellitus without complication, without long-term current use of insulin (HCC)  -     Hemoglobin A1C; Future  Carcinoma larynx (HCC)  Primary hypertension  -     Comprehensive Metabolic Panel; Future  Special screening for malignant neoplasm of prostate  -     PSA Screening; Future  Iron deficiency anemia, unspecified iron deficiency anemia type  -     Hemoglobin; Future  -     Iron and TIBC; Future      Recommendations for Preventive Services Due: see orders and patient instructions/AVS.  Recommended screening schedule for the next 5-10 years is provided to the patient in written form: see Patient Instructions/AVS.     Return in about 6 months (around 9/26/2022) for Hypertension, Hyperlipidemia, Diabetes. Subjective       Patient's complete Health Risk Assessment and screening values have been reviewed and are found in Flowsheets. The following problems were reviewed today and where indicated follow up appointments were made and/or referrals ordered.     Positive Risk Factor Screenings with Interventions:               General Health and ACP:  General  In general, how would you say your health is?: Good  In the past 7 days, have you experienced any of the following: New or Increased Pain, New or Increased Fatigue, Loneliness, Social Isolation, Stress or Anger?: No  Do you get the social and emotional support that you need?: Yes  Do you have a Living Will?: (!) No    Advance Directives     Power of  Living Will ACP-Advance Directive ACP-Power of     Not on File Not on File Not on File Not on File      General Health Risk Interventions:  · .declines living will as needed for Chest pain up to max of 3 total doses. If no relief after 1 dose, call 911. Rebecca Simpson MD   ferrous sulfate (IRON 325) 325 (65 Fe) MG tablet Take 1 tablet by mouth 2 times daily  Rebecca Simpson MD   Handicap Placard MISC by Does not apply route Expires: 7/5/2021  Rebecca Simpson MD   tiZANidine (ZANAFLEX) 4 MG tablet TAKE ONE TABLET BY MOUTH THREE TIMES DAILY  LAWRENCE Sanford - CNP   aspirin 81 MG tablet Take 81 mg by mouth daily  Historical Provider, MD   famotidine (PEPCID) 20 MG tablet Take 20 mg by mouth daily. Historical Provider, MD [place] (Including outside providers/suppliers regularly involved in providing care):   Patient Care Team:  Rebecca Simpson MD as PCP - General (Internal Medicine)  Rebecca Simpson MD as PCP - REHABILITATION [facility] Empaneled Provider    Reviewed and updated this visit:  Tobacco  Allergies  Meds  Problems  Med Hx  Surg Hx  Soc Hx  Fam Hx                   I, Dr. Carrie Leal, directly supervised the performance of this Medicare annual wellness visit.

## 2022-03-24 NOTE — PROGRESS NOTES
[place] [address]  Dept: [phone]  Dept Fax: [phone]  Loc: [phone]     Norberto Flynn is a 65 y.o. male who presents today for his medical conditions/complaintsas noted below. Norberto Osullivan is c/o of   Chief Complaint   Patient presents with    Medicare AWV     6 month    Hypertension    Hyperlipidemia    Diabetes         HPI:     Hypertension  This is a chronic problem. The current episode started more than 1 year ago. The problem has been waxing and waning since onset. The problem is controlled. Pertinent negatives include no chest pain, headaches, neck pain, palpitations or shortness of breath. Hyperlipidemia  This is a chronic problem. The current episode started more than 1 year ago. The problem is controlled. Recent lipid tests were reviewed and are variable. Pertinent negatives include no chest pain or shortness of breath. Diabetes  He presents for his follow-up diabetic visit. He has type 2 diabetes mellitus. His disease course has been fluctuating. Pertinent negatives for hypoglycemia include no confusion, dizziness, headaches, nervousness/anxiousness or pallor. Pertinent negatives for diabetes include no chest pain, no polydipsia, no polyuria and no weakness. Other  This is a recurrent (4-general medical exam, 5-recurrent major depressive disorder, in partial remission, 6-carcinoma of larynx, VIANEY) problem. The current episode started today. The problem occurs intermittently. The problem has been waxing and waning. Pertinent negatives include no abdominal pain, arthralgias, chest pain, chills, coughing, fever, headaches, nausea, neck pain, numbness, rash, vomiting or weakness. Hemoglobin A1C (%)   Date Value   03/17/2022 5.7   08/20/2021 5.6   02/18/2021 6.0            Microalb/Crt.  Ratio (mcg/mg creat)   Date Value   03/17/2022 Can not be calculated     LDL Cholesterol (mg/dL)   Date Value   08/20/2021 93   08/17/2020 89   08/15/2019 80         AST (U/L)   Date Value   08/20/2021 15     ALT (U/L)   Date Value   08/20/2021 18     BUN (mg/dL)   Date Value   08/20/2021 22     BP Readings from Last 3 Encounters:   03/24/22 116/64   02/15/22 126/82   02/09/22 (!) 145/73              Past Medical History:   Diagnosis Date    Abnormal cardiovascular stress test 01/2022    Asthma     CAD (coronary artery disease)     STENTS X 4    Cancer (HCC)     THROAT, HX. RADIATION , LT EAR    Cervical radiculopathy     Chronic back pain     Colonic polyp     Degeneration of cervical intervertebral disc     [facility], [facility] C4/C5-C6/C7    Degeneration of cervical intervertebral disc     [facility] 1994, C3/C4    Depression     Diverticulosis     GERD (gastroesophageal reflux disease)     Glucose intolerance (impaired glucose tolerance)     IS NOT DIABETIC, PER PT    HTN (hypertension)     Hyperlipidemia     Lumbar radiculopathy     MI [facility]myocardial infarction) ([facility] [address]     2011    Numbness and tingling     HANDS    OA (osteoarthritis)     Pre-diabetes     Sacroiliac pain 11/04/2014    Vision abnormalities     WEARS GLASSES      Past Surgical History:   Procedure Laterality Date    ABSCESS DRAINAGE Right     ELBOW WITH CLOSURE    ANESTHESIA NERVE BLOCK Bilateral 06/02/2017    Bilat L1 L2 L3 L4 L5 Diagnostic Medial Branch Blk performed by Uche Harper MD at [address] [facility] Bilateral 06/09/2017    Bilat L1 L2 L3 L4 L5 Confirmatory Medial BB performed by Uche Harper MD at [address] [place] [address]  02/09/2022    CERVICAL SPINE SURGERY  08/09/2001    Anterior Cervical Decompression and Fusion C3-4    CERVICAL SPINE SURGERY Left 4920,[id number],1248    Cervical C6-C7 Discectomy and Foraminotomy    COLONOSCOPY  2007, 2003    POLYPS    COLONOSCOPY  09/09/2013    hyperplastic polyp x 3    COLONOSCOPY N/A 10/15/2018    hyperplastic polyp, severe sigmoid diverticulosis, large ext. hemorrhoid, Dr Kenzie Erazo  05/05/2011    PROX RCA X2    CORONARY ANGIOPLASTY WITH STENT PLACEMENT  05/31/2011    XIENCE TO MID LAD X2    CYSTOSCOPY Bilateral 05/19/2021    CYSTO Bilateral Retrogrades performed by Mckenna Armenta MD at [address] [facility] Right 2012,2011    FINGER TRIGGER RELEASE Right     THIRD FINGER    [facility]. INJECTION PROCEDURE FOR SACROILIAC JOINT Bilateral 03/14/2017    Bilat Sacroiliac & Piriformis Inj's performed by Opal Coles MD at [address] Right     ELBOW    LUMBAR FUSION  04/07/2014    lumbar decompression and fusion    LUMBAR FUSION N/A 11/15/2017    LUMBAR DECOMPRESSION POSTERIOR W/FUSION L3 L4 ( with SPINAL CORD MONITOR ) performed by Brandon Gaspar MD at [address]  [phone]    Fusion    LUMBAR SPINE SURGERY Left 02/14/2017    Left L4 & L5 Transforaminal ANITHA performed by Opal Coles MD at [address] [place] Bilateral 05/02/2017    Bilat L5 Transforaminal ANITHA performed by Opal Coles MD at [address] Bilateral 05/09/2017    Bilat L5 Transforaminal ANITHA performed by Opal Coles MD at [address] [facility]  2/19/13, 5/14/13    L1/L2 IESI    OTHER SURGICAL HISTORY      Hardware correction, patient had 1 broken screw and to loose screws in back     OTHER SURGICAL HISTORY Left 09/27/2016     C6 TFE    OTHER SURGICAL HISTORY Bilateral 11/29/2016    L3, L4 L5 Diagnostic Medial Branch Block    OTHER SURGICAL HISTORY  12/06/2016    jovani L3 L4 L5 conf MBB    OTHER SURGICAL HISTORY Right 12/12/2016    L3,L4,L5 RFA    OTHER SURGICAL HISTORY Left 12/15/2017    L3.  L4, L5 RFA    OTHER SURGICAL HISTORY Left 01/31/2017    L4 & L5 TFE    VT ARTHROCENTESIS ASPIR&/INJ MAJOR JT/BURSA W/O US Left 03/31/2017    Left Hip Inj performed by Opal Coles MD at [address] ARTHROCENTESIS ASPIR&/INJ MAJOR JT/BURSA W/O US Left 04/14/2017    Left Hip Inj performed by Adi Lozano MD at [address] HEMORRHOIDECTOMY,INT/EXT,1 COLUMN/GROUP N/A 11/07/2018    External HEMORRHOIDECTOMY performed by Queenie Seals MD at [address] Right 06/29/2017    Right L1 L2 L3 L4 L5 Radiofrequency Ablation performed by Adi Lozano MD at [address] Left 07/06/2017    Left L1 L2 L3 L4 L5 Radiofrequency performed by Adi Lozano MD at [facility] [facility]    UPPER GASTROINTESTINAL ENDOSCOPY N/A 09/30/2019    mild gastritis, mild to moderate esophagitis, Dr. Caty Feldman       Family History   Problem Relation Age of Onset    Cancer Father         colon cancer          Social History     Tobacco Use    Smoking status: Former Smoker     Packs/day: 1.00     Years: 40.00     Pack years: 40.00     Types: Cigarettes, Pipe, Cigars     Quit date: 5/5/2011     Years since quitting: 10.8    Smokeless tobacco: Former User     Types: [address] date: 5/5/2011   Substance Use Topics    Alcohol use:  Yes     Alcohol/week: 0.0 standard drinks     Comment: RARE         Current Outpatient Medications   Medication Sig Dispense Refill    vitamin C (ASCORBIC ACID) 500 MG tablet Take 500 mg by mouth 2 times daily      atorvastatin (LIPITOR) 80 MG tablet TAKE 1 TABLET DAILY 90 tablet 3    metoprolol tartrate (LOPRESSOR) 25 MG tablet TAKE 1 TABLET TWICE A  tablet 3    clopidogrel (PLAVIX) 75 MG tablet TAKE 1 TABLET DAILY 90 tablet 3    PARoxetine (PAXIL) 20 MG tablet TAKE 1 TABLET BY MOUTH ONCE DAILY IN THE MORNING 90 tablet 3    metFORMIN (GLUCOPHAGE) 500 MG tablet TAKE 1 TABLET TWICE DAILY  WITH MEALS. 180 tablet 3    celecoxib (CELEBREX) 200 MG capsule TAKE 1 CAPSULE BY MOUTH TWICE DAILY 180 capsule 3    losartan (COZAAR) 25 MG tablet TAKE 1 TABLET DAILY 90 tablet 3    pantoprazole (PROTONIX) 40 MG tablet Take 1 tablet by mouth once daily 90 tablet 3    Handicap Placard MISC by Does not apply route EXPIRES 04/13/2026 2 each 0    nitroGLYCERIN (NITROSTAT) 0.4 MG SL tablet Place 1 tablet under the tongue every 5 minutes as needed for Chest pain up to max of 3 total doses. If no relief after 1 dose, call 911. 25 tablet 3    ferrous sulfate (IRON 325) 325 (65 Fe) MG tablet Take 1 tablet by mouth 2 times daily 120 tablet 3    Handicap Placard MISC by Does not apply route Expires: 7/5/2021 2 each 0    tiZANidine (ZANAFLEX) 4 MG tablet TAKE ONE TABLET BY MOUTH THREE TIMES DAILY 90 tablet 5    aspirin 81 MG tablet Take 81 mg by mouth daily      famotidine (PEPCID) 20 MG tablet Take 20 mg by mouth daily. Current Facility-Administered Medications   Medication Dose Route Frequency Provider Last Rate Last Admin    methylPREDNISolone acetate (DEPO-MEDROL) injection 40 mg  40 mg IntraMUSCular Once Reuben Quintana MD         Allergies   Allergen Reactions    Crestor [Rosuvastatin] Other (See Comments)     Joint pain, muscle aches    Ibuprofen Other (See Comments)     bleeding    Lisinopril Hives    Effient [Prasugrel] Rash       Health Maintenance   Topic Date Due    Hepatitis C screen  Never done    DTaP/Tdap/Td vaccine (1 - Tdap) Never done    Low dose CT lung screening  Never done    Shingles Vaccine (2 of 3) 01/03/2014    COVID-19 Vaccine (3 - Booster for Moderna series) 08/01/2021    Depression Monitoring  03/26/2022    Lipid screen  08/20/2022    Potassium monitoring  02/09/2023    Creatinine monitoring  02/09/2023    Flu vaccine  Completed    Pneumococcal 65+ years Vaccine  Completed    Hepatitis A vaccine  Aged Out    Hib vaccine  Aged Out    Meningococcal (ACWY) vaccine  Aged Out       Subjective:      Review of Systems   Constitutional: Negative for chills and fever. HENT: Negative for hearing loss. Eyes: Negative for pain and visual disturbance. Respiratory: Negative for cough and shortness of breath.     Cardiovascular: Negative for chest pain, palpitations and leg swelling. Gastrointestinal: Negative for abdominal pain, blood in stool, constipation, diarrhea, nausea and vomiting. Endocrine: Negative for cold intolerance, polydipsia and polyuria. Genitourinary: Negative for difficulty urinating, dysuria and hematuria. Musculoskeletal: Negative for arthralgias, back pain, gait problem and neck pain. Skin: Negative for pallor and rash. Neurological: Negative for dizziness, weakness, numbness and headaches. Hematological: Negative for adenopathy. Does not bruise/bleed easily. Psychiatric/Behavioral: Negative for confusion. The patient is not nervous/anxious. Objective:     Physical Exam  Vitals reviewed. Constitutional:       Appearance: He is well-developed. HENT:      Head: Normocephalic and atraumatic. Eyes:      Pupils: Pupils are equal, round, and reactive to light. Cardiovascular:      Rate and Rhythm: Normal rate and regular rhythm. Heart sounds: No murmur heard. No friction rub. No gallop. Pulmonary:      Effort: Pulmonary effort is normal.      Breath sounds: Normal breath sounds. No wheezing or rales. Abdominal:      General: There is no distension. Palpations: Abdomen is soft. There is no mass. Tenderness: There is no abdominal tenderness. There is no rebound. Musculoskeletal:         General: Normal range of motion. Cervical back: Neck supple. Lymphadenopathy:      Cervical: No cervical adenopathy. Skin:     General: Skin is warm and dry. Findings: No rash. Neurological:      Mental Status: He is alert and oriented to person, place, and time. Cranial Nerves: No cranial nerve deficit (grossly). Psychiatric:         Thought Content:  Thought content normal.        /64 (Site: Right Upper Arm, Position: Sitting, Cuff Size: Large Adult)   Pulse 57   Resp 16   Ht 5' 9\" (1.753 m)   Wt 201 lb (91.2 kg)   BMI 29.68 kg/m²     Assessment:       Diagnosis Orders   1. General medical exam     2. Medicare annual wellness visit, subsequent     3. Other hyperlipidemia  Lipid Panel   4. Recurrent major depressive disorder, in partial remission ([facility] [address]     5. Type 2 diabetes mellitus without complication, without long-term current use of insulin (HCC)  Hemoglobin A1C   6. Carcinoma larynx ([facility] [address]     7. Primary hypertension  Comprehensive Metabolic Panel   8. Special screening for malignant neoplasm of prostate  PSA Screening   9. Iron deficiency anemia, unspecified iron deficiency anemia type  Hemoglobin    Iron and TIBC   Reviewed multiple test results for this appointment. 3/17/2022 normal hemoglobin A1c 5.7.    3/17/2022 normal hemoglobin 13.6.    3/17/2022 normal iron 72. Patient told to continue Protonix. Plan:       Return in about 6 months (around 9/26/2022) for Hypertension, Hyperlipidemia, Diabetes. Orders Placed This Encounter   Procedures    Comprehensive Metabolic Panel     Standing Status:   Future     Standing Expiration Date:   3/24/2023    PSA Screening     Standing Status:   Future     Standing Expiration Date:   3/24/2023    Hemoglobin A1C     Standing Status:   Future     Standing Expiration Date:   3/24/2023    Hemoglobin     Standing Status:   Future     Standing Expiration Date:   3/24/2023    Iron and TIBC     Standing Status:   Future     Standing Expiration Date:   3/24/2023     Order Specific Question:   Is Patient Fasting? Answer:   na     Order Specific Question:   No of Hours? Answer:   na    Lipid Panel     Standing Status:   Future     Standing Expiration Date:   3/24/2023     Order Specific Question:   Is Patient Fasting?/# of Hours     Answer:   yes     No orders of the defined types were placed in this encounter. Patientgiven educational materials - see patient instructions. Discussed use, benefit,and side effects of prescribed medications. All patient questions answered. Ptvoiced understanding.  Reviewed health maintenance. Instructed to continue currentmedications, diet and exercise. Patient agreed with treatment plan. Follow up asdirected.      Electronically signed by Katerina Sanchez MD on 3/24/2022 at 9:46 AM

## 2022-03-24 NOTE — PATIENT INSTRUCTIONS
Personalized Preventive Plan for Norberto Osullivan - 3/24/2022  Medicare offers a range of preventive health benefits. Some of the tests and screenings are paid in full while other may be subject to a deductible, co-insurance, and/or copay. Some of these benefits include a comprehensive review of your medical history including lifestyle, illnesses that may run in your family, and various assessments and screenings as appropriate. After reviewing your medical record and screening and assessments performed today your provider may have ordered immunizations, labs, imaging, and/or referrals for you. A list of these orders (if applicable) as well as your Preventive Care list are included within your After Visit Summary for your review. Other Preventive Recommendations:    · A preventive eye exam performed by an eye specialist is recommended every 1-2 years to screen for glaucoma; cataracts, macular degeneration, and other eye disorders. · A preventive dental visit is recommended every 6 months. · Try to get at least 150 minutes of exercise per week or 10,000 steps per day on a pedometer . · Order or download the FREE \"Exercise & Physical Activity: Your Everyday Guide\" from [employer]. Call 9-193.805.3206 or search [employer] online. · You need 8535-3142 mg of calcium and 6486-3263 IU of vitamin D per day. It is possible to meet your calcium requirement with diet alone, but a vitamin D supplement is usually necessary to meet this goal.  · When exposed to the sun, use a sunscreen that protects against both UVA and UVB radiation with an SPF of 30 or greater. Reapply every 2 to 3 hours or after sweating, drying off with a towel, or swimming. · Always wear a seat belt when traveling in a car. Always wear a helmet when riding a bicycle or motorcycle.

## 2022-04-06 ENCOUNTER — HOSPITAL ENCOUNTER (INPATIENT)
Age: 77
LOS: 3 days | Discharge: HOME | DRG: 309 | End: 2022-04-09
Attending: EMERGENCY MEDICINE | Admitting: INTERNAL MEDICINE
Payer: MEDICARE

## 2022-04-06 ENCOUNTER — APPOINTMENT (OUTPATIENT)
Dept: CT IMAGING | Age: 77
DRG: 309 | End: 2022-04-06
Payer: MEDICARE

## 2022-04-06 ENCOUNTER — OFFICE VISIT (OUTPATIENT)
Dept: PRIMARY CARE CLINIC | Age: 77
DRG: 309 | End: 2022-04-06
Payer: MEDICARE

## 2022-04-06 VITALS
RESPIRATION RATE: 18 BRPM | SYSTOLIC BLOOD PRESSURE: 132 MMHG | WEIGHT: 210.25 LBS | TEMPERATURE: 96.8 F | DIASTOLIC BLOOD PRESSURE: 90 MMHG | OXYGEN SATURATION: 100 % | HEART RATE: 88 BPM | HEIGHT: 69 IN | BODY MASS INDEX: 31.14 KG/M2

## 2022-04-06 DIAGNOSIS — I48.91 ATRIAL FIBRILLATION WITH RVR (HCC): Primary | ICD-10-CM

## 2022-04-06 DIAGNOSIS — J40 BRONCHITIS: Primary | ICD-10-CM

## 2022-04-06 DIAGNOSIS — R06.09 DOE (DYSPNEA ON EXERTION): ICD-10-CM

## 2022-04-06 DIAGNOSIS — I49.9 IRREGULAR HEART RHYTHM: ICD-10-CM

## 2022-04-06 PROBLEM — J34.89 NASAL DISCHARGE: Status: ACTIVE | Noted: 2022-04-06

## 2022-04-06 PROBLEM — D64.9 ANEMIA: Status: ACTIVE | Noted: 2022-04-06

## 2022-04-06 LAB
ABSOLUTE EOS #: 0.05 K/UL (ref 0–0.44)
ABSOLUTE IMMATURE GRANULOCYTE: <0.03 K/UL (ref 0–0.3)
ABSOLUTE LYMPH #: 0.84 K/UL (ref 1.1–3.7)
ABSOLUTE MONO #: 0.58 K/UL (ref 0.1–1.2)
ALBUMIN SERPL-MCNC: 3.9 G/DL (ref 3.5–5.2)
ALBUMIN/GLOBULIN RATIO: 1.4 (ref 1–2.5)
ALP BLD-CCNC: 87 U/L (ref 40–129)
ALT SERPL-CCNC: 68 U/L (ref 5–41)
ANION GAP SERPL CALCULATED.3IONS-SCNC: 8 MMOL/L (ref 9–17)
AST SERPL-CCNC: 34 U/L
BASOPHILS # BLD: 1 % (ref 0–2)
BASOPHILS ABSOLUTE: 0.04 K/UL (ref 0–0.2)
BILIRUB SERPL-MCNC: 0.54 MG/DL (ref 0.3–1.2)
BUN BLDV-MCNC: 28 MG/DL (ref 8–23)
BUN/CREAT BLD: 23 (ref 9–20)
CALCIUM SERPL-MCNC: 8.7 MG/DL (ref 8.6–10.4)
CHLORIDE BLD-SCNC: 106 MMOL/L (ref 98–107)
CO2: 27 MMOL/L (ref 20–31)
CREAT SERPL-MCNC: 1.24 MG/DL (ref 0.7–1.2)
D-DIMER QUANTITATIVE: 3.04 MG/L FEU (ref 0–0.59)
EOSINOPHILS RELATIVE PERCENT: 1 % (ref 1–4)
GFR AFRICAN AMERICAN: >60 ML/MIN
GFR NON-AFRICAN AMERICAN: 56 ML/MIN
GFR SERPL CREATININE-BSD FRML MDRD: ABNORMAL ML/MIN/{1.73_M2}
GLUCOSE BLD-MCNC: 125 MG/DL (ref 75–110)
GLUCOSE BLD-MCNC: 163 MG/DL (ref 70–99)
GLUCOSE BLD-MCNC: 87 MG/DL (ref 75–110)
HCT VFR BLD CALC: 38.8 % (ref 40.7–50.3)
HEMOGLOBIN: 12.6 G/DL (ref 13–17)
IMMATURE GRANULOCYTES: 0 %
LYMPHOCYTES # BLD: 12 % (ref 24–43)
MAGNESIUM: 1.8 MG/DL (ref 1.6–2.6)
MCH RBC QN AUTO: 31.6 PG (ref 25.2–33.5)
MCHC RBC AUTO-ENTMCNC: 32.5 G/DL (ref 25.2–33.5)
MCV RBC AUTO: 97.2 FL (ref 82.6–102.9)
MONOCYTES # BLD: 8 % (ref 3–12)
NRBC AUTOMATED: 0.4 PER 100 WBC
PDW BLD-RTO: 14.4 % (ref 11.8–14.4)
PLATELET # BLD: 201 K/UL (ref 138–453)
PMV BLD AUTO: 9.8 FL (ref 8.1–13.5)
POTASSIUM SERPL-SCNC: 4.9 MMOL/L (ref 3.7–5.3)
PRO-BNP: 3779 PG/ML
RBC # BLD: 3.99 M/UL (ref 4.21–5.77)
SARS-COV-2, RAPID: NOT DETECTED
SEG NEUTROPHILS: 78 % (ref 36–65)
SEGMENTED NEUTROPHILS ABSOLUTE COUNT: 5.47 K/UL (ref 1.5–8.1)
SODIUM BLD-SCNC: 141 MMOL/L (ref 135–144)
SPECIMEN DESCRIPTION: NORMAL
TOTAL PROTEIN: 6.6 G/DL (ref 6.4–8.3)
TROPONIN, HIGH SENSITIVITY: 29 NG/L (ref 0–22)
TROPONIN, HIGH SENSITIVITY: 30 NG/L (ref 0–22)
TROPONIN, HIGH SENSITIVITY: 35 NG/L (ref 0–22)
TSH SERPL DL<=0.05 MIU/L-ACNC: 2.57 UIU/ML (ref 0.3–5)
WBC # BLD: 7 K/UL (ref 3.5–11.3)

## 2022-04-06 PROCEDURE — 99213 OFFICE O/P EST LOW 20 MIN: CPT | Performed by: NURSE PRACTITIONER

## 2022-04-06 PROCEDURE — 71260 CT THORAX DX C+: CPT

## 2022-04-06 PROCEDURE — 4040F PNEUMOC VAC/ADMIN/RCVD: CPT | Performed by: NURSE PRACTITIONER

## 2022-04-06 PROCEDURE — 2580000003 HC RX 258: Performed by: EMERGENCY MEDICINE

## 2022-04-06 PROCEDURE — 1123F ACP DISCUSS/DSCN MKR DOCD: CPT | Performed by: NURSE PRACTITIONER

## 2022-04-06 PROCEDURE — 94640 AIRWAY INHALATION TREATMENT: CPT

## 2022-04-06 PROCEDURE — 85025 COMPLETE CBC W/AUTO DIFF WBC: CPT

## 2022-04-06 PROCEDURE — 2500000003 HC RX 250 WO HCPCS: Performed by: EMERGENCY MEDICINE

## 2022-04-06 PROCEDURE — 84443 ASSAY THYROID STIM HORMONE: CPT

## 2022-04-06 PROCEDURE — 85379 FIBRIN DEGRADATION QUANT: CPT

## 2022-04-06 PROCEDURE — 2060000000 HC ICU INTERMEDIATE R&B

## 2022-04-06 PROCEDURE — 6360000004 HC RX CONTRAST MEDICATION: Performed by: EMERGENCY MEDICINE

## 2022-04-06 PROCEDURE — 82947 ASSAY GLUCOSE BLOOD QUANT: CPT

## 2022-04-06 PROCEDURE — 99222 1ST HOSP IP/OBS MODERATE 55: CPT | Performed by: INTERNAL MEDICINE

## 2022-04-06 PROCEDURE — 83735 ASSAY OF MAGNESIUM: CPT

## 2022-04-06 PROCEDURE — 6370000000 HC RX 637 (ALT 250 FOR IP): Performed by: INTERNAL MEDICINE

## 2022-04-06 PROCEDURE — 93010 ELECTROCARDIOGRAM REPORT: CPT | Performed by: NURSE PRACTITIONER

## 2022-04-06 PROCEDURE — 99222 1ST HOSP IP/OBS MODERATE 55: CPT

## 2022-04-06 PROCEDURE — 93005 ELECTROCARDIOGRAM TRACING: CPT | Performed by: NURSE PRACTITIONER

## 2022-04-06 PROCEDURE — 94760 N-INVAS EAR/PLS OXIMETRY 1: CPT

## 2022-04-06 PROCEDURE — 36415 COLL VENOUS BLD VENIPUNCTURE: CPT

## 2022-04-06 PROCEDURE — 83880 ASSAY OF NATRIURETIC PEPTIDE: CPT

## 2022-04-06 PROCEDURE — 87635 SARS-COV-2 COVID-19 AMP PRB: CPT

## 2022-04-06 PROCEDURE — 99284 EMERGENCY DEPT VISIT MOD MDM: CPT

## 2022-04-06 PROCEDURE — 96374 THER/PROPH/DIAG INJ IV PUSH: CPT

## 2022-04-06 PROCEDURE — G8427 DOCREV CUR MEDS BY ELIG CLIN: HCPCS | Performed by: NURSE PRACTITIONER

## 2022-04-06 PROCEDURE — 1036F TOBACCO NON-USER: CPT | Performed by: NURSE PRACTITIONER

## 2022-04-06 PROCEDURE — G8417 CALC BMI ABV UP PARAM F/U: HCPCS | Performed by: NURSE PRACTITIONER

## 2022-04-06 PROCEDURE — 96361 HYDRATE IV INFUSION ADD-ON: CPT

## 2022-04-06 PROCEDURE — 84484 ASSAY OF TROPONIN QUANT: CPT

## 2022-04-06 PROCEDURE — 80053 COMPREHEN METABOLIC PANEL: CPT

## 2022-04-06 RX ORDER — DEXTROSE MONOHYDRATE 50 MG/ML
100 INJECTION, SOLUTION INTRAVENOUS PRN
Status: DISCONTINUED | OUTPATIENT
Start: 2022-04-06 | End: 2022-04-09 | Stop reason: HOSPADM

## 2022-04-06 RX ORDER — ASCORBIC ACID 500 MG
500 TABLET ORAL 2 TIMES DAILY
Status: DISCONTINUED | OUTPATIENT
Start: 2022-04-06 | End: 2022-04-09 | Stop reason: HOSPADM

## 2022-04-06 RX ORDER — FAMOTIDINE 20 MG/1
20 TABLET, FILM COATED ORAL DAILY
Status: DISCONTINUED | OUTPATIENT
Start: 2022-04-06 | End: 2022-04-09 | Stop reason: HOSPADM

## 2022-04-06 RX ORDER — SODIUM CHLORIDE 9 MG/ML
INJECTION, SOLUTION INTRAVENOUS ONCE
Status: COMPLETED | OUTPATIENT
Start: 2022-04-06 | End: 2022-04-06

## 2022-04-06 RX ORDER — FERROUS SULFATE 325(65) MG
325 TABLET ORAL 2 TIMES DAILY
Status: DISCONTINUED | OUTPATIENT
Start: 2022-04-06 | End: 2022-04-09 | Stop reason: HOSPADM

## 2022-04-06 RX ORDER — ONDANSETRON 2 MG/ML
4 INJECTION INTRAMUSCULAR; INTRAVENOUS EVERY 6 HOURS PRN
Status: DISCONTINUED | OUTPATIENT
Start: 2022-04-06 | End: 2022-04-09 | Stop reason: HOSPADM

## 2022-04-06 RX ORDER — DILTIAZEM HYDROCHLORIDE 5 MG/ML
10 INJECTION INTRAVENOUS ONCE
Status: COMPLETED | OUTPATIENT
Start: 2022-04-06 | End: 2022-04-06

## 2022-04-06 RX ORDER — PANTOPRAZOLE SODIUM 40 MG/1
40 TABLET, DELAYED RELEASE ORAL
Status: DISCONTINUED | OUTPATIENT
Start: 2022-04-07 | End: 2022-04-09 | Stop reason: HOSPADM

## 2022-04-06 RX ORDER — NICOTINE POLACRILEX 4 MG
15 LOZENGE BUCCAL PRN
Status: DISCONTINUED | OUTPATIENT
Start: 2022-04-06 | End: 2022-04-09 | Stop reason: HOSPADM

## 2022-04-06 RX ORDER — ACETAMINOPHEN 325 MG/1
650 TABLET ORAL EVERY 4 HOURS PRN
Status: DISCONTINUED | OUTPATIENT
Start: 2022-04-06 | End: 2022-04-09 | Stop reason: HOSPADM

## 2022-04-06 RX ORDER — ASPIRIN 81 MG/1
81 TABLET ORAL DAILY
Status: DISCONTINUED | OUTPATIENT
Start: 2022-04-06 | End: 2022-04-07

## 2022-04-06 RX ORDER — DEXTROSE MONOHYDRATE 25 G/50ML
12.5 INJECTION, SOLUTION INTRAVENOUS PRN
Status: DISCONTINUED | OUTPATIENT
Start: 2022-04-06 | End: 2022-04-06 | Stop reason: RX

## 2022-04-06 RX ORDER — LOSARTAN POTASSIUM 25 MG/1
25 TABLET ORAL DAILY
Status: DISCONTINUED | OUTPATIENT
Start: 2022-04-06 | End: 2022-04-09 | Stop reason: HOSPADM

## 2022-04-06 RX ORDER — IPRATROPIUM BROMIDE AND ALBUTEROL SULFATE 2.5; .5 MG/3ML; MG/3ML
1 SOLUTION RESPIRATORY (INHALATION)
Status: DISCONTINUED | OUTPATIENT
Start: 2022-04-06 | End: 2022-04-09 | Stop reason: HOSPADM

## 2022-04-06 RX ORDER — ALBUTEROL SULFATE 2.5 MG/3ML
2.5 SOLUTION RESPIRATORY (INHALATION)
Status: DISCONTINUED | OUTPATIENT
Start: 2022-04-06 | End: 2022-04-09 | Stop reason: HOSPADM

## 2022-04-06 RX ORDER — SODIUM CHLORIDE FOR INHALATION 0.9 %
3 VIAL, NEBULIZER (ML) INHALATION
Status: DISCONTINUED | OUTPATIENT
Start: 2022-04-06 | End: 2022-04-09 | Stop reason: HOSPADM

## 2022-04-06 RX ORDER — ATORVASTATIN CALCIUM 40 MG/1
80 TABLET, FILM COATED ORAL NIGHTLY
Status: DISCONTINUED | OUTPATIENT
Start: 2022-04-07 | End: 2022-04-09 | Stop reason: HOSPADM

## 2022-04-06 RX ORDER — CLOPIDOGREL BISULFATE 75 MG/1
75 TABLET ORAL DAILY
Status: DISCONTINUED | OUTPATIENT
Start: 2022-04-06 | End: 2022-04-09 | Stop reason: HOSPADM

## 2022-04-06 RX ORDER — PAROXETINE HYDROCHLORIDE 20 MG/1
20 TABLET, FILM COATED ORAL DAILY
Status: DISCONTINUED | OUTPATIENT
Start: 2022-04-06 | End: 2022-04-09 | Stop reason: HOSPADM

## 2022-04-06 RX ORDER — METAXALONE 800 MG/1
800 TABLET ORAL 3 TIMES DAILY
Status: DISCONTINUED | OUTPATIENT
Start: 2022-04-06 | End: 2022-04-09 | Stop reason: HOSPADM

## 2022-04-06 RX ADMIN — DILTIAZEM HYDROCHLORIDE 7.5 MG/HR: 5 INJECTION, SOLUTION INTRAVENOUS at 16:04

## 2022-04-06 RX ADMIN — METOPROLOL TARTRATE 25 MG: 25 TABLET, FILM COATED ORAL at 21:03

## 2022-04-06 RX ADMIN — METAXALONE 800 MG: 800 TABLET ORAL at 21:03

## 2022-04-06 RX ADMIN — FERROUS SULFATE TAB 325 MG (65 MG ELEMENTAL FE) 325 MG: 325 (65 FE) TAB at 21:03

## 2022-04-06 RX ADMIN — DILTIAZEM HYDROCHLORIDE 5 MG/HR: 5 INJECTION, SOLUTION INTRAVENOUS at 15:12

## 2022-04-06 RX ADMIN — IPRATROPIUM BROMIDE AND ALBUTEROL SULFATE 1 AMPULE: .5; 2.5 SOLUTION RESPIRATORY (INHALATION) at 20:14

## 2022-04-06 RX ADMIN — DILTIAZEM HYDROCHLORIDE 10 MG: 5 INJECTION, SOLUTION INTRAVENOUS at 13:08

## 2022-04-06 RX ADMIN — METAXALONE 800 MG: 800 TABLET ORAL at 18:13

## 2022-04-06 RX ADMIN — IOPAMIDOL 80 ML: 755 INJECTION, SOLUTION INTRAVENOUS at 13:34

## 2022-04-06 RX ADMIN — OXYCODONE HYDROCHLORIDE AND ACETAMINOPHEN 500 MG: 500 TABLET ORAL at 21:03

## 2022-04-06 RX ADMIN — SODIUM CHLORIDE: 9 INJECTION, SOLUTION INTRAVENOUS at 13:10

## 2022-04-06 RX ADMIN — PAROXETINE HYDROCHLORIDE 20 MG: 20 TABLET, FILM COATED ORAL at 17:52

## 2022-04-06 RX ADMIN — APIXABAN 5 MG: 5 TABLET, FILM COATED ORAL at 21:03

## 2022-04-06 ASSESSMENT — ENCOUNTER SYMPTOMS
WHEEZING: 0
SORE THROAT: 0
TROUBLE SWALLOWING: 0
DIARRHEA: 0
SHORTNESS OF BREATH: 1
BACK PAIN: 0
VOMITING: 0
SORE THROAT: 0
ABDOMINAL PAIN: 0
BLOOD IN STOOL: 0
NAUSEA: 0
CONSTIPATION: 0
WHEEZING: 1
COUGH: 1
SHORTNESS OF BREATH: 1

## 2022-04-06 ASSESSMENT — PAIN SCALES - GENERAL
PAINLEVEL_OUTOF10: 0

## 2022-04-06 NOTE — H&P
HOSPITALIST ADMISSION H&P      REASON FOR ADMISSION:  New onset atrial fib with RVR  ESTIMATED LENGTH OF STAY: >2 midnights, 2-3 days    ATTENDING/ADMITTING PHYSICIAN: Bonnie Fuentes MD  PCP: Jacob Cooley MD    HISTORY OF PRESENT ILLNESS:      The patient is a 65 y.o. male patient of Jacob Cooley MD who presents from ER with c/o SOB. Patient presented from urgent care to ER after his wife made him come get checked out. Patient reports he has had increased shortness of breath since Sunday (3 days ago). Patient awoke on Sunday night/Monday morning diaphoretic. Patient reports having to stop frequently during activities, today was unable to carry packages from the [address] center to his vehicle for delivery to homes. Has also c/o increased fatigue and needing to lie down during the day, and his daughter told him he looked pale today. Hypertension: controlled at 104/63. Prediabetic: HgbA1c 5.7 on 3/17/22. CKD: Stage 3a. Wounds and LDAs present prior to admission: None     See below for additional PMH. In Urgent care:   EKG: Afib with RVR. In ER: Cardizem bolus then drip, IVF KVO. CT chest PE:  Impression   No evidence of pulmonary embolism.  Mildly enlarged central pulmonary   arterial vasculature which can be seen in the setting of underlying pulmonary   hypertension.       Diffuse bronchial wall thickening with mild mosaic attenuation of the lungs. Correlate for acute or chronic airways disease.  Background mild emphysema.       New small layering right pleural effusion.       Mild subcarinal lymphadenopathy which is of indeterminate stability in the   absence of prior comparison chest CT.       Cardiomegaly with coronary artery disease in the left anterior descending and   right coronary artery.  Small pericardial effusion, new from prior.        Patient wkgx-xkllkajsbs-shqnzwod-available records reviewed, including, but not limited to ER records, imaging results, lab results, office records, personal records, and OARRS -- no signs of abuse or diversion.      Past Medical History:   Diagnosis Date    Abnormal cardiovascular stress test 01/2022    Asthma     CAD (coronary artery disease)     STENTS X 4    Cancer (HCC)     THROAT, HX. RADIATION , LT EAR    Cervical radiculopathy     Chronic back pain     Colonic polyp     Degeneration of cervical intervertebral disc     [facility], 1990 C4/C5-C6/C7    Degeneration of cervical intervertebral disc     [facility] 1994, C3/C4    Depression     Diverticulosis     GERD (gastroesophageal reflux disease)     Glucose intolerance (impaired glucose tolerance)     IS NOT DIABETIC, PER PT    HTN (hypertension)     Hyperlipidemia     Lumbar radiculopathy     MI (myocardial infarction) ([facility] [address]     2011    Numbness and tingling     HANDS    OA (osteoarthritis)     Pre-diabetes     Sacroiliac pain 11/04/2014    Vision abnormalities     WEARS GLASSES           Past Surgical History:   Procedure Laterality Date    ABSCESS DRAINAGE Right     ELBOW WITH CLOSURE    ANESTHESIA NERVE BLOCK Bilateral 06/02/2017    Bilat L1 L2 L3 L4 L5 Diagnostic Medial Branch Blk performed by Margaret Andre MD at [address] [facility] 06/09/2017    Bilat L1 L2 L3 L4 L5 Confirmatory Medial BB performed by Margaret Andre MD at [address] [place] [address]  02/09/2022    CERVICAL SPINE SURGERY  08/09/2001    Anterior Cervical Decompression and Fusion C3-4    CERVICAL SPINE SURGERY Left [id number]    Cervical C6-C7 Discectomy and Foraminotomy    COLONOSCOPY  2007, 2003    POLYPS    COLONOSCOPY  09/09/2013    hyperplastic polyp x 3    COLONOSCOPY N/A 10/15/2018    hyperplastic polyp, severe sigmoid diverticulosis, large ext. hemorrhoid, Dr Jose Elias Pfeiffer  05/05/2011    PROX RCA X2    CORONARY ANGIOPLASTY WITH STENT PLACEMENT  05/31/2011    XIENCE TO MID LAD X2    CYSTOSCOPY Bilateral 05/19/2021    CYSTO Bilateral Retrogrades performed by Brian Mercer MD at [address] Right 2012,2011    FINGER TRIGGER RELEASE Right     [facility] INJECTION PROCEDURE FOR SACROILIAC JOINT Bilateral 03/14/2017    Bilat Sacroiliac & Piriformis Inj's performed by Eden Álvarez MD at [address] Right     ELBOW    LUMBAR FUSION  04/07/2014    lumbar decompression and fusion    LUMBAR FUSION N/A 11/15/2017    LUMBAR DECOMPRESSION POSTERIOR W/FUSION L3 L4 ( with SPINAL CORD MONITOR ) performed by Arabella Shell MD at [address]  [id number]    Fusion    LUMBAR SPINE SURGERY Left 02/14/2017    Left L4 & L5 Transforaminal ANITHA performed by Eden Álvarez MD at [place] [address] Bilateral 05/02/2017    Bilat L5 Transforaminal ANITHA performed by Eden Álvarez MD at [place] [address] Bilateral 05/09/2017    Bilat L5 Transforaminal ANITHA performed by Eden Álvarez MD at [address] [facility]  2/19/13, 5/14/13    L1/L2 IESI    OTHER SURGICAL HISTORY      Hardware correction, patient had 1 broken screw and to loose screws in back     OTHER SURGICAL HISTORY Left 09/27/2016     C6 TFE    OTHER SURGICAL HISTORY Bilateral 11/29/2016    L3, L4 L5 Diagnostic Medial Branch Block    OTHER SURGICAL HISTORY  12/06/2016    jovani L3 L4 L5 conf MBB    OTHER SURGICAL HISTORY Right 12/12/2016    L3,L4,L5 RFA    OTHER SURGICAL HISTORY Left 12/15/2017    L3.  L4, L5 RFA    OTHER SURGICAL HISTORY Left 01/31/2017    L4 & L5 TFE    OK ARTHROCENTESIS ASPIR&/INJ MAJOR JT/BURSA W/O US Left 03/31/2017    Left Hip Inj performed by Eden Álvarez MD at [address] ARTHROCENTESIS ASPIR&/INJ MAJOR JT/BURSA W/O US Left 04/14/2017    Left Hip Inj performed by Eden Álvarez MD at [address] HEMORRHOIDECTOMY,INT/EXT,1 COLUMN/GROUP N/A 11/07/2018    External HEMORRHOIDECTOMY performed by Celine Holguin MD at [address] [facility] RADIOFREQUENCY ABLATION NERVES Right 06/29/2017    Right L1 L2 L3 L4 L5 Radiofrequency Ablation performed by Oliverio To MD at [address] Left 07/06/2017    Left L1 L2 L3 L4 L5 Radiofrequency performed by Oliverio To MD at [address] [facility]    UPPER GASTROINTESTINAL ENDOSCOPY N/A 09/30/2019    mild gastritis, mild to moderate esophagitis, Dr. Wolf Arriaza       Medications Prior to Admission:    Medications Prior to Admission: Handicap Placard MISC, by Does not apply route EXPIRES 04/13/2026  Handicap Placard MISC, by Does not apply route Expires: 7/5/2021  vitamin C (ASCORBIC ACID) 500 MG tablet, Take 500 mg by mouth 2 times daily  atorvastatin (LIPITOR) 80 MG tablet, TAKE 1 TABLET DAILY  metoprolol tartrate (LOPRESSOR) 25 MG tablet, TAKE 1 TABLET TWICE A DAY  clopidogrel (PLAVIX) 75 MG tablet, TAKE 1 TABLET DAILY  PARoxetine (PAXIL) 20 MG tablet, TAKE 1 TABLET BY MOUTH ONCE DAILY IN THE MORNING  metFORMIN (GLUCOPHAGE) 500 MG tablet, TAKE 1 TABLET TWICE DAILY  WITH MEALS. celecoxib (CELEBREX) 200 MG capsule, TAKE 1 CAPSULE BY MOUTH TWICE DAILY  losartan (COZAAR) 25 MG tablet, TAKE 1 TABLET DAILY  pantoprazole (PROTONIX) 40 MG tablet, Take 1 tablet by mouth once daily  nitroGLYCERIN (NITROSTAT) 0.4 MG SL tablet, Place 1 tablet under the tongue every 5 minutes as needed for Chest pain up to max of 3 total doses. If no relief after 1 dose, call 911.  ferrous sulfate (IRON 325) 325 (65 Fe) MG tablet, Take 1 tablet by mouth 2 times daily  tiZANidine (ZANAFLEX) 4 MG tablet, TAKE ONE TABLET BY MOUTH THREE TIMES DAILY  aspirin 81 MG tablet, Take 81 mg by mouth daily  famotidine (PEPCID) 20 MG tablet, Take 20 mg by mouth daily. Allergies:    Crestor [rosuvastatin], Ibuprofen, Lisinopril, and Effient [prasugrel]    Social History:    reports that he quit smoking about 10 years ago. His smoking use included cigarettes, pipe, and cigars.  He has a 40.00 pack-year smoking history. He quit smokeless tobacco use about 10 years ago. His smokeless tobacco use included chew. He reports current alcohol use. He reports that he does not use drugs. Family History:   family history includes Cancer in his father. REVIEW OF SYSTEMS:  See HPI and problem list; otherwise no other new complaints with respect to eyes, ENT, neck, pulmonary, coronary, chest, GI, , endocrine, musculoskeletal, immune system/connective tissue disease, hematologic, neurologic, psychiatric, skin, lymphatics, or malignancies. Code status: patient/family wishes for Full Code at this time. PHYSICAL EXAM:  Vitals:  BP (!) 130/92   Pulse 104   Temp 97.7 °F (36.5 °C) (Tympanic)   Resp 25   Ht 5' 9\" (1.753 m)   Wt 210 lb (95.3 kg)   SpO2 93%   BMI 31.01 kg/m²     General: awake, alert, cooperative, well nourished and well groomed  HEENT: PERRLA, EMOI, External nose normal, Normocephalic, Atraumatic and Neck with full ROM  Neck: Supple, Carotid Pulses Present, No Bruits, No Masses, Tenderness, Nodularity and No Lymphadenopathy  Chest/Lungs: Short of breath with activity, no shortness of breath with conversation, Clear to Auscultation without Rales, Rhonchi, or Wheezes and Respirations even and unlabored  Cardiac: Tachycardia, Irregularly Irregular and Pedal Pulses Palpable Bilaterally  GI/Abdomen:  Bowel Sounds Present, Soft, Non-tender, without Guarding or Rebound Tenderness, No Masses and No Tenderness  : Not examined  Extremities/Musculoskeletal: All four extremities without edema and All four extremities with 5/5 strength  Skin: Feet cool-normal variant per patient and Skin warm and dry  Neuro: Alert and Oriented, to Person, to Time, to Place, to Situation, No Localizing Signs/Symptoms, follows commands with all 4 extremities and Strength equal bilaterally  Psychiatric: Normal mood and affect    LABS:    CBC with Differential:    Lab Results   Component Value Date    WBC 7.0 04/06/2022    RBC 3.99 04/06/2022    HGB 12.6 04/06/2022    HCT 38.8 04/06/2022     04/06/2022    MCV 97.2 04/06/2022    MCH 31.6 04/06/2022    MCHC 32.5 04/06/2022    RDW 14.4 04/06/2022    LYMPHOPCT 12 04/06/2022    MONOPCT 8 04/06/2022    BASOPCT 1 04/06/2022    MONOSABS 0.58 04/06/2022    LYMPHSABS 0.84 04/06/2022    EOSABS 0.05 04/06/2022    BASOSABS 0.04 04/06/2022    DIFFTYPE NOT REPORTED 11/13/2018     BMP:    Lab Results   Component Value Date     04/06/2022    K 4.9 04/06/2022     04/06/2022    CO2 27 04/06/2022    BUN 28 04/06/2022    LABALBU 3.9 04/06/2022    CREATININE 1.24 04/06/2022    CALCIUM 8.7 04/06/2022    GFRAA >60 04/06/2022    LABGLOM 56 04/06/2022    GLUCOSE 163 04/06/2022     Hepatic Function Panel:    Lab Results   Component Value Date    ALKPHOS 87 04/06/2022    ALT 68 04/06/2022    AST 34 04/06/2022    PROT 6.6 04/06/2022    BILITOT 0.54 04/06/2022    LABALBU 3.9 04/06/2022     HgBA1c:    Lab Results   Component Value Date    LABA1C 5.7 03/17/2022     TSH:    Lab Results   Component Value Date    TSH 2.57 04/06/2022     D-dimer 3.04, Pro-BNP 3779, Troponin Hs 35->29, Covid (-), Mag 1.8.     ASSESSMENT:      Patient Active Problem List   Diagnosis    Chronic back pain    Neck pain, chronic    Brachial neuritis or radiculitis    Spinal stenosis, lumbar region, with neurogenic claudication    Displacement of cervical intervertebral disc without myelopathy    CAD (coronary artery disease)    GILL (dyspnea on exertion)    S/P lumbar spinal fusion    Obesity (BMI 35.0-39.9 without comorbidity)    HTN (hypertension)    Hyperlipidemia    Type 2 diabetes mellitus without complication (HCC)    Chronic bilateral low back pain with bilateral sciatica    Left hip pain    Acquired spondylolisthesis    Back pain    Grade III hemorrhoids    Recurrent major depressive disorder, [facility] partial remission ([facility] [address]    Carcinoma larynx (HCC)    Atrial fibrillation with RVR (HCC)    Anemia    Nasal discharge   Patient oyxy-kqkpbgbxuu-chpthmjx-available records reviewed, including, but not limited to,  ER reports--labs--imaging---EKGs---office records----personal note    KEDAR PATTON   V.  [address] WM [sabina Gutierrez ;  [facility]]  FULL CODE     ELIQUIS----ASPIRIN---PLAVIX    Anti-infectives:   ---------------    Atrial fibrillation---symptomatic---dyspnea----fatigue---onset 4. 2.2022  Atrial fibrillation----RVR----4.6.2022            2D ECHO---4.7.2022---pending           CTA chest--pulmonary---4.6.2022---no PE--enlarged central pulmonary vasculature---                             diffuse bronchial wall thickening with mosaic attenuation of lungs---mild                              emphysema--mild subcarinal lymphadenopathy---cardiomegaly--                             small pericardial effusion--new            EKG----4.6.2022---atrial fibrillation--123--RVR---RBBB            EKG---1.18.2022----sinus bradycardia--59---RBBB  ASCVD            Cardiac catheterization---2.9.2022--10-20% LM--LAD--LCx---20-30% patent proximal                                 and mid RCA stents---patent LAD and RCA stents with non-obstructive CAD            Abnormal nuclear stress test---1.14.2022---moderate mid--apical--inferior ischemia around old infarction--                               normal wall motion---LVEF ~ 67%            Cardiac catheterization---5.31.2011---MELBA mid-LAD x 2            Cardiac catheterization--5. 5.2011--stent MELBA proximal RCA x 2--thrombectomy            MI---2011  Hypertension  Hyperlipidemia          Asthma   Prediabetes   CKD---Stage 3a  GERD  Depression  Tobacco abuse---quit---2011---pipe--cigarettes--cigars--chewer  PMH:   muscle spasm, carcinoma throat---XRT, cervical DDD-radiculopathy--[facility] 1990--C4-5-6-7,                chronic back pain, colonic polyp, diverticulosis, lumbar radiculopathy, numbness--tingling--hands,               OA, sacroiliac pain---2014, impaired vision  PSH:    see above, multiple

## 2022-04-06 NOTE — PROGRESS NOTES
Subjective:      Patient ID: Dread Templeton is a 65 y.o. male coming in for   Chief Complaint   Patient presents with    Other     SOB started 3 days ago,coughing, tired, cant get deep breath, break out in sweat,night sweats. Shortness of Breath  This is a new problem. Episode onset: started 3 days ago. The problem occurs constantly. The problem has been gradually worsening. Associated symptoms include wheezing. Pertinent negatives include no chest pain, claudication, fever, leg swelling or sore throat. Review of Systems   Constitutional: Positive for chills and diaphoresis. Negative for fever. HENT: Positive for congestion. Negative for sore throat. Respiratory: Positive for cough, shortness of breath and wheezing. Cardiovascular: Negative for chest pain, palpitations, claudication and leg swelling. All other systems reviewed and are negative. Objective:  BP (!) 132/90 (Site: Left Upper Arm, Position: Sitting, Cuff Size: Large Adult)   Pulse 88   Temp 96.8 °F (36 °C) (Temporal)   Resp 18   Ht 5' 9\" (1.753 m)   Wt 210 lb 4 oz (95.4 kg)   SpO2 100%   BMI 31.05 kg/m²      Physical Exam  Vitals and nursing note reviewed. Constitutional:       General: He is not in acute distress. Appearance: Normal appearance. He is not ill-appearing or toxic-appearing. HENT:      Head: Normocephalic. Cardiovascular:      Rate and Rhythm: Normal rate. Rhythm irregularly irregular. Heart sounds: Normal heart sounds, S1 normal and S2 normal.   Pulmonary:      Effort: Pulmonary effort is normal. No respiratory distress. Breath sounds: Wheezing (scattered exp wheezing throughout) present. Musculoskeletal:      Right lower leg: No edema. Left lower leg: No edema. Skin:     General: Skin is warm and dry. Findings: No rash. Neurological:      General: No focal deficit present. Mental Status: He is alert and oriented to person, place, and time.           Assessment: 1. Bronchitis    2. Irregular heart rhythm           Plan:   -EKG shows new onset A-fib  -I do feel pt needs further work up, will send to ER  -report called to ER     Orders Placed This Encounter   Procedures    EKG 12 Lead     Standing Status:   Future     Standing Expiration Date:   4/6/2023     Order Specific Question:   Reason for Exam?     Answer:   Irregular heart rate      Outpatient Encounter Medications as of 4/6/2022   Medication Sig Dispense Refill    vitamin C (ASCORBIC ACID) 500 MG tablet Take 500 mg by mouth 2 times daily      atorvastatin (LIPITOR) 80 MG tablet TAKE 1 TABLET DAILY 90 tablet 3    metoprolol tartrate (LOPRESSOR) 25 MG tablet TAKE 1 TABLET TWICE A  tablet 3    clopidogrel (PLAVIX) 75 MG tablet TAKE 1 TABLET DAILY 90 tablet 3    PARoxetine (PAXIL) 20 MG tablet TAKE 1 TABLET BY MOUTH ONCE DAILY IN THE MORNING 90 tablet 3    metFORMIN (GLUCOPHAGE) 500 MG tablet TAKE 1 TABLET TWICE DAILY  WITH MEALS. 180 tablet 3    celecoxib (CELEBREX) 200 MG capsule TAKE 1 CAPSULE BY MOUTH TWICE DAILY 180 capsule 3    losartan (COZAAR) 25 MG tablet TAKE 1 TABLET DAILY 90 tablet 3    pantoprazole (PROTONIX) 40 MG tablet Take 1 tablet by mouth once daily 90 tablet 3    Handicap Placard MISC by Does not apply route EXPIRES 04/13/2026 2 each 0    nitroGLYCERIN (NITROSTAT) 0.4 MG SL tablet Place 1 tablet under the tongue every 5 minutes as needed for Chest pain up to max of 3 total doses. If no relief after 1 dose, call 911. 25 tablet 3    ferrous sulfate (IRON 325) 325 (65 Fe) MG tablet Take 1 tablet by mouth 2 times daily 120 tablet 3    Handicap Placard MISC by Does not apply route Expires: 7/5/2021 2 each 0    tiZANidine (ZANAFLEX) 4 MG tablet TAKE ONE TABLET BY MOUTH THREE TIMES DAILY 90 tablet 5    aspirin 81 MG tablet Take 81 mg by mouth daily      famotidine (PEPCID) 20 MG tablet Take 20 mg by mouth daily.       [DISCONTINUED] methylPREDNISolone acetate (DEPO-MEDROL) injection 40 mg        No facility-administered encounter medications on file as of 4/6/2022.             Flaca Mccord, APRN - CNP

## 2022-04-06 NOTE — ED PROVIDER NOTES
[facility]  eMERGENCY dEPARTMENT eNCOUnter      Pt Name: Philipp Quinn  MRN: [id number]  [place] 1945  Date of evaluation: 4/6/2022      CHIEF COMPLAINT       Chief Complaint   Patient presents with    Shortness of Breath     pt presents with sob from urgent care x3 days. HISTORY OF PRESENT ILLNESS    Norberto Osullivan is a 65 y.o. male who presents with a chief complaint of shortness of breath patient said this weekend he is noted she is becoming more winded with regular activities there is been no chest pain no fevers or chills she has had the Covid vaccine. He has a history of heart disease with stents he had a catheterization this February which showed both the LAD and RCA stent open there was nonobstructive coronary artery disease at that point patient says he never had a regular rhythm. There is been no leg swelling no history of thyroid disorder. Urgent care found her to be in A. fib with a rate in the 120s and they sent him over here for evaluation  There is been no recent immobilization or procedures    REVIEW OF SYSTEMS         Review of Systems   Constitutional: Negative for chills and fever. HENT: Negative for congestion, dental problem, sore throat and trouble swallowing. Eyes: Negative for visual disturbance. Respiratory: Positive for shortness of breath. Negative for wheezing. Cardiovascular: Positive for palpitations. Negative for chest pain and leg swelling. Gastrointestinal: Negative for abdominal pain, blood in stool, constipation, diarrhea, nausea and vomiting. Genitourinary: Negative for difficulty urinating, dysuria and testicular pain. Musculoskeletal: Negative for back pain, joint swelling and neck pain. Skin: Negative for rash. Neurological: Negative for dizziness, syncope, weakness and headaches. Hematological: Negative for adenopathy. Does not bruise/bleed easily. Psychiatric/Behavioral: Negative for confusion and suicidal ideas.          PAST (02/09/2022); back surgery; lumbar fusion (N/A, 11/15/2017); Colonoscopy (N/A, 10/15/2018); pr hemorrhoidectomy,int/ext,1 column/group (N/A, 11/07/2018); Upper gastrointestinal endoscopy (N/A, 09/30/2019); Cystoscopy (Bilateral, 05/19/2021); and Coronary angioplasty with stent (05/31/2011). CURRENT MEDICATIONS       Previous Medications    ASPIRIN 81 MG TABLET    Take 81 mg by mouth daily    ATORVASTATIN (LIPITOR) 80 MG TABLET    TAKE 1 TABLET DAILY    CELECOXIB (CELEBREX) 200 MG CAPSULE    TAKE 1 CAPSULE BY MOUTH TWICE DAILY    CLOPIDOGREL (PLAVIX) 75 MG TABLET    TAKE 1 TABLET DAILY    FAMOTIDINE (PEPCID) 20 MG TABLET    Take 20 mg by mouth daily. FERROUS SULFATE (IRON 325) 325 (65 FE) MG TABLET    Take 1 tablet by mouth 2 times daily    HANDICAP PLACARD MISC    by Does not apply route Expires: 7/5/2021    HANDICAP PLACARD MISC    by Does not apply route EXPIRES 04/13/2026    LOSARTAN (COZAAR) 25 MG TABLET    TAKE 1 TABLET DAILY    METFORMIN (GLUCOPHAGE) 500 MG TABLET    TAKE 1 TABLET TWICE DAILY  WITH MEALS. METOPROLOL TARTRATE (LOPRESSOR) 25 MG TABLET    TAKE 1 TABLET TWICE A DAY    NITROGLYCERIN (NITROSTAT) 0.4 MG SL TABLET    Place 1 tablet under the tongue every 5 minutes as needed for Chest pain up to max of 3 total doses. If no relief after 1 dose, call 911. PANTOPRAZOLE (PROTONIX) 40 MG TABLET    Take 1 tablet by mouth once daily    PAROXETINE (PAXIL) 20 MG TABLET    TAKE 1 TABLET BY MOUTH ONCE DAILY IN THE MORNING    TIZANIDINE (ZANAFLEX) 4 MG TABLET    TAKE ONE TABLET BY MOUTH THREE TIMES DAILY    VITAMIN C (ASCORBIC ACID) 500 MG TABLET    Take 500 mg by mouth 2 times daily       ALLERGIES     is allergic to crestor [rosuvastatin], ibuprofen, lisinopril, and effient [prasugrel]. FAMILY HISTORY     He indicated that the status of his father is unknown.     family history includes Cancer in his father. SOCIAL HISTORY      reports that he quit smoking about 10 years ago.  His smoking use included cigarettes, pipe, and cigars. He has a 40.00 pack-year smoking history. He quit smokeless tobacco use about 10 years ago. His smokeless tobacco use included chew. He reports current alcohol use. He reports that he does not use drugs. PHYSICAL EXAM     INITIAL VITALS:  height is 5' 9\" (1.753 m) and weight is 210 lb (95.3 kg). His tympanic temperature is 97.7 °F (36.5 °C). His blood pressure is 110/73 and his pulse is 115. His respiration is 20 and oxygen saturation is 94%. Physical Exam  Constitutional:       General: He is not in acute distress. Appearance: He is well-developed. He is not ill-appearing, toxic-appearing or diaphoretic. HENT:      Head: Normocephalic and atraumatic. Right Ear: External ear normal.      Left Ear: External ear normal.   Eyes:      Pupils: Pupils are equal, round, and reactive to light. Cardiovascular:      Rate and Rhythm: Tachycardia present. Rhythm irregular. Pulmonary:      Effort: Pulmonary effort is normal.      Breath sounds: Normal breath sounds. Chest:      Chest wall: No mass. Abdominal:      General: Bowel sounds are normal.      Palpations: Abdomen is soft. Musculoskeletal:         General: Normal range of motion. Cervical back: Normal range of motion and neck supple. Skin:     General: Skin is warm and dry. Neurological:      Mental Status: He is alert and oriented to person, place, and time.    Psychiatric:         Behavior: Behavior normal.           DIFFERENTIAL DIAGNOSIS/ MDM:     Dyspnea, with A. fib with RVR new onset    DIAGNOSTIC RESULTS     EKG: All EKG's are interpreted by the Emergency Department Physician who either signs or Co-signs this chart in the absence of a cardiologist.  A. fib with RVR rate of 123 QT is 346 QRS durations 138ms QRS axis is 66 there is a right bundle branch block pattern this is old the A. fib appears to be new as I do not see it is other EKGs      RADIOLOGY:   I directly visualized the following  images and reviewed the radiologist interpretations:     EXAMINATION:   CTA OF THE CHEST 4/6/2022 10:25 am       TECHNIQUE:   CTA of the chest was performed after the administration of intravenous   contrast.  Multiplanar reformatted images are provided for review.  MIP   images are provided for review. Dose modulation, iterative reconstruction,   and/or weight based adjustment of the mA/kV was utilized to reduce the   radiation dose to as low as reasonably achievable.       COMPARISON:   CT abdomen pelvis 03/26/2021       HISTORY:   ORDERING SYSTEM PROVIDED HISTORY: Shortness of breath new onset A. fib and   elevated D-dimer   TECHNOLOGIST PROVIDED HISTORY:   Shortness of breath new onset A. fib and elevated D-dimer   Decision Support Exception - unselect if not a suspected or confirmed   emergency medical condition->Emergency Medical Condition (MA)   Reason for Exam: SOB ,new onset of A-fib, elevated d-dimer       FINDINGS:   Pulmonary Arteries: Pulmonary arteries are adequately opacified for   evaluation.  No evidence of intraluminal filling defect to suggest pulmonary   embolism.  Right and left main pulmonary artery is are mildly prominent in   caliber.       Mediastinum: Subcarinal lymphadenopathy with the largest node measuring 12 mm   short axis.  Lower right paratracheal node is upper limits of normal for size   measuring 8 mm short axis without luis enlargement.  Cardiomegaly.  Mild   mitral annular and valvular calcifications. Coronary artery calcifications   which appear to spare the circumflex.  Small pericardial effusion. Alessio Land is   no acute abnormality or aneurysm of the atherosclerotic thoracic aorta.       Lungs/pleura: Minimal atelectasis along the dependent right upper lobe.    Scarring along the right middle lobe medial segment.  Mild emphysematous   change.  Mild mosaic attenuation of the lungs.  Diffuse bronchial wall   thickening.  New small layering right pleural effusion.  No pneumothorax.       Upper Abdomen: Atrophic pancreas.  Recanalized umbilical vein.  Colonic   diverticulosis.       Soft Tissues/Bones: No acute soft tissue abnormality.  Degenerative changes   of the spine and shoulders without acute or aggressive osseous lesion.           Impression   No evidence of pulmonary embolism.  Mildly enlarged central pulmonary   arterial vasculature which can be seen in the setting of underlying pulmonary   hypertension.       Diffuse bronchial wall thickening with mild mosaic attenuation of the lungs.    Correlate for acute or chronic airways disease.  Background mild emphysema.       New small layering right pleural effusion.       Mild subcarinal lymphadenopathy which is of indeterminate stability in the   absence of prior comparison chest CT.       Cardiomegaly with coronary artery disease in the left anterior descending and   right coronary artery.  Small pericardial effusion, new from prior.                 ED BEDSIDE ULTRASOUND:       LABS:  Labs Reviewed   CBC WITH AUTO DIFFERENTIAL - Abnormal; Notable for the following components:       Result Value    RBC 3.99 (*)     Hemoglobin 12.6 (*)     Hematocrit 38.8 (*)     NRBC Automated 0.4 (*)     Seg Neutrophils 78 (*)     Lymphocytes 12 (*)     Absolute Lymph # 0.84 (*)     All other components within normal limits   BRAIN NATRIURETIC PEPTIDE - Abnormal; Notable for the following components:    Pro-BNP 3,779 (*)     All other components within normal limits   COMPREHENSIVE METABOLIC PANEL W/ REFLEX TO MG FOR LOW K - Abnormal; Notable for the following components:    Glucose 163 (*)     BUN 28 (*)     CREATININE 1.24 (*)     Bun/Cre Ratio 23 (*)     Anion Gap 8 (*)     ALT 68 (*)     GFR Non- 56 (*)     All other components within normal limits   TROPONIN - Abnormal; Notable for the following components:    Troponin, High Sensitivity 35 (*)     All other components within normal limits   D-DIMER, QUANTITATIVE - Abnormal; Notable for the following components:    D-Dimer, Quant 3.04 (*)     All other components within normal limits   COVID-19, RAPID   TSH WITH REFLEX           EMERGENCY DEPARTMENT COURSE:   Vitals:    Vitals:    04/06/22 1315 04/06/22 1348 04/06/22 1442 04/06/22 1500   BP: (!) 122/93 97/64 95/71 110/73   Pulse: 152 102 115 115   Resp: 17  17 20   Temp:       TempSrc:       SpO2: 95% 95% 93% 94%   Weight:       Height:         -------------------------  BP: 110/73, Temp: 97.7 °F (36.5 °C), Pulse: 115, Resp: 20        Re-evaluation Notes      CRITICAL CARE:   IP CONSULT TO HOSPITALIST  IP CONSULT TO CARDIOLOGY        CONSULTS:      PROCEDURES:  None    FINAL IMPRESSION      1. Atrial fibrillation with RVR (HCC)          DISPOSITION/PLAN   DISPOSITION admitted    Condition on Disposition    Stable    PATIENT REFERRED TO:  No follow-up provider specified. DISCHARGE MEDICATIONS:  New Prescriptions    No medications on file       (Please note that portions of this note were completed with a voice recognition program.  Efforts were made to edit the dictations but occasionally words are mis-transcribed.)    Ronit Teixeira MD,, MD, F.A.A.E.M.   Attending Emergency Physician                          Ronit Teixeira MD  04/06/22 4155

## 2022-04-06 NOTE — FLOWSHEET NOTE
Simona boyce in ED. Assessment:  Patient calm and engaged well in conversation with spouse present. Patient is to be admitted to PCU and is being transferred now. Intervention:   provided a listening and supportive presence. Outcome:  Patient and spouse expressed their gratitude for visit. Plan:  Chaplains will remain available to offer spiritual and emotional support as needed. 04/06/22 1620   Encounter Summary   Services provided to: Patient; Family   Referral/Consult From: Rounding   Support System Spouse; Sikhism/ravi community   Continue Visiting   (04/16/22)   Complexity of Encounter Low   Length of Encounter 15 minutes   Spiritual/Congregational   Type Spiritual support   Assessment Approachable;Calm   Intervention Active listening   Outcome Engaged in conversation;Expressed gratitude     Electronically signed by Albina Lyles on 4/6/2022 at 4:32 PM

## 2022-04-06 NOTE — PROGRESS NOTES
Incentive Spirometry education and demonstration given by Respiratory Therapy. Pt achieving 2500 mL at time of instruction. Incentive Spirometer left at bedside and   Patient instructed to do a minimum of 10 breaths every hour.       Fredy Owens RCP  5:09 PM

## 2022-04-06 NOTE — FLOWSHEET NOTE
rounding on ED    Assessment: Patient is waiting to be admitted to PCU. His wife is with him for support. They are connected to their Alevism. Intervention: Engaged in conversation. Patient expressed appreciation for visit and offer of continued prayer. Plan: Chaplains are available on site or on call 24/7 for spiritual and emotional support.

## 2022-04-06 NOTE — PLAN OF CARE
Problem: Skin Integrity:  Goal: Will show no infection signs and symptoms  Description: Will show no infection signs and symptoms  Outcome: Ongoing  Note: No signs of skin breakdown. Skin warm, dry, and intact. Mucous membranes pink and moist.  Assistance with turns/ambulation provided PRN. Will continue to monitor. Problem: Falls - Risk of:  Goal: Will remain free from falls  Description: Will remain free from falls  Outcome: Ongoing  Note: Call light in reach, bed in lowest position, and bed alarm activated. Education given on use of call light before ambulation and when in need of assistance. Patient expressed understanding. Hourly visual checks performed and charted. Toileting offered to patient. No falls this shift, at any time. Arm band and falling star in place. Will continue to monitor. Problem: Pain:  Goal: Pain level will decrease  Description: Pain level will decrease  Outcome: Ongoing  Note: Patient able to use 0-10 pain scale. Denies pain at this time. Agreeable to take PRN pain medications. Problem: Discharge Planning:  Goal: Discharged to appropriate level of care  Description: Discharged to appropriate level of care  Outcome: Ongoing  Note: Discharge planning in process and discussed with patient/family. Social work consulted for any additional needs. Care manager aware of discharge needs. Problem: Cardiac:  Goal: Ability to maintain an adequate cardiac output will improve  Description: Ability to maintain an adequate cardiac output will improve  Intervention: Monitor pulmonary status  Note: Patient atrial fib RVR on telemetry monitor. No evidence of DVT this shift. DVT prophylaxis in place. No complains of chest pain or shortness of breath. Will continue to monitor. Care plan reviewed with patient and wife. Patient and wife verbalize understanding of the plan of care and contribute to goal setting.

## 2022-04-07 ENCOUNTER — APPOINTMENT (OUTPATIENT)
Dept: GENERAL RADIOLOGY | Age: 77
DRG: 309 | End: 2022-04-07
Payer: MEDICARE

## 2022-04-07 LAB
ABSOLUTE EOS #: 0.08 K/UL (ref 0–0.44)
ABSOLUTE IMMATURE GRANULOCYTE: <0.03 K/UL (ref 0–0.3)
ABSOLUTE LYMPH #: 1.05 K/UL (ref 1.1–3.7)
ABSOLUTE MONO #: 0.52 K/UL (ref 0.1–1.2)
ANION GAP SERPL CALCULATED.3IONS-SCNC: 8 MMOL/L (ref 9–17)
BASOPHILS # BLD: 0 % (ref 0–2)
BASOPHILS ABSOLUTE: <0.03 K/UL (ref 0–0.2)
BUN BLDV-MCNC: 22 MG/DL (ref 8–23)
BUN/CREAT BLD: 24 (ref 9–20)
CALCIUM SERPL-MCNC: 8.3 MG/DL (ref 8.6–10.4)
CHLORIDE BLD-SCNC: 108 MMOL/L (ref 98–107)
CO2: 26 MMOL/L (ref 20–31)
CREAT SERPL-MCNC: 0.92 MG/DL (ref 0.7–1.2)
EOSINOPHILS RELATIVE PERCENT: 1 % (ref 1–4)
GFR AFRICAN AMERICAN: >60 ML/MIN
GFR NON-AFRICAN AMERICAN: >60 ML/MIN
GFR SERPL CREATININE-BSD FRML MDRD: ABNORMAL ML/MIN/{1.73_M2}
GLUCOSE BLD-MCNC: 114 MG/DL (ref 70–99)
GLUCOSE BLD-MCNC: 117 MG/DL (ref 75–110)
GLUCOSE BLD-MCNC: 132 MG/DL (ref 75–110)
GLUCOSE BLD-MCNC: 137 MG/DL (ref 75–110)
HCT VFR BLD CALC: 34.2 % (ref 40.7–50.3)
HEMOGLOBIN: 11 G/DL (ref 13–17)
IMMATURE GRANULOCYTES: 0 %
LV EF: 55 %
LVEF MODALITY: NORMAL
LYMPHOCYTES # BLD: 17 % (ref 24–43)
MCH RBC QN AUTO: 30.7 PG (ref 25.2–33.5)
MCHC RBC AUTO-ENTMCNC: 32.2 G/DL (ref 25.2–33.5)
MCV RBC AUTO: 95.5 FL (ref 82.6–102.9)
MONOCYTES # BLD: 8 % (ref 3–12)
NRBC AUTOMATED: 0 PER 100 WBC
PDW BLD-RTO: 14.4 % (ref 11.8–14.4)
PLATELET # BLD: 186 K/UL (ref 138–453)
PMV BLD AUTO: 9.7 FL (ref 8.1–13.5)
POTASSIUM SERPL-SCNC: 4.4 MMOL/L (ref 3.7–5.3)
RBC # BLD: 3.58 M/UL (ref 4.21–5.77)
SEG NEUTROPHILS: 74 % (ref 36–65)
SEGMENTED NEUTROPHILS ABSOLUTE COUNT: 4.63 K/UL (ref 1.5–8.1)
SODIUM BLD-SCNC: 142 MMOL/L (ref 135–144)
TROPONIN, HIGH SENSITIVITY: 30 NG/L (ref 0–22)
WBC # BLD: 6.3 K/UL (ref 3.5–11.3)

## 2022-04-07 PROCEDURE — 2580000003 HC RX 258: Performed by: EMERGENCY MEDICINE

## 2022-04-07 PROCEDURE — 84484 ASSAY OF TROPONIN QUANT: CPT

## 2022-04-07 PROCEDURE — 6370000000 HC RX 637 (ALT 250 FOR IP): Performed by: INTERNAL MEDICINE

## 2022-04-07 PROCEDURE — 85025 COMPLETE CBC W/AUTO DIFF WBC: CPT

## 2022-04-07 PROCEDURE — 2060000000 HC ICU INTERMEDIATE R&B

## 2022-04-07 PROCEDURE — 6360000002 HC RX W HCPCS: Performed by: INTERNAL MEDICINE

## 2022-04-07 PROCEDURE — 93005 ELECTROCARDIOGRAM TRACING: CPT | Performed by: INTERNAL MEDICINE

## 2022-04-07 PROCEDURE — 2500000003 HC RX 250 WO HCPCS: Performed by: EMERGENCY MEDICINE

## 2022-04-07 PROCEDURE — 99232 SBSQ HOSP IP/OBS MODERATE 35: CPT | Performed by: INTERNAL MEDICINE

## 2022-04-07 PROCEDURE — 71045 X-RAY EXAM CHEST 1 VIEW: CPT

## 2022-04-07 PROCEDURE — 80048 BASIC METABOLIC PNL TOTAL CA: CPT

## 2022-04-07 PROCEDURE — 94760 N-INVAS EAR/PLS OXIMETRY 1: CPT

## 2022-04-07 PROCEDURE — 82947 ASSAY GLUCOSE BLOOD QUANT: CPT

## 2022-04-07 PROCEDURE — 36415 COLL VENOUS BLD VENIPUNCTURE: CPT

## 2022-04-07 PROCEDURE — 93306 TTE W/DOPPLER COMPLETE: CPT

## 2022-04-07 PROCEDURE — 94640 AIRWAY INHALATION TREATMENT: CPT

## 2022-04-07 RX ORDER — DIGOXIN 0.25 MG/ML
250 INJECTION INTRAMUSCULAR; INTRAVENOUS ONCE
Status: COMPLETED | OUTPATIENT
Start: 2022-04-07 | End: 2022-04-07

## 2022-04-07 RX ADMIN — FERROUS SULFATE TAB 325 MG (65 MG ELEMENTAL FE) 325 MG: 325 (65 FE) TAB at 09:30

## 2022-04-07 RX ADMIN — FAMOTIDINE 20 MG: 20 TABLET, FILM COATED ORAL at 09:30

## 2022-04-07 RX ADMIN — CLOPIDOGREL BISULFATE 75 MG: 75 TABLET ORAL at 09:30

## 2022-04-07 RX ADMIN — PAROXETINE HYDROCHLORIDE 20 MG: 20 TABLET, FILM COATED ORAL at 09:30

## 2022-04-07 RX ADMIN — DILTIAZEM HYDROCHLORIDE 10 MG/HR: 5 INJECTION, SOLUTION INTRAVENOUS at 03:05

## 2022-04-07 RX ADMIN — LOSARTAN POTASSIUM 25 MG: 25 TABLET, FILM COATED ORAL at 09:30

## 2022-04-07 RX ADMIN — APIXABAN 5 MG: 5 TABLET, FILM COATED ORAL at 20:48

## 2022-04-07 RX ADMIN — METAXALONE 800 MG: 800 TABLET ORAL at 15:02

## 2022-04-07 RX ADMIN — METOPROLOL TARTRATE 37.5 MG: 25 TABLET, FILM COATED ORAL at 09:30

## 2022-04-07 RX ADMIN — METAXALONE 800 MG: 800 TABLET ORAL at 20:48

## 2022-04-07 RX ADMIN — METOPROLOL TARTRATE 37.5 MG: 25 TABLET, FILM COATED ORAL at 20:48

## 2022-04-07 RX ADMIN — DILTIAZEM HYDROCHLORIDE 5 MG/HR: 5 INJECTION, SOLUTION INTRAVENOUS at 20:54

## 2022-04-07 RX ADMIN — OXYCODONE HYDROCHLORIDE AND ACETAMINOPHEN 500 MG: 500 TABLET ORAL at 20:48

## 2022-04-07 RX ADMIN — APIXABAN 5 MG: 5 TABLET, FILM COATED ORAL at 09:30

## 2022-04-07 RX ADMIN — IPRATROPIUM BROMIDE AND ALBUTEROL SULFATE 1 AMPULE: .5; 2.5 SOLUTION RESPIRATORY (INHALATION) at 15:55

## 2022-04-07 RX ADMIN — IPRATROPIUM BROMIDE AND ALBUTEROL SULFATE 1 AMPULE: .5; 2.5 SOLUTION RESPIRATORY (INHALATION) at 12:30

## 2022-04-07 RX ADMIN — DIGOXIN 250 MCG: 0.25 INJECTION INTRAMUSCULAR; INTRAVENOUS at 09:36

## 2022-04-07 RX ADMIN — METAXALONE 800 MG: 800 TABLET ORAL at 09:30

## 2022-04-07 RX ADMIN — FERROUS SULFATE TAB 325 MG (65 MG ELEMENTAL FE) 325 MG: 325 (65 FE) TAB at 20:48

## 2022-04-07 RX ADMIN — ACETAMINOPHEN 650 MG: 325 TABLET ORAL at 00:16

## 2022-04-07 RX ADMIN — DIGOXIN 250 MCG: 0.25 INJECTION INTRAMUSCULAR; INTRAVENOUS at 17:10

## 2022-04-07 RX ADMIN — PANTOPRAZOLE SODIUM 40 MG: 40 TABLET, DELAYED RELEASE ORAL at 06:37

## 2022-04-07 RX ADMIN — OXYCODONE HYDROCHLORIDE AND ACETAMINOPHEN 500 MG: 500 TABLET ORAL at 09:30

## 2022-04-07 RX ADMIN — IPRATROPIUM BROMIDE AND ALBUTEROL SULFATE 1 AMPULE: .5; 2.5 SOLUTION RESPIRATORY (INHALATION) at 07:41

## 2022-04-07 RX ADMIN — IPRATROPIUM BROMIDE AND ALBUTEROL SULFATE 1 AMPULE: .5; 2.5 SOLUTION RESPIRATORY (INHALATION) at 20:24

## 2022-04-07 RX ADMIN — ACETAMINOPHEN 650 MG: 325 TABLET ORAL at 18:32

## 2022-04-07 RX ADMIN — ATORVASTATIN CALCIUM 80 MG: 40 TABLET, FILM COATED ORAL at 20:48

## 2022-04-07 RX ADMIN — ACETAMINOPHEN 650 MG: 325 TABLET ORAL at 10:57

## 2022-04-07 ASSESSMENT — PAIN DESCRIPTION - ORIENTATION: ORIENTATION: POSTERIOR

## 2022-04-07 ASSESSMENT — PAIN DESCRIPTION - LOCATION: LOCATION: HEAD;NECK

## 2022-04-07 ASSESSMENT — PAIN SCALES - GENERAL
PAINLEVEL_OUTOF10: 0
PAINLEVEL_OUTOF10: 3
PAINLEVEL_OUTOF10: 0
PAINLEVEL_OUTOF10: 0
PAINLEVEL_OUTOF10: 2
PAINLEVEL_OUTOF10: 3
PAINLEVEL_OUTOF10: 0
PAINLEVEL_OUTOF10: 5

## 2022-04-07 ASSESSMENT — PAIN DESCRIPTION - DESCRIPTORS: DESCRIPTORS: HEADACHE;ACHING

## 2022-04-07 ASSESSMENT — PAIN DESCRIPTION - FREQUENCY: FREQUENCY: INTERMITTENT

## 2022-04-07 ASSESSMENT — PAIN DESCRIPTION - ONSET: ONSET: GRADUAL

## 2022-04-07 ASSESSMENT — PAIN DESCRIPTION - PAIN TYPE: TYPE: ACUTE PAIN

## 2022-04-07 NOTE — FLOWSHEET NOTE
rounding on PCU    Assessment: Patient resting in bed. Wife is present for support. Change in medication seems to be helping. Patient would like Communion today. Intervention: Engaged in conversation. Patient expressed appreciation for visit and offer of continued prayer. Plan: Chaplains are available on site or on call 24/7 for spiritual and emotional support.      04/07/22 1059   Encounter Summary   Services provided to: Patient and family together   Referral/Consult From: Rounding   Continue Visiting   (4/7/22)   Complexity of Encounter Low   Length of Encounter 15 minutes   Spiritual/Scientologist   Type Spiritual support   Assessment Approachable   Intervention Active listening;Placed on communion list;Sustaining presence/ Ministry of presence   Outcome Expressed gratitude;Engaged in conversation   Sacraments   Communion Patient/Family wants communion

## 2022-04-07 NOTE — PROGRESS NOTES
Hospitalist Progress Note    Patient:   Monica Osullivan     YOB: 1945    MRN: [id number]   Admit date: 4/6/2022     Acct: [de-identified]     PCP: Skyler Sarmiento MD    CC--Interval History:    Atrial fibrillation---flutter---remains in the same---Lopressor increased---digoxin added----seen by Cardiology    See note below     All other ROS negative except noted in HPI    Diet:  Diet NPO    Medications:  Scheduled Meds:   apixaban  5 mg Oral BID    aspirin  81 mg Oral Daily    atorvastatin  80 mg Oral Nightly    clopidogrel  75 mg Oral Daily    famotidine  20 mg Oral Daily    ferrous sulfate  325 mg Oral BID    losartan  25 mg Oral Daily    metoprolol tartrate  25 mg Oral BID    pantoprazole  40 mg Oral QAM AC    PARoxetine  20 mg Oral Daily    metaxalone  800 mg Oral TID    vitamin C  500 mg Oral BID    insulin lispro  0-6 Units SubCUTAneous TID WC    insulin lispro  0-3 Units SubCUTAneous Nightly    ipratropium-albuterol  1 ampule Inhalation Q4H WA     Continuous Infusions:   dilTIAZem 10 mg/hr (04/07/22 0523)    dextrose       PRN Meds:glucose, glucagon (rDNA), dextrose, sodium chloride nebulizer, albuterol, acetaminophen, ondansetron, dextrose bolus (hypoglycemia) **OR** dextrose bolus (hypoglycemia)    Objective:  Labs:  CBC with Differential:    Lab Results   Component Value Date    WBC 6.3 04/07/2022    RBC 3.58 04/07/2022    HGB 11.0 04/07/2022    HCT 34.2 04/07/2022     04/07/2022    MCV 95.5 04/07/2022    MCH 30.7 04/07/2022    MCHC 32.2 04/07/2022    RDW 14.4 04/07/2022    LYMPHOPCT 17 04/07/2022    MONOPCT 8 04/07/2022    BASOPCT 0 04/07/2022    MONOSABS 0.52 04/07/2022    LYMPHSABS 1.05 04/07/2022    EOSABS 0.08 04/07/2022    BASOSABS <0.03 04/07/2022    DIFFTYPE NOT REPORTED 11/13/2018     BMP:    Lab Results   Component Value Date     04/07/2022    K 4.4 04/07/2022     04/07/2022    CO2 26 04/07/2022    BUN 22 04/07/2022    LABALBU 3.9 04/06/2022    CREATININE 0.92 04/07/2022    CALCIUM 8.3 04/07/2022    GFRAA >60 04/07/2022    LABGLOM >60 04/07/2022    GLUCOSE 114 04/07/2022           Physical Exam:  Vitals: /81   Pulse 76   Temp 98.1 °F (36.7 °C) (Tympanic)   Resp 20   Ht 5' 9\" (1.753 m)   Wt 202 lb 14.4 oz (92 kg)   SpO2 95%   BMI 29.96 kg/m²   24 hour intake/output:    Intake/Output Summary (Last 24 hours) at 4/7/2022 0813  Last data filed at 4/7/2022 1602  Gross per 24 hour   Intake 746.23 ml   Output 700 ml   Net 46.23 ml     Last 3 weights: Wt Readings from Last 3 Encounters:   04/07/22 202 lb 14.4 oz (92 kg)   04/06/22 210 lb 4 oz (95.4 kg)   03/24/22 201 lb (91.2 kg)     HEENT: Normocephalic and Atraumatic  Neck: Supple, No Masses, Tenderness, Nodularity and No Lymphadenopathy  Chest/Lungs: Clear to Auscultation without Rales, Rhonchi, or Wheezes  Cardiac: Irregularly Irregular  GI/Abdomen: Bowel Sounds Present and Soft, Non-tender, without Guarding or Rebound Tenderness  : Not examined  EXT/Skin: No Cyanosis, No Clubbing and Edema Present----minimal  Neuro: Alert and Oriented and No Localizing Signs/Symptoms      Assessment:    Principal Problem:    Atrial fibrillation with RVR (HCC)  Resolved Problems:    * No resolved hospital problems. *    KEDAR PATTON   V.  78 WM [MELI Bailey;  [facility]---[facility]]  FULL CODE     ELIQUIS----ASPIRIN--dcd---PLAVIX    Anti-infectives:   ---------------    Atrial fibrillation---symptomatic---dyspnea----fatigue---onset 4. 2.2022  Atrial fibrillation----RVR----4.6.2022  Atrial flutter----4.7.2022            2D ECHO---4.7.2022--LA moderately dilated--borderline LVH--RA dilatation--                              RV dilatation---reduced RVSF---MAC mild MV leaflet thickening--                              RHIANNON without stenosis---mild-to-moderate TR--RVSP ~ 60 mm Hg---                               severe pulmonary HTN--normal AR--3.6 cm---cannot calculate DD---                               LVEF ~ 55% EKG---4.7.2022---atrial flutter--76---RBBB          CTA chest--pulmonary---4.6.2022---no PE--enlarged central pulmonary vasculature---                             diffuse bronchial wall thickening with mosaic attenuation of lungs---mild                              emphysema--mild subcarinal lymphadenopathy---cardiomegaly--                             small pericardial effusion--new            EKG----4.6.2022---atrial fibrillation--123--RVR---RBBB            EKG---1.18.2022----sinus bradycardia--59---RBBB  ASCVD            Cardiac catheterization---2.9.2022--10-20% LM--LAD--LCx---20-30% patent proximal                                 and mid RCA stents---patent LAD and RCA stents with non-obstructive CAD            Abnormal nuclear stress test---1.14.2022---moderate mid--apical--inferior ischemia around old infarction--                               normal wall motion---LVEF ~ 67%            Cardiac catheterization---5.31.2011---MELBA mid-LAD x 2            Cardiac catheterization--5. 5.2011--stent MELBA proximal RCA x 2--thrombectomy            MI---2011  Pulmonary hypertension----see above   Hypertension  Hyperlipidemia          Asthma   Prediabetes   CKD---Stage 3a  GERD  Depression  Tobacco abuse---quit---2011---pipe--cigarettes--cigars--chewer  PMH:   muscle spasm, carcinoma throat---XRT, cervical DDD-radiculopathy--[facility] 1990--C4-5-6-7,                chronic back pain, colonic polyp, diverticulosis, lumbar radiculopathy, numbness--tingling--hands,               OA, sacroiliac pain---2014, impaired vision  PSH:    see above, multiple PM injections----back--hips, right 3rd finger trigger finger release,              I&D abscess right elbow, debridement right elbow, colonoscopy--2003--2007--2013----              hyperplastic polyps, C3-4--decompression--fusion--2001, C6-7--discectomy--              [id number], lumbar fusion--1973--2008--2014, left ear skin              biopsy, hardware correction--screws---back, lumbar fusion--2017--L3-4, colonoscopy--              2018--hyperplastic polyp---severe sigmoid diverticulosis, external hemorrhoidectomy--2018    Allergies:          lisinopril--hives, Effient--prasugrel--rash  Intolerances:    Crestor-rosuvastatin--joint-muscle aches, ibuprofen---bleeding    Plan:  1. Atrial fibrillation---flutter----digoxin this AM and repeated this afternoon  2. Lopressor increased to 37.5 BID  3. ASA dc'd----cont'd Eliquis---Plavix  4.   2D ECHO---completed-----4.7.2022  5. Seen by Cardiology  6.    See orders    Electronically signed by Vanna Mcardle on 4/7/2022 at 8:13 AM    Hospitalist

## 2022-04-07 NOTE — CONSULTS
[facility]   Consult Note                 Date:   4/7/2022  Date of admission:  4/6/2022 12:20 PM  MRN:   [id number]  YOB: 1945    Reason for Consult: A. fib with RVR    HISTORY OF PRESENT ILLNESS:    The patient is a 65 y.o.  male who is admitted to the hospital for shortness of breath came to the ER found to be in A. fib with RVR patient was started on Cardizem drip. At present the heart rate is better patient does not feel any palpitations. Patient had extensive history of CAD history of MI history of stents  History of recent heart cath details as below with patent stents  From cardiac point of view denies any chest pain pressure tightness  Denies any ankle swelling denies any paroxysmal nocturnal dyspnea  Denies any prior history of A. fib. Past Medical History:   has a past medical history of Abnormal cardiovascular stress test, Asthma, CAD (coronary artery disease), Cancer (HCC), Cervical radiculopathy, Chronic back pain, Colonic polyp, Degeneration of cervical intervertebral disc, Degeneration of cervical intervertebral disc, Depression, Diverticulosis, GERD (gastroesophageal reflux disease), Glucose intolerance (impaired glucose tolerance), HTN (hypertension), Hyperlipidemia, Lumbar radiculopathy, MI (myocardial infarction) ([facility] [address] Numbness and tingling, OA (osteoarthritis), Pre-diabetes, Sacroiliac pain, and Vision abnormalities. Past Surgical History:   has a past surgical history that includes Coronary angioplasty with stent (05/05/2011); Finger trigger release (Right); Abscess Drainage (Right); other surgical history (2/19/13, 5/14/13); Elbow bursa surgery (Right, 2012,2011); Infected skin debridement (Right); Colonoscopy (2007, 2003); Colonoscopy (09/09/2013); Cervical spine surgery (08/09/2001); Cervical spine surgery (Left, [id number]);  Lumbar spine surgery (6793,4427); lumbar fusion (04/07/2014); skin biopsy; other surgical history; other surgical history (Left, 09/27/2016); other surgical history (Bilateral, 11/29/2016); other surgical history (12/06/2016); other surgical history (Right, 12/12/2016); other surgical history (Left, 12/15/2017); other surgical history (Left, 01/31/2017); Lumbar spine surgery (Left, 02/14/2017); Injection Procedure For Sacroiliac Joint (Bilateral, 03/14/2017); pr arthrocentesis aspir&/inj major jt/bursa w/o us (Left, 03/31/2017); pr arthrocentesis aspir&/inj major jt/bursa w/o us (Left, 04/14/2017); Lumbar spine surgery (Bilateral, 05/02/2017); Lumbar spine surgery (Bilateral, 05/09/2017); Anesthesia Nerve Block (Bilateral, 06/02/2017); Anesthesia Nerve Block (Bilateral, 06/09/2017); RADIOFREQUENCY ABLATION NERVES (Right, 06/29/2017); RADIOFREQUENCY ABLATION NERVES (Left, 07/06/2017); Cardiac catheterization (02/09/2022); back surgery; lumbar fusion (N/A, 11/15/2017); Colonoscopy (N/A, 10/15/2018); pr hemorrhoidectomy,int/ext,1 column/group (N/A, 11/07/2018); Upper gastrointestinal endoscopy (N/A, 09/30/2019); Cystoscopy (Bilateral, 05/19/2021); and Coronary angioplasty with stent (05/31/2011). Home Medications:    Prior to Admission medications    Medication Sig Start Date End Date Taking?  Authorizing Provider   Handicap Placard MISC by Does not apply route EXPIRES 04/13/2026 4/13/21  Yes MD Piedad ChristopherC by Does not apply route Expires: 7/5/2021 7/5/19  Yes Verónica López MD   vitamin C (ASCORBIC ACID) 500 MG tablet Take 500 mg by mouth 2 times daily    Historical Provider, MD   atorvastatin (LIPITOR) 80 MG tablet TAKE 1 TABLET DAILY 1/4/22   Verónica López MD   metoprolol tartrate (LOPRESSOR) 25 MG tablet TAKE 1 TABLET TWICE A DAY 1/4/22   Verónica López MD   clopidogrel (PLAVIX) 75 MG tablet TAKE 1 TABLET DAILY 12/8/21   Cassie Serrano APRN - CNP   PARoxetine (PAXIL) 20 MG tablet TAKE 1 TABLET BY MOUTH ONCE DAILY IN THE MORNING 11/29/21   Verónica López MD   metFORMIN (GLUCOPHAGE) 500 MG tablet TAKE 1 TABLET TWICE DAILY  WITH MEALS. 10/29/21   Jaspal Crawford MD   celecoxib (CELEBREX) 200 MG capsule TAKE 1 CAPSULE BY MOUTH TWICE DAILY 9/7/21   Jaspal Crawford MD   losartan (COZAAR) 25 MG tablet TAKE 1 TABLET DAILY 8/6/21   Jaspal Crawford MD   pantoprazole (PROTONIX) 40 MG tablet Take 1 tablet by mouth once daily 5/13/21   Jaspal Crawford MD   nitroGLYCERIN (NITROSTAT) 0.4 MG SL tablet Place 1 tablet under the tongue every 5 minutes as needed for Chest pain up to max of 3 total doses. If no relief after 1 dose, call 911. 2/25/21   Jaspal Crawford MD   ferrous sulfate (IRON 325) 325 (65 Fe) MG tablet Take 1 tablet by mouth 2 times daily 2/25/21   Jaspal Crawford MD   tiZANidine (ZANAFLEX) 4 MG tablet TAKE ONE TABLET BY MOUTH THREE TIMES DAILY 5/11/17   Isrrael Guerrero, APRN - CNP   aspirin 81 MG tablet Take 81 mg by mouth daily    Historical Provider, MD   famotidine (PEPCID) 20 MG tablet Take 20 mg by mouth daily.     Historical Provider, MD       Current Medications: Scheduled Meds:   apixaban  5 mg Oral BID    aspirin  81 mg Oral Daily    atorvastatin  80 mg Oral Nightly    clopidogrel  75 mg Oral Daily    famotidine  20 mg Oral Daily    ferrous sulfate  325 mg Oral BID    losartan  25 mg Oral Daily    metoprolol tartrate  25 mg Oral BID    pantoprazole  40 mg Oral QAM AC    PARoxetine  20 mg Oral Daily    metaxalone  800 mg Oral TID    vitamin C  500 mg Oral BID    insulin lispro  0-6 Units SubCUTAneous TID WC    insulin lispro  0-3 Units SubCUTAneous Nightly    ipratropium-albuterol  1 ampule Inhalation Q4H WA     Continuous Infusions:   dilTIAZem 10 mg/hr (04/07/22 0523)    dextrose       PRN Meds:.glucose, glucagon (rDNA), dextrose, sodium chloride nebulizer, albuterol, acetaminophen, ondansetron, dextrose bolus (hypoglycemia) **OR** dextrose bolus (hypoglycemia)     Allergies:  Crestor [rosuvastatin], Ibuprofen, Lisinopril, and Effient [prasugrel]    Social History:   reports that he quit smoking about 10 years ago. His smoking use included cigarettes, pipe, and cigars. He has a 40.00 pack-year smoking history. He quit smokeless tobacco use about 10 years ago. His smokeless tobacco use included chew. He reports current alcohol use. He reports that he does not use drugs. Family History: family history includes Cancer in his father. Review of Systems   CONSTITUTIONAL:  negative for fevers, chills, fatigue and malaise    EYES:  negative for discharge    HEENT:  negative for epistaxis and sore throat    RESPIRATORY:  positive for shortness of breath, no wheezing    CARDIOVASCULAR:  negative for chest pain, palpitations, syncope, edema    GASTROINTESTINAL:  negative for nausea, vomiting, diarrhea, constipation, abdominal pain    GENITOURINARY:  negative for incontinence    MUSCULOSKELETAL:  negative for neck or back pain    NEUROLOGICAL:  negative for headaches, seizures and double vision   PSYCHIATRIC:  negative               PHYSICAL EXAM:    Blood pressure 123/81, pulse 76, temperature 98.1 °F (36.7 °C), temperature source Tympanic, resp. rate 20, height 5' 9\" (1.753 m), weight 202 lb 14.4 oz (92 kg), SpO2 95 %. CONSTITUTIONAL: AOx4, no apparent distress, appears stated age   HEAD: normocephalic, atraumatic   EYES: PERRLA, EOMI   ENT: moist mucous membranes, uvula midline   NECK:  symmetric, no midline tenderness to palpation   LUNGS: clear to auscultation bilaterally   CARDIOVASCULAR: irregular rate and rhythm, no murmurs, rubs or gallops   ABDOMEN: Soft, non-tender, non-distended with normal active bowel sounds   SKIN: no rash       DATA:    ECG:AFIB  Echo:  Stress:  Cath: 2/9/22    Angiographic Findings        Cardiac Arteries and Lesion Findings       LMCA: Mild irregularities 10-20%. LAD: Mild irregularities 10-20%. patent mid stents     LCx: Mild irregularities 10-20%. RCA: Mild irregularities 20-30%. patent proximal and mid stents.      Coronary Tree        Dominance: Right        Procedure Summary        patent LAD and RCA stents with nonobstructive CAD. Labs:     CBC:   Recent Labs     04/06/22  1237 04/07/22  0448   WBC 7.0 6.3   HGB 12.6* 11.0*   HCT 38.8* 34.2*    186     BMP:   Recent Labs     04/06/22  1237 04/07/22  0448    142   K 4.9 4.4   CO2 27 26   BUN 28* 22   CREATININE 1.24* 0.92   LABGLOM 56* >60   GLUCOSE 163* 114*     BNP: No results for input(s): BNP in the last 72 hours. PT/INR: No results for input(s): PROTIME, INR in the last 72 hours. APTT:No results for input(s): APTT in the last 72 hours. CARDIAC ENZYMES:No results for input(s): CKTOTAL, CKMB, CKMBINDEX, TROPONINI in the last 72 hours.   FASTING LIPID PANEL:  Lab Results   Component Value Date    HDL 43 08/20/2021    TRIG 248 08/20/2021     LIVER PROFILE:  Recent Labs     04/06/22  1237   AST 34   ALT 68*   LABALBU 3.9       Intake/Output Summary (Last 24 hours) at 4/7/2022 8271  Last data filed at 4/7/2022 0683  Gross per 24 hour   Intake 746.23 ml   Output 700 ml   Net 46.23 ml       IMPRESSION:    Patient Active Problem List   Diagnosis    Chronic back pain    Neck pain, chronic    Brachial neuritis or radiculitis    Spinal stenosis, lumbar region, with neurogenic claudication    Displacement of cervical intervertebral disc without myelopathy    CAD (coronary artery disease)    GILL (dyspnea on exertion)    S/P lumbar spinal fusion    Obesity (BMI 35.0-39.9 without comorbidity)    HTN (hypertension)    Hyperlipidemia    Type 2 diabetes mellitus without complication (HCC)    Chronic bilateral low back pain with bilateral sciatica    Left hip pain    Acquired spondylolisthesis    Back pain    Grade III hemorrhoids    Recurrent major depressive disorder, in partial remission ([facility] [address]    Carcinoma larynx (HCC)    Atrial fibrillation with RVR (HCC)    Anemia    Nasal discharge           RECOMMENDATIONS:  AFIB on Cardizem drip, plan to give digoxin 0.251 time IV  Will increase beta-blocker to 37.5 mg twice daily hold for heart rate less than 50 blood pressure less than 90  If the heart rate get improved then we can titrate off Cardizem  Patient is already started on Eliquis    CAD- STEMI 5/5/2011 with thrombectomy and MELBA to RCA followed by staged PCI of LAD with MELBA 5/31/2011. Tropes are negative no chest pain  Hypertension continue current medications  We will continue Plavix and DC aspirin  If patient heart rate improved and tolerated current medication can be discharged in a.m. Plan to continue anticoagulation for 3 to 4 weeks if still remain in A. fib then can do cardioversion      Discussed with patient and nursing.     Joie Ba MD, MD  [facility]        [phone]

## 2022-04-07 NOTE — PLAN OF CARE
Problem: Skin Integrity:  Goal: Will show no infection signs and symptoms  Description: Will show no infection signs and symptoms  4/7/2022 0758 by Rita Roberson RN  Outcome: Ongoing  4/7/2022 0226 by Dorsey Denver, RN  Outcome: Ongoing  Goal: Absence of new skin breakdown  Description: Absence of new skin breakdown  4/7/2022 0758 by Rita Roberson RN  Outcome: Ongoing  4/7/2022 0226 by Dorsey Denver, RN  Outcome: Ongoing     Problem: Falls - Risk of:  Goal: Will remain free from falls  Description: Will remain free from falls  4/7/2022 0758 by Rita Roberson RN  Outcome: Ongoing  4/7/2022 0226 by Dorsey Denver, RN  Outcome: Ongoing  Goal: Absence of physical injury  Description: Absence of physical injury  4/7/2022 0758 by Rita Roberson RN  Outcome: Ongoing  4/7/2022 0226 by Dorsey Denver, RN  Outcome: Ongoing     Problem: Pain:  Goal: Pain level will decrease  Description: Pain level will decrease  4/7/2022 0758 by Rita Roberson RN  Outcome: Ongoing  4/7/2022 0226 by Dorsey Denver, RN  Outcome: Ongoing  Goal: Control of acute pain  Description: Control of acute pain  4/7/2022 0758 by Rita Roberson RN  Outcome: Ongoing  4/7/2022 0226 by Dorsey Denver, RN  Outcome: Ongoing  Goal: Control of chronic pain  Description: Control of chronic pain  4/7/2022 0758 by Rita Roberson RN  Outcome: Ongoing  4/7/2022 0226 by Dorsey Denver, RN  Outcome: Ongoing     Problem: Discharge Planning:  Goal: Discharged to appropriate level of care  Description: Discharged to appropriate level of care  4/7/2022 0758 by Rita Roberson RN  Outcome: Ongoing  4/7/2022 0226 by Dorsey Denver, RN  Outcome: Ongoing     Problem: Cardiac:  Goal: Ability to maintain an adequate cardiac output will improve  Description: Ability to maintain an adequate cardiac output will improve  4/7/2022 0758 by Rita Roberson RN  Outcome: Ongoing  4/7/2022 0226 by Dorsey Denver, RN  Outcome: Ongoing

## 2022-04-07 NOTE — PLAN OF CARE
Problem: Skin Integrity:  Goal: Will show no infection signs and symptoms  Description: Will show no infection signs and symptoms  4/7/2022 0226 by Dorsey Denver, RN  Outcome: Ongoing  4/6/2022 1646 by Jose Padron RN  Outcome: Ongoing  Note: No signs of skin breakdown. Skin warm, dry, and intact. Mucous membranes pink and moist.  Assistance with turns/ambulation provided PRN. Will continue to monitor. Goal: Absence of new skin breakdown  Description: Absence of new skin breakdown  4/7/2022 0226 by Dorsey Denver, RN  Outcome: Ongoing  4/6/2022 1646 by Jose Padron RN  Outcome: Ongoing     Problem: Falls - Risk of:  Goal: Will remain free from falls  Description: Will remain free from falls  4/7/2022 0226 by Dorsey Denver, RN  Outcome: Ongoing  4/6/2022 1646 by Jose Padron RN  Outcome: Ongoing  Note: Call light in reach, bed in lowest position, and bed alarm activated. Education given on use of call light before ambulation and when in need of assistance. Patient expressed understanding. Hourly visual checks performed and charted. Toileting offered to patient. No falls this shift, at any time. Arm band and falling star in place. Will continue to monitor. Goal: Absence of physical injury  Description: Absence of physical injury  4/7/2022 0226 by Dorsey Denver, RN  Outcome: Ongoing  4/6/2022 1646 by Jose Padron RN  Outcome: Ongoing     Problem: Pain:  Goal: Pain level will decrease  Description: Pain level will decrease  4/7/2022 0226 by Dorsey Denver, RN  Outcome: Ongoing  4/6/2022 1646 by Jose Padron RN  Outcome: Ongoing  Note: Patient able to use 0-10 pain scale. Denies pain at this time. Agreeable to take PRN pain medications.    Goal: Control of acute pain  Description: Control of acute pain  4/7/2022 0226 by Dorsey Denver, RN  Outcome: Ongoing  4/6/2022 1646 by Jose Padron RN  Outcome: Ongoing  Goal: Control of chronic pain  Description: Control of chronic pain  4/7/2022 0226 by Florecita Sequeira Abdelrahman Merchant RN  Outcome: Ongoing  4/6/2022 1646 by Dayne Kerr RN  Outcome: Ongoing     Problem: Discharge Planning:  Goal: Discharged to appropriate level of care  Description: Discharged to appropriate level of care  4/7/2022 0226 by Evette Maritns RN  Outcome: Ongoing  4/6/2022 1646 by Dayne Kerr RN  Outcome: Ongoing  Note: Discharge planning in process and discussed with patient/family. Social work consulted for any additional needs. Care manager aware of discharge needs. Problem: Cardiac:  Goal: Ability to maintain an adequate cardiac output will improve  Description: Ability to maintain an adequate cardiac output will improve  4/7/2022 0226 by Evette Martins RN  Outcome: Ongoing  4/6/2022 1646 by Dayne Kerr RN  Outcome: Ongoing  Intervention: Monitor pulmonary status  4/6/2022 1646 by Dayne Kerr RN  Note: Patient atrial fib RVR on telemetry monitor. No evidence of DVT this shift. DVT prophylaxis in place. No complains of chest pain or shortness of breath. Will continue to monitor.

## 2022-04-07 NOTE — PROGRESS NOTES
DISCHARGE BARRIERS       Reason for Referral:  SW completed a Psychosocial Assessment for evaluation of patient's mental health, social status, and functional capacity within the community. Espinoza Miles is a 65 y.o. male admitted due to Atrial fibrillation with RVR. Patient was accompanied by his spouse. SW provided supportive listening while patient discussed past medical history and events leading up to hospitalization. Mental Status:  Alert, oriented, and engaging during assessment. Decision Making:  Makes own decisions. Family/Social/Home Environment: Lives with spouse in [address]: Discussed a good social support network     Current Services: None    Current DMEs: None       PCP: Lele Espinoza MD and repots no issues affording medication.  status:  None     ADLs and means of transportation: Independent in ADLs prior to hospitalization and able to transport herself. Food insecurity or needed financial assistance: Denies any food insecurity or financial concerns at this time. ACP and Code Status: Norberto Osullivan is a Full Code status and does not have an Advance Directive. Collaborative List of SNF/ECF/HH were provided: Declined No discharge order at this time. Anticipated Needs/Discharge Plan:  Spoke with patient/family/representative about discharge plan. Patient/Family/Representative verbalizes understanding of the plan of care and denies discharge needs or further services at this time. SW provided business card. SW will continue to monitor needs and assist as appropriate.         Electronically signed by DANTE Hopkins on 4/7/2022 at 12:57 PM

## 2022-04-08 LAB
ABSOLUTE EOS #: 0.06 K/UL (ref 0–0.44)
ABSOLUTE IMMATURE GRANULOCYTE: 0.03 K/UL (ref 0–0.3)
ABSOLUTE LYMPH #: 0.8 K/UL (ref 1.1–3.7)
ABSOLUTE MONO #: 0.56 K/UL (ref 0.1–1.2)
ANION GAP SERPL CALCULATED.3IONS-SCNC: 8 MMOL/L (ref 9–17)
BASOPHILS # BLD: 1 % (ref 0–2)
BASOPHILS ABSOLUTE: 0.04 K/UL (ref 0–0.2)
BUN BLDV-MCNC: 10 MG/DL (ref 8–23)
BUN/CREAT BLD: 13 (ref 9–20)
CALCIUM SERPL-MCNC: 8.4 MG/DL (ref 8.6–10.4)
CHLORIDE BLD-SCNC: 106 MMOL/L (ref 98–107)
CO2: 26 MMOL/L (ref 20–31)
CREAT SERPL-MCNC: 0.8 MG/DL (ref 0.7–1.2)
EKG ATRIAL RATE: 304 BPM
EKG P AXIS: -89 DEGREES
EKG Q-T INTERVAL: 402 MS
EKG QRS DURATION: 144 MS
EKG QTC CALCULATION (BAZETT): 452 MS
EKG R AXIS: 68 DEGREES
EKG T AXIS: 49 DEGREES
EKG VENTRICULAR RATE: 76 BPM
EOSINOPHILS RELATIVE PERCENT: 1 % (ref 1–4)
GFR AFRICAN AMERICAN: >60 ML/MIN
GFR NON-AFRICAN AMERICAN: >60 ML/MIN
GFR SERPL CREATININE-BSD FRML MDRD: ABNORMAL ML/MIN/{1.73_M2}
GLUCOSE BLD-MCNC: 109 MG/DL (ref 75–110)
GLUCOSE BLD-MCNC: 139 MG/DL (ref 70–99)
GLUCOSE BLD-MCNC: 150 MG/DL (ref 75–110)
GLUCOSE BLD-MCNC: 170 MG/DL (ref 75–110)
HCT VFR BLD CALC: 34.5 % (ref 40.7–50.3)
HEMOGLOBIN: 11.3 G/DL (ref 13–17)
IMMATURE GRANULOCYTES: 0 %
LYMPHOCYTES # BLD: 11 % (ref 24–43)
MCH RBC QN AUTO: 31 PG (ref 25.2–33.5)
MCHC RBC AUTO-ENTMCNC: 32.8 G/DL (ref 25.2–33.5)
MCV RBC AUTO: 94.8 FL (ref 82.6–102.9)
MONOCYTES # BLD: 8 % (ref 3–12)
NRBC AUTOMATED: 0 PER 100 WBC
PDW BLD-RTO: 14.1 % (ref 11.8–14.4)
PLATELET # BLD: 194 K/UL (ref 138–453)
PMV BLD AUTO: 9.5 FL (ref 8.1–13.5)
POTASSIUM SERPL-SCNC: 4.1 MMOL/L (ref 3.7–5.3)
RBC # BLD: 3.64 M/UL (ref 4.21–5.77)
SEG NEUTROPHILS: 79 % (ref 36–65)
SEGMENTED NEUTROPHILS ABSOLUTE COUNT: 5.84 K/UL (ref 1.5–8.1)
SODIUM BLD-SCNC: 140 MMOL/L (ref 135–144)
WBC # BLD: 7.3 K/UL (ref 3.5–11.3)

## 2022-04-08 PROCEDURE — 99233 SBSQ HOSP IP/OBS HIGH 50: CPT | Performed by: INTERNAL MEDICINE

## 2022-04-08 PROCEDURE — 2060000000 HC ICU INTERMEDIATE R&B

## 2022-04-08 PROCEDURE — 6370000000 HC RX 637 (ALT 250 FOR IP): Performed by: INTERNAL MEDICINE

## 2022-04-08 PROCEDURE — 80048 BASIC METABOLIC PNL TOTAL CA: CPT

## 2022-04-08 PROCEDURE — 93005 ELECTROCARDIOGRAM TRACING: CPT | Performed by: INTERNAL MEDICINE

## 2022-04-08 PROCEDURE — 99232 SBSQ HOSP IP/OBS MODERATE 35: CPT | Performed by: INTERNAL MEDICINE

## 2022-04-08 PROCEDURE — 2500000003 HC RX 250 WO HCPCS: Performed by: EMERGENCY MEDICINE

## 2022-04-08 PROCEDURE — 36415 COLL VENOUS BLD VENIPUNCTURE: CPT

## 2022-04-08 PROCEDURE — 82947 ASSAY GLUCOSE BLOOD QUANT: CPT

## 2022-04-08 PROCEDURE — 2580000003 HC RX 258: Performed by: EMERGENCY MEDICINE

## 2022-04-08 PROCEDURE — 85025 COMPLETE CBC W/AUTO DIFF WBC: CPT

## 2022-04-08 PROCEDURE — 94640 AIRWAY INHALATION TREATMENT: CPT

## 2022-04-08 PROCEDURE — 94761 N-INVAS EAR/PLS OXIMETRY MLT: CPT

## 2022-04-08 RX ORDER — METOPROLOL TARTRATE 50 MG/1
50 TABLET, FILM COATED ORAL 2 TIMES DAILY
Status: DISCONTINUED | OUTPATIENT
Start: 2022-04-08 | End: 2022-04-09 | Stop reason: HOSPADM

## 2022-04-08 RX ADMIN — IPRATROPIUM BROMIDE AND ALBUTEROL SULFATE 1 AMPULE: .5; 2.5 SOLUTION RESPIRATORY (INHALATION) at 08:26

## 2022-04-08 RX ADMIN — IPRATROPIUM BROMIDE AND ALBUTEROL SULFATE 1 AMPULE: .5; 2.5 SOLUTION RESPIRATORY (INHALATION) at 16:42

## 2022-04-08 RX ADMIN — OXYCODONE HYDROCHLORIDE AND ACETAMINOPHEN 500 MG: 500 TABLET ORAL at 08:15

## 2022-04-08 RX ADMIN — IPRATROPIUM BROMIDE AND ALBUTEROL SULFATE 1 AMPULE: .5; 2.5 SOLUTION RESPIRATORY (INHALATION) at 20:19

## 2022-04-08 RX ADMIN — PANTOPRAZOLE SODIUM 40 MG: 40 TABLET, DELAYED RELEASE ORAL at 06:02

## 2022-04-08 RX ADMIN — LOSARTAN POTASSIUM 25 MG: 25 TABLET, FILM COATED ORAL at 08:15

## 2022-04-08 RX ADMIN — METAXALONE 800 MG: 800 TABLET ORAL at 21:54

## 2022-04-08 RX ADMIN — ATORVASTATIN CALCIUM 80 MG: 40 TABLET, FILM COATED ORAL at 21:54

## 2022-04-08 RX ADMIN — ACETAMINOPHEN 650 MG: 325 TABLET ORAL at 10:26

## 2022-04-08 RX ADMIN — FERROUS SULFATE TAB 325 MG (65 MG ELEMENTAL FE) 325 MG: 325 (65 FE) TAB at 21:54

## 2022-04-08 RX ADMIN — METAXALONE 800 MG: 800 TABLET ORAL at 08:15

## 2022-04-08 RX ADMIN — DILTIAZEM HYDROCHLORIDE 60 MG: 30 TABLET, FILM COATED ORAL at 14:30

## 2022-04-08 RX ADMIN — METOPROLOL TARTRATE 50 MG: 50 TABLET, FILM COATED ORAL at 21:55

## 2022-04-08 RX ADMIN — METOPROLOL TARTRATE 12.5 MG: 25 TABLET, FILM COATED ORAL at 09:46

## 2022-04-08 RX ADMIN — DILTIAZEM HYDROCHLORIDE 15 MG/HR: 5 INJECTION, SOLUTION INTRAVENOUS at 06:03

## 2022-04-08 RX ADMIN — INSULIN LISPRO 1 UNITS: 100 INJECTION, SOLUTION INTRAVENOUS; SUBCUTANEOUS at 12:38

## 2022-04-08 RX ADMIN — FAMOTIDINE 20 MG: 20 TABLET, FILM COATED ORAL at 08:15

## 2022-04-08 RX ADMIN — FERROUS SULFATE TAB 325 MG (65 MG ELEMENTAL FE) 325 MG: 325 (65 FE) TAB at 08:15

## 2022-04-08 RX ADMIN — DILTIAZEM HYDROCHLORIDE 60 MG: 30 TABLET, FILM COATED ORAL at 09:46

## 2022-04-08 RX ADMIN — CLOPIDOGREL BISULFATE 75 MG: 75 TABLET ORAL at 08:15

## 2022-04-08 RX ADMIN — DILTIAZEM HYDROCHLORIDE 60 MG: 30 TABLET, FILM COATED ORAL at 23:15

## 2022-04-08 RX ADMIN — PAROXETINE HYDROCHLORIDE 20 MG: 20 TABLET, FILM COATED ORAL at 08:15

## 2022-04-08 RX ADMIN — APIXABAN 5 MG: 5 TABLET, FILM COATED ORAL at 21:54

## 2022-04-08 RX ADMIN — METOPROLOL TARTRATE 37.5 MG: 25 TABLET, FILM COATED ORAL at 08:15

## 2022-04-08 RX ADMIN — OXYCODONE HYDROCHLORIDE AND ACETAMINOPHEN 500 MG: 500 TABLET ORAL at 21:54

## 2022-04-08 RX ADMIN — METAXALONE 800 MG: 800 TABLET ORAL at 14:30

## 2022-04-08 RX ADMIN — INSULIN LISPRO 1 UNITS: 100 INJECTION, SOLUTION INTRAVENOUS; SUBCUTANEOUS at 21:56

## 2022-04-08 RX ADMIN — APIXABAN 5 MG: 5 TABLET, FILM COATED ORAL at 08:15

## 2022-04-08 ASSESSMENT — PAIN SCALES - GENERAL
PAINLEVEL_OUTOF10: 0
PAINLEVEL_OUTOF10: 3
PAINLEVEL_OUTOF10: 0

## 2022-04-08 NOTE — PROGRESS NOTES
Hospitalist Progress Note    Patient: Elmira Osullivan     YOB: 1945    MRN: [id number]   Admit date: 4/6/2022     Acct: [de-identified]     PCP: Janusz Adkins MD    CC--Interval History:   Atrial flutter--variable rates---seen by Cardiology---adding Cardizem PO and increased Lopressor to 50 BID    HTN---159/89---see above     O2 sat 88 --> supplemental oxygen    May ultimately require cardioversion if does not convert to SR    See note below     All other ROS negative except noted in HPI    Diet:  ADULT DIET; Regular; 5 carb choices (75 gm/meal);  Low Fat/Low Chol/High Fiber/2 gm Na    Medications:  Scheduled Meds:   metoprolol tartrate  37.5 mg Oral BID    apixaban  5 mg Oral BID    atorvastatin  80 mg Oral Nightly    clopidogrel  75 mg Oral Daily    famotidine  20 mg Oral Daily    ferrous sulfate  325 mg Oral BID    losartan  25 mg Oral Daily    pantoprazole  40 mg Oral QAM AC    PARoxetine  20 mg Oral Daily    metaxalone  800 mg Oral TID    vitamin C  500 mg Oral BID    insulin lispro  0-6 Units SubCUTAneous TID WC    insulin lispro  0-3 Units SubCUTAneous Nightly    ipratropium-albuterol  1 ampule Inhalation Q4H WA     Continuous Infusions:   dilTIAZem 15 mg/hr (04/08/22 0603)    dextrose       PRN Meds:glucose, glucagon (rDNA), dextrose, sodium chloride nebulizer, albuterol, acetaminophen, ondansetron, dextrose bolus (hypoglycemia) **OR** dextrose bolus (hypoglycemia)    Objective:  Labs:  CBC with Differential:    Lab Results   Component Value Date    WBC 7.3 04/08/2022    RBC 3.64 04/08/2022    HGB 11.3 04/08/2022    HCT 34.5 04/08/2022     04/08/2022    MCV 94.8 04/08/2022    MCH 31.0 04/08/2022    MCHC 32.8 04/08/2022    RDW 14.1 04/08/2022    LYMPHOPCT 11 04/08/2022    MONOPCT 8 04/08/2022    BASOPCT 1 04/08/2022    MONOSABS 0.56 04/08/2022    LYMPHSABS 0.80 04/08/2022    EOSABS 0.06 04/08/2022    BASOSABS 0.04 04/08/2022    DIFFTYPE NOT REPORTED 11/13/2018     BMP:    Lab Results   Component Value Date     04/08/2022    K 4.1 04/08/2022     04/08/2022    CO2 26 04/08/2022    BUN 10 04/08/2022    LABALBU 3.9 04/06/2022    CREATININE 0.80 04/08/2022    CALCIUM 8.4 04/08/2022    GFRAA >60 04/08/2022    LABGLOM >60 04/08/2022    GLUCOSE 139 04/08/2022           Physical Exam:  Vitals: BP (!) 159/89   Pulse 100   Temp 98.8 °F (37.1 °C) (Tympanic)   Resp 17   Ht 5' 9\" (1.753 m)   Wt 203 lb 14.4 oz (92.5 kg)   SpO2 91%   BMI 30.11 kg/m²   24 hour intake/output:    Intake/Output Summary (Last 24 hours) at 4/8/2022 0805  Last data filed at 4/8/2022 0727  Gross per 24 hour   Intake 939.37 ml   Output 300 ml   Net 639.37 ml     Last 3 weights: Wt Readings from Last 3 Encounters:   04/08/22 203 lb 14.4 oz (92.5 kg)   04/06/22 210 lb 4 oz (95.4 kg)   03/24/22 201 lb (91.2 kg)     HEENT: O2 NC-----, Normocephalic and Atraumatic  Neck: Supple, No Masses, Tenderness, Nodularity and No Lymphadenopathy  Chest/Lungs: Distant Breath Sounds  Cardiac: Regular Rate and Rhythm  GI/Abdomen: Bowel Sounds Present and Soft, Non-tender, without Guarding or Rebound Tenderness  : Not examined  EXT/Skin: No Edema, No Cyanosis and No Clubbing  Neuro: Alert and Oriented and No Localizing Signs/Symptoms      Assessment:    Principal Problem:    Atrial fibrillation with RVR (HCC)  Resolved Problems:    * No resolved hospital problems. *  KEDAR PATTON   VDangelo  77 WM [MELI Bailey;  DC Cardiology---[facility]]  FULL CODE     ELIQUIS----ASPIRIN--dcd---PLAVIX    CARDIZEM gtt + PO + Lopressor  O2 2 liters NC    Anti-infectives:   ---------------    Atrial fibrillation---symptomatic---dyspnea----fatigue---onset 4. 2.2022  Atrial fibrillation----RVR----4.6.2022  Atrial flutter----4.7.2022           EKG----4.8.2022---atrial flutter---78----variable AVB---RBBB          2D ECHO---4.7.2022--LA moderately dilated--borderline LVH--RA dilatation--                              RV dilatation---reduced RVSF---MAC mild MV leaflet thickening--                              RHIANNON without stenosis---mild-to-moderate TR--RVSP ~ 60 mm Hg---                               severe pulmonary HTN--normal AR--3.6 cm---cannot calculate DD---                               LVEF ~ 55%          EKG---4.7.2022---atrial flutter--76---RBBB          CTA chest--pulmonary---4.6.2022---no PE--enlarged central pulmonary vasculature---                             diffuse bronchial wall thickening with mosaic attenuation of lungs---mild                              emphysema--mild subcarinal lymphadenopathy---cardiomegaly--                             small pericardial effusion--new            EKG----4.6.2022---atrial fibrillation--123--RVR---RBBB            EKG---1.18.2022----sinus bradycardia--59---RBBB  ASCVD            Cardiac catheterization---2.9.2022--10-20% LM--LAD--LCx---20-30% patent proximal                                 and mid RCA stents---patent LAD and RCA stents with non-obstructive CAD            Abnormal nuclear stress test---1.14.2022---moderate mid--apical--inferior ischemia around old infarction--                               normal wall motion---LVEF ~ 67%            Cardiac catheterization---5.31.2011---MELBA mid-LAD x 2            Cardiac catheterization--5. 5.2011--stent MELBA proximal RCA x 2--thrombectomy            MI---2011  Pulmonary hypertension----see above   RBBB  Hypertension  Hyperlipidemia          Asthma   Prediabetes   CKD---Stage 3a  GERD  Depression  Tobacco abuse---quit---2011---pipe--cigarettes--cigars--chewer  PMH:   muscle spasm, carcinoma throat---XRT, cervical DDD-radiculopathy--[facility] 1990--C4-5-6-7,                chronic back pain, colonic polyp, diverticulosis, lumbar radiculopathy, numbness--tingling--hands,               OA, sacroiliac pain---2014, impaired vision  PSH:    see above, multiple PM injections----back--hips, right 3rd finger trigger finger release,              I&D abscess right elbow, debridement right elbow, colonoscopy--2003--2007--2013----              hyperplastic polyps, C3-4--decompression--fusion--2001, C6-7--discectomy--              [id number], lumbar fusion--1973--2008--2014, left ear skin              biopsy, hardware correction--screws---back, lumbar fusion--2017--L3-4, colonoscopy--              2018--hyperplastic polyp---severe sigmoid diverticulosis, external hemorrhoidectomy--2018    Allergies:          lisinopril--hives, Effient--prasugrel--rash  Intolerances:    Crestor-rosuvastatin--joint-muscle aches, ibuprofen---bleeding    Plan:  1. Atrial flutter---fibrillation---seen by Cardiology---weaning Cardizem gtt ---> PO Cardizem and Lopressor  2. If does not convert, may undergo cardioversion  3.     See orders    Electronically signed by Bryant Jones on 4/8/2022 at 8:05 AM    Hospitalist

## 2022-04-08 NOTE — PROGRESS NOTES
Physician Progress Note      Duke Rios  [facility] #:                  [id number]  :                       1945  ADMIT DATE:       2022 12:20 PM  [address] [place] DATE:  RESPONDING  PROVIDER #:        Jason GRANT          QUERY TEXT:    Pt admitted 22 with atrial fibrillation with RVR. CKD stage 3a is   documented. Pt noted to have serum creatinine 1.24 -> 0.80 on 22, a decrease in SCr    greater than 1.5 times (1.24 / 1.5 =  0.827)    If possible, please document in the progress notes and discharge summary if   you are evaluating and/or treating any of the following: The medical record reflects the following:  Risk Factors: HTN, pre-diabetic, atrial fib with RVR  Clinical Indicators:      BUN / Cr / GFR  22     28 / 1.24 / 56  22     22 / 0.92 / >60  22     10 / 0.80 / >60  Treatment: lab monitoring    Defined by Kidney Disease Improving Global Outcomes (KDIGO) clinical practice   guideline for acute kidney injury:  -Increase in SCr by greater than or equal to 0.3 mg/dl within 48 hours; or  -Increase or decrease in SCr to greater than or equal to 1.5 times baseline,   which is known or presumed to have occurred within the prior 7 days; or  -Urine volume < 0.5ml/kg/h for 6 hours    Crystal Smith RN, [address] [place] [address]   [phone]  . Options provided:  -- Acute kidney injury  -- YADIRA on CKD stage 3a  -- YADIRA on CKD stage 2  -- CKD 3a  without YADIRA  -- Other - I will add my own diagnosis  -- Disagree - Not applicable / Not valid  -- Disagree - Clinically unable to determine / Unknown  -- Refer to Clinical Documentation Reviewer    PROVIDER RESPONSE TEXT:    This patient has YADIRA on CKD stage 2.     Query created by: Ky Nickerson on 2022 8:21 AM      Electronically signed by:  Stefan Roberts 2022 11:57 AM

## 2022-04-08 NOTE — PLAN OF CARE
Problem: Skin Integrity:  Goal: Will show no infection signs and symptoms  Description: Will show no infection signs and symptoms  Outcome: Ongoing  Goal: Absence of new skin breakdown  Description: Absence of new skin breakdown  Outcome: Ongoing     Problem: Falls - Risk of:  Goal: Will remain free from falls  Description: Will remain free from falls  Outcome: Ongoing  Goal: Absence of physical injury  Description: Absence of physical injury  Outcome: Ongoing     Problem: Pain:  Description: Pain management should include both nonpharmacologic and pharmacologic interventions.   Goal: Pain level will decrease  Description: Pain level will decrease  Outcome: Ongoing  Goal: Control of acute pain  Description: Control of acute pain  Outcome: Ongoing  Goal: Control of chronic pain  Description: Control of chronic pain  Outcome: Ongoing     Problem: Discharge Planning:  Goal: Discharged to appropriate level of care  Description: Discharged to appropriate level of care  Outcome: Ongoing     Problem: Cardiac:  Goal: Ability to maintain an adequate cardiac output will improve  Description: Ability to maintain an adequate cardiac output will improve  Outcome: Ongoing

## 2022-04-08 NOTE — PLAN OF CARE
Problem: Skin Integrity:  Goal: Will show no infection signs and symptoms  Description: Will show no infection signs and symptoms  4/8/2022 0731 by Lakia Villarreal RN  Outcome: Ongoing  4/8/2022 0059 by Florida Mckee RN  Outcome: Ongoing  Goal: Absence of new skin breakdown  Description: Absence of new skin breakdown  4/8/2022 0731 by Lakia Villarreal RN  Outcome: Ongoing  4/8/2022 0059 by Florida Mckee RN  Outcome: Ongoing     Problem: Falls - Risk of:  Goal: Will remain free from falls  Description: Will remain free from falls  4/8/2022 0731 by Lakia Villarreal RN  Outcome: Ongoing  4/8/2022 0059 by Florida Mckee RN  Outcome: Ongoing  Goal: Absence of physical injury  Description: Absence of physical injury  4/8/2022 0731 by Lakia Villarreal RN  Outcome: Ongoing  4/8/2022 0059 by Florida Mckee RN  Outcome: Ongoing     Problem: Pain:  Goal: Pain level will decrease  Description: Pain level will decrease  4/8/2022 0731 by Lakia Villarreal RN  Outcome: Ongoing  4/8/2022 0059 by Florida Mckee RN  Outcome: Ongoing  Goal: Control of acute pain  Description: Control of acute pain  4/8/2022 0731 by Lakia Villarreal RN  Outcome: Ongoing  4/8/2022 0059 by Florida Mckee RN  Outcome: Ongoing  Goal: Control of chronic pain  Description: Control of chronic pain  4/8/2022 0731 by Lakia Villarreal RN  Outcome: Ongoing  4/8/2022 0059 by Florida Mckee RN  Outcome: Ongoing     Problem: Discharge Planning:  Goal: Discharged to appropriate level of care  Description: Discharged to appropriate level of care  4/8/2022 0731 by Lakia Villarreal RN  Outcome: Ongoing  4/8/2022 0059 by Florida Mckee RN  Outcome: Ongoing     Problem: Cardiac:  Goal: Ability to maintain an adequate cardiac output will improve  Description: Ability to maintain an adequate cardiac output will improve  4/8/2022 0731 by Lakia Villarreal RN  Outcome: Ongoing  4/8/2022 0059 by Florida Mckee RN  Outcome: Ongoing

## 2022-04-08 NOTE — PROGRESS NOTES
[facility]   Progress Note                   Date:   4/8/2022  Patient name: Romana Osullivan  Date of admission:  4/6/2022 12:20 PM  MRN:   [id number]  YOB: 1945  PCP: Alonzo Lagunas MD    Reason for Admission: Atrial fibrillation. Subjective:       Clinical Changes / Abnormalities: Still in A. fib with RVR on Cardizem drip. No chest pain or shortness of breath      Medications:   Scheduled Meds:   metoprolol tartrate  37.5 mg Oral BID    apixaban  5 mg Oral BID    atorvastatin  80 mg Oral Nightly    clopidogrel  75 mg Oral Daily    famotidine  20 mg Oral Daily    ferrous sulfate  325 mg Oral BID    losartan  25 mg Oral Daily    pantoprazole  40 mg Oral QAM AC    PARoxetine  20 mg Oral Daily    metaxalone  800 mg Oral TID    vitamin C  500 mg Oral BID    insulin lispro  0-6 Units SubCUTAneous TID WC    insulin lispro  0-3 Units SubCUTAneous Nightly    ipratropium-albuterol  1 ampule Inhalation Q4H WA     Continuous Infusions:   dilTIAZem 15 mg/hr (04/08/22 0603)    dextrose       CBC:   Recent Labs     04/06/22  1237 04/07/22  0448 04/08/22  0509   WBC 7.0 6.3 7.3   HGB 12.6* 11.0* 11.3*    186 194     BMP:    Recent Labs     04/06/22  1237 04/07/22  0448 04/08/22  0509    142 140   K 4.9 4.4 4.1    108* 106   CO2 27 26 26   BUN 28* 22 10   CREATININE 1.24* 0.92 0.80   GLUCOSE 163* 114* 139*     Hepatic:   Recent Labs     04/06/22  1237   AST 34   ALT 68*   BILITOT 0.54   ALKPHOS 87     Troponin: No results for input(s): TROPONINI in the last 72 hours. BNP: No results for input(s): BNP in the last 72 hours. Lipids: No results for input(s): CHOL, HDL in the last 72 hours. Invalid input(s): LDLCALCU  INR: No results for input(s): INR in the last 72 hours.     Objective:   Vitals: BP (!) 159/89   Pulse 111   Temp 98.8 °F (37.1 °C) (Tympanic)   Resp 17   Ht 5' 9\" (1.753 m)   Wt 203 lb 14.4 oz (92.5 kg)   SpO2 92%   BMI 30.11 kg/m²   General appearance: alert and cooperative with exam  HEENT: Head: Normocephalic, no lesions, without obvious abnormality. Neck: no adenopathy, no carotid bruit, no JVD, supple, symmetrical, trachea midline and thyroid not enlarged, symmetric, no tenderness/mass/nodules  Lungs: clear to auscultation bilaterally  Heart: regular rate and rhythm, S1, S2 normal, no murmur, click, rub or gallop  Abdomen: soft, non-tender; bowel sounds normal; no masses,  no organomegaly  Extremities: extremities normal, atraumatic, no cyanosis or edema  Neurologic: Mental status: Alert, oriented, thought content appropriate    Echo 4/7/2022  Summary  Normal left ventricular diameter. Borderline left ventricular hypertrophy. Left ventricular systolic function is normal.  Left ventricular ejection fraction 55 %. Unable to evaluate diastolic function. Left atrium is moderately dilated. Right atrial dilatation. Right ventricular dilatation with reduced systolic function. Aortic valve sclerosis without stenosis. Mild mitral valve thickening with annular calcification. Mild mitral regurgitation. Normal tricuspid valve leaflets. Mild to moderate tricuspid regurgitation. Estimated right ventricular systolic pressure is 60 mmHg. Severe pulmonary hypertension. Trivial pericardial effusion. IVC Increased diameter and impaired or no inspiratory variation indicating  elevated RA filling pressure (i.e. CVP) . Assessment / Acute Cardiac Problems/Plan    1. Atrial fibrillation with rapid ventricular response. Still on IV Cardizem. We will increase metoprolol to 50 mg p.o. twice daily. Continue Eliquis. Will start Cardizem 60 mg p.o. 3 times daily. Wean off IV Cardizem gradually. If is still tachycardic we can go up on PO Lopressor or Cardizem. Okay to DC home once his heart rate is better controlled and off IV Cardizem and plan for cardioversion in 4 weeks.   If the patient remains tachycardic and requiring IV Cardizem then he will be kept n.p.o. after midnight on Sunday and will plan for JOSIAS cardioversion in [facility] on Monday. (He Already had breakfast today). 2.  History of coronary artery disease s/p stent. Stable continue current medications.           Keiko Dumont MD, MD  [facility]  [phone]

## 2022-04-08 NOTE — FLOWSHEET NOTE
rounding on PCU    Assessment: Patient is feeling better today. Trying a different medicine and early indications are hopeful. Intervention: Engaged in conversation.  prayed with patient and his wife. Both received Communion. Patient expressed appreciation for visit and offer of continued prayer. Plan: Chaplains are available on site or on call 24/7 for spiritual and emotional support.      04/08/22 1138   Encounter Summary   Services provided to: Patient and family together   Referral/Consult From: Rounding   Continue Visiting   (4/8/22)   Complexity of Encounter Low   Length of Encounter 15 minutes   Spiritual/Faith   Type Spiritual support   Assessment Approachable   Intervention Active listening;Prayer;Communion   Outcome Expressed gratitude;Engaged in conversation   Sacraments   Communion Patient received communion;Family received communion

## 2022-04-09 VITALS
SYSTOLIC BLOOD PRESSURE: 156 MMHG | HEIGHT: 69 IN | DIASTOLIC BLOOD PRESSURE: 91 MMHG | OXYGEN SATURATION: 94 % | WEIGHT: 203.9 LBS | BODY MASS INDEX: 30.2 KG/M2 | HEART RATE: 73 BPM | RESPIRATION RATE: 19 BRPM | TEMPERATURE: 98 F

## 2022-04-09 LAB
ABSOLUTE EOS #: 0.07 K/UL (ref 0–0.44)
ABSOLUTE IMMATURE GRANULOCYTE: <0.03 K/UL (ref 0–0.3)
ABSOLUTE LYMPH #: 0.81 K/UL (ref 1.1–3.7)
ABSOLUTE MONO #: 0.53 K/UL (ref 0.1–1.2)
ANION GAP SERPL CALCULATED.3IONS-SCNC: 8 MMOL/L (ref 9–17)
BASOPHILS # BLD: 0 % (ref 0–2)
BASOPHILS ABSOLUTE: 0.03 K/UL (ref 0–0.2)
BUN BLDV-MCNC: 10 MG/DL (ref 8–23)
BUN/CREAT BLD: 12 (ref 9–20)
CALCIUM SERPL-MCNC: 8.5 MG/DL (ref 8.6–10.4)
CHLORIDE BLD-SCNC: 105 MMOL/L (ref 98–107)
CO2: 27 MMOL/L (ref 20–31)
CREAT SERPL-MCNC: 0.83 MG/DL (ref 0.7–1.2)
EOSINOPHILS RELATIVE PERCENT: 1 % (ref 1–4)
GFR AFRICAN AMERICAN: >60 ML/MIN
GFR NON-AFRICAN AMERICAN: >60 ML/MIN
GFR SERPL CREATININE-BSD FRML MDRD: ABNORMAL ML/MIN/{1.73_M2}
GLUCOSE BLD-MCNC: 139 MG/DL (ref 75–110)
GLUCOSE BLD-MCNC: 176 MG/DL (ref 70–99)
HCT VFR BLD CALC: 33.9 % (ref 40.7–50.3)
HEMOGLOBIN: 11.1 G/DL (ref 13–17)
IMMATURE GRANULOCYTES: 0 %
LYMPHOCYTES # BLD: 12 % (ref 24–43)
MCH RBC QN AUTO: 30.8 PG (ref 25.2–33.5)
MCHC RBC AUTO-ENTMCNC: 32.7 G/DL (ref 25.2–33.5)
MCV RBC AUTO: 94.2 FL (ref 82.6–102.9)
MONOCYTES # BLD: 8 % (ref 3–12)
NRBC AUTOMATED: 0 PER 100 WBC
PDW BLD-RTO: 14.2 % (ref 11.8–14.4)
PLATELET # BLD: 210 K/UL (ref 138–453)
PMV BLD AUTO: 9.5 FL (ref 8.1–13.5)
POTASSIUM SERPL-SCNC: 4.2 MMOL/L (ref 3.7–5.3)
RBC # BLD: 3.6 M/UL (ref 4.21–5.77)
SEG NEUTROPHILS: 79 % (ref 36–65)
SEGMENTED NEUTROPHILS ABSOLUTE COUNT: 5.41 K/UL (ref 1.5–8.1)
SODIUM BLD-SCNC: 140 MMOL/L (ref 135–144)
WBC # BLD: 6.9 K/UL (ref 3.5–11.3)

## 2022-04-09 PROCEDURE — 2700000000 HC OXYGEN THERAPY PER DAY

## 2022-04-09 PROCEDURE — 6370000000 HC RX 637 (ALT 250 FOR IP): Performed by: INTERNAL MEDICINE

## 2022-04-09 PROCEDURE — 80048 BASIC METABOLIC PNL TOTAL CA: CPT

## 2022-04-09 PROCEDURE — 94761 N-INVAS EAR/PLS OXIMETRY MLT: CPT

## 2022-04-09 PROCEDURE — 85025 COMPLETE CBC W/AUTO DIFF WBC: CPT

## 2022-04-09 PROCEDURE — 82947 ASSAY GLUCOSE BLOOD QUANT: CPT

## 2022-04-09 PROCEDURE — 36415 COLL VENOUS BLD VENIPUNCTURE: CPT

## 2022-04-09 PROCEDURE — 93005 ELECTROCARDIOGRAM TRACING: CPT | Performed by: INTERNAL MEDICINE

## 2022-04-09 PROCEDURE — 94640 AIRWAY INHALATION TREATMENT: CPT

## 2022-04-09 RX ORDER — METOPROLOL TARTRATE 50 MG/1
50 TABLET, FILM COATED ORAL 2 TIMES DAILY
Qty: 60 TABLET | Refills: 1 | Status: SHIPPED | OUTPATIENT
Start: 2022-04-09 | End: 2022-07-11

## 2022-04-09 RX ORDER — DILTIAZEM HYDROCHLORIDE 60 MG/1
60 TABLET, FILM COATED ORAL EVERY 8 HOURS SCHEDULED
Qty: 90 TABLET | Refills: 2 | Status: SHIPPED | OUTPATIENT
Start: 2022-04-09 | End: 2022-05-02 | Stop reason: SDUPTHER

## 2022-04-09 RX ADMIN — DILTIAZEM HYDROCHLORIDE 60 MG: 30 TABLET, FILM COATED ORAL at 06:32

## 2022-04-09 RX ADMIN — METAXALONE 800 MG: 800 TABLET ORAL at 09:42

## 2022-04-09 RX ADMIN — FAMOTIDINE 20 MG: 20 TABLET, FILM COATED ORAL at 09:43

## 2022-04-09 RX ADMIN — FERROUS SULFATE TAB 325 MG (65 MG ELEMENTAL FE) 325 MG: 325 (65 FE) TAB at 09:42

## 2022-04-09 RX ADMIN — PANTOPRAZOLE SODIUM 40 MG: 40 TABLET, DELAYED RELEASE ORAL at 06:34

## 2022-04-09 RX ADMIN — PAROXETINE HYDROCHLORIDE 20 MG: 20 TABLET, FILM COATED ORAL at 09:43

## 2022-04-09 RX ADMIN — APIXABAN 5 MG: 5 TABLET, FILM COATED ORAL at 09:42

## 2022-04-09 RX ADMIN — METOPROLOL TARTRATE 50 MG: 50 TABLET, FILM COATED ORAL at 09:43

## 2022-04-09 RX ADMIN — IPRATROPIUM BROMIDE AND ALBUTEROL SULFATE 1 AMPULE: .5; 2.5 SOLUTION RESPIRATORY (INHALATION) at 12:17

## 2022-04-09 RX ADMIN — ACETAMINOPHEN 650 MG: 325 TABLET ORAL at 06:32

## 2022-04-09 RX ADMIN — DILTIAZEM HYDROCHLORIDE 60 MG: 30 TABLET, FILM COATED ORAL at 13:33

## 2022-04-09 RX ADMIN — CLOPIDOGREL BISULFATE 75 MG: 75 TABLET ORAL at 09:43

## 2022-04-09 RX ADMIN — IPRATROPIUM BROMIDE AND ALBUTEROL SULFATE 1 AMPULE: .5; 2.5 SOLUTION RESPIRATORY (INHALATION) at 08:24

## 2022-04-09 RX ADMIN — LOSARTAN POTASSIUM 25 MG: 25 TABLET, FILM COATED ORAL at 09:42

## 2022-04-09 RX ADMIN — OXYCODONE HYDROCHLORIDE AND ACETAMINOPHEN 500 MG: 500 TABLET ORAL at 09:43

## 2022-04-09 RX ADMIN — METAXALONE 800 MG: 800 TABLET ORAL at 13:33

## 2022-04-09 RX ADMIN — INSULIN LISPRO 1 UNITS: 100 INJECTION, SOLUTION INTRAVENOUS; SUBCUTANEOUS at 09:43

## 2022-04-09 ASSESSMENT — PAIN DESCRIPTION - PAIN TYPE: TYPE: ACUTE PAIN

## 2022-04-09 ASSESSMENT — PAIN - FUNCTIONAL ASSESSMENT: PAIN_FUNCTIONAL_ASSESSMENT: ACTIVITIES ARE NOT PREVENTED

## 2022-04-09 ASSESSMENT — PAIN DESCRIPTION - LOCATION: LOCATION: NECK

## 2022-04-09 ASSESSMENT — PAIN DESCRIPTION - ONSET: ONSET: GRADUAL

## 2022-04-09 ASSESSMENT — PAIN SCALES - GENERAL
PAINLEVEL_OUTOF10: 0
PAINLEVEL_OUTOF10: 3

## 2022-04-09 ASSESSMENT — PAIN DESCRIPTION - DESCRIPTORS: DESCRIPTORS: ACHING

## 2022-04-09 ASSESSMENT — PAIN DESCRIPTION - FREQUENCY: FREQUENCY: INTERMITTENT

## 2022-04-09 NOTE — PLAN OF CARE
Problem: Skin Integrity:  Goal: Will show no infection signs and symptoms  Description: Will show no infection signs and symptoms  Outcome: Ongoing  Goal: Absence of new skin breakdown  Description: Absence of new skin breakdown  Outcome: Ongoing     Problem: Falls - Risk of:  Goal: Will remain free from falls  Description: Will remain free from falls  Outcome: Ongoing  Goal: Absence of physical injury  Description: Absence of physical injury  Outcome: Ongoing     Problem: Pain:  Goal: Pain level will decrease  Description: Pain level will decrease  Outcome: Ongoing  Goal: Control of acute pain  Description: Control of acute pain  Outcome: Ongoing  Goal: Control of chronic pain  Description: Control of chronic pain  Outcome: Ongoing     Problem: Discharge Planning:  Goal: Discharged to appropriate level of care  Description: Discharged to appropriate level of care  Outcome: Ongoing     Problem: Cardiac:  Goal: Ability to maintain an adequate cardiac output will improve  Description: Ability to maintain an adequate cardiac output will improve  Outcome: Ongoing

## 2022-04-09 NOTE — PROGRESS NOTES
Home Oxygen Evaluation completed. Patient is on 2 liters per minute via nasal cannula.   Resting SpO2 on 2lpm cannula = 95%  Resting SpO2 on room air = 87%    SpO2 on oxygen as above with exercise = 90%    Kris Delgado RCP  12:22 PM

## 2022-04-09 NOTE — PROGRESS NOTES
Discharge teaching and instructions for diagnosis of atrial fib completed with patient using teachback method. AVS reviewed with the patient. Prescriptions were given to the patient to bring for any pharmacy for the patient to  after discharge. Patient voiced understanding regarding prescriptions, education sheets regarding new medications were given to the patient (cardizem and eliquis), follow up appointments were made for the patient, and care of self at home was discussed. Unit phone number highlighted on AVS if questions arise.

## 2022-04-09 NOTE — PROGRESS NOTES
Verified that the patient is to take eliquis, plavix and aspirin. Dr. Martinez Doctor aware and OK to continue regimen.

## 2022-04-09 NOTE — DISCHARGE SUMMARY
Hospitalist Discharge Summary    Pj Osullivan  :  1945  MRN:  [id number]    Admit date:  2022  Discharge date: 22       Admitting Physician:  aMrcos Monteiro    Discharge Diagnoses:   Patient Active Problem List   Diagnosis    Chronic back pain    Neck pain, chronic    Brachial neuritis or radiculitis    Spinal stenosis, lumbar region, with neurogenic claudication    Displacement of cervical intervertebral disc without myelopathy    CAD (coronary artery disease)    GILL (dyspnea on exertion)    S/P lumbar spinal fusion    Obesity (BMI 35.0-39.9 without comorbidity)    HTN (hypertension)    Hyperlipidemia    Type 2 diabetes mellitus without complication (HCC)    Chronic bilateral low back pain with bilateral sciatica    Left hip pain    Acquired spondylolisthesis    Back pain    Grade III hemorrhoids    Recurrent major depressive disorder, [facility] partial remission [facility] [address]    Carcinoma larynx (HCC)    Atrial fibrillation with RVR ([facility] [address]    Anemia    Nasal discharge        Admission Condition:  fair      Discharged Condition:  good    Hospital Course/Treatments   Admitted with a fib /a flutter and converted to nsr, pt rate is controled with cardizem, pt on eliquis, pt was seen by cardio and pt denies any chest pain, not sob, pt denies any orthopnea, pt will be d/c with following meds    Discharge Medications:         Medication List      START taking these medications    apixaban 5 MG Tabs tablet  Commonly known as: ELIQUIS  Take 1 tablet by mouth 2 times daily     dilTIAZem 60 MG tablet  Commonly known as: CARDIZEM  Take 1 tablet by mouth every 8 hours        CHANGE how you take these medications    metoprolol tartrate 50 MG tablet  Commonly known as: LOPRESSOR  Take 1 tablet by mouth 2 times daily  What changed:   · medication strength  · See the new instructions.         CONTINUE taking these medications    aspirin 81 MG tablet     atorvastatin 80 MG tablet  Commonly known as: LIPITOR  TAKE 1 TABLET DAILY     celecoxib 200 MG capsule  Commonly known as: CELEBREX  TAKE 1 CAPSULE BY MOUTH TWICE DAILY     clopidogrel 75 MG tablet  Commonly known as: PLAVIX  TAKE 1 TABLET DAILY     famotidine 20 MG tablet  Commonly known as: PEPCID     ferrous sulfate 325 (65 Fe) MG tablet  Commonly known as: IRON 325  Take 1 tablet by mouth 2 times daily     Handicap Placard Misc  by Does not apply route Expires: 7/5/2021     Handicap Placard Misc  by Does not apply route EXPIRES 04/13/2026     losartan 25 MG tablet  Commonly known as: COZAAR  TAKE 1 TABLET DAILY     metFORMIN 500 MG tablet  Commonly known as: GLUCOPHAGE  TAKE 1 TABLET TWICE DAILY  WITH MEALS. nitroGLYCERIN 0.4 MG SL tablet  Commonly known as: NITROSTAT  Place 1 tablet under the tongue every 5 minutes as needed for Chest pain up to max of 3 total doses. If no relief after 1 dose, call 911.      pantoprazole 40 MG tablet  Commonly known as: PROTONIX  Take 1 tablet by mouth once daily     PARoxetine 20 MG tablet  Commonly known as: PAXIL  TAKE 1 TABLET BY MOUTH ONCE DAILY IN THE MORNING     tiZANidine 4 MG tablet  Commonly known as: ZANAFLEX  TAKE ONE TABLET BY MOUTH THREE TIMES DAILY     vitamin C 500 MG tablet  Commonly known as: ASCORBIC ACID           Where to Get Your Medications      You can get these medications from any pharmacy    Bring a paper prescription for each of these medications  · apixaban 5 MG Tabs tablet  · dilTIAZem 60 MG tablet  · metoprolol tartrate 50 MG tablet         Consults:  IP CONSULT TO HOSPITALIST  IP CONSULT TO CARDIOLOGY  IP CONSULT TO CARDIOLOGY    Significant Diagnostic Studies:  ECHO Complete 2D W Doppler W Color    Result Date: 4/7/2022  Transthoracic Echocardiography Report (TTE)  Patient Name Jeanne Fontana AL V  Date of Study             04/07/2022   Date of      1945  Gender                    Male  Birth   Age          65 year(s)  Race                         Room Number  0206        Height: 69 inch, 175.26 cm   Corporate ID [id number]    Weight:                   210 pounds, 95.3 kg  #   Patient Acct [de-identified]   BSA:         2.11 m^2     BMI:       31.01 kg/m^2  #   MR #         [id number]     Raymundo Díaz [facility]   Accession #  [id number]  Interpreting Physician    [address]   Fellow                   Referring Nurse                           Practitioner   Interpreting             Referring Physician       Glynn Viclhis,  Fellow                                             DEFIANCE*  Type of Study   TTE procedure:2D Echocardiogram, M-Mode, Doppler, Color Doppler. Procedure Date Date: 04/07/2022 Start: 10:51 AM Study Location: [facility] Technical Quality: Adequate visualization Indications:Atrial fibrillation. History / Tech. Comments: Procedure explained to patient. Patient Status: Inpatient Height: 69 inches Weight: 210 pounds BSA: 2.11 m^2 BMI: 31.01 kg/m^2 Rhythm: Atrial flutter Allergies   - *Unlisted:(LISINOPRIL, EFFIENT, CRESTOR, IBUPROFEN). CONCLUSIONS Summary Normal left ventricular diameter. Borderline left ventricular hypertrophy. Left ventricular systolic function is normal. Left ventricular ejection fraction 55 %. Unable to evaluate diastolic function. Left atrium is moderately dilated. Right atrial dilatation. Right ventricular dilatation with reduced systolic function. Aortic valve sclerosis without stenosis. Mild mitral valve thickening with annular calcification. Mild mitral regurgitation. Normal tricuspid valve leaflets. Mild to moderate tricuspid regurgitation. Estimated right ventricular systolic pressure is 60 mmHg. Severe pulmonary hypertension. Trivial pericardial effusion. IVC Increased diameter and impaired or no inspiratory variation indicating elevated RA filling pressure (i.e. CVP) .  Signature ----------------------------------------------------------------------------  Electronically signed by David Lewis [facility](Sonographer) on 04/07/2022 12:16 PM ---------------------------------------------------------------------------- ----------------------------------------------------------------------------  Electronically signed by Navin Reid(Interpreting physician) on 2022  01:25 PM ---------------------------------------------------------------------------- FINDINGS Left Atrium Left atrium is moderately dilated. Left Ventricle Normal left ventricular diameter. Borderline left ventricular hypertrophy. Left ventricular systolic function is normal. Left ventricular ejection fraction 55 %. Unable to evaluate diastolic function. Right Atrium Right atrial dilatation. Right Ventricle Right ventricular dilatation with reduced systolic function. Mitral Valve Mild mitral valve thickening with annular calcification. Mild mitral regurgitation. Aortic Valve Aortic valve sclerosis without stenosis. Tricuspid Valve Normal tricuspid valve leaflets. Mild to moderate tricuspid regurgitation. Estimated right ventricular systolic pressure is 60 mmHg. Severe pulmonary hypertension. Pulmonic Valve The pulmonic valve is normal in structure. Pericardial Effusion Trivial pericardial effusion. Miscellaneous Normal aortic root dimension. IVC Increased diameter and impaired or no inspiratory variation indicating elevated RA filling pressure (i.e. CVP) .  M-mode / 2D Measurements & Calculations:   LVIDd:5.2 cm(3.7 - 5.6 cm)       Diastolic NSOYTH:642.8 ml  LVIDs:3.55 cm(2.2 - 4.0 cm)      Systolic XUWEBI:10.3 ml  MKJD:9.27 cm(0.6 - 1.1 cm)       Aortic Root:3.6 cm(2.0 - 3.7 cm)  LVPWd:1.05 cm(0.6 - 1.1 cm)      LA Dimension: 5.1 cm(1.9 - 4.0 cm)  Fractional Shortenin.73 %    LA volume/Index: 82.5 ml /39m^2  Calculated LVEF (%): 53.52 %                                             Aortic                                             Peak Velocity: 1.14 m/s                                            Peak Gradient: 5.2 mmHg   Tricuspid:                                Pulmonic: Peak TR Velocity: 3.36 m/s                Peak Velocity: 1.00 m/s  Peak TR Gradient: 45.1584 mmHg            Peak Gradient: 4 mmHg      XR CHEST PORTABLE    Result Date: 4/7/2022  EXAMINATION: ONE XRAY VIEW OF THE CHEST 4/7/2022 6:32 am COMPARISON: 05/30/2020 HISTORY: ORDERING SYSTEM PROVIDED HISTORY: atrial fib, cardiomegaly TECHNOLOGIST PROVIDED HISTORY: atrial fib, cardiomegaly Reason for Exam: A-Fib, denies chest pain FINDINGS: Cardiac silhouette appears slightly enlarged compared to prior study. Cardiomediastinal silhouette otherwise within normal limits. Trace right pleural effusion. No focal consolidation. No pneumothorax or subdiaphragmatic free air. Mild cardiomegaly. Trace right pleural effusion. CT CHEST PULMONARY EMBOLISM W CONTRAST    Result Date: 4/6/2022  EXAMINATION: CTA OF THE CHEST 4/6/2022 10:25 am TECHNIQUE: CTA of the chest was performed after the administration of intravenous contrast.  Multiplanar reformatted images are provided for review. MIP images are provided for review. Dose modulation, iterative reconstruction, and/or weight based adjustment of the mA/kV was utilized to reduce the radiation dose to as low as reasonably achievable. COMPARISON: CT abdomen pelvis 03/26/2021 HISTORY: ORDERING SYSTEM PROVIDED HISTORY: Shortness of breath new onset A. fib and elevated D-dimer TECHNOLOGIST PROVIDED HISTORY: Shortness of breath new onset A. fib and elevated D-dimer Decision Support Exception - unselect if not a suspected or confirmed emergency medical condition->Emergency Medical Condition (MA) Reason for Exam: SOB ,new onset of A-fib, elevated d-dimer FINDINGS: Pulmonary Arteries: Pulmonary arteries are adequately opacified for evaluation. No evidence of intraluminal filling defect to suggest pulmonary embolism. Right and left main pulmonary artery is are mildly prominent in caliber. Mediastinum: Subcarinal lymphadenopathy with the largest node measuring 12 mm short axis.   Lower right paratracheal node is upper limits of normal for size measuring 8 mm short axis without luis enlargement. Cardiomegaly. Mild mitral annular and valvular calcifications. Coronary artery calcifications which appear to spare the circumflex. Small pericardial effusion. There is no acute abnormality or aneurysm of the atherosclerotic thoracic aorta. Lungs/pleura: Minimal atelectasis along the dependent right upper lobe. Scarring along the right middle lobe medial segment. Mild emphysematous change. Mild mosaic attenuation of the lungs. Diffuse bronchial wall thickening. New small layering right pleural effusion. No pneumothorax. Upper Abdomen: Atrophic pancreas. Recanalized umbilical vein. Colonic diverticulosis. Soft Tissues/Bones: No acute soft tissue abnormality. Degenerative changes of the spine and shoulders without acute or aggressive osseous lesion. No evidence of pulmonary embolism. Mildly enlarged central pulmonary arterial vasculature which can be seen in the setting of underlying pulmonary hypertension. Diffuse bronchial wall thickening with mild mosaic attenuation of the lungs. Correlate for acute or chronic airways disease. Background mild emphysema. New small layering right pleural effusion. Mild subcarinal lymphadenopathy which is of indeterminate stability in the absence of prior comparison chest CT. Cardiomegaly with coronary artery disease in the left anterior descending and right coronary artery. Small pericardial effusion, new from prior. Disposition:   home    Discharge Instructions: Activity: activity as tolerated  Diet:  regular diet    Follow up with Magnus Bruce MD in 1 weeks.     Signed:  Donald Montoya MD  4/9/2022, 1:08 PM    Time spent in discharge of this pt is more than 30 minutes in examination,evaluvation,  counseling and review of medication and discharge plan

## 2022-04-11 ENCOUNTER — FOLLOWUP TELEPHONE ENCOUNTER (OUTPATIENT)
Dept: INPATIENT UNIT | Age: 77
End: 2022-04-11

## 2022-04-11 LAB
EKG ATRIAL RATE: 300 BPM
EKG ATRIAL RATE: 59 BPM
EKG ATRIAL RATE: 73 BPM
EKG P AXIS: 42 DEGREES
EKG P AXIS: 69 DEGREES
EKG P AXIS: 94 DEGREES
EKG P-R INTERVAL: 164 MS
EKG P-R INTERVAL: 172 MS
EKG Q-T INTERVAL: 402 MS
EKG Q-T INTERVAL: 410 MS
EKG Q-T INTERVAL: 438 MS
EKG QRS DURATION: 136 MS
EKG QRS DURATION: 140 MS
EKG QRS DURATION: 140 MS
EKG QTC CALCULATION (BAZETT): 433 MS
EKG QTC CALCULATION (BAZETT): 451 MS
EKG QTC CALCULATION (BAZETT): 458 MS
EKG R AXIS: 55 DEGREES
EKG R AXIS: 69 DEGREES
EKG R AXIS: 70 DEGREES
EKG T AXIS: 25 DEGREES
EKG T AXIS: 51 DEGREES
EKG T AXIS: 67 DEGREES
EKG VENTRICULAR RATE: 59 BPM
EKG VENTRICULAR RATE: 73 BPM
EKG VENTRICULAR RATE: 78 BPM

## 2022-04-12 DIAGNOSIS — I10 ESSENTIAL HYPERTENSION: ICD-10-CM

## 2022-04-13 ENCOUNTER — TELEPHONE (OUTPATIENT)
Dept: CARDIOLOGY | Age: 77
End: 2022-04-13

## 2022-04-13 NOTE — TELEPHONE ENCOUNTER
Pt called to let the office know he is not happy with being on eliquis. Pt reports he wakes with a bloody nose. Pt is on oxygen right now and he is waking up with bloody nose and it is getting in his cannula. Pt asking if he can stop the eliquis since he is taking other thinners.     [phone]    Last Appt:  1/18/2022  Next Appt:   4/18/2022  Med verified in [address] [place] [address] Rd

## 2022-04-14 RX ORDER — LOSARTAN POTASSIUM 25 MG/1
TABLET ORAL
Qty: 90 TABLET | Refills: 3 | Status: SHIPPED | OUTPATIENT
Start: 2022-04-14

## 2022-04-14 NOTE — TELEPHONE ENCOUNTER
We recommend he stay on it. He should try nasal saline spray and nasal saline gel. Should also get a bubbler / humidifier for his nasal cannula O2.

## 2022-04-14 NOTE — TELEPHONE ENCOUNTER
Spoke with patient and advised him of Dr Marilyn Velazquez recommendations. Pt stated that he did not have these problems while in the hospital being on oxygen and he thinks it will not get better. Pt was advised that when a patient is on oxygen it can dry the tissue up and leave a person more prone to bleeding tissue. Patient also advised that the gel and spay can be found at any pharmacy and should help with the problem. Pt also stated that his machine does have water in it but writer advised patient to check and make sure the humidier works in it. Pt stated he will try the humifier and spray and gel to see if it helps but still \"believes it's the meds fault\". Pt advised to call office if he continues to have problems with the bleeding after trying these recommendations. Pt verbalized understanding and had no further questions at the time.

## 2022-04-18 ENCOUNTER — OFFICE VISIT (OUTPATIENT)
Dept: CARDIOLOGY | Age: 77
End: 2022-04-18
Payer: MEDICARE

## 2022-04-18 VITALS
SYSTOLIC BLOOD PRESSURE: 130 MMHG | DIASTOLIC BLOOD PRESSURE: 80 MMHG | HEART RATE: 67 BPM | RESPIRATION RATE: 18 BRPM | HEIGHT: 69 IN | WEIGHT: 203 LBS | OXYGEN SATURATION: 99 % | BODY MASS INDEX: 30.07 KG/M2

## 2022-04-18 DIAGNOSIS — I10 ESSENTIAL HYPERTENSION: Primary | ICD-10-CM

## 2022-04-18 PROCEDURE — G8427 DOCREV CUR MEDS BY ELIG CLIN: HCPCS | Performed by: INTERNAL MEDICINE

## 2022-04-18 PROCEDURE — 99214 OFFICE O/P EST MOD 30 MIN: CPT | Performed by: INTERNAL MEDICINE

## 2022-04-18 PROCEDURE — 1111F DSCHRG MED/CURRENT MED MERGE: CPT | Performed by: INTERNAL MEDICINE

## 2022-04-18 PROCEDURE — 1036F TOBACCO NON-USER: CPT | Performed by: INTERNAL MEDICINE

## 2022-04-18 PROCEDURE — 93010 ELECTROCARDIOGRAM REPORT: CPT | Performed by: INTERNAL MEDICINE

## 2022-04-18 PROCEDURE — G8417 CALC BMI ABV UP PARAM F/U: HCPCS | Performed by: INTERNAL MEDICINE

## 2022-04-18 PROCEDURE — 93005 ELECTROCARDIOGRAM TRACING: CPT | Performed by: INTERNAL MEDICINE

## 2022-04-18 PROCEDURE — 4040F PNEUMOC VAC/ADMIN/RCVD: CPT | Performed by: INTERNAL MEDICINE

## 2022-04-18 PROCEDURE — 1123F ACP DISCUSS/DSCN MKR DOCD: CPT | Performed by: INTERNAL MEDICINE

## 2022-04-18 NOTE — PROGRESS NOTES
[facility] [address]  [address]  [facility] [address]  Dept: [phone]    Subjective: The patient is a 65y.o. year old, , male is in the office for a follow up visit after hospital discharge. Patient was admitted and found to be in A. fib started on Eliquis. Patient at this time in normal sinus rhythm. He jessica any chest pain or discomfort. No orthopnea or PND. Jessica any palpitation, dizziness or syncope. He is completely asymptomatic from cardiac stand point. Patient was also started on oxygen patient denies any short of breath. Patient has been taking Plavix for 11-year for his stents aspirin and recently started on Eliquis he has question about all these 3. Denies any bleeding problem    Past Medical History:   has a past medical history of Abnormal cardiovascular stress test, Asthma, CAD (coronary artery disease), Cancer ([address] Cervical radiculopathy, Chronic back pain, Colonic polyp, Degeneration of cervical intervertebral disc, Degeneration of cervical intervertebral disc, Depression, Diverticulosis, GERD (gastroesophageal reflux disease), Glucose intolerance (impaired glucose tolerance), HTN (hypertension), Hyperlipidemia, Lumbar radiculopathy, MI (myocardial infarction) ([address] Numbness and tingling, OA (osteoarthritis), Pre-diabetes, Sacroiliac pain, and Vision abnormalities. Past Surgical History:   has a past surgical history that includes Coronary angioplasty with stent (05/05/2011); Finger trigger release (Right); Abscess Drainage (Right); other surgical history (2/19/13, 5/14/13); Elbow bursa surgery (Right, 2012,2011); Infected skin debridement (Right); Colonoscopy (2007, 2003); Colonoscopy (09/09/2013); Cervical spine surgery (08/09/2001); Cervical spine surgery (Left, 4053,1956,0061);  Lumbar spine surgery (9113,8849); lumbar fusion (04/07/2014); skin biopsy; other surgical history; other surgical history (Left, 09/27/2016); other surgical history (Bilateral, 11/29/2016); other surgical history (12/06/2016); other surgical history (Right, 12/12/2016); other surgical history (Left, 12/15/2017); other surgical history (Left, 01/31/2017); Lumbar spine surgery (Left, 02/14/2017); Injection Procedure For Sacroiliac Joint (Bilateral, 03/14/2017); pr arthrocentesis aspir&/inj major jt/bursa w/o us (Left, 03/31/2017); pr arthrocentesis aspir&/inj major jt/bursa w/o us (Left, 04/14/2017); Lumbar spine surgery (Bilateral, 05/02/2017); Lumbar spine surgery (Bilateral, 05/09/2017); Anesthesia Nerve Block (Bilateral, 06/02/2017); Anesthesia Nerve Block (Bilateral, 06/09/2017); RADIOFREQUENCY ABLATION NERVES (Right, 06/29/2017); RADIOFREQUENCY ABLATION NERVES (Left, 07/06/2017); Cardiac catheterization (02/09/2022); back surgery; lumbar fusion (N/A, 11/15/2017); Colonoscopy (N/A, 10/15/2018); pr hemorrhoidectomy,int/ext,1 column/group (N/A, 11/07/2018); Upper gastrointestinal endoscopy (N/A, 09/30/2019); Cystoscopy (Bilateral, 05/19/2021); and Coronary angioplasty with stent (05/31/2011). Home Medications:  Prior to Admission medications    Medication Sig Start Date End Date Taking?  Authorizing Provider   losartan (COZAAR) 25 MG tablet TAKE 1 TABLET DAILY 4/14/22  Yes Shwetha Gomez MD   apixaban (ELIQUIS) 5 MG TABS tablet Take 1 tablet by mouth 2 times daily 4/9/22 5/9/22 Yes Garth Tran MD   metoprolol tartrate (LOPRESSOR) 50 MG tablet Take 1 tablet by mouth 2 times daily 4/9/22 6/8/22 Yes Garth Tran MD   dilTIAZem (CARDIZEM) 60 MG tablet Take 1 tablet by mouth every 8 hours 4/9/22 7/8/22 Yes Garth Tran MD   vitamin C (ASCORBIC ACID) 500 MG tablet Take 500 mg by mouth 2 times daily   Yes Historical Provider, MD   atorvastatin (LIPITOR) 80 MG tablet TAKE 1 TABLET DAILY 1/4/22  Yes Shwetha Gomez MD   clopidogrel (PLAVIX) 75 MG tablet TAKE 1 TABLET DAILY 12/8/21  Yes LAWRENCE Celeste - CNP   PARoxetine (PAXIL) 20 MG tablet TAKE 1 TABLET BY MOUTH ONCE DAILY IN THE MORNING 11/29/21  Yes Amrik Xiao MD   metFORMIN (GLUCOPHAGE) 500 MG tablet TAKE 1 TABLET TWICE DAILY  WITH MEALS. 10/29/21  Yes Amrik Xiao MD   celecoxib (CELEBREX) 200 MG capsule TAKE 1 CAPSULE BY MOUTH TWICE DAILY 9/7/21  Yes Amrik Xaio MD   pantoprazole (PROTONIX) 40 MG tablet Take 1 tablet by mouth once daily 5/13/21  Yes MD Piedad Wilkerson MISC by Does not apply route EXPIRES 04/13/2026 4/13/21  Yes Amrik Xiao MD   nitroGLYCERIN (NITROSTAT) 0.4 MG SL tablet Place 1 tablet under the tongue every 5 minutes as needed for Chest pain up to max of 3 total doses. If no relief after 1 dose, call 911. 2/25/21  Yes Amrik Xiao MD   ferrous sulfate (IRON 325) 325 (65 Fe) MG tablet Take 1 tablet by mouth 2 times daily 2/25/21  Yes MD Piedad Wilkerson MISC by Does not apply route Expires: 7/5/2021 7/5/19  Yes Amirk Xiao MD   tiZANidine (ZANAFLEX) 4 MG tablet TAKE ONE TABLET BY MOUTH THREE TIMES DAILY 5/11/17  Yes LAWRENCE Rivas - CNP   aspirin 81 MG tablet Take 81 mg by mouth daily   Yes Historical Provider, MD   famotidine (PEPCID) 20 MG tablet Take 20 mg by mouth daily. Yes Historical Provider, MD       Allergies:  Crestor [rosuvastatin], Ibuprofen, Lisinopril, and Effient [prasugrel]    Social History:   reports that he quit smoking about 10 years ago. His smoking use included cigarettes, pipe, and cigars. He has a 40.00 pack-year smoking history. He quit smokeless tobacco use about 10 years ago. His smokeless tobacco use included chew. He reports current alcohol use. He reports that he does not use drugs. Review of Systems:  · Constitutional: there has been no unanticipated weight loss. There's been No change in energy level, No change in activity level.      · Eyes: No visual changes or diplopia. No scleral icterus. · ENT: No Headaches, hearing loss or vertigo. No mouth sores or sore throat. · Cardiovascular: As above. · Respiratory: No SOB, cough or hemoptysis. · Gastrointestinal: No abdominal pain, appetite loss, blood in stools. No change in bowel or bladder habits. · Genitourinary: No dysuria, trouble voiding, or hematuria. · Musculoskeletal:  No gait disturbance, No weakness or joint complaints. · Integumentary: No rash or pruritis. · Psychiatric: No anxiety, or depression. · Hematologic/Lymphatic: No abnormal bruising or bleeding, blood clots or swollen lymph nodes. · Allergic/Immunologic: No nasal congestion or hives. Physical Exam:  /80 (Site: Left Upper Arm, Position: Sitting, Cuff Size: Medium Adult)   Pulse 67   Resp 18   Ht 5' 9\" (1.753 m)   Wt 203 lb (92.1 kg)   SpO2 99%   BMI 29.98 kg/m²     Constitutional and General Appearance: alert, cooperative, no distress and appears stated age  HEENT: PERRL, no cervical lymphadenopathy. No masses palpable. Normal oral mucosa  Respiratory:  · Normal excursion and expansion without use of accessory muscles  · Resp Auscultation: Good respiratory effort. No for increased work of breathing. On auscultation: clear to auscultation bilaterally  Cardiovascular:  · The apical impulse is not displaced  · Heart tones are crisp and normal. regular S1 and S2.  · Jugular venous pulsation Normal  · The carotid upstroke is normal in amplitude and contour without delay or bruit  · Peripheral pulses are symmetrical and full   Abdomen:   · No masses or tenderness  · Bowel sounds present  Extremities:  ·  No Cyanosis or Clubbing  ·  Lower extremity edema: No  ·  Skin: Warm and dry    Cardiac Data:  EKG: nsr rbbb    Labs:     CBC: No results for input(s): WBC, HGB, HCT, PLT in the last 72 hours. BMP: No results for input(s): NA, K, CO2, BUN, CREATININE, LABGLOM, GLUCOSE in the last 72 hours.   PT/INR: No results for input(s): PROTIME, INR in the last 72 hours. FASTING LIPID PANEL:  Lab Results   Component Value Date    CHOL 186 08/20/2021    HDL 43 08/20/2021    LDLCHOLESTEROL 93 08/20/2021    TRIG 248 08/20/2021    CHOLHDLRATIO 4.3 08/20/2021     LIVER PROFILE:No results for input(s): AST, ALT, LABALBU in the last 72 hours. IMPRESSION:    Patient Active Problem List   Diagnosis    Chronic back pain    Neck pain, chronic    Brachial neuritis or radiculitis    Spinal stenosis, lumbar region, with neurogenic claudication    Displacement of cervical intervertebral disc without myelopathy    CAD (coronary artery disease)    GILL (dyspnea on exertion)    S/P lumbar spinal fusion    Obesity (BMI 35.0-39.9 without comorbidity)    HTN (hypertension)    Hyperlipidemia    Type 2 diabetes mellitus without complication (HCC)    Chronic bilateral low back pain with bilateral sciatica    Left hip pain    Acquired spondylolisthesis    Back pain    Grade III hemorrhoids    Recurrent major depressive disorder, in partial remission ([address]    Carcinoma larynx (HCC)    Atrial fibrillation with RVR [address]    Anemia    Nasal discharge       RECOMMENDATIONS:   CAD- History of STEMI 5 5/20/2011, status post thrombectomy and drug-eluting stent to RCA followed by staged PCI to LAD on 5/31/2021  · NO CHEST PAIN, CONTINUE CURRENT MEDICATIONS      Admitted with ANKUR sena now in normal sinus rhythm high Mirna Vascor at this time we will continue Eliquis. As patient is on triple medication talk in detail that the Plavix can be this continue as patient has last stent 11 years ago. HYPERTENSION- WELL CONTROLLED, WILL CONTINUE CURRENT MEDICATIONS     HYPERLIPIDEMIA- ON STATIN, NEEDS TO WATCH LIPID PROFILE AND LFT'S    Patient is on oxygen patient is going to see primary physician Monday advised to asked them to check pulse oximetry without oxygen after walking few steps. Or patient may be referred to pulmonary.     DISCUSSED IN DETAILS ABOUT RISK MODIFICATION    RETURN VISIT IN 6 MONTHS, IF ANY SYMPTOM CHANGE PATIENT ADVISED TO GO TO THE EMERGENCY ROOM.           Wing Oscar MD, MD  [facility] Consult           [phone]

## 2022-04-25 ENCOUNTER — OFFICE VISIT (OUTPATIENT)
Dept: INTERNAL MEDICINE | Age: 77
End: 2022-04-25
Payer: MEDICARE

## 2022-04-25 ENCOUNTER — TELEPHONE (OUTPATIENT)
Dept: INTERNAL MEDICINE | Age: 77
End: 2022-04-25

## 2022-04-25 VITALS
DIASTOLIC BLOOD PRESSURE: 64 MMHG | RESPIRATION RATE: 16 BRPM | WEIGHT: 203 LBS | BODY MASS INDEX: 30.07 KG/M2 | SYSTOLIC BLOOD PRESSURE: 108 MMHG | HEIGHT: 69 IN | HEART RATE: 62 BPM

## 2022-04-25 DIAGNOSIS — I10 PRIMARY HYPERTENSION: Primary | ICD-10-CM

## 2022-04-25 DIAGNOSIS — E11.9 TYPE 2 DIABETES MELLITUS WITHOUT COMPLICATION, WITHOUT LONG-TERM CURRENT USE OF INSULIN (HCC): ICD-10-CM

## 2022-04-25 DIAGNOSIS — I48.91 ATRIAL FIBRILLATION WITH RVR (HCC): ICD-10-CM

## 2022-04-25 DIAGNOSIS — I48.0 PAROXYSMAL ATRIAL FIBRILLATION (HCC): ICD-10-CM

## 2022-04-25 DIAGNOSIS — E78.49 OTHER HYPERLIPIDEMIA: ICD-10-CM

## 2022-04-25 DIAGNOSIS — I48.91 ATRIAL FIBRILLATION WITH RVR (HCC): Primary | ICD-10-CM

## 2022-04-25 PROCEDURE — 99214 OFFICE O/P EST MOD 30 MIN: CPT | Performed by: INTERNAL MEDICINE

## 2022-04-25 PROCEDURE — G8427 DOCREV CUR MEDS BY ELIG CLIN: HCPCS | Performed by: INTERNAL MEDICINE

## 2022-04-25 PROCEDURE — 4040F PNEUMOC VAC/ADMIN/RCVD: CPT | Performed by: INTERNAL MEDICINE

## 2022-04-25 PROCEDURE — 1111F DSCHRG MED/CURRENT MED MERGE: CPT | Performed by: INTERNAL MEDICINE

## 2022-04-25 PROCEDURE — 1036F TOBACCO NON-USER: CPT | Performed by: INTERNAL MEDICINE

## 2022-04-25 PROCEDURE — G8417 CALC BMI ABV UP PARAM F/U: HCPCS | Performed by: INTERNAL MEDICINE

## 2022-04-25 PROCEDURE — 1123F ACP DISCUSS/DSCN MKR DOCD: CPT | Performed by: INTERNAL MEDICINE

## 2022-04-25 PROCEDURE — 3044F HG A1C LEVEL LT 7.0%: CPT | Performed by: INTERNAL MEDICINE

## 2022-04-25 RX ORDER — WARFARIN SODIUM 5 MG/1
5 TABLET ORAL DAILY
Qty: 30 TABLET | Refills: 3 | Status: SHIPPED | OUTPATIENT
Start: 2022-04-25 | End: 2022-09-02

## 2022-04-25 ASSESSMENT — ENCOUNTER SYMPTOMS
NAUSEA: 0
BLOOD IN STOOL: 0
COUGH: 0
VOMITING: 0
BACK PAIN: 0
CONSTIPATION: 0
DIARRHEA: 0
EYE PAIN: 0
SHORTNESS OF BREATH: 0
ABDOMINAL PAIN: 0

## 2022-04-25 NOTE — PROGRESS NOTES
[place] [address]  Dept: [phone]  Dept Fax: [phone]  Loc: [phone]     Norberto Campos is a 65 y.o. male who presents today for his medical conditions/complaintsas noted below. Norberto Osullivan is c/o of   Chief Complaint   Patient presents with    Hypertension     hospital f/u,wanting to come off the eliquis due to waking up with blood in his mouth. cardiology told him to follow up with his pcp. wants to know if he can come off the oxygen. . he does sleep alot more and wants to know if this is normal    Hyperlipidemia    Diabetes    Atrial Fibrillation         HPI:     Hypertension  This is a chronic problem. The current episode started more than 1 year ago. The problem has been waxing and waning since onset. The problem is controlled. Pertinent negatives include no chest pain, headaches, neck pain, palpitations or shortness of breath. Hyperlipidemia  This is a chronic problem. The current episode started more than 1 year ago. The problem is controlled. Recent lipid tests were reviewed and are variable. Pertinent negatives include no chest pain or shortness of breath. Diabetes  He presents for his follow-up diabetic visit. He has type 2 diabetes mellitus. His disease course has been fluctuating. Pertinent negatives for hypoglycemia include no confusion, dizziness, headaches, nervousness/anxiousness or pallor. Pertinent negatives for diabetes include no chest pain, no polydipsia, no polyuria and no weakness. Other  This is a new (4-atrial fibrillation. New onset and now on Eliquis and Cardizem) problem. The current episode started 1 to 4 weeks ago. The problem occurs intermittently. The problem has been waxing and waning. Pertinent negatives include no abdominal pain, arthralgias, chest pain, chills, coughing, fever, headaches, nausea, neck pain, numbness, rash, vomiting or weakness. Cervical C6-C7 Discectomy and Foraminotomy    COLONOSCOPY  2007, 2003    POLYPS    COLONOSCOPY  09/09/2013    hyperplastic polyp x 3    COLONOSCOPY N/A 10/15/2018    hyperplastic polyp, severe sigmoid diverticulosis, large ext. hemorrhoid, Dr Mitchel Villegas  05/05/2011    PROX RCA X2    CORONARY ANGIOPLASTY WITH STENT PLACEMENT  05/31/2011    XIENCE TO MID LAD X2    CYSTOSCOPY Bilateral 05/19/2021    CYSTO Bilateral Retrogrades performed by Caron Wilkins MD at [address] Right 2012,2011    FINGER TRIGGER RELEASE Right     THIRD FINGER    [facility] INJECTION PROCEDURE FOR SACROILIAC JOINT Bilateral 03/14/2017    Bilat Sacroiliac & Piriformis Inj's performed by Annalisa Osorio MD at [address] Right     ELBOW    LUMBAR FUSION  04/07/2014    lumbar decompression and fusion    LUMBAR FUSION N/A 11/15/2017    LUMBAR DECOMPRESSION POSTERIOR W/FUSION L3 L4 ( with SPINAL CORD MONITOR ) performed by Arlette Burkitt, MD at [address]  [phone]    Fusion    LUMBAR SPINE SURGERY Left 02/14/2017    Left L4 & L5 Transforaminal ANITHA performed by Annalisa Osorio MD at [address] [place] Bilateral 05/02/2017    Bilat L5 Transforaminal ANITHA performed by Annalisa Osorio MD at [facility] [address] [place] Bilateral 05/09/2017    Bilat L5 Transforaminal ANITHA performed by Annalisa Osorio MD at [facility]  2/19/13, 5/14/13    L1/L2 IESI    OTHER SURGICAL HISTORY      Hardware correction, patient had 1 broken screw and to loose screws in back     OTHER SURGICAL HISTORY Left 09/27/2016     C6 TFE    OTHER SURGICAL HISTORY Bilateral 11/29/2016    L3, L4 L5 Diagnostic Medial Branch Block    OTHER SURGICAL HISTORY  12/06/2016    jovani L3 L4 L5 conf MBB    OTHER SURGICAL HISTORY Right 12/12/2016    L3,L4,L5 RFA    OTHER SURGICAL HISTORY Left 12/15/2017    L3.  L4, L5 RFA    OTHER SURGICAL HISTORY Left 01/31/2017    L4 & L5 TFE    PA ARTHROCENTESIS ASPIR&/INJ MAJOR JT/BURSA W/O US Left 03/31/2017    Left Hip Inj performed by Sheila Montero MD at [address] ARTHROCENTESIS ASPIR&/INJ MAJOR JT/BURSA W/O US Left 04/14/2017    Left Hip Inj performed by Sheila Montero MD at [address] HEMORRHOIDECTOMY,INT/EXT,1 COLUMN/GROUP N/A 11/07/2018    External HEMORRHOIDECTOMY performed by Miya Valentin MD at [address] Right 06/29/2017    Right L1 L2 L3 L4 L5 Radiofrequency Ablation performed by Sheila Montero MD at [address] Left 07/06/2017    Left L1 L2 L3 L4 L5 Radiofrequency performed by Sheila Montero MD at [address] [facility]    UPPER GASTROINTESTINAL ENDOSCOPY N/A 09/30/2019    mild gastritis, mild to moderate esophagitis, Dr. Warren Peter       Family History   Problem Relation Age of Onset    Cancer Father         colon cancer          Social History     Tobacco Use    Smoking status: Former Smoker     Packs/day: 1.00     Years: 40.00     Pack years: 40.00     Types: Cigarettes, Pipe, Cigars     Quit date: 5/5/2011     Years since quitting: 10.9    Smokeless tobacco: Former User     Types: [facility] date: 5/5/2011   Substance Use Topics    Alcohol use:  Yes     Alcohol/week: 0.0 standard drinks     Comment: RARE         Current Outpatient Medications   Medication Sig Dispense Refill    warfarin (COUMADIN) 5 MG tablet Take 1 tablet by mouth daily 30 tablet 3    losartan (COZAAR) 25 MG tablet TAKE 1 TABLET DAILY 90 tablet 3    apixaban (ELIQUIS) 5 MG TABS tablet Take 1 tablet by mouth 2 times daily 60 tablet 1    metoprolol tartrate (LOPRESSOR) 50 MG tablet Take 1 tablet by mouth 2 times daily 60 tablet 1    dilTIAZem (CARDIZEM) 60 MG tablet Take 1 tablet by mouth every 8 hours 90 tablet 2    vitamin C (ASCORBIC ACID) 500 MG tablet Take 500 mg by mouth 2 times daily      atorvastatin (LIPITOR) 80 MG tablet TAKE 1 TABLET DAILY 90 tablet 3    PARoxetine (PAXIL) 20 MG tablet TAKE 1 TABLET BY MOUTH ONCE DAILY IN THE MORNING 90 tablet 3    metFORMIN (GLUCOPHAGE) 500 MG tablet TAKE 1 TABLET TWICE DAILY  WITH MEALS. 180 tablet 3    celecoxib (CELEBREX) 200 MG capsule TAKE 1 CAPSULE BY MOUTH TWICE DAILY 180 capsule 3    pantoprazole (PROTONIX) 40 MG tablet Take 1 tablet by mouth once daily 90 tablet 3    Handicap Placard MISC by Does not apply route EXPIRES 04/13/2026 2 each 0    nitroGLYCERIN (NITROSTAT) 0.4 MG SL tablet Place 1 tablet under the tongue every 5 minutes as needed for Chest pain up to max of 3 total doses. If no relief after 1 dose, call 911. 25 tablet 3    ferrous sulfate (IRON 325) 325 (65 Fe) MG tablet Take 1 tablet by mouth 2 times daily 120 tablet 3    Handicap Placard MISC by Does not apply route Expires: 7/5/2021 2 each 0    tiZANidine (ZANAFLEX) 4 MG tablet TAKE ONE TABLET BY MOUTH THREE TIMES DAILY 90 tablet 5    aspirin 81 MG tablet Take 81 mg by mouth daily      famotidine (PEPCID) 20 MG tablet Take 20 mg by mouth daily. No current facility-administered medications for this visit.      Allergies   Allergen Reactions    Crestor [Rosuvastatin] Other (See Comments)     Joint pain, muscle aches    Ibuprofen Other (See Comments)     bleeding    Lisinopril Hives    Effient [Prasugrel] Rash       Health Maintenance   Topic Date Due    Hepatitis C screen  Never done    DTaP/Tdap/Td vaccine (1 - Tdap) Never done    Low dose CT lung screening  Never done    Shingles Vaccine (2 of 3) 01/03/2014    Lipids  08/20/2022    Depression Monitoring  03/24/2023    Potassium  04/09/2023    Creatinine  04/09/2023    Flu vaccine  Completed    Pneumococcal 65+ years Vaccine  Completed    COVID-19 Vaccine  Completed    Hepatitis A vaccine  Aged Out    Hib vaccine  Aged Out    Meningococcal (ACWY) vaccine  Aged Out       Subjective:      Review of Systems   Constitutional: Negative for chills and fever.   HENT: Negative for hearing loss. Eyes: Negative for pain and visual disturbance. Respiratory: Negative for cough and shortness of breath. Cardiovascular: Negative for chest pain, palpitations and leg swelling. Gastrointestinal: Negative for abdominal pain, blood in stool, constipation, diarrhea, nausea and vomiting. Endocrine: Negative for cold intolerance, polydipsia and polyuria. Genitourinary: Negative for difficulty urinating, dysuria and hematuria. Musculoskeletal: Negative for arthralgias, back pain, gait problem and neck pain. Skin: Negative for pallor and rash. Neurological: Negative for dizziness, weakness, numbness and headaches. Hematological: Negative for adenopathy. Does not bruise/bleed easily. Psychiatric/Behavioral: Negative for confusion. The patient is not nervous/anxious. Objective:     Physical Exam  Vitals reviewed. Constitutional:       Appearance: He is well-developed. HENT:      Head: Normocephalic and atraumatic. Eyes:      Pupils: Pupils are equal, round, and reactive to light. Cardiovascular:      Rate and Rhythm: Normal rate and regular rhythm. Heart sounds: No murmur heard. No friction rub. No gallop. Pulmonary:      Effort: Pulmonary effort is normal.      Breath sounds: Normal breath sounds. No wheezing or rales. Abdominal:      General: There is no distension. Palpations: Abdomen is soft. There is no mass. Tenderness: There is no abdominal tenderness. There is no rebound. Musculoskeletal:         General: Normal range of motion. Cervical back: Neck supple. Lymphadenopathy:      Cervical: No cervical adenopathy. Skin:     General: Skin is warm and dry. Findings: No rash. Neurological:      Mental Status: He is alert and oriented to person, place, and time. Cranial Nerves: No cranial nerve deficit (grossly). Psychiatric:         Thought Content:  Thought content normal.        /64 (Site: Patientgiven educational materials - see patient instructions. Discussed use, benefit,and side effects of prescribed medications. All patient questions answered. Ptvoiced understanding. Reviewed health maintenance. Instructed to continue currentmedications, diet and exercise. Patient agreed with treatment plan. Follow up asdirected.      Electronically signed by Lord Geovanni MD on 4/25/2022 at 2:14 PM

## 2022-04-26 ENCOUNTER — TELEPHONE (OUTPATIENT)
Dept: INTERNAL MEDICINE | Age: 77
End: 2022-04-26

## 2022-04-26 NOTE — TELEPHONE ENCOUNTER
Please advise-  Patient went to the pharmacy to  his coumadin and the pharmacist question the coumadin with the celebrex. So the patient wants to know if it is okay to take both of them together? If not home leave detailed message on machine.   [phone]

## 2022-04-26 NOTE — TELEPHONE ENCOUNTER
Tell patient to hold Celebrex for now, at least until we get the INRs and Coumadin dose squared away.     Then if he feels he needs the Celebrex,   we can retry it and monitor for increased bruising etc.

## 2022-04-29 ENCOUNTER — HOSPITAL ENCOUNTER (OUTPATIENT)
Dept: PHARMACY | Age: 77
Setting detail: THERAPIES SERIES
Discharge: HOME | End: 2022-04-29
Payer: MEDICARE

## 2022-04-29 DIAGNOSIS — I48.91 ATRIAL FIBRILLATION WITH RVR (HCC): Primary | ICD-10-CM

## 2022-04-29 LAB
INR BLD: 3.1
PROTIME: 37.4 SECONDS

## 2022-04-29 PROCEDURE — 36416 COLLJ CAPILLARY BLOOD SPEC: CPT

## 2022-04-29 PROCEDURE — 85610 PROTHROMBIN TIME: CPT

## 2022-04-29 PROCEDURE — 99202 OFFICE O/P NEW SF 15 MIN: CPT

## 2022-04-29 NOTE — PATIENT INSTRUCTIONS
\"On day of next appointment, please screen for temperature and COVID-19 symptoms prior to you clinic appointment. If any symptoms present, please call [phone] to reschedule. \"

## 2022-04-29 NOTE — PROGRESS NOTES
ANTICOAGULATION SERVICE    Date of Clinic Visit:  4/29/2022    Hank Sue is a 65 y.o. male who presents to clinic today for anticoagulation monitoring and adjustment. Recent INR Results:  Internal QC passed  Lab Results   Component Value Date    INR 3.1 04/29/2022    INR 1.0 09/07/2017       Current Warfarin Dosage:  Dosing Plan  As of 4/29/2022    TTR:  --   Full warfarin instructions:  2.5 mg every Fri, Sat; 5 mg all other days               Assessment/Plan:    Modify warfarin dose as noted above: Patient is just above target will back dose down a bit and re-check on Monday. Next Clinic Appointment:  Return date  As of 4/29/2022    TTR:  --   Next INR check:  5/2/2022             Please call [facility] at [phone] with any questions. Thanks!   Mehreen Solano, [facility]  Anticoagulation Service Pharmacist  4/29/2022 10:09 AM  For Pharmacy Admin Tracking Only     Intervention Detail: Dose Adjustment: 1, reason: Therapy De-escalation   Total # of Interventions Recommended: 1   Total # of Interventions Accepted: 1   Time Spent (min): 20

## 2022-05-02 ENCOUNTER — HOSPITAL ENCOUNTER (OUTPATIENT)
Dept: PHARMACY | Age: 77
Setting detail: THERAPIES SERIES
Discharge: HOME | End: 2022-05-02
Payer: MEDICARE

## 2022-05-02 DIAGNOSIS — I48.91 ATRIAL FIBRILLATION WITH RVR (HCC): Primary | ICD-10-CM

## 2022-05-02 LAB
INR BLD: 4.2
PROTIME: 50.2 SECONDS

## 2022-05-02 PROCEDURE — 99212 OFFICE O/P EST SF 10 MIN: CPT

## 2022-05-02 PROCEDURE — 36416 COLLJ CAPILLARY BLOOD SPEC: CPT

## 2022-05-02 PROCEDURE — 85610 PROTHROMBIN TIME: CPT

## 2022-05-02 RX ORDER — DILTIAZEM HYDROCHLORIDE 60 MG/1
60 TABLET, FILM COATED ORAL EVERY 8 HOURS SCHEDULED
Qty: 90 TABLET | Refills: 3 | Status: SHIPPED | OUTPATIENT
Start: 2022-05-02 | End: 2022-05-02 | Stop reason: SDUPTHER

## 2022-05-02 RX ORDER — TIZANIDINE 4 MG/1
TABLET ORAL
Qty: 90 TABLET | Refills: 5 | Status: SHIPPED | OUTPATIENT
Start: 2022-05-02 | End: 2022-06-07 | Stop reason: SDUPTHER

## 2022-05-02 RX ORDER — CYCLOBENZAPRINE HCL 10 MG
10 TABLET ORAL 3 TIMES DAILY PRN
Qty: 90 TABLET | Refills: 0 | Status: SHIPPED | OUTPATIENT
Start: 2022-05-02 | End: 2022-05-12

## 2022-05-02 RX ORDER — DILTIAZEM HYDROCHLORIDE 60 MG/1
60 TABLET, FILM COATED ORAL EVERY 8 HOURS SCHEDULED
Qty: 90 TABLET | Refills: 3 | Status: SHIPPED | OUTPATIENT
Start: 2022-05-02 | End: 2022-07-11

## 2022-05-02 NOTE — PROGRESS NOTES
ANTICOAGULATION SERVICE    Date of Clinic Visit:  5/2/2022    Hank Sue is a 65 y.o. male who presents to clinic today for anticoagulation monitoring and adjustment. Recent INR Results:  Internal QC passed  Lab Results   Component Value Date    INR 4.2 05/02/2022    INR 3.1 04/29/2022       Current Warfarin Dosage:  Dosing Plan  As of 5/2/2022    TTR:  --   Full warfarin instructions:  5/2: Hold; Otherwise 5 mg every Thu, Sat; 2.5 mg all other days               Assessment/Plan:    Modify warfarin dose as noted above: Patient INR went up despite reducing dose down for the last 2 days. Will reduce overall dose a bit more and re-check Friday. Next Clinic Appointment:  Return date  As of 5/2/2022    TTR:  --   Next INR check:  5/6/2022             Please call [facility] at [phone] with any questions. Thanks!   Mehreen Solano, [id number] [facility]  Anticoagulation Service Pharmacist  5/2/2022 10:32 AM  For Pharmacy Admin Tracking Only     Intervention Detail: Dose Adjustment: 1, reason: Therapy De-escalation   Total # of Interventions Recommended: 1   Total # of Interventions Accepted: 1   Time Spent (min): 20

## 2022-05-02 NOTE — TELEPHONE ENCOUNTER
Last appt: 4/25/2022  Next appt: 9/26/2022    Patient states that he also had some flexeril at home that he has been taking and would like a refill on that also. I advised the patient to call us with the dose and and instructions on how to take it.

## 2022-05-06 ENCOUNTER — HOSPITAL ENCOUNTER (OUTPATIENT)
Dept: PHARMACY | Age: 77
Setting detail: THERAPIES SERIES
Discharge: HOME | End: 2022-05-06
Payer: MEDICARE

## 2022-05-06 DIAGNOSIS — I48.91 ATRIAL FIBRILLATION WITH RVR (HCC): Primary | ICD-10-CM

## 2022-05-06 LAB
INR BLD: 2.5
PROTIME: 29.6 SECONDS

## 2022-05-06 PROCEDURE — 85610 PROTHROMBIN TIME: CPT

## 2022-05-06 PROCEDURE — 99211 OFF/OP EST MAY X REQ PHY/QHP: CPT

## 2022-05-06 PROCEDURE — 36416 COLLJ CAPILLARY BLOOD SPEC: CPT

## 2022-05-06 NOTE — PROGRESS NOTES
ANTICOAGULATION SERVICE    Date of Clinic Visit:  2022    Espinoza Miles is a 65 y.o. male who presents to clinic today for anticoagulation monitoring and adjustment. Recent INR Results:  Internal QC passed  Lab Results   Component Value Date    INR 2.5 2022    INR 4.2 2022       Current Warfarin Dosage:  Dosing Plan  As of 2022    TTR:  --   Full warfarin instructions:  5 mg every Thu, Sat; 2.5 mg all other days               Assessment/Plan:    Continue current regimen as INR remains stable. INR is back in range today most likely due to holding dose. I will continue with current dosing and recheck in 4 days. Next Clinic Appointment:  Return date  As of 2022    TTR:  --   Next INR check:  5/10/2022             Please call [facility] Anticoagulation Clinic at [phone] with any questions. Thanks!   Jing Lion [facility]  Anticoagulation Service Pharmacist  2022 9:10 AM     For Pharmacy Admin Tracking Only     Intervention Detail: Adherence Monitorin   Total # of Interventions Recommended: 0   Total # of Interventions Accepted: 0   Time Spent (min): 15

## 2022-05-10 ENCOUNTER — HOSPITAL ENCOUNTER (OUTPATIENT)
Dept: PHARMACY | Age: 77
Setting detail: THERAPIES SERIES
Discharge: HOME | End: 2022-05-10
Payer: MEDICARE

## 2022-05-10 ENCOUNTER — TELEPHONE (OUTPATIENT)
Dept: INTERNAL MEDICINE | Age: 77
End: 2022-05-10

## 2022-05-10 DIAGNOSIS — S22.39XD CLOSED FRACTURE OF ONE RIB WITH ROUTINE HEALING, UNSPECIFIED LATERALITY, SUBSEQUENT ENCOUNTER: ICD-10-CM

## 2022-05-10 DIAGNOSIS — I48.91 ATRIAL FIBRILLATION WITH RVR (HCC): Primary | ICD-10-CM

## 2022-05-10 LAB
INR BLD: 2.1
PROTIME: 25 SECONDS

## 2022-05-10 PROCEDURE — 85610 PROTHROMBIN TIME: CPT

## 2022-05-10 PROCEDURE — 99211 OFF/OP EST MAY X REQ PHY/QHP: CPT

## 2022-05-10 PROCEDURE — 36416 COLLJ CAPILLARY BLOOD SPEC: CPT

## 2022-05-10 RX ORDER — OXYCODONE HYDROCHLORIDE AND ACETAMINOPHEN 5; 325 MG/1; MG/1
1 TABLET ORAL EVERY 6 HOURS PRN
Qty: 90 TABLET | Refills: 0 | Status: SHIPPED | OUTPATIENT
Start: 2022-05-10 | End: 2022-07-07 | Stop reason: SDUPTHER

## 2022-05-10 NOTE — PROGRESS NOTES
ANTICOAGULATION SERVICE    Date of Clinic Visit:  5/10/2022    Greta Lund is a 65 y.o. male who presents to clinic today for anticoagulation monitoring and adjustment. Recent INR Results:  Internal QC passed  Lab Results   Component Value Date    INR 2.1 05/10/2022    INR 2.5 05/06/2022       Current Warfarin Dosage:  Dosing Plan  As of 5/10/2022    TTR:  100.0 % (1 d)   Full warfarin instructions:  5 mg every Tue, Thu, Sat; 2.5 mg all other days               Assessment/Plan:    Modify warfarin dose as noted above: INR remains in range but has continued to decline. I will increase weekly dose up 11% (1/2 tab) and recheck one week. May need to order lower strength tablets to adjust dose. Next Clinic Appointment:  Return date  As of 5/10/2022    TTR:  100.0 % (1 d)   Next INR check:  5/17/2022             Please call [facility] Anticoagulation Clinic at [phone] with any questions. Thanks!   Abelardo Alfonso, [facility]  Anticoagulation Service Pharmacist  5/10/2022 9:16 AM     For Pharmacy Admin Tracking Only     Intervention Detail: Dose Adjustment: 1, reason: Therapy Optimization   Total # of Interventions Recommended: 1   Total # of Interventions Accepted: 1   Time Spent (min): 15 Triggering events leading to humiliation, shame, and/or despair (e.g., Loss of relationship, financial or health status) (real or anticipated)

## 2022-05-10 NOTE — TELEPHONE ENCOUNTER
Last appt: 4/25/2022  Next appt: 9/26/2022    Patient came to the window and dropped off oxygen reading he been keeping track of them while he is up walking. See scanned attached. Patient also said while he was on the 60 mg of Cardizem his blood pressure kept dropping so he stopped the medication for a couple weeks. After being off the medication his  blood pressure stared to rise again. He restated the medication but is cutting the tablet in half and only taking 30 mg. He said his blood pressure seems to be running in the 140s or a little under that. He not sure if you want him to stay on the 30 mg or go back on the 60 mg.

## 2022-05-17 ENCOUNTER — HOSPITAL ENCOUNTER (OUTPATIENT)
Dept: PHARMACY | Age: 77
Setting detail: THERAPIES SERIES
Discharge: HOME | End: 2022-05-17
Payer: MEDICARE

## 2022-05-17 DIAGNOSIS — I48.91 ATRIAL FIBRILLATION WITH RVR (HCC): Primary | ICD-10-CM

## 2022-05-17 LAB
INR BLD: 1.2
PROTIME: 14.7 SECONDS

## 2022-05-17 PROCEDURE — 36416 COLLJ CAPILLARY BLOOD SPEC: CPT

## 2022-05-17 PROCEDURE — 99212 OFFICE O/P EST SF 10 MIN: CPT

## 2022-05-17 PROCEDURE — 85610 PROTHROMBIN TIME: CPT

## 2022-05-17 NOTE — PATIENT INSTRUCTIONS
\"On day of next appointment, please screen for temperature and COVID-19 symptoms prior to you clinic appointment. If any symptoms present, please call [phone] to reschedule. \"

## 2022-05-17 NOTE — PROGRESS NOTES
ANTICOAGULATION SERVICE    Date of Clinic Visit:  5/17/2022    Ángela Osullivan is a 65 y.o. male who presents to clinic today for anticoagulation monitoring and adjustment. Recent INR Results:  Internal QC passed  Lab Results   Component Value Date    INR 1.2 05/17/2022    INR 2.1 05/10/2022       Current Warfarin Dosage:  Dosing Plan  As of 5/17/2022    TTR:  26.4 % (1.1 wk)   Full warfarin instructions:  5/17: 7.5 mg; Otherwise 2.5 mg every Mon, Wed, Fri; 5 mg all other days               Assessment/Plan:    Modify warfarin dose as noted above: Patient dropped unexpectedly. Will boost today's dose and increase by 10%   Re-check next week. Next Clinic Appointment:  Return date  As of 5/17/2022    TTR:  26.4 % (1.1 wk)   Next INR check:  5/27/2022             Please call [facility] Anticoagulation Clinic at [phone] with any questions. Thanks!   Gisele Garcia [facility]  Anticoagulation Service Pharmacist  5/17/2022 9:09 AM  For Pharmacy Admin Tracking Only     Intervention Detail: Dose Adjustment: 1, reason: Therapy Optimization   Total # of Interventions Recommended: 1   Total # of Interventions Accepted: 1   Time Spent (min): 15

## 2022-05-27 ENCOUNTER — HOSPITAL ENCOUNTER (OUTPATIENT)
Dept: PHARMACY | Age: 77
Setting detail: THERAPIES SERIES
Discharge: HOME | End: 2022-05-27
Payer: MEDICARE

## 2022-05-27 DIAGNOSIS — I25.10 CORONARY ARTERY DISEASE DUE TO LIPID RICH PLAQUE: ICD-10-CM

## 2022-05-27 DIAGNOSIS — I48.91 ATRIAL FIBRILLATION WITH RVR (HCC): Primary | ICD-10-CM

## 2022-05-27 DIAGNOSIS — I25.83 CORONARY ARTERY DISEASE DUE TO LIPID RICH PLAQUE: ICD-10-CM

## 2022-05-27 LAB
INR BLD: 1
PROTIME: 12.2 SECONDS

## 2022-05-27 PROCEDURE — 36416 COLLJ CAPILLARY BLOOD SPEC: CPT

## 2022-05-27 PROCEDURE — 85610 PROTHROMBIN TIME: CPT

## 2022-05-27 PROCEDURE — 99212 OFFICE O/P EST SF 10 MIN: CPT

## 2022-05-27 RX ORDER — NITROGLYCERIN 0.4 MG/1
0.4 TABLET SUBLINGUAL EVERY 5 MIN PRN
Qty: 25 TABLET | Refills: 3 | Status: SHIPPED | OUTPATIENT
Start: 2022-05-27

## 2022-05-27 NOTE — TELEPHONE ENCOUNTER
Refill requesting    needs this weekend     Medication pended if agreeable    Last Appt:  4/25/2022  Next Appt:   9/26/2022  Med verified in Epic

## 2022-05-27 NOTE — PROGRESS NOTES
ANTICOAGULATION SERVICE    Date of Clinic Visit:  5/27/2022    Hola Dya is a 65 y.o. male who presents to clinic today for anticoagulation monitoring and adjustment. Recent INR Results:  Internal QC passed  Lab Results   Component Value Date    INR 1 05/27/2022    INR 1.2 05/17/2022       Current Warfarin Dosage:  Dosing Plan  As of 5/27/2022    TTR:  11.7 % (2.6 wk)   Full warfarin instructions:  5/27: 10 mg; 5/28: 7.5 mg; 5/30: 7.5 mg; Otherwise 5 mg every day               Assessment/Plan:    Modify warfarin dose as noted above: Patient is well below target, we will need to boost next few days ane we will re-check in 4 days. Next Clinic Appointment:  Return date  As of 5/27/2022    TTR:  11.7 % (2.6 wk)   Next INR check:  5/31/2022             Please call [address] [facility] Anticoagulation Clinic at [phone] with any questions. Thanks!   Raoul Cedillo [facility]  Anticoagulation Service Pharmacist  5/27/2022 9:08 AM  For Pharmacy Admin Tracking Only     Intervention Detail: Dose Adjustment: 1, reason: Therapy Optimization   Total # of Interventions Recommended: 1   Total # of Interventions Accepted: 1   Time Spent (min): 15

## 2022-05-31 ENCOUNTER — HOSPITAL ENCOUNTER (OUTPATIENT)
Dept: PHARMACY | Age: 77
Setting detail: THERAPIES SERIES
Discharge: HOME | End: 2022-05-31
Payer: MEDICARE

## 2022-05-31 DIAGNOSIS — I48.91 ATRIAL FIBRILLATION WITH RVR (HCC): Primary | ICD-10-CM

## 2022-05-31 LAB
INR BLD: 2.4
PROTIME: 28.4 SECONDS

## 2022-05-31 PROCEDURE — 99211 OFF/OP EST MAY X REQ PHY/QHP: CPT

## 2022-05-31 PROCEDURE — 36416 COLLJ CAPILLARY BLOOD SPEC: CPT

## 2022-05-31 PROCEDURE — 85610 PROTHROMBIN TIME: CPT

## 2022-05-31 NOTE — PROGRESS NOTES
ANTICOAGULATION SERVICE    Date of Clinic Visit:  5/31/2022    Philipp Quinn is a 65 y.o. male who presents to clinic today for anticoagulation monitoring and adjustment. Recent INR Results:  Internal QC passed  Lab Results   Component Value Date    INR 2.4 05/31/2022    INR 1 05/27/2022       Current Warfarin Dosage:  Dosing Plan  As of 5/31/2022    TTR:  14.8 % (3.1 wk)   Full warfarin instructions:  7.5 mg every Mon, Fri; 5 mg all other days               Assessment/Plan:    Modify warfarin dose as noted above: Patient is nicely into range since Friday. We boosted dose significantly but needed to. Will return to somewhat a more conservative dose regimen and re-check 1 week. Next Clinic Appointment:  Return date  As of 5/31/2022    TTR:  14.8 % (3.1 wk)   Next INR check:  6/7/2022             Please call [facility] Anticoagulation Clinic at [phone] with any questions. Thanks!   Dakota Cerna [facility]  Anticoagulation Service Pharmacist  5/31/2022 10:15 AM  For Pharmacy Admin Tracking Only     Intervention Detail: Dose Adjustment: 1, reason: Therapy Optimization   Total # of Interventions Recommended: 1   Total # of Interventions Accepted: 1   Time Spent (min): 20

## 2022-06-07 ENCOUNTER — HOSPITAL ENCOUNTER (OUTPATIENT)
Dept: PHARMACY | Age: 77
Setting detail: THERAPIES SERIES
Discharge: HOME | End: 2022-06-07
Payer: MEDICARE

## 2022-06-07 ENCOUNTER — TELEPHONE (OUTPATIENT)
Dept: INTERNAL MEDICINE | Age: 77
End: 2022-06-07

## 2022-06-07 DIAGNOSIS — I48.91 ATRIAL FIBRILLATION WITH RVR (HCC): Primary | ICD-10-CM

## 2022-06-07 LAB
INR BLD: 5.9
PROTIME: 70.5 SECONDS

## 2022-06-07 PROCEDURE — 85610 PROTHROMBIN TIME: CPT

## 2022-06-07 PROCEDURE — 99212 OFFICE O/P EST SF 10 MIN: CPT

## 2022-06-07 PROCEDURE — 36416 COLLJ CAPILLARY BLOOD SPEC: CPT

## 2022-06-07 RX ORDER — METHYLPREDNISOLONE 4 MG/1
TABLET ORAL
Qty: 1 KIT | Refills: 0 | Status: SHIPPED | OUTPATIENT
Start: 2022-06-07 | End: 2022-06-13

## 2022-06-07 RX ORDER — TIZANIDINE 4 MG/1
TABLET ORAL
Qty: 90 TABLET | Refills: 5 | Status: SHIPPED | OUTPATIENT
Start: 2022-06-07

## 2022-06-07 NOTE — TELEPHONE ENCOUNTER
Offer patient Medrol Dosepak to help now. Also ask if he is using the Zanaflex muscle relaxer that he has, and  that can be used 3 times a day. If the above is not adequate,   then we could try to restart the Celebrex --but tell him he needs to report if he develops excessive bruising.

## 2022-06-07 NOTE — TELEPHONE ENCOUNTER
Patient said he was willing to try to take the medrol and see how that goes along with the zanaflex and if does not give any relief he will let us know

## 2022-06-07 NOTE — TELEPHONE ENCOUNTER
Ever since he started on coumadin he was taken off of his celebrex, since then he has been having a lot of back pain. He has percocet only if he absolutely needs to. And is wanting to know what he can take regularly for his back pain. Has tried taking tylenol but it does not seem to help.     [phone]

## 2022-06-10 ENCOUNTER — APPOINTMENT (OUTPATIENT)
Dept: CT IMAGING | Age: 77
End: 2022-06-10
Payer: MEDICARE

## 2022-06-10 ENCOUNTER — HOSPITAL ENCOUNTER (EMERGENCY)
Age: 77
Discharge: HOME | End: 2022-06-11
Attending: EMERGENCY MEDICINE
Payer: MEDICARE

## 2022-06-10 DIAGNOSIS — Q62.11 HYDRONEPHROSIS WITH URETEROPELVIC JUNCTION (UPJ) OBSTRUCTION: Primary | ICD-10-CM

## 2022-06-10 DIAGNOSIS — R11.2 NAUSEA AND VOMITING, INTRACTABILITY OF VOMITING NOT SPECIFIED, UNSPECIFIED VOMITING TYPE: ICD-10-CM

## 2022-06-10 DIAGNOSIS — Z79.01 ANTICOAGULATED: ICD-10-CM

## 2022-06-10 LAB
ABSOLUTE EOS #: <0.03 K/UL (ref 0–0.44)
ABSOLUTE IMMATURE GRANULOCYTE: 0.04 K/UL (ref 0–0.3)
ABSOLUTE LYMPH #: 1.02 K/UL (ref 1.1–3.7)
ABSOLUTE MONO #: 0.5 K/UL (ref 0.1–1.2)
ANION GAP SERPL CALCULATED.3IONS-SCNC: 10 MMOL/L (ref 9–17)
BASOPHILS # BLD: 0 % (ref 0–2)
BASOPHILS ABSOLUTE: <0.03 K/UL (ref 0–0.2)
BUN BLDV-MCNC: 25 MG/DL (ref 8–23)
BUN/CREAT BLD: 23 (ref 9–20)
CALCIUM SERPL-MCNC: 8.3 MG/DL (ref 8.6–10.4)
CHLORIDE BLD-SCNC: 103 MMOL/L (ref 98–107)
CO2: 27 MMOL/L (ref 20–31)
CREAT SERPL-MCNC: 1.08 MG/DL (ref 0.7–1.2)
EOSINOPHILS RELATIVE PERCENT: 0 % (ref 1–4)
GFR AFRICAN AMERICAN: >60 ML/MIN
GFR NON-AFRICAN AMERICAN: >60 ML/MIN
GFR SERPL CREATININE-BSD FRML MDRD: ABNORMAL ML/MIN/{1.73_M2}
GLUCOSE BLD-MCNC: 180 MG/DL (ref 70–99)
HCT VFR BLD CALC: 40.2 % (ref 40.7–50.3)
HEMOGLOBIN: 13.4 G/DL (ref 13–17)
IMMATURE GRANULOCYTES: 1 %
INR BLD: 2.3
LYMPHOCYTES # BLD: 13 % (ref 24–43)
MCH RBC QN AUTO: 31.3 PG (ref 25.2–33.5)
MCHC RBC AUTO-ENTMCNC: 33.3 G/DL (ref 25.2–33.5)
MCV RBC AUTO: 93.9 FL (ref 82.6–102.9)
MONOCYTES # BLD: 6 % (ref 3–12)
NRBC AUTOMATED: 0.2 PER 100 WBC
PARTIAL THROMBOPLASTIN TIME: 39.4 SEC (ref 23.9–33.8)
PDW BLD-RTO: 14.2 % (ref 11.8–14.4)
PLATELET # BLD: 287 K/UL (ref 138–453)
PMV BLD AUTO: 8.5 FL (ref 8.1–13.5)
POTASSIUM SERPL-SCNC: 5.3 MMOL/L (ref 3.7–5.3)
PROTHROMBIN TIME: 24 SEC (ref 11.5–14.2)
RBC # BLD: 4.28 M/UL (ref 4.21–5.77)
SEG NEUTROPHILS: 80 % (ref 36–65)
SEGMENTED NEUTROPHILS ABSOLUTE COUNT: 6.59 K/UL (ref 1.5–8.1)
SODIUM BLD-SCNC: 140 MMOL/L (ref 135–144)
WBC # BLD: 8.2 K/UL (ref 3.5–11.3)

## 2022-06-10 PROCEDURE — 96375 TX/PRO/DX INJ NEW DRUG ADDON: CPT

## 2022-06-10 PROCEDURE — 85730 THROMBOPLASTIN TIME PARTIAL: CPT

## 2022-06-10 PROCEDURE — 6360000002 HC RX W HCPCS: Performed by: EMERGENCY MEDICINE

## 2022-06-10 PROCEDURE — 80048 BASIC METABOLIC PNL TOTAL CA: CPT

## 2022-06-10 PROCEDURE — 74176 CT ABD & PELVIS W/O CONTRAST: CPT

## 2022-06-10 PROCEDURE — 96374 THER/PROPH/DIAG INJ IV PUSH: CPT

## 2022-06-10 PROCEDURE — 85025 COMPLETE CBC W/AUTO DIFF WBC: CPT

## 2022-06-10 PROCEDURE — 6370000000 HC RX 637 (ALT 250 FOR IP): Performed by: EMERGENCY MEDICINE

## 2022-06-10 PROCEDURE — 85610 PROTHROMBIN TIME: CPT

## 2022-06-10 PROCEDURE — 99284 EMERGENCY DEPT VISIT MOD MDM: CPT

## 2022-06-10 RX ORDER — FENTANYL CITRATE 50 UG/ML
50 INJECTION, SOLUTION INTRAMUSCULAR; INTRAVENOUS ONCE
Status: COMPLETED | OUTPATIENT
Start: 2022-06-10 | End: 2022-06-10

## 2022-06-10 RX ORDER — ONDANSETRON 2 MG/ML
4 INJECTION INTRAMUSCULAR; INTRAVENOUS ONCE
Status: COMPLETED | OUTPATIENT
Start: 2022-06-10 | End: 2022-06-10

## 2022-06-10 RX ORDER — OXYCODONE HYDROCHLORIDE AND ACETAMINOPHEN 5; 325 MG/1; MG/1
2 TABLET ORAL ONCE
Status: COMPLETED | OUTPATIENT
Start: 2022-06-10 | End: 2022-06-10

## 2022-06-10 RX ADMIN — ONDANSETRON 4 MG: 2 INJECTION INTRAMUSCULAR; INTRAVENOUS at 21:40

## 2022-06-10 RX ADMIN — OXYCODONE AND ACETAMINOPHEN 2 TABLET: 5; 325 TABLET ORAL at 23:27

## 2022-06-10 RX ADMIN — FENTANYL CITRATE 50 MCG: 50 INJECTION, SOLUTION INTRAMUSCULAR; INTRAVENOUS at 21:40

## 2022-06-11 ENCOUNTER — HOSPITAL ENCOUNTER (OUTPATIENT)
Dept: PHARMACY | Age: 77
Setting detail: THERAPIES SERIES
Discharge: HOME | End: 2022-06-11
Payer: MEDICARE

## 2022-06-11 VITALS
BODY MASS INDEX: 29.62 KG/M2 | WEIGHT: 200 LBS | DIASTOLIC BLOOD PRESSURE: 81 MMHG | HEIGHT: 69 IN | RESPIRATION RATE: 18 BRPM | OXYGEN SATURATION: 90 % | SYSTOLIC BLOOD PRESSURE: 181 MMHG | TEMPERATURE: 98.6 F | HEART RATE: 91 BPM

## 2022-06-11 DIAGNOSIS — I48.91 ATRIAL FIBRILLATION WITH RVR (HCC): Primary | ICD-10-CM

## 2022-06-11 LAB
-: ABNORMAL
AMORPHOUS: ABNORMAL
BACTERIA: ABNORMAL
BILIRUBIN URINE: NEGATIVE
CASTS UA: ABNORMAL /LPF (ref 0–2)
CASTS UA: ABNORMAL /LPF (ref 0–2)
EPITHELIAL CELLS UA: ABNORMAL /HPF (ref 0–5)
GLUCOSE URINE: NEGATIVE
KETONES, URINE: ABNORMAL
LEUKOCYTE ESTERASE, URINE: NEGATIVE
NITRITE, URINE: NEGATIVE
PH UA: 6 (ref 5–6)
PROTEIN UA: NEGATIVE
RBC UA: ABNORMAL /HPF (ref 0–4)
SPECIFIC GRAVITY UA: 1.02 (ref 1.01–1.02)
URINE HGB: ABNORMAL
UROBILINOGEN, URINE: NORMAL
WBC UA: ABNORMAL /HPF (ref 0–4)

## 2022-06-11 PROCEDURE — 81001 URINALYSIS AUTO W/SCOPE: CPT

## 2022-06-11 PROCEDURE — 6370000000 HC RX 637 (ALT 250 FOR IP): Performed by: EMERGENCY MEDICINE

## 2022-06-11 PROCEDURE — 87086 URINE CULTURE/COLONY COUNT: CPT

## 2022-06-11 RX ORDER — CEPHALEXIN 500 MG/1
500 CAPSULE ORAL 4 TIMES DAILY
Qty: 40 CAPSULE | Refills: 0 | Status: SHIPPED | OUTPATIENT
Start: 2022-06-11 | End: 2022-06-21

## 2022-06-11 RX ORDER — CEPHALEXIN 250 MG/1
500 CAPSULE ORAL ONCE
Status: COMPLETED | OUTPATIENT
Start: 2022-06-11 | End: 2022-06-11

## 2022-06-11 RX ORDER — ONDANSETRON 4 MG/1
4 TABLET, ORALLY DISINTEGRATING ORAL ONCE
Status: DISCONTINUED | OUTPATIENT
Start: 2022-06-11 | End: 2022-06-11 | Stop reason: HOSPADM

## 2022-06-11 RX ORDER — ONDANSETRON 4 MG/1
4 TABLET, ORALLY DISINTEGRATING ORAL EVERY 8 HOURS PRN
Qty: 12 TABLET | Refills: 0 | Status: SHIPPED | OUTPATIENT
Start: 2022-06-11

## 2022-06-11 RX ORDER — NALOXONE HYDROCHLORIDE 4 MG/.1ML
1 SPRAY NASAL PRN
Qty: 1 EACH | Refills: 0 | Status: SHIPPED | OUTPATIENT
Start: 2022-06-11

## 2022-06-11 RX ADMIN — CEPHALEXIN 500 MG: 250 CAPSULE ORAL at 01:17

## 2022-06-11 ASSESSMENT — ENCOUNTER SYMPTOMS
SHORTNESS OF BREATH: 0
NAUSEA: 1
VOMITING: 1

## 2022-06-11 NOTE — ED PROVIDER NOTES
[facility] ED  [address]  Phone: [phone]  eMERGENCY dEPARTMENT eNCOUnter      Pt Name: Umu Mcadams  MRN: [id number]  [place] 1945  Date of evaluation: 6/11/22      CHIEF COMPLAINT     Chief Complaint   Patient presents with    Flank Pain    Hematuria       HISTORY OF PRESENT ILLNESS    Norberto Osullivan is a 65 y.o. male who presents today with sudden onset of right-sided flank pain which was preceded by several days of gross hematuria. Patient denies any direct injury to the area. No previous history of kidney stones. Patient is anticoagulated for history of atrial fibrillation. He denies any chest pain or difficulty breathing. He has not noticed any fevers. He has had several episodes of nausea and vomiting. He states that several years ago he had an episode of gross hematuria and at that time he saw urology and had a scope performed which did not show any clear cause. Patient indicates that the pain starts in the right flank and right radiates around to the right groin area. He does have a history of a small abdominal aortic aneurysm. No chest pain or difficulty breathing. REVIEW OF SYSTEMS     Review of Systems   Constitutional: Negative for fever. HENT: Negative for congestion. Respiratory: Negative for shortness of breath. Cardiovascular: Negative for chest pain. Gastrointestinal: Positive for nausea and vomiting. Genitourinary: Positive for flank pain and hematuria. Skin: Negative for rash. Neurological: Negative for syncope. All other systems reviewed and are negative.     PAST MEDICAL HISTORY    has a past medical history of Abnormal cardiovascular stress test, Asthma, CAD (coronary artery disease), Cancer (HCC), Cervical radiculopathy, Chronic back pain, Colonic polyp, Degeneration of cervical intervertebral disc, Degeneration of cervical intervertebral disc, Depression, Diverticulosis, GERD (gastroesophageal reflux disease), Glucose intolerance (impaired glucose tolerance), HTN (hypertension), Hyperlipidemia, Lumbar radiculopathy, MI (myocardial infarction) ([facility] [address] Numbness and tingling, OA (osteoarthritis), Pre-diabetes, Sacroiliac pain, and Vision abnormalities. SURGICAL HISTORY      has a past surgical history that includes Coronary angioplasty with stent (05/05/2011); Finger trigger release (Right); Abscess Drainage (Right); other surgical history (2/19/13, 5/14/13); Elbow bursa surgery (Right, 2012,2011); Infected skin debridement (Right); Colonoscopy (2007, 2003); Colonoscopy (09/09/2013); Cervical spine surgery (08/09/2001); Cervical spine surgery (Left, 3608,[id number]); Lumbar spine surgery (6402,3001); lumbar fusion (04/07/2014); skin biopsy; other surgical history; other surgical history (Left, 09/27/2016); other surgical history (Bilateral, 11/29/2016); other surgical history (12/06/2016); other surgical history (Right, 12/12/2016); other surgical history (Left, 12/15/2017); other surgical history (Left, 01/31/2017); Lumbar spine surgery (Left, 02/14/2017); Injection Procedure For Sacroiliac Joint (Bilateral, 03/14/2017); pr arthrocentesis aspir&/inj major jt/bursa w/o us (Left, 03/31/2017); pr arthrocentesis aspir&/inj major jt/bursa w/o us (Left, 04/14/2017); Lumbar spine surgery (Bilateral, 05/02/2017); Lumbar spine surgery (Bilateral, 05/09/2017); Anesthesia Nerve Block (Bilateral, 06/02/2017); Anesthesia Nerve Block (Bilateral, 06/09/2017); RADIOFREQUENCY ABLATION NERVES (Right, 06/29/2017); RADIOFREQUENCY ABLATION NERVES (Left, 07/06/2017); Cardiac catheterization (02/09/2022); back surgery; lumbar fusion (N/A, 11/15/2017); Colonoscopy (N/A, 10/15/2018); pr hemorrhoidectomy,int/ext,1 column/group (N/A, 11/07/2018); Upper gastrointestinal endoscopy (N/A, 09/30/2019); Cystoscopy (Bilateral, 05/19/2021); and Coronary angioplasty with stent (05/31/2011).     CURRENT MEDICATIONS       Discharge Medication List as of 6/11/2022  1:32 AM      CONTINUE these medications which have NOT CHANGED    Details   tiZANidine (ZANAFLEX) 4 MG tablet TAKE ONE TABLET BY MOUTH THREE TIMES DAILY, Disp-90 tablet, R-5Normal      methylPREDNISolone (MEDROL DOSEPACK) 4 MG tablet Take by mouth., Disp-1 kit, R-0Normal      nitroGLYCERIN (NITROSTAT) 0.4 MG SL tablet Place 1 tablet under the tongue every 5 minutes as needed for Chest pain up to max of 3 total doses.  If no relief after 1 dose, call 911., Disp-25 tablet, R-3Normal      dilTIAZem (CARDIZEM) 60 MG tablet Take 1 tablet by mouth every 8 hours, Disp-90 tablet, R-3Normal      warfarin (COUMADIN) 5 MG tablet Take 1 tablet by mouth daily, Disp-30 tablet, R-3Normal      losartan (COZAAR) 25 MG tablet TAKE 1 TABLET DAILY, Disp-90 tablet, R-3Normal      metoprolol tartrate (LOPRESSOR) 50 MG tablet Take 1 tablet by mouth 2 times daily, Disp-60 tablet, R-1Print      vitamin C (ASCORBIC ACID) 500 MG tablet Take 500 mg by mouth 2 times dailyHistorical Med      atorvastatin (LIPITOR) 80 MG tablet TAKE 1 TABLET DAILY, Disp-90 tablet, R-3Normal      PARoxetine (PAXIL) 20 MG tablet TAKE 1 TABLET BY MOUTH ONCE DAILY IN THE MORNING, Disp-90 tablet, R-3Normal      metFORMIN (GLUCOPHAGE) 500 MG tablet TAKE 1 TABLET TWICE DAILY  WITH MEALS., Disp-180 tablet, R-3Normal      celecoxib (CELEBREX) 200 MG capsule TAKE 1 CAPSULE BY MOUTH TWICE DAILY, Disp-180 capsule, R-3Please consider 90 day supplies to promote better adherenceNormal      pantoprazole (PROTONIX) 40 MG tablet Take 1 tablet by mouth once daily, Disp-90 tablet, R-3Normal      !! Handicap [facility] Starting Tue 4/13/2021, Disp-2 each, R-0, PrintEXPIRES 04/13/2026      ferrous sulfate (IRON 325) 325 (65 Fe) MG tablet Take 1 tablet by mouth 2 times daily, Disp-120 tablet, R-3Normal      !! [facility], Disp-2 each, R-0, PrintExpires: 7/5/2021      aspirin 81 MG tablet Take 81 mg by mouth daily      famotidine (PEPCID) 20 MG tablet Take 20 mg by mouth daily. !! - Potential duplicate medications found. Please discuss with provider. ALLERGIES     is allergic to crestor [rosuvastatin], ibuprofen, lisinopril, and effient [prasugrel]. FAMILY HISTORY     He indicated that the status of his father is unknown.     family history includes Cancer in his father. SOCIAL HISTORY      reports that he quit smoking about 11 years ago. His smoking use included cigarettes, pipe, and cigars. He has a 40.00 pack-year smoking history. He quit smokeless tobacco use about 11 years ago. His smokeless tobacco use included chew. He reports current alcohol use. He reports that he does not use drugs. PHYSICAL EXAM     INITIAL VITALS:  height is 5' 9\" (1.753 m) and weight is 200 lb (90.7 kg). His temperature is 98.6 °F (37 °C). His blood pressure is 181/81 (abnormal) and his pulse is 91. His respiration is 18 and oxygen saturation is 90%. Physical Exam  Vitals reviewed. Constitutional:       General: He is in acute distress. Appearance: He is not ill-appearing or toxic-appearing. HENT:      Head: Normocephalic and atraumatic. Mouth/Throat:      Mouth: Mucous membranes are moist.   Eyes:      Extraocular Movements: Extraocular movements intact. Cardiovascular:      Rate and Rhythm: Normal rate. Rhythm irregular. Pulses: Normal pulses. Heart sounds: Normal heart sounds. Comments: Bilateral symmetric dorsalis pedis pulses. Good capillary refill. Normal color of the feet and toes. Pulmonary:      Effort: Pulmonary effort is normal. No respiratory distress. Breath sounds: Normal breath sounds. Abdominal:      Palpations: Abdomen is soft. There is no mass. Tenderness: There is no abdominal tenderness. There is right CVA tenderness. There is no guarding or rebound. Musculoskeletal:         General: No swelling or tenderness. Normal range of motion. Right lower leg: No edema.       Left lower leg: No edema. Skin:     General: Skin is warm and dry. Capillary Refill: Capillary refill takes less than 2 seconds. Findings: No rash. Neurological:      General: No focal deficit present. Mental Status: He is alert and oriented to person, place, and time. Cranial Nerves: No cranial nerve deficit. Motor: No weakness. Psychiatric:         Mood and Affect: Mood normal.         Behavior: Behavior normal.       DIFFERENTIAL DIAGNOSIS / MDM / EMERGENCY DEPARTMENT COURSE:     Plan for CBC, BMP, INR as well as CT scan of the abdomen and pelvis. At this point his presentation is more consistent with a kidney stone however given his history of aneurysm if no apparent cause of his pain is found we did discuss repeat imaging with IV contrast.  Patient is anticoagulated on Coumadin his INR is therapeutic at 2.3 today. Indicates that ibuprofen is on his allergy list as it has caused him to have bleeding in the past when he was not anticoagulated. At this point given his gross hematuria and the fact that he is on Coumadin I will avoid NSAIDs. Patient did get some relief with the fentanyl and Zofran initially however he did begin to have return of his pain about 90 minutes after these medications. Patient was offered further parenteral opioids such as fentanyl or morphine versus switching to oral opioids. Case was discussed with Dr. Dayana Austin on-call for urology. He indicated that if the patient's pain was adequately controlled he could be seen in the office on Monday however if not he can be transferred to [facility] for admission with possible stenting tomorrow or admitted at [facility] emergency department with possible stenting on Monday. All of these options were discussed with the patient. After the oral Percocet the patient did feel much improved and wanted to go home.   He understands that if his pain is not controlled he will need to return to the emergency department. The patient did have some periods of oxygen saturation around 90% with a few episodes of desaturation. The patient did not have depressed respiratory drive and the hypoxia was more related to activity and movement. The patient is actually prescribed 2 to 3 L of oxygen at home as he has intermittently required it since his diagnosis of atrial fibrillation. The patient does have a Percocet prescription at home which she filled earlier this month for 90 tablets. He indicates that he does not need further opioid prescription at this time. I did discuss transiently increasing the frequency of his medication to every 4 hours if he is having more persistent pain given this acute episode of hydronephrosis. Patient is agreeable to this. However given the totality of the picture and his cardiopulmonary history I did advise him to wear the oxygen 2 to 3 L at night and as well as to keep a close eye on his oxygen saturation during the day. He does not have a prescription for Narcan at home so I have sent this into the pharmacy given he may have some renal impairment. Patient did have a potassium of 5.3 he and his wife indicated that he was recently started on steroids for back pain as he is no longer able to take Celebrex. I have advised that he stop this as it may be cause elevation of his potassium. Patient was scheduled to see the pharmacist in the morning for further Coumadin dosing. At this point I have advised him to skip his 7.5 mg dose of Coumadin this evening as I will be starting him on an antibiotic for mild bacteria as well as WBCs in his urine in the setting of obstructive hydronephrosis. I alains the risks and benefits of Keflex versus ciprofloxacin patient preferred to proceed with Keflex. He is aware that this will affect his INR level.   Further Coumadin adjustments will be deferred to the patient's pharmacist who is managing his Coumadin however if for some reason there is difficulty with this I advised him to skip his dose this evening and to take 5 mg over the weekend with a recheck on Monday. Patient and wife were agreeable with the plan. They had some further questions both related to his visit here in the emergency department as well as with his atrial fibrillation which were answered to the best of my abilities. Patient did indicate he is interested to know if he is a candidate for a Watchman device. I recommended that he discuss this issue with his cardiologist.  The patient understands the warning signs which return to the emergency department including if he does not have adequate control of his pain or nausea vomiting. I have reviewed the disposition diagnosis with the patient and or their family/guardian. I have answered their questions and givendischarge instructions. They voiced understanding of these instructions and did not have any further questions or complaints. DIAGNOSTIC RESULTS     EKG: All EKG's are interpreted by the Emergency Department Physician who either signs or Co-signs this chart inthe absence of a cardiologist.        RADIOLOGY:   Radiologist interpretation of radiologic studies:     CT ABDOMEN PELVIS WO CONTRAST Additional Contrast? None    Result Date: 6/10/2022  EXAMINATION: CT OF THE ABDOMEN AND PELVIS WITHOUT CONTRAST 6/10/2022 9:48 pm TECHNIQUE: CT of the abdomen and pelvis was performed without the administration of intravenous contrast. Multiplanar reformatted images are provided for review. Automated exposure control, iterative reconstruction, and/or weight based adjustment of the mA/kV was utilized to reduce the radiation dose to as low as reasonably achievable.  COMPARISON: 03/26/2021 HISTORY: ORDERING SYSTEM PROVIDED HISTORY: flank pain TECHNOLOGIST PROVIDED HISTORY: flank pain Decision Support Exception - unselect if not a suspected or confirmed emergency medical condition->Emergency Medical Condition (MA) Reason for Exam: Right flank pain today, hematuria the last 3 days FINDINGS: LOWER CHEST:  Visualized portion of the lower chest demonstrates no acute abnormality. KIDNEYS AND URINARY TRACT: There is moderate right sided hydronephrosis with extensive amount of right perinephric stranding. There is a 7 mm hypodense lesion with surrounding rim of hyperdensity within the right upper ureter near ureteropelvic junction. This lesion does not have a typical appearance of urinary tract stone and may represent atypical stone or other type of lesions such as blood clot among other possibilities. The remainder of right ureter is unremarkable. ORGANS: Visualized portions of the liver, spleen, pancreas, gallbladder, and adrenal glands demonstrate no acute abnormality. GI/BOWEL: No bowel obstruction. No evidence of acute appendicitis. Diverticulosis with no evidence of diverticulitis. PELVIS: The bladder and pelvic organs are unremarkable. PERITONEUM/RETROPERITONEUM: No free air or free fluid is noted. No pathologically enlarged lymphadenopathy. Unchanged 3 cm fusiform infrarenal abdominal aortic aneurysm. BONES/SOFT TISSUES: The osseous structures demonstrate no acute abnormality. Fat containing umbilical hernia. Changes related to instrumented disc space and posterior fusion of L2-L3 and L3-L4. Unchanged left-sided fat containing inguinal hernia. Moderate right sided hydronephrosis with extensive amount of right perinephric stranding. There is a 7 mm hypodense lesion with surrounding rim of hyperdensity within the right upper ureter near ureteropelvic junction. This lesion does not have a typical appearance of urinary tract stone and may represent atypical stone or other type of lesions such as blood clot or mass lesion. Changes related to instrumented posterior fusion of L2 through L4 as described. Diverticulosis with no evidence of diverticulitis. Unchanged 3 cm fusiform infrarenal abdominal aortic aneurysm.   Follow-up examination every 3 years can be performed.  RECOMMENDATIONS: Unavailable       LABS:  Results for orders placed or performed during the hospital encounter of 06/10/22   CBC with Auto Differential   Result Value Ref Range    WBC 8.2 3.5 - 11.3 k/uL    RBC 4.28 4.21 - 5.77 m/uL    Hemoglobin 13.4 13.0 - 17.0 g/dL    Hematocrit 40.2 (L) 40.7 - 50.3 %    MCV 93.9 82.6 - 102.9 fL    MCH 31.3 25.2 - 33.5 pg    MCHC 33.3 25.2 - 33.5 g/dL    RDW 14.2 11.8 - 14.4 %    Platelets 619 492 - 576 k/uL    MPV 8.5 8.1 - 13.5 fL    NRBC Automated 0.2 (H) 0.0 per 100 WBC    Seg Neutrophils 80 (H) 36 - 65 %    Lymphocytes 13 (L) 24 - 43 %    Monocytes 6 3 - 12 %    Eosinophils % 0 (L) 1 - 4 %    Basophils 0 0 - 2 %    Immature Granulocytes 1 (H) 0 %    Segs Absolute 6.59 1.50 - 8.10 k/uL    Absolute Lymph # 1.02 (L) 1.10 - 3.70 k/uL    Absolute Mono # 0.50 0.10 - 1.20 k/uL    Absolute Eos # <0.03 0.00 - 0.44 k/uL    Basophils Absolute <0.03 0.00 - 0.20 k/uL    Absolute Immature Granulocyte 0.04 0.00 - 0.30 k/uL   Basic Metabolic Panel   Result Value Ref Range    Glucose 180 (H) 70 - 99 mg/dL    BUN 25 (H) 8 - 23 mg/dL    CREATININE 1.08 0.70 - 1.20 mg/dL    Bun/Cre Ratio 23 (H) 9 - 20    Calcium 8.3 (L) 8.6 - 10.4 mg/dL    Sodium 140 135 - 144 mmol/L    Potassium 5.3 3.7 - 5.3 mmol/L    Chloride 103 98 - 107 mmol/L    CO2 27 20 - 31 mmol/L    Anion Gap 10 9 - 17 mmol/L    GFR Non-African American >60 >60 mL/min    GFR African American >60 >60 mL/min    GFR Comment         Urinalysis with Reflex to Culture    Specimen: Urine, clean catch   Result Value Ref Range    Glucose, Ur NEGATIVE NEGATIVE    Bilirubin Urine NEGATIVE NEGATIVE    Ketones, Urine TRACE (A) NEGATIVE    Specific Gravity, UA 1.025 1.010 - 1.025    Urine Hgb 3+ (A) NEGATIVE    pH, UA 6.0 5.0 - 6.0    Protein, UA NEGATIVE NEGATIVE    Urobilinogen, Urine Normal Normal    Nitrite, Urine NEGATIVE NEGATIVE    Leukocyte Esterase, Urine NEGATIVE NEGATIVE   APTT   Result Value Ref Range    PTT 39.4 (H) 23.9 - 33.8 sec   Protime-INR   Result Value Ref Range    Protime 24.0 (H) 11.5 - 14.2 sec    INR 2.3    Microscopic Urinalysis   Result Value Ref Range    -          WBC, UA 5 TO 10 0 - 4 /HPF    RBC, UA 10 TO 25 0 - 4 /HPF    Casts UA 0 TO 4 0 - 2 /LPF    Casts UA HYALINE 0 - 2 /LPF    Epithelial Cells UA None 0 - 5 /HPF    Bacteria, UA TRACE (A) None    Amorphous, UA 1+ (A) None       EMERGENCY DEPARTMENT COURSE:   Vitals:    Vitals:    06/10/22 2029 06/10/22 2033 06/10/22 2100 06/11/22 0127   BP:  (!) 187/105 (!) 164/75 (!) 181/81   Pulse: 93   91   Resp: 18      Temp: 98.6 °F (37 °C)      SpO2: 96%  (!) 89% 90%   Weight: 200 lb (90.7 kg)      Height: 5' 9\" (1.753 m)        -------------------------  BP: (!) 181/81, Temp: 98.6 °F (37 °C), Heart Rate: 91, Resp: 18    CONSULTS:  Urology    PROCEDURES:  None    FINAL IMPRESSION      1. Hydronephrosis with ureteropelvic junction (UPJ) obstruction    2. Anticoagulated    3. Nausea and vomiting, intractability of vomiting not specified, unspecified vomiting type          DISPOSITION/PLAN   DISPOSITION Decision To Discharge 06/11/2022 01:22:56 AM      PATIENT REFERRED TO:  [facility]  Urology  [place] [address] [address]  [phone]  In 2 days        DISCHARGE MEDICATIONS:  Discharge Medication List as of 6/11/2022  1:32 AM      START taking these medications    Details   naloxone 4 MG/0.1ML LIQD nasal spray 1 spray by Nasal route as needed for Opioid Reversal, Disp-1 each, R-0Normal      ondansetron (ZOFRAN ODT) 4 MG disintegrating tablet Take 1 tablet by mouth every 8 hours as needed for Nausea, Disp-12 tablet, R-0Normal      cephALEXin (KEFLEX) 500 MG capsule Take 1 capsule by mouth 4 times daily for 10 days, Disp-40 capsule, R-0Normal             There are no active hospital problems to display for this patient.       (Please note that portions of this note were completed with avoice recognition program.  Efforts were made to edit the dictations but occasionally words are mis-transcribed.)    Froylan Cobian MD, [facility]  Attending Emergency Medicine Physician        Froylan Cobian MD  06/11/22 [id number]

## 2022-06-11 NOTE — ED TRIAGE NOTES
Pt to ed complaining of rt flank and rt lowr abd pain. sts onset approx 1 hour ago. Pt sts that he has noticed blood in urine for about 3 days however thought it was due to the coumadin. Pt sts urinary frequency.   Denies pain with urination

## 2022-06-13 ENCOUNTER — HOSPITAL ENCOUNTER (OUTPATIENT)
Dept: PHARMACY | Age: 77
Setting detail: THERAPIES SERIES
Discharge: HOME | End: 2022-06-13
Payer: MEDICARE

## 2022-06-13 DIAGNOSIS — I48.91 ATRIAL FIBRILLATION WITH RVR (HCC): Primary | ICD-10-CM

## 2022-06-13 LAB
CULTURE: NORMAL
INR BLD: 3.6
PROTIME: 42.7 SECONDS
SPECIMEN DESCRIPTION: NORMAL

## 2022-06-13 PROCEDURE — 99212 OFFICE O/P EST SF 10 MIN: CPT

## 2022-06-13 PROCEDURE — 85610 PROTHROMBIN TIME: CPT

## 2022-06-13 PROCEDURE — 36416 COLLJ CAPILLARY BLOOD SPEC: CPT

## 2022-06-13 NOTE — PROGRESS NOTES
ANTICOAGULATION SERVICE    Date of Clinic Visit:  6/13/2022    Ro Osullivan is a 65 y.o. male who presents to clinic today for anticoagulation monitoring and adjustment. Recent INR Results:  Internal QC passed  Lab Results   Component Value Date    INR 3.6 06/13/2022    INR 2.3 06/10/2022       Current Warfarin Dosage:  Dosing Plan  As of 6/13/2022    TTR:  18.1 % (1.2 mo)   Full warfarin instructions:  6/15: 2.5 mg; 6/16: 5 mg; Otherwise 2.5 mg every Sun, Mon, Thu; 5 mg all other days               Assessment/Plan:    Modify warfarin dose as noted above: Reduce dose down as patient is experiencing pain and taking percocet and keflex. Will re-check Friday. Next Clinic Appointment:  Return date  As of 6/13/2022    TTR:  18.1 % (1.2 mo)   Next INR check:  6/17/2022             Please call [facility] at [phone] with any questions. Thanks!   Cam [facility]  Anticoagulation Service Pharmacist  6/13/2022 1:44 PM  For Pharmacy Admin Tracking Only     Intervention Detail: Dose Adjustment: 1, reason: Therapy De-escalation   Total # of Interventions Recommended: 1   Total # of Interventions Accepted: 1   Time Spent (min): 20

## 2022-06-14 RX ORDER — PANTOPRAZOLE SODIUM 40 MG/1
TABLET, DELAYED RELEASE ORAL
Qty: 90 TABLET | Refills: 0 | Status: SHIPPED | OUTPATIENT
Start: 2022-06-14 | End: 2022-09-09

## 2022-06-15 ENCOUNTER — OFFICE VISIT (OUTPATIENT)
Dept: UROLOGY | Age: 77
End: 2022-06-15
Payer: MEDICARE

## 2022-06-15 VITALS
BODY MASS INDEX: 29.77 KG/M2 | OXYGEN SATURATION: 95 % | HEART RATE: 67 BPM | WEIGHT: 201 LBS | RESPIRATION RATE: 16 BRPM | DIASTOLIC BLOOD PRESSURE: 60 MMHG | HEIGHT: 69 IN | SYSTOLIC BLOOD PRESSURE: 108 MMHG

## 2022-06-15 DIAGNOSIS — N13.8 BPH WITH OBSTRUCTION/LOWER URINARY TRACT SYMPTOMS: Primary | ICD-10-CM

## 2022-06-15 DIAGNOSIS — N12 PYELONEPHRITIS: ICD-10-CM

## 2022-06-15 DIAGNOSIS — N40.1 BPH WITH OBSTRUCTION/LOWER URINARY TRACT SYMPTOMS: Primary | ICD-10-CM

## 2022-06-15 DIAGNOSIS — N13.30 HYDRONEPHROSIS, RIGHT: ICD-10-CM

## 2022-06-15 DIAGNOSIS — R31.0 GROSS HEMATURIA: ICD-10-CM

## 2022-06-15 PROCEDURE — 1036F TOBACCO NON-USER: CPT | Performed by: UROLOGY

## 2022-06-15 PROCEDURE — 1123F ACP DISCUSS/DSCN MKR DOCD: CPT | Performed by: UROLOGY

## 2022-06-15 PROCEDURE — G8427 DOCREV CUR MEDS BY ELIG CLIN: HCPCS | Performed by: UROLOGY

## 2022-06-15 PROCEDURE — 99214 OFFICE O/P EST MOD 30 MIN: CPT | Performed by: UROLOGY

## 2022-06-15 PROCEDURE — 99213 OFFICE O/P EST LOW 20 MIN: CPT | Performed by: UROLOGY

## 2022-06-15 PROCEDURE — G8417 CALC BMI ABV UP PARAM F/U: HCPCS | Performed by: UROLOGY

## 2022-06-15 NOTE — PROGRESS NOTES
Unique Sloan MD   Urology Clinic office Visit      Patient: Norberto Osullivan  YOB: 1945  Date: 6/15/2022    HISTORY OF PRESENT ILLNESS:   The patient is a 65 y.o. male who presents today for evaluation of the following problem(s):      1. BPH with obstruction/lower urinary tract symptoms    2. Gross hematuria    3. Hydronephrosis, right    4. Pyelonephritis           Overall the problem(s) : are worsened  Associated Symptoms: No dysuria, gross hematuria. Pain Severity:      Summary of old records: hx of retention/BPH  (Patient's old records, notes and chart reviewed and summarized above.)      Onset 1 week  Gross hematuria with clots, terminal  Severity is described as moderate. The course of symptoms over time is sudden. Alleviating factors: none  Worsening factors: none  Lower urinary tract symptoms: none  Former smoker  On plavix    Voiding well; hematuria resolved    Ct Abdomen Pelvis Wo Contrast Additional Contrast? None    Result Date: 3/26/2021  *No CT abnormality is identified to explain the patient's hematuria. *Stable 3 cm fusiform type infrarenal abdominal aortic aneurysm. Last several PSA's:  Lab Results   Component Value Date    PSA 0.53 08/20/2021    PSA 1.12 08/17/2020    PSA 1.00 08/15/2019       Last total testosterone:  No results found for: TESTOSTERONE    Urinalysis today:  No results found for this visit on 06/15/22.       Last BUN and creatinine:  Lab Results   Component Value Date    BUN 25 (H) 06/10/2022     Lab Results   Component Value Date    CREATININE 1.08 06/10/2022       Imaging Reviewed during this Office Visit:   (results were independently reviewed by physician and radiology report verified)    PAST MEDICAL, FAMILY AND SOCIAL HISTORY:  Past Medical History:   Diagnosis Date    Abnormal cardiovascular stress test 01/2022    Asthma     CAD (coronary artery disease)     STENTS X 4    Cancer (HCC)     THROAT, HX. RADIATION , LT EAR    Cervical radiculopathy     Chronic back pain     Colonic polyp     Degeneration of cervical intervertebral disc     [facility], 1990 C4/C5-C6/C7    Degeneration of cervical intervertebral disc     [facility] 1994, C3/C4    Depression     Diverticulosis     GERD (gastroesophageal reflux disease)     Glucose intolerance (impaired glucose tolerance)     IS NOT DIABETIC, PER PT    HTN (hypertension)     Hyperlipidemia     Lumbar radiculopathy     MI (myocardial infarction) ([facility] [address]     2011    Numbness and tingling     HANDS    OA (osteoarthritis)     Pre-diabetes     Sacroiliac pain 11/04/2014    Vision abnormalities     WEARS GLASSES     Past Surgical History:   Procedure Laterality Date    ABSCESS DRAINAGE Right     ELBOW WITH CLOSURE    ANESTHESIA NERVE BLOCK Bilateral 06/02/2017    Bilat L1 L2 L3 L4 L5 Diagnostic Medial Branch Blk performed by Parrish Felix MD at [facility] Bilateral 06/09/2017    Bilat L1 L2 L3 L4 L5 Confirmatory Medial BB performed by Parrish Felix MD at [address] [place] [address]  02/09/2022    CERVICAL SPINE SURGERY  08/09/2001    Anterior Cervical Decompression and Fusion C3-4    CERVICAL SPINE SURGERY Left [id number]    Cervical C6-C7 Discectomy and Foraminotomy    COLONOSCOPY  2007, 2003    POLYPS    COLONOSCOPY  09/09/2013    hyperplastic polyp x 3    COLONOSCOPY N/A 10/15/2018    hyperplastic polyp, severe sigmoid diverticulosis, large ext. hemorrhoid, Dr Roscoe Sierra  05/05/2011    PROX RCA X2    CORONARY ANGIOPLASTY WITH STENT PLACEMENT  05/31/2011    XIENCE TO MID LAD X2    CYSTOSCOPY Bilateral 05/19/2021    CYSTO Bilateral Retrogrades performed by Mar Aguirre MD at [address] [place] [address] Right 2012,2011    FINGER TRIGGER RELEASE Right     THIRD FINGER    [facility]. INJECTION PROCEDURE FOR SACROILIAC JOINT Bilateral 03/14/2017    Bilat Sacroiliac & Piriformis Inj's performed by Cely Rebolledo Hayde Russ MD at [address] Right     ELBOW    LUMBAR FUSION  04/07/2014    lumbar decompression and fusion    LUMBAR FUSION N/A 11/15/2017    LUMBAR DECOMPRESSION POSTERIOR W/FUSION L3 L4 ( with SPINAL CORD MONITOR ) performed by Isai Arredondo MD at [facility] [address]  [phone]    Fusion    LUMBAR SPINE SURGERY Left 02/14/2017    Left L4 & L5 Transforaminal ANITHA performed by Nikki Castellanos MD at [address] [place] [address] [address] Bilateral 05/02/2017    Bilat L5 Transforaminal ANITHA performed by Nikki Castellanos MD at [address] [place] [address] [address] Bilateral 05/09/2017    Bilat L5 Transforaminal ANITHA performed by Nikki Castellanos MD at [address] [place] [address]  2/19/13, 5/14/13    L1/L2 IESI    OTHER SURGICAL HISTORY      Hardware correction, patient had 1 broken screw and to loose screws in back     OTHER SURGICAL HISTORY Left 09/27/2016     C6 TFE    OTHER SURGICAL HISTORY Bilateral 11/29/2016    L3, L4 L5 Diagnostic Medial Branch Block    OTHER SURGICAL HISTORY  12/06/2016    jovani L3 L4 L5 conf MBB    OTHER SURGICAL HISTORY Right 12/12/2016    L3,L4,L5 RFA    OTHER SURGICAL HISTORY Left 12/15/2017    L3.  L4, L5 RFA    OTHER SURGICAL HISTORY Left 01/31/2017    L4 & L5 TFE    MA ARTHROCENTESIS ASPIR&/INJ MAJOR JT/BURSA W/O US Left 03/31/2017    Left Hip Inj performed by Nikki Castellanos MD at [address] ARTHROCENTESIS ASPIR&/INJ MAJOR JT/BURSA W/O US Left 04/14/2017    Left Hip Inj performed by Nikki Castellanos MD at [address] HEMORRHOIDECTOMY,INT/EXT,1 COLUMN/GROUP N/A 11/07/2018    External HEMORRHOIDECTOMY performed by Bijal Cabrera MD at [address] Right 06/29/2017    Right L1 L2 L3 L4 L5 Radiofrequency Ablation performed by Nikki Castellanos MD at [address] Left 07/06/2017    Left L1 L2 L3 L4 L5 Radiofrequency performed by Nikki Castellanos MD at [address] [place] [address] UPPER GASTROINTESTINAL ENDOSCOPY N/A 09/30/2019    mild gastritis, mild to moderate esophagitis, Dr. Kim Edwards     Family History   Problem Relation Age of Onset    Cancer Father         colon cancer     Outpatient Medications Marked as Taking for the 6/15/22 encounter (Office Visit) with Ben Johns MD   Medication Sig Dispense Refill    pantoprazole (PROTONIX) 40 MG tablet Take 1 tablet by mouth once daily 90 tablet 0    cephALEXin (KEFLEX) 500 MG capsule Take 1 capsule by mouth 4 times daily for 10 days 40 capsule 0    tiZANidine (ZANAFLEX) 4 MG tablet TAKE ONE TABLET BY MOUTH THREE TIMES DAILY 90 tablet 5    nitroGLYCERIN (NITROSTAT) 0.4 MG SL tablet Place 1 tablet under the tongue every 5 minutes as needed for Chest pain up to max of 3 total doses. If no relief after 1 dose, call 911. 25 tablet 3    dilTIAZem (CARDIZEM) 60 MG tablet Take 1 tablet by mouth every 8 hours 90 tablet 3    warfarin (COUMADIN) 5 MG tablet Take 1 tablet by mouth daily 30 tablet 3    losartan (COZAAR) 25 MG tablet TAKE 1 TABLET DAILY 90 tablet 3    metoprolol tartrate (LOPRESSOR) 50 MG tablet Take 1 tablet by mouth 2 times daily 60 tablet 1    vitamin C (ASCORBIC ACID) 500 MG tablet Take 500 mg by mouth 2 times daily      atorvastatin (LIPITOR) 80 MG tablet TAKE 1 TABLET DAILY 90 tablet 3    PARoxetine (PAXIL) 20 MG tablet TAKE 1 TABLET BY MOUTH ONCE DAILY IN THE MORNING 90 tablet 3    metFORMIN (GLUCOPHAGE) 500 MG tablet TAKE 1 TABLET TWICE DAILY  WITH MEALS. 180 tablet 3    ferrous sulfate (IRON 325) 325 (65 Fe) MG tablet Take 1 tablet by mouth 2 times daily 120 tablet 3    aspirin 81 MG tablet Take 81 mg by mouth daily      famotidine (PEPCID) 20 MG tablet Take 20 mg by mouth daily.          Crestor [rosuvastatin], Ibuprofen, Lisinopril, and Effient [prasugrel]  Social History     Tobacco Use   Smoking Status Former Smoker    Packs/day: 1.00    Years: 40.00    Pack years: 40.00    Types: Cigarettes, Pipe, Cigars    Start date: 1964   Alex Brand Quit date: 2011    Years since quittin.1   Smokeless Tobacco Former User    Types: Marcelino Chandler Quit date: 2011       Social History     Substance and Sexual Activity   Alcohol Use Yes    Alcohol/week: 0.0 standard drinks    Comment: RARE       REVIEW OF SYSTEMS:  Constitutional: negative  Eyes: negative  Respiratory: negative  Cardiovascular: negative  Gastrointestinal: negative  Musculoskeletal: negative  Genitourinary: negative except for what is in HPI  Skin: negative   Neurological: negative  Hematological/Lymphatic: negative  Psychological: negative    Physical Exam:    This a 65 y.o. male   Vitals:    06/15/22 1119   BP: 108/60   Pulse: 67   Resp: 16   SpO2: 95%     Constitutional: Patient in no acute distress; Neuro: alert and oriented to person place and time. Psych: Mood and affect normal.  Skin: Normal        Assessment and Plan      1. BPH with obstruction/lower urinary tract symptoms    2. Gross hematuria    3. Hydronephrosis, right    4. Pyelonephritis         Cystoscopy bilateral retrograde pyelogram 2022    Findings: Enlarged vascular prostate with elevated median bar/median lobe that was vascular as well.   The rest of bladder was completely normal.    Was in ER post op for hematuria  CT shows right hydro with likely clot in the right renal pelvis  Possible pyelonephritis  Complete abx course    Check renal US in 6-8 weeks to ensure resolution           MD Amelie Dunham M.D, MD  [facility] Urology

## 2022-06-17 ENCOUNTER — TELEPHONE (OUTPATIENT)
Dept: INTERNAL MEDICINE | Age: 77
End: 2022-06-17

## 2022-06-17 ENCOUNTER — TELEPHONE (OUTPATIENT)
Dept: UROLOGY | Age: 77
End: 2022-06-17

## 2022-06-17 ENCOUNTER — HOSPITAL ENCOUNTER (OUTPATIENT)
Dept: PHARMACY | Age: 77
Setting detail: THERAPIES SERIES
Discharge: HOME | End: 2022-06-17
Payer: MEDICARE

## 2022-06-17 DIAGNOSIS — I48.91 ATRIAL FIBRILLATION WITH RVR (HCC): Primary | ICD-10-CM

## 2022-06-17 LAB
INR BLD: 4
PROTIME: 48.3 SECONDS

## 2022-06-17 PROCEDURE — 36416 COLLJ CAPILLARY BLOOD SPEC: CPT

## 2022-06-17 PROCEDURE — 85610 PROTHROMBIN TIME: CPT

## 2022-06-17 PROCEDURE — 99211 OFF/OP EST MAY X REQ PHY/QHP: CPT

## 2022-06-17 NOTE — TELEPHONE ENCOUNTER
Patient called back and advised him to do as Dr. Talon Goldberg recommend. Increase fluids by 60 oz and limit activity. Explained to hold blood thinners til Sunday but patient states his INR is 4.1. Transferred patient to Dr. Ana Rosa Silva office and see what Dr. Ana Rosa Silva recommends.

## 2022-06-17 NOTE — TELEPHONE ENCOUNTER
Spoke with dr Cindi Martínez, would like patient to increase 60oz of fluids daily, limit activity and hold blood thinners until Sunday.

## 2022-06-17 NOTE — PROGRESS NOTES
ANTICOAGULATION SERVICE    Date of Clinic Visit:  6/17/2022    Elvira Osullivan is a 65 y.o. male who presents to clinic today for anticoagulation monitoring and adjustment. Recent INR Results:  Internal QC passed  Lab Results   Component Value Date    INR 4 06/17/2022    INR 3.6 06/13/2022       Current Warfarin Dosage:  Dosing Plan  As of 6/17/2022    TTR:  16.1 % (1.3 mo)   Full warfarin instructions:  6/17: Hold; Otherwise 5 mg every Mon, Thu; 2.5 mg all other days               Assessment/Plan:    Modify warfarin dose as noted above: INR remains elevated today after dose decrease 4 days ago. I will hold x 1 day and decrease weekly dose. Recheck 5 days. I hesitate to hold his dose today but Al is still having some blood in his urine so I want to get INR down sooner rather than later. Next Clinic Appointment:  Return date  As of 6/17/2022    TTR:  16.1 % (1.3 mo)   Next INR check:  6/22/2022             Please call [address] [facility] Anticoagulation Clinic at [phone] with any questions. Thanks!   Aminata Casillas, [facility]  Anticoagulation Service Pharmacist  6/17/2022 9:28 AM

## 2022-06-17 NOTE — TELEPHONE ENCOUNTER
Patient called and states that he still is having hematuria every time he urinates. Denies any clots, pain or discomfort with urination. He is still on Keflex 500 mg four times daily.

## 2022-06-17 NOTE — TELEPHONE ENCOUNTER
Please Advise    Patient has been having issues with blood in his urine. Dr Dat Jordan currently has him on keflex 500 mg 4 x daily, denies any pain or discomfort with urination. Dr Dat Jordan wanted him to hold his coumadin until Sunday. Patient follows INR with the coumadin clinic. INR was 4.1 today. Dr Vega Vidal office wants to know if its okay for him to hold it until Sunday with INR at 4.1 today. They also advised for him to limit his activities.  He is suppose to go out of town to a wedding tomorrow and wanted to know if it would be okay for him to go.    [phone]

## 2022-06-17 NOTE — TELEPHONE ENCOUNTER
Okay to hold Coumadin until Sunday as per Dr. Mira Osei recommendations. Agree with Dr. Justina Allen. By limiting activities we mainly mean  to avoid heavy lifting. Otherwise, it is okay for him to go to the wedding, etc.    Also advise patient to try to avoid bladder irritants as much as possible, including coffee and other caffeine beverages.

## 2022-06-21 ENCOUNTER — CARE COORDINATION (OUTPATIENT)
Dept: CARE COORDINATION | Age: 77
End: 2022-06-21

## 2022-06-21 SDOH — ECONOMIC STABILITY: HOUSING INSECURITY: IN THE LAST 12 MONTHS, HOW MANY PLACES HAVE YOU LIVED?: 1

## 2022-06-21 SDOH — ECONOMIC STABILITY: HOUSING INSECURITY
IN THE LAST 12 MONTHS, WAS THERE A TIME WHEN YOU DID NOT HAVE A STEADY PLACE TO SLEEP OR SLEPT IN A SHELTER (INCLUDING NOW)?: NO

## 2022-06-21 SDOH — ECONOMIC STABILITY: TRANSPORTATION INSECURITY
IN THE PAST 12 MONTHS, HAS LACK OF TRANSPORTATION KEPT YOU FROM MEETINGS, WORK, OR FROM GETTING THINGS NEEDED FOR DAILY LIVING?: NO

## 2022-06-21 SDOH — ECONOMIC STABILITY: INCOME INSECURITY: IN THE LAST 12 MONTHS, WAS THERE A TIME WHEN YOU WERE NOT ABLE TO PAY THE MORTGAGE OR RENT ON TIME?: NO

## 2022-06-21 SDOH — ECONOMIC STABILITY: TRANSPORTATION INSECURITY
IN THE PAST 12 MONTHS, HAS THE LACK OF TRANSPORTATION KEPT YOU FROM MEDICAL APPOINTMENTS OR FROM GETTING MEDICATIONS?: NO

## 2022-06-21 ASSESSMENT — SOCIAL DETERMINANTS OF HEALTH (SDOH)
HOW OFTEN DO YOU ATTENT MEETINGS OF THE CLUB OR ORGANIZATION YOU BELONG TO?: MORE THAN 4 TIMES PER YEAR
HOW OFTEN DO YOU GET TOGETHER WITH FRIENDS OR RELATIVES?: MORE THAN THREE TIMES A WEEK
DO YOU BELONG TO ANY CLUBS OR ORGANIZATIONS SUCH AS CHURCH GROUPS UNIONS, FRATERNAL OR ATHLETIC GROUPS, OR SCHOOL GROUPS?: YES
HOW OFTEN DO YOU ATTEND CHURCH OR RELIGIOUS SERVICES?: MORE THAN 4 TIMES PER YEAR
IN A TYPICAL WEEK, HOW MANY TIMES DO YOU TALK ON THE PHONE WITH FAMILY, FRIENDS, OR NEIGHBORS?: MORE THAN THREE TIMES A WEEK

## 2022-06-22 ENCOUNTER — HOSPITAL ENCOUNTER (OUTPATIENT)
Dept: PHARMACY | Age: 77
Setting detail: THERAPIES SERIES
Discharge: HOME | End: 2022-06-22
Payer: MEDICARE

## 2022-06-22 LAB
INR BLD: 1.3
PROTIME: 15 SECONDS

## 2022-06-22 PROCEDURE — 85610 PROTHROMBIN TIME: CPT

## 2022-06-22 PROCEDURE — 99212 OFFICE O/P EST SF 10 MIN: CPT

## 2022-06-22 PROCEDURE — 36416 COLLJ CAPILLARY BLOOD SPEC: CPT

## 2022-06-22 NOTE — PROGRESS NOTES
ANTICOAGULATION SERVICE    Date of Clinic Visit:  6/22/2022    Tequila Rai is a 65 y.o. male who presents to clinic today for anticoagulation monitoring and adjustment. Recent INR Results:  Internal QC passed  Lab Results   Component Value Date    INR 1.3 06/22/2022    INR 4 06/17/2022       Current Warfarin Dosage:  Dosing Plan  As of 6/22/2022    TTR:  18.5 % (1.5 mo)   Full warfarin instructions:  6/22: 5 mg; Otherwise 5 mg every Mon, Thu; 2.5 mg all other days               Assessment/Plan:    Modify warfarin dose as noted above: INR is low today. I gave instructions to hold dose x 1 day due to blood in urine. Al was instructed to hold Saturdays dose also by Dr Jen Johnson. I will give extra 2.5 mg today to get INR back in range. Recheck 6 days. Next Clinic Appointment:  Return date  As of 6/22/2022    TTR:  18.5 % (1.5 mo)   Next INR check:  6/28/2022             Please call [facility] Anticoagulation Clinic at [phone] with any questions. Thanks!   Sergio Uriarte [facility]  Anticoagulation Service Pharmacist  6/22/2022 9:16 AM     For Pharmacy Admin Tracking Only     Intervention Detail: Dose Adjustment: 1, reason: Therapy Optimization   Total # of Interventions Recommended: 1   Total # of Interventions Accepted: 1   Time Spent (min): 15

## 2022-06-23 ENCOUNTER — TELEPHONE (OUTPATIENT)
Dept: INTERNAL MEDICINE | Age: 77
End: 2022-06-23

## 2022-06-23 NOTE — TELEPHONE ENCOUNTER
Last appt: 4/25/2022  Next appt: 9/26/2022    Patient stopped by the window saying that every time he takes the cardizem he start to feel dizzy and lightheaded. He said when he was just on the Metoprolol he had no issues but once we added the second blood pressure medication that when he started having the dizziness. Patient was wondering if he needs to be on two blood pressure medications.

## 2022-06-23 NOTE — TELEPHONE ENCOUNTER
No blood pressure numbers are listed. First, tell patient drink more fluids --to see if that helps with the lightheadedness.   Second, he may hold the Cardizem, but he needs to check his blood pressure daily for a few days after holding Cardizem and report the numbers

## 2022-06-28 ENCOUNTER — HOSPITAL ENCOUNTER (OUTPATIENT)
Dept: PHARMACY | Age: 77
Setting detail: THERAPIES SERIES
Discharge: HOME | End: 2022-06-28
Payer: MEDICARE

## 2022-06-28 DIAGNOSIS — I48.91 ATRIAL FIBRILLATION WITH RVR (HCC): Primary | ICD-10-CM

## 2022-06-28 LAB
INR BLD: 1.5
PROTIME: 18 SECONDS

## 2022-06-28 PROCEDURE — 99212 OFFICE O/P EST SF 10 MIN: CPT

## 2022-06-28 PROCEDURE — 36416 COLLJ CAPILLARY BLOOD SPEC: CPT

## 2022-06-28 PROCEDURE — 85610 PROTHROMBIN TIME: CPT

## 2022-06-28 NOTE — PROGRESS NOTES
ANTICOAGULATION SERVICE    Date of Clinic Visit:  6/28/2022    Krishna Dumont is a 65 y.o. male who presents to clinic today for anticoagulation monitoring and adjustment. Recent INR Results:  Internal QC passed  Lab Results   Component Value Date    INR 1.5 06/28/2022    INR 1.3 06/22/2022       Current Warfarin Dosage:  Dosing Plan  As of 6/28/2022    TTR:  16.4 % (1.7 mo)   Full warfarin instructions:  5 mg every Mon, Tue, Thu; 2.5 mg all other days               Assessment/Plan:    Modify warfarin dose as noted above: Patient INR is below target. Will boost today's dose and increase 11%. Re-check 1 week. Next Clinic Appointment:  Return date  As of 6/28/2022    TTR:  16.4 % (1.7 mo)   Next INR check:  7/6/2022             Please call [facility] Anticoagulation Clinic at [phone] with any questions. Thanks!   Perry Cobos [facility]  Anticoagulation Service Pharmacist  6/28/2022 9:49 AM  For Pharmacy Admin Tracking Only     Intervention Detail: Dose Adjustment: 1, reason: Therapy Optimization   Total # of Interventions Recommended: 1   Total # of Interventions Accepted: 1   Time Spent (min): 20

## 2022-07-06 ENCOUNTER — HOSPITAL ENCOUNTER (OUTPATIENT)
Dept: PHARMACY | Age: 77
Setting detail: THERAPIES SERIES
Discharge: HOME | End: 2022-07-06
Payer: MEDICARE

## 2022-07-06 DIAGNOSIS — S22.39XD CLOSED FRACTURE OF ONE RIB WITH ROUTINE HEALING, UNSPECIFIED LATERALITY, SUBSEQUENT ENCOUNTER: ICD-10-CM

## 2022-07-06 DIAGNOSIS — I48.91 ATRIAL FIBRILLATION WITH RVR (HCC): Primary | ICD-10-CM

## 2022-07-06 LAB
INR BLD: 3.7
PROTIME: 44.1 SECONDS

## 2022-07-06 PROCEDURE — 99212 OFFICE O/P EST SF 10 MIN: CPT

## 2022-07-06 PROCEDURE — 36416 COLLJ CAPILLARY BLOOD SPEC: CPT

## 2022-07-06 PROCEDURE — 85610 PROTHROMBIN TIME: CPT

## 2022-07-06 NOTE — PROGRESS NOTES
ANTICOAGULATION SERVICE    Date of Clinic Visit:  7/6/2022    Rj Figueroa is a 65 y.o. male who presents to clinic today for anticoagulation monitoring and adjustment. Recent INR Results:  Internal QC passed  Lab Results   Component Value Date    INR 3.7 07/06/2022    INR 1.5 06/28/2022       Current Warfarin Dosage:  Dosing Plan  As of 7/6/2022    TTR:  20.6 % (1.9 mo)   Full warfarin instructions:  5 mg every Mon, Fri; 2.5 mg all other days               Assessment/Plan:    Modify warfarin dose as noted above: INR is elevated today. Dose was adjusted up 11% at last visit and INR increased from 1.5 to 3.7 in 8 days. I will decrease dose back down 10% and recheck in 5 days. If INR drops low on this dose, I will order smaller strength tablets to adjust in smaller increments. Next Clinic Appointment:  Return date  As of 7/6/2022    TTR:  20.6 % (1.9 mo)   Next INR check:  7/11/2022             Please call [facility] Anticoagulation Clinic at [phone] with any questions. Thanks!   Celeste Lopez [facility]  Anticoagulation Service Pharmacist  7/6/2022 9:07 AM     For Pharmacy Admin Tracking Only     Intervention Detail: Dose Adjustment: 1, reason: Therapy Optimization   Total # of Interventions Recommended: 1   Total # of Interventions Accepted: 1   Time Spent (min): 20

## 2022-07-07 RX ORDER — OXYCODONE HYDROCHLORIDE AND ACETAMINOPHEN 5; 325 MG/1; MG/1
1 TABLET ORAL EVERY 6 HOURS PRN
Qty: 90 TABLET | Refills: 0 | Status: SHIPPED | OUTPATIENT
Start: 2022-07-07 | End: 2022-08-15 | Stop reason: SDUPTHER

## 2022-07-11 ENCOUNTER — OFFICE VISIT (OUTPATIENT)
Dept: CARDIOLOGY | Age: 77
End: 2022-07-11
Payer: MEDICARE

## 2022-07-11 ENCOUNTER — HOSPITAL ENCOUNTER (OUTPATIENT)
Dept: PHARMACY | Age: 77
Setting detail: THERAPIES SERIES
Discharge: HOME | End: 2022-07-11
Payer: MEDICARE

## 2022-07-11 VITALS
DIASTOLIC BLOOD PRESSURE: 67 MMHG | WEIGHT: 201 LBS | SYSTOLIC BLOOD PRESSURE: 127 MMHG | HEIGHT: 69 IN | BODY MASS INDEX: 29.77 KG/M2 | HEART RATE: 73 BPM

## 2022-07-11 DIAGNOSIS — I48.91 ATRIAL FIBRILLATION WITH RVR (HCC): Primary | ICD-10-CM

## 2022-07-11 DIAGNOSIS — Z95.5 S/P CORONARY ARTERY STENT PLACEMENT: ICD-10-CM

## 2022-07-11 DIAGNOSIS — R94.39 ABNORMAL STRESS TEST: ICD-10-CM

## 2022-07-11 DIAGNOSIS — E78.2 MIXED HYPERLIPIDEMIA: ICD-10-CM

## 2022-07-11 DIAGNOSIS — I25.10 CORONARY ARTERY DISEASE INVOLVING NATIVE CORONARY ARTERY OF NATIVE HEART WITHOUT ANGINA PECTORIS: ICD-10-CM

## 2022-07-11 DIAGNOSIS — I10 ESSENTIAL HYPERTENSION: Primary | ICD-10-CM

## 2022-07-11 DIAGNOSIS — I48.91 ATRIAL FIBRILLATION WITH RVR (HCC): ICD-10-CM

## 2022-07-11 LAB
INR BLD: 2
PROTIME: 24 SECONDS

## 2022-07-11 PROCEDURE — 93005 ELECTROCARDIOGRAM TRACING: CPT | Performed by: INTERNAL MEDICINE

## 2022-07-11 PROCEDURE — 93010 ELECTROCARDIOGRAM REPORT: CPT | Performed by: INTERNAL MEDICINE

## 2022-07-11 PROCEDURE — 1123F ACP DISCUSS/DSCN MKR DOCD: CPT | Performed by: INTERNAL MEDICINE

## 2022-07-11 PROCEDURE — 99211 OFF/OP EST MAY X REQ PHY/QHP: CPT

## 2022-07-11 PROCEDURE — 99214 OFFICE O/P EST MOD 30 MIN: CPT | Performed by: INTERNAL MEDICINE

## 2022-07-11 PROCEDURE — G8417 CALC BMI ABV UP PARAM F/U: HCPCS | Performed by: INTERNAL MEDICINE

## 2022-07-11 PROCEDURE — G8428 CUR MEDS NOT DOCUMENT: HCPCS | Performed by: INTERNAL MEDICINE

## 2022-07-11 PROCEDURE — 36416 COLLJ CAPILLARY BLOOD SPEC: CPT

## 2022-07-11 PROCEDURE — 1036F TOBACCO NON-USER: CPT | Performed by: INTERNAL MEDICINE

## 2022-07-11 PROCEDURE — 85610 PROTHROMBIN TIME: CPT

## 2022-07-11 RX ORDER — METOPROLOL TARTRATE 50 MG/1
75 TABLET, FILM COATED ORAL 2 TIMES DAILY
Qty: 270 TABLET | Refills: 1 | Status: SHIPPED | OUTPATIENT
Start: 2022-07-11

## 2022-07-11 NOTE — PROGRESS NOTES
ANTICOAGULATION SERVICE    Date of Clinic Visit:  7/11/2022    Carmen Osullivan is a 65 y.o. male who presents to clinic today for anticoagulation monitoring and adjustment. Recent INR Results:  Internal QC passed  Lab Results   Component Value Date    INR 2 07/11/2022    INR 3.7 07/06/2022       Current Warfarin Dosage:  Dosing Plan  As of 7/11/2022    TTR:  23.7 % (2.1 mo)   Full warfarin instructions:  5 mg every Mon, Wed, Fri; 2.5 mg all other days               Assessment/Plan:    Modify warfarin dose as noted above: INR is back in range today but a big drop from last week after 11% dose decrease. I will increase dose back up 5%. Recheck 11 days. Next Clinic Appointment:  Return date  As of 7/11/2022    TTR:  23.7 % (2.1 mo)   Next INR check:  7/22/2022             Please call [facility] Anticoagulation Clinic at [phone] with any questions. Thanks!   Ansley Sanchez [facility]  Anticoagulation Service Pharmacist  7/11/2022 3:12 PM     For Pharmacy Admin Tracking Only     Intervention Detail: Dose Adjustment: 1, reason: Therapy Optimization   Total # of Interventions Recommended: 1   Total # of Interventions Accepted: 1   Time Spent (min): 15

## 2022-07-11 NOTE — PROGRESS NOTES
Today's Date: 7/11/2022  Patient's Name: Mnoty De Luna  Patient's age: 65 y.o., 1945    CC:  CAD, Afib, HTN, DL    HPI:  Norberto Osullivan  is here for follow up for CAD and afib. Had cath on 2/22 patent stent. Admits on 4/22 for Afib with RVR. Was initially on eliquis, had bleeding in mouth. Was also very expensive. Was then changed to coumadin. After that had some hematuria. Was taken off coumadin. Now back on coumadin, without hematuria. Off plavix. Stopped cardizem on his own due to dizziness. He wants to get a watchman device per the patient. Past Medical History:   has a past medical history of Abnormal cardiovascular stress test, Asthma, CAD (coronary artery disease), Cancer (HCC), Cervical radiculopathy, Chronic back pain, Colonic polyp, Degeneration of cervical intervertebral disc, Degeneration of cervical intervertebral disc, Depression, Diverticulosis, GERD (gastroesophageal reflux disease), Glucose intolerance (impaired glucose tolerance), HTN (hypertension), Hyperlipidemia, Lumbar radiculopathy, MI (myocardial infarction) ([facility] [address] Numbness and tingling, OA (osteoarthritis), Pre-diabetes, Sacroiliac pain, and Vision abnormalities. Past Surgical History:   has a past surgical history that includes Coronary angioplasty with stent (05/05/2011); Finger trigger release (Right); Abscess Drainage (Right); other surgical history (2/19/13, 5/14/13); Elbow bursa surgery (Right, 2012,2011); Infected skin debridement (Right); Colonoscopy (2007, 2003); Colonoscopy (09/09/2013); Cervical spine surgery (08/09/2001); Cervical spine surgery (Left, 8791,3311,0132);  Lumbar spine surgery (9434,6115); lumbar fusion (04/07/2014); skin biopsy; other surgical history; other surgical history (Left, 09/27/2016); other surgical history (Bilateral, 11/29/2016); other surgical history (12/06/2016); other surgical history (Right, 12/12/2016); other surgical history (Left, 12/15/2017); other surgical history (Left, 01/31/2017); Lumbar spine surgery (Left, 02/14/2017); Injection Procedure For Sacroiliac Joint (Bilateral, 03/14/2017); pr arthrocentesis aspir&/inj major jt/bursa w/o us (Left, 03/31/2017); pr arthrocentesis aspir&/inj major jt/bursa w/o us (Left, 04/14/2017); Lumbar spine surgery (Bilateral, 05/02/2017); Lumbar spine surgery (Bilateral, 05/09/2017); Anesthesia Nerve Block (Bilateral, 06/02/2017); Anesthesia Nerve Block (Bilateral, 06/09/2017); RADIOFREQUENCY ABLATION NERVES (Right, 06/29/2017); RADIOFREQUENCY ABLATION NERVES (Left, 07/06/2017); Cardiac catheterization (02/09/2022); back surgery; lumbar fusion (N/A, 11/15/2017); Colonoscopy (N/A, 10/15/2018); pr hemorrhoidectomy,int/ext,1 column/group (N/A, 11/07/2018); Upper gastrointestinal endoscopy (N/A, 09/30/2019); Cystoscopy (Bilateral, 05/19/2021); and Coronary angioplasty with stent (05/31/2011). Home Medications:  Prior to Admission medications    Medication Sig Start Date End Date Taking? Authorizing Provider   oxyCODONE-acetaminophen (PERCOCET) 5-325 MG per tablet Take 1 tablet by mouth every 6 hours as needed for Pain for up to 30 days. 7/7/22 8/6/22 Yes Lucia Amador MD   pantoprazole (PROTONIX) 40 MG tablet Take 1 tablet by mouth once daily 6/14/22  Yes Lucia Amador MD   naloxone 4 MG/0.1ML LIQD nasal spray 1 spray by Nasal route as needed for Opioid Reversal 6/11/22  Yes Adithya Gallo MD   ondansetron (ZOFRAN ODT) 4 MG disintegrating tablet Take 1 tablet by mouth every 8 hours as needed for Nausea 6/11/22  Yes Adithya Gallo MD   tiZANidine (ZANAFLEX) 4 MG tablet TAKE ONE TABLET BY MOUTH THREE TIMES DAILY 6/7/22  Yes Lucia Amador MD   nitroGLYCERIN (NITROSTAT) 0.4 MG SL tablet Place 1 tablet under the tongue every 5 minutes as needed for Chest pain up to max of 3 total doses.  If no relief after 1 dose, call 911. 5/27/22  Yes Lucia Amador MD   warfarin (COUMADIN) 5 MG tablet Take 1 tablet by mouth daily 4/25/22  Yes Dominic Owusu MD   losartan (COZAAR) 25 MG tablet TAKE 1 TABLET DAILY 4/14/22  Yes Dominic Owusu MD   metoprolol tartrate (LOPRESSOR) 50 MG tablet Take 1 tablet by mouth 2 times daily 4/9/22  Yes Letty Whittaker MD   vitamin C (ASCORBIC ACID) 500 MG tablet Take 500 mg by mouth 2 times daily   Yes Historical Provider, MD   atorvastatin (LIPITOR) 80 MG tablet TAKE 1 TABLET DAILY 1/4/22  Yes Dominic Owusu MD   PARoxetine (PAXIL) 20 MG tablet TAKE 1 TABLET BY MOUTH ONCE DAILY IN THE MORNING 11/29/21  Yes Dominic Owusu MD   metFORMIN (GLUCOPHAGE) 500 MG tablet TAKE 1 TABLET TWICE DAILY  WITH MEALS. 10/29/21  Yes Dominic Owusu MD   ferrous sulfate (IRON 325) 325 (65 Fe) MG tablet Take 1 tablet by mouth 2 times daily 2/25/21  Yes Dominic Owusu MD   aspirin 81 MG tablet Take 81 mg by mouth daily   Yes Historical Provider, MD   famotidine (PEPCID) 20 MG tablet Take 20 mg by mouth daily. Yes Historical Provider, MD   dilTIAZem (CARDIZEM) 60 MG tablet Take 1 tablet by mouth every 8 hours  Patient not taking: Reported on 7/11/2022 5/2/22 7/31/22  Dominic Owusu MD   celecoxib (CELEBREX) 200 MG capsule TAKE 1 CAPSULE BY MOUTH TWICE DAILY  Patient not taking: Reported on 7/11/2022 9/7/21   Dominic Owusu MD       Allergies:  Crestor [rosuvastatin], Ibuprofen, Lisinopril, and Effient [prasugrel]    Social History:   reports that he quit smoking about 11 years ago. His smoking use included cigarettes, pipe, and cigars. He started smoking about 58 years ago. He has a 40.00 pack-year smoking history. He quit smokeless tobacco use about 11 years ago. His smokeless tobacco use included chew. He reports current alcohol use. He reports that he does not use drugs. Family History: family history includes Cancer in his father. No h/o sudden cardiac death. No for premature CAD    REVIEW OF SYSTEMS:    · Constitutional: there has been no unanticipated weight loss.  There's been No change in energy level, No change in activity level. · Eyes: No visual changes or diplopia. No scleral icterus. · ENT: No Headaches, hearing loss or vertigo. No mouth sores or sore throat. · Cardiovascular: see above  · Respiratory: see above  · Gastrointestinal: No abdominal pain, appetite loss, blood in stools. · Genitourinary: No dysuria, trouble voiding, or hematuria. · Musculoskeletal:  Lower Back pain reported  · Integumentary: No rash or pruritis. · Neurological: No headache or diplopia. No tingling  · Psychiatric: No anxiety, or depression. · Endocrine: No temperature intolerance. · Hematologic/Lymphatic: No abnormal bruising or bleeding, blood clots or swollen lymph nodes. · Allergic/Immunologic: No nasal congestion or hives. PHYSICAL EXAM:      /67   Pulse 73   Ht 5' 9\" (1.753 m)   Wt 201 lb (91.2 kg)   BMI 29.68 kg/m²   General appearance: alert and cooperative with exam  HEENT: Head: Normocephalic, no lesions, without obvious abnormality.   Eyes: PERRL, EOMI  Ears: Not obvious deformations or lack of hearing  Neck: no carotid bruit, no JVD  Lungs: clear to auscultation bilaterally  Heart: regular rate and rhythm, S1, S2 normal, no murmur, click, rub or gallop  Abdomen: soft, non-tender; bowel sounds normal; no masses,  no organomegaly  Extremities: extremities normal, atraumatic, no cyanosis or edema  Neurologic: Mental status: Alert, oriented, thought content appropriate  Skin: WNL for age and condition, no obvious rashes or leasions    Labs:   Lab Results   Component Value Date    CHOL 186 08/20/2021    TRIG 248 (H) 08/20/2021    HDL 43 08/20/2021    LDLCHOLESTEROL 93 08/20/2021    VLDL NOT REPORTED (H) 08/20/2021    CHOLHDLRATIO 4.3 08/20/2021     Lab Results   Component Value Date     06/10/2022    K 5.3 06/10/2022     06/10/2022    CO2 27 06/10/2022    BUN 25 (H) 06/10/2022    CREATININE 1.08 06/10/2022    GLUCOSE 180 (H) 06/10/2022    CALCIUM 8.3 (L) 06/10/2022    PROT 6.6 04/06/2022    LABALBU 3.9 04/06/2022    BILITOT 0.54 04/06/2022    ALKPHOS 87 04/06/2022    AST 34 04/06/2022    ALT 68 (H) 04/06/2022    LABGLOM >60 06/10/2022    GFRAA >60 06/10/2022     Cardiac Data:  EKG: Sinus  Rhythm   -Right bundle branch block. TTE 10/17/2014  1.   Left ventricle is normal in dimension with normal left ventricular systolic function.  Ejection fraction is 50-55%. 2.   Biatrial enlargement. 3.   Right ventricle is dilated with normal function. 4.   Mitral valve leaflets are thickened with trivial regurgitation. 5.   Mildly sclerotic aortic valve with no stenosis or regurgitation. 6.   Trivial tricuspid regurgitation.  RVSP is 57mm Hg. 7.   Grade I diastolic dysfunction. Stress test 1/22:  1. Moderate size/intensity mid/apical inferior ischemia around old infarction  (more basal). 2.  Normal LVEF 67%. 3.  Normal wall motion.     Cath on 2/22:   patent LAD and RCA stents with nonobstructive CAD. Echo on 4/22:  Normal left ventricular diameter. Borderline left ventricular hypertrophy. Left ventricular systolic function is normal. Left ventricular ejection fraction 55 %. Left atrium is moderately dilated. Right atrial dilatation. Right ventricular dilatation with reduced systolic function. Aortic valve sclerosis without stenosis. Mild mitral valve thickening with annular calcification. Mild mitral regurgitation. Mild to moderate tricuspid regurgitation. Estimated right ventricular systolic pressure is 60 mmHg. Severe pulmonary hypertension. Trivial pericardial effusion. IVC Increased diameter and impaired or no inspiratory variation indicatingelevated RA filling pressure (i.e. CVP) . Assessment and Plan:    1. CAD- STEMI 5/5/2011 with thrombectomy and MELBA to RCA followed by staged PCI of LAD with MELBA 5/31/2011. Patent Stents on 2/22 (after abnormal stress test). - on ASA, statin, BB   - can d/c ASA due to stable CAD (and hematuria), continue coumadin     2. Parox Afib- in NSR   - coumadin   - couldn't afford eliquis    - having hematuria- now resolved back on coumadin    - we are stopping his aspirin    - if hematuria recurs- will refer to structural for possible watchman    - didn't tolerate cardizem due to dizziness- will d/c   - instead increase lopressor 75 bid    3. HTN- failry well controlled at this time. Continue current therapy. 4. Obesity - encouraged diet, exercise, and discussed weight loss extensively. 5. Hyperlipidemia- continue statin. 6. DM- management per PCP. The patient is to continue heart healthy diet, weight loss and exercise as tolerated. Patient's medications and side effects were discussed. Medication refills were provided if needed. Follow up appointment timing was discussed. All questions and concerns were addressed to patient's satisfaction. The patient is to follow up in 3 months or sooner if necessary. Thank you for allowing me to participate in the care of this patient, please do not hesitate to call if you have any questions. Leny Lr DO, [facility], [address], [address], [place] [address] [facility]  [facility] "[employer]  [phone]

## 2022-07-11 NOTE — PROGRESS NOTES
[facility]             [address], [address]                        Telephone [phone]             Fax [phone]     Ameena Barnes  1945  IGB:[id number]   Date of visit:  1/28/2022    Subjective: Ameena Barnes is a 67 y.o.  male who presents to [facility] today (1/28/2022) for evaluation of:    Chief Complaint   Patient presents with    Elbow Injury     Left, fell at home tonight, swelling and pain       Arm Pain   The incident occurred less than 1 hour ago. The incident occurred at home. The injury mechanism was a fall (fell while stepping of a step stool the tarp slid and cause him to fall landing on left elbow; denies hitting head or LOC). The pain is present in the left elbow. Quality: thumping. The pain radiates to the left arm. The pain is at a severity of 9/10. The patient is experiencing no pain. The pain has been constant since the incident. Pertinent negatives include no chest pain, numbness or tingling. Associated symptoms comments: Pain and swelling left elbow with radiating up the upper arm and down lower arm. Nothing aggravates the symptoms. He has tried nothing for the symptoms. The treatment provided no relief.        He has the following problem list:  Patient Active Problem List   Diagnosis    Chronic back pain    Neck pain, chronic    Brachial neuritis or radiculitis    Spinal stenosis, lumbar region, with neurogenic claudication    Displacement of cervical intervertebral disc without myelopathy    CAD (coronary artery disease)    GILL (dyspnea on exertion)    S/P lumbar spinal fusion    Obesity (BMI 35.0-39.9 without comorbidity)    HTN (hypertension)    Hyperlipidemia    Type 2 diabetes mellitus without complication (HCC)    Chronic bilateral low back pain with bilateral sciatica    Left hip pain    Acquired spondylolisthesis    Back pain    Grade III Signed. Thank you.    hemorrhoids    Recurrent major depressive disorder, in partial remission (HCC)    Carcinoma larynx (HCC)        Current medications are:  Current Outpatient Medications   Medication Sig Dispense Refill    oxyCODONE-acetaminophen (PERCOCET) 5-325 MG per tablet Take 1 tablet by mouth every 6 hours as needed for Pain for up to 30 days. 90 tablet 0    atorvastatin (LIPITOR) 80 MG tablet TAKE 1 TABLET DAILY 90 tablet 3    metoprolol tartrate (LOPRESSOR) 25 MG tablet TAKE 1 TABLET TWICE A  tablet 3    clopidogrel (PLAVIX) 75 MG tablet TAKE 1 TABLET DAILY 90 tablet 3    PARoxetine (PAXIL) 20 MG tablet TAKE 1 TABLET BY MOUTH ONCE DAILY IN THE MORNING 90 tablet 3    metFORMIN (GLUCOPHAGE) 500 MG tablet TAKE 1 TABLET TWICE DAILY  WITH MEALS. 180 tablet 3    celecoxib (CELEBREX) 200 MG capsule TAKE 1 CAPSULE BY MOUTH TWICE DAILY 180 capsule 3    losartan (COZAAR) 25 MG tablet TAKE 1 TABLET DAILY 90 tablet 3    pantoprazole (PROTONIX) 40 MG tablet Take 1 tablet by mouth once daily 90 tablet 3    ferrous sulfate (IRON 325) 325 (65 Fe) MG tablet Take 1 tablet by mouth 2 times daily 120 tablet 3    tiZANidine (ZANAFLEX) 4 MG tablet TAKE ONE TABLET BY MOUTH THREE TIMES DAILY 90 tablet 5    aspirin 81 MG tablet Take 81 mg by mouth daily      famotidine (PEPCID) 20 MG tablet Take 20 mg by mouth daily.  Handicap Placard MISC by Does not apply route EXPIRES 04/13/2026 2 each 0    nitroGLYCERIN (NITROSTAT) 0.4 MG SL tablet Place 1 tablet under the tongue every 5 minutes as needed for Chest pain up to max of 3 total doses. If no relief after 1 dose, call 911.  (Patient not taking: Reported on 1/28/2022) 25 tablet 3    Handicap Placard MISC by Does not apply route Expires: 7/5/2021 2 each 0     Current Facility-Administered Medications   Medication Dose Route Frequency Provider Last Rate Last Admin    methylPREDNISolone acetate (DEPO-MEDROL) injection 40 mg  40 mg IntraMUSCular Once Asad Alvarenga MD He is allergic to crestor [rosuvastatin], ibuprofen, lisinopril, and effient [prasugrel]. .    He  reports that he quit smoking about 10 years ago. His smoking use included cigarettes, pipe, and cigars. He has a 40.00 pack-year smoking history. He quit smokeless tobacco use about 10 years ago. His smokeless tobacco use included chew. Objective:    Vitals:    01/28/22 1813   BP: (!) 102/52   Site: Right Upper Arm   Position: Sitting   Cuff Size: Medium Adult   Pulse: 70   Temp: 96.2 °F (35.7 °C)   TempSrc: Tympanic   SpO2: 96%   Weight: 210 lb (95.3 kg)   Height: 5' 9\" (1.753 m)     Body mass index is 31.01 kg/m². Review of Systems   Constitutional: Negative. Respiratory: Negative. Cardiovascular: Negative. Negative for chest pain. Musculoskeletal:        Left elbow and lower arm pain and swelling   Neurological: Negative for tingling and numbness. Physical Exam  Vitals and nursing note reviewed. Constitutional:       Appearance: He is well-developed. HENT:      Head: Normocephalic. Eyes:      Pupils: Pupils are equal, round, and reactive to light. Cardiovascular:      Rate and Rhythm: Normal rate and regular rhythm. Heart sounds: Normal heart sounds. Pulmonary:      Effort: Pulmonary effort is normal.      Breath sounds: Normal breath sounds and air entry. Musculoskeletal:      Left elbow: Swelling present. Normal range of motion. Tenderness present in olecranon process (with swelling). Arms:       Cervical back: Normal range of motion and neck supple. Skin:     General: Skin is warm and dry. Neurological:      General: No focal deficit present. Mental Status: He is alert and oriented to person, place, and time. GCS: GCS eye subscore is 4. GCS verbal subscore is 5. GCS motor subscore is 6. Psychiatric:         Behavior: Behavior normal.         Thought Content:  Thought content normal.       Assessment and Plan:    XR ELBOW LEFT (MIN 3 VIEWS)    Result Date: 1/28/2022  EXAMINATION: THREE XRAY VIEWS OF THE LEFT ELBOW 1/28/2022 4:02 pm COMPARISON: None. HISTORY: ORDERING SYSTEM PROVIDED HISTORY: Pain and swelling of left elbow TECHNOLOGIST PROVIDED HISTORY: fall from step stool landing on left elbow Reason for Exam: fall onto left elbow, swelling and pain FINDINGS: No acute fracture. Alignment is anatomic. No elbow effusion. Small curvilinear amorphous calcification with rounded margins posterior to the olecranon may represent hydroxyapatite deposition or possibly a small fragmented enthesophyte. Marked posterior soft tissue swelling and increased soft tissue density overlying the olecranon and extending into the lower portion of the upper arm and along the proximal dorsal forearm. No soft tissue gas or radiopaque retained foreign body. Marked posterior soft tissue swelling at the elbow centered about the olecranon. No soft tissue gas or retained foreign body. Either hydroxyapatite deposition in the distal triceps tendon versus a small chronically fragmented olecranon enthesophyte. No acute fracture, dislocation, or elbow effusion. NM MYOCARDIAL SPECT REST EXERCISE OR RX    Result Date: 1/24/2022  Radiology exam is complete. No Radiologist dictation. Please follow up with ordering provider. Diagnosis Orders   1. Pain and swelling of left elbow  XR ELBOW LEFT (MIN 3 VIEWS)   2. Fall, initial encounter  XR ELBOW LEFT (MIN 3 VIEWS)   3. Olecranon bursitis of left elbow  20600 - PA DRAIN/INJECT SMALL JOINT/BURSA     I reviewed x-ray result with him during visit. The procedure, risks and complications have been discussed in detail (including, but not limited to pain, infection, bleeding) with the patient, and the He has given consent to the procedure. The skin was sterilely prepped and draped over the affected area in the usual fashion. Skin was cleansed with betadine and alcohol.    After adequate topical anesthesia, a 18 g needle was inserted into the bursa of left elbow. Blood fluid was aspirated in the amount of 10 ml. Pressure dressing was applied. He tolerated procedure well. He was observed until stable. He was instructed on post-procedure wound care. Pt was instructed to keep pressure dressing to affected area for 2 days. May take over the counter Tylenol as directed for pain, fever. Pt instructed to monitor for signs of infection: fever over 100.4, redness, warmth, drainage, foul odor. Pt to follow up with PCP/Urgent Care if symptoms persist or worsen. The use, risks, benefits, and side effects of prescribed or recommended medications were discussed. All questions were answered and the patient/caregiver voiced understanding.         Procedures    20600 - [place] DRAIN/INJECT SMALL JOINT/BURSA         Electronically signed by LAWRENCE Gavin CNP on 1/28/22 at 6:44 PM EST

## 2022-07-19 ENCOUNTER — OFFICE VISIT (OUTPATIENT)
Dept: INTERNAL MEDICINE | Age: 77
End: 2022-07-19
Payer: MEDICARE

## 2022-07-19 ENCOUNTER — HOSPITAL ENCOUNTER (OUTPATIENT)
Dept: LAB | Age: 77
Discharge: HOME | End: 2022-07-19
Payer: MEDICARE

## 2022-07-19 VITALS
DIASTOLIC BLOOD PRESSURE: 62 MMHG | HEIGHT: 69 IN | BODY MASS INDEX: 29.33 KG/M2 | SYSTOLIC BLOOD PRESSURE: 114 MMHG | RESPIRATION RATE: 16 BRPM | WEIGHT: 198 LBS | HEART RATE: 69 BPM

## 2022-07-19 DIAGNOSIS — R42 DIZZINESS: ICD-10-CM

## 2022-07-19 DIAGNOSIS — R19.7 DIARRHEA, UNSPECIFIED TYPE: Primary | ICD-10-CM

## 2022-07-19 DIAGNOSIS — I10 PRIMARY HYPERTENSION: ICD-10-CM

## 2022-07-19 DIAGNOSIS — D50.9 IRON DEFICIENCY ANEMIA, UNSPECIFIED IRON DEFICIENCY ANEMIA TYPE: ICD-10-CM

## 2022-07-19 LAB
HEMOGLOBIN: 10.8 G/DL (ref 13–17)
IRON SATURATION: 7 % (ref 20–55)
IRON: 28 UG/DL (ref 59–158)
TOTAL IRON BINDING CAPACITY: 377 UG/DL (ref 250–450)
UNSATURATED IRON BINDING CAPACITY: 349 UG/DL (ref 112–347)

## 2022-07-19 PROCEDURE — 36415 COLL VENOUS BLD VENIPUNCTURE: CPT

## 2022-07-19 PROCEDURE — 83540 ASSAY OF IRON: CPT

## 2022-07-19 PROCEDURE — G8417 CALC BMI ABV UP PARAM F/U: HCPCS | Performed by: INTERNAL MEDICINE

## 2022-07-19 PROCEDURE — 1036F TOBACCO NON-USER: CPT | Performed by: INTERNAL MEDICINE

## 2022-07-19 PROCEDURE — 1123F ACP DISCUSS/DSCN MKR DOCD: CPT | Performed by: INTERNAL MEDICINE

## 2022-07-19 PROCEDURE — G8427 DOCREV CUR MEDS BY ELIG CLIN: HCPCS | Performed by: INTERNAL MEDICINE

## 2022-07-19 PROCEDURE — 99214 OFFICE O/P EST MOD 30 MIN: CPT | Performed by: INTERNAL MEDICINE

## 2022-07-19 PROCEDURE — 83550 IRON BINDING TEST: CPT

## 2022-07-19 PROCEDURE — 85018 HEMOGLOBIN: CPT

## 2022-07-19 ASSESSMENT — ENCOUNTER SYMPTOMS
COUGH: 0
CONSTIPATION: 0
EYE PAIN: 0
NAUSEA: 0
SHORTNESS OF BREATH: 0
ABDOMINAL PAIN: 0
BACK PAIN: 0
DIARRHEA: 1
BLOOD IN STOOL: 0
VOMITING: 0

## 2022-07-19 NOTE — PROGRESS NOTES
STENTS X 4    Cancer (HCC)     THROAT, HX. RADIATION , LT EAR    Cervical radiculopathy     Chronic back pain     Colonic polyp     Degeneration of cervical intervertebral disc     [facility], 1990 C4/C5-C6/C7    Degeneration of cervical intervertebral disc     [facility] 1994, C3/C4    Depression     Diverticulosis     GERD (gastroesophageal reflux disease)     Glucose intolerance (impaired glucose tolerance)     IS NOT DIABETIC, PER PT    HTN (hypertension)     Hyperlipidemia     Lumbar radiculopathy     MI (myocardial infarction) ([address]     2011    Numbness and tingling     HANDS    OA (osteoarthritis)     Pre-diabetes     Sacroiliac pain 11/04/2014    Vision abnormalities     WEARS GLASSES      Past Surgical History:   Procedure Laterality Date    ABSCESS DRAINAGE Right     ELBOW WITH CLOSURE    ANESTHESIA NERVE BLOCK Bilateral 06/02/2017    Bilat L1 L2 L3 L4 L5 Diagnostic Medial Branch Blk performed by Cathryn Oneal MD at [address] [place] [address] Bilateral 06/09/2017    Bilat L1 L2 L3 L4 L5 Confirmatory Medial BB performed by Cathryn Oneal MD at [address] [place] [address]  02/09/2022    CERVICAL SPINE SURGERY  08/09/2001    Anterior Cervical Decompression and Fusion C3-4    CERVICAL SPINE SURGERY Left [id number]    Cervical C6-C7 Discectomy and Foraminotomy    COLONOSCOPY  2007, 2003    POLYPS    COLONOSCOPY  09/09/2013    hyperplastic polyp x 3    COLONOSCOPY N/A 10/15/2018    hyperplastic polyp, severe sigmoid diverticulosis, large ext. hemorrhoid, Dr Frazier Saliva  05/05/2011    PROX RCA X2    CORONARY ANGIOPLASTY WITH STENT PLACEMENT  05/31/2011    XIENCE TO MID LAD X2    CYSTOSCOPY Bilateral 05/19/2021    CYSTO Bilateral Retrogrades performed by Shanon Lomeli MD at [facility] Right 2012,2011    FINGER TRIGGER RELEASE Right     THIRD FINGER    [facility], [facility]. INJECTION PROCEDURE FOR SACROILIAC JOINT Bilateral 03/14/2017    Bilat Sacroiliac & Piriformis Inj's performed by Natasha Ríos MD at [address] Right     ELBOW    LUMBAR FUSION  04/07/2014    lumbar decompression and fusion    LUMBAR FUSION N/A 11/15/2017    LUMBAR DECOMPRESSION POSTERIOR W/FUSION L3 L4 ( with SPINAL CORD MONITOR ) performed by Shanika Blackmon MD at [address] [place] [address] Rd  [phone]    Fusion    LUMBAR SPINE SURGERY Left 02/14/2017    Left L4 & L5 Transforaminal ANITHA performed by Natasha Ríos MD at [address] [facility] [address] Bilateral 05/02/2017    Bilat L5 Transforaminal ANITHA performed by Natasha Ríos MD at [address] [facility] Bilateral 05/09/2017    Bilat L5 Transforaminal ANITHA performed by Natasha Ríos MD at [address] [place] [address]  2/19/13, 5/14/13    L1/L2 IESI    OTHER SURGICAL HISTORY      Hardware correction, patient had 1 broken screw and to loose screws in back     OTHER SURGICAL HISTORY Left 09/27/2016     C6 TFE    OTHER SURGICAL HISTORY Bilateral 11/29/2016    L3, L4 L5 Diagnostic Medial Branch Block    OTHER SURGICAL HISTORY  12/06/2016    jovani L3 L4 L5 conf MBB    OTHER SURGICAL HISTORY Right 12/12/2016    L3,L4,L5 RFA    OTHER SURGICAL HISTORY Left 12/15/2017    L3.  L4, L5 RFA    OTHER SURGICAL HISTORY Left 01/31/2017    L4 & L5 TFE    [place] ARTHROCENTESIS ASPIR&/INJ MAJOR JT/BURSA W/O US Left 03/31/2017    Left Hip Inj performed by Natasha Ríos MD at [address] [employer] ARTHROCENTESIS ASPIR&/INJ MAJOR JT/BURSA W/O US Left 04/14/2017    Left Hip Inj performed by Natasha Ríos MD at [address] [employer] HEMORRHOIDECTOMY,INT/EXT,1 COLUMN/GROUP N/A 11/07/2018    External HEMORRHOIDECTOMY performed by Belgica Soto MD at [facility] [address] Right 06/29/2017    Right L1 L2 L3 L4 L5 Radiofrequency Ablation performed by Natasha Ríos MD at [facility] [address] Left 07/06/2017    Left L1 L2 L3 L4 L5 Radiofrequency performed by Natasha Ríos MD at [address] [place] [address] SKIN BIOPSY      LT EAR    UPPER GASTROINTESTINAL ENDOSCOPY N/A 2019    mild gastritis, mild to moderate esophagitis, Dr. Guillermo Balbuena       Family History   Problem Relation Age of Onset    Cancer Father         colon cancer          Social History     Tobacco Use    Smoking status: Former     Packs/day: 1.00     Years: 40.00     Pack years: 40.00     Types: Cigarettes, Pipe, Cigars     Start date: 1964     Quit date: 2011     Years since quittin.2    Smokeless tobacco: Former     Types: Chew     Quit date: 2011   Substance Use Topics    Alcohol use: Yes     Alcohol/week: 0.0 standard drinks     Comment: RARE         Current Outpatient Medications   Medication Sig Dispense Refill    metoprolol tartrate (LOPRESSOR) 50 MG tablet Take 1.5 tablets by mouth 2 times daily (Patient taking differently: Take 50 mg by mouth in the morning and 50 mg before bedtime.) 270 tablet 1    oxyCODONE-acetaminophen (PERCOCET) 5-325 MG per tablet Take 1 tablet by mouth every 6 hours as needed for Pain for up to 30 days. 90 tablet 0    pantoprazole (PROTONIX) 40 MG tablet Take 1 tablet by mouth once daily 90 tablet 0    naloxone 4 MG/0.1ML LIQD nasal spray 1 spray by Nasal route as needed for Opioid Reversal 1 each 0    ondansetron (ZOFRAN ODT) 4 MG disintegrating tablet Take 1 tablet by mouth every 8 hours as needed for Nausea 12 tablet 0    tiZANidine (ZANAFLEX) 4 MG tablet TAKE ONE TABLET BY MOUTH THREE TIMES DAILY 90 tablet 5    nitroGLYCERIN (NITROSTAT) 0.4 MG SL tablet Place 1 tablet under the tongue every 5 minutes as needed for Chest pain up to max of 3 total doses.  If no relief after 1 dose, call 911. 25 tablet 3    warfarin (COUMADIN) 5 MG tablet Take 1 tablet by mouth daily 30 tablet 3    losartan (COZAAR) 25 MG tablet TAKE 1 TABLET DAILY 90 tablet 3    vitamin C (ASCORBIC ACID) 500 MG tablet Take 500 mg by mouth 2 times daily      atorvastatin (LIPITOR) 80 MG tablet TAKE 1 TABLET DAILY 90 tablet 3    PARoxetine (PAXIL) 20 MG tablet TAKE 1 TABLET BY MOUTH ONCE DAILY IN THE MORNING 90 tablet 3    celecoxib (CELEBREX) 200 MG capsule TAKE 1 CAPSULE BY MOUTH TWICE DAILY (Patient not taking: Reported on 7/19/2022) 180 capsule 3    ferrous sulfate (IRON 325) 325 (65 Fe) MG tablet Take 1 tablet by mouth 2 times daily 120 tablet 3    famotidine (PEPCID) 20 MG tablet Take 20 mg by mouth daily. No current facility-administered medications for this visit. Allergies   Allergen Reactions    Crestor [Rosuvastatin] Other (See Comments)     Joint pain, muscle aches    Ibuprofen Other (See Comments)     bleeding    Lisinopril Hives    Effient [Prasugrel] Rash       Health Maintenance   Topic Date Due    Hepatitis C screen  Never done    DTaP/Tdap/Td vaccine (1 - Tdap) Never done    Low dose CT lung screening  Never done    Shingles vaccine (2 of 3) 01/03/2014    COVID-19 Vaccine (4 - Booster for Moderna series) 03/10/2022    Lipids  08/20/2022    Flu vaccine (1) 09/01/2022    Depression Monitoring  03/24/2023    Pneumococcal 65+ years Vaccine  Completed    Hepatitis A vaccine  Aged Out    Hib vaccine  Aged Out    Meningococcal (ACWY) vaccine  Aged Out       Subjective:      Review of Systems   Constitutional:  Negative for chills and fever. HENT:  Negative for hearing loss. Eyes:  Negative for pain and visual disturbance. Respiratory:  Negative for cough and shortness of breath. Cardiovascular:  Negative for chest pain, palpitations and leg swelling. Gastrointestinal:  Positive for diarrhea. Negative for abdominal pain, blood in stool, constipation, nausea and vomiting. Endocrine: Negative for cold intolerance, polydipsia and polyuria. Genitourinary:  Negative for difficulty urinating, dysuria and hematuria. Musculoskeletal:  Negative for arthralgias, back pain, gait problem and neck pain. Skin:  Negative for pallor and rash. Neurological:  Positive for dizziness.  Negative for weakness, numbness and headaches. Hematological:  Negative for adenopathy. Does not bruise/bleed easily. Psychiatric/Behavioral:  Negative for confusion. The patient is not nervous/anxious. Objective:     Physical Exam  Vitals reviewed. Constitutional:       Appearance: He is well-developed. HENT:      Head: Normocephalic and atraumatic. Eyes:      Pupils: Pupils are equal, round, and reactive to light. Cardiovascular:      Rate and Rhythm: Normal rate and regular rhythm. Heart sounds: No murmur heard. No friction rub. No gallop. Pulmonary:      Effort: Pulmonary effort is normal.      Breath sounds: Normal breath sounds. No wheezing or rales. Abdominal:      General: There is no distension. Palpations: Abdomen is soft. There is no mass. Tenderness: There is no abdominal tenderness. There is no rebound. Musculoskeletal:         General: Normal range of motion. Cervical back: Neck supple. Lymphadenopathy:      Cervical: No cervical adenopathy. Skin:     General: Skin is warm and dry. Findings: No rash. Neurological:      Mental Status: He is alert and oriented to person, place, and time. Cranial Nerves: No cranial nerve deficit (grossly). Psychiatric:         Thought Content: Thought content normal.      /62 (Site: Left Upper Arm, Position: Standing, Cuff Size: Large Adult)   Pulse 69   Resp 16   Ht 5' 9\" (1.753 m)   Wt 198 lb (89.8 kg)   BMI 29.24 kg/m²     Assessment:       Diagnosis Orders   1. Diarrhea, unspecified type  Clostridium Difficile Toxin/Antigen      2. Primary hypertension        3. Dizziness        4. Iron deficiency anemia, unspecified iron deficiency anemia type  Hemoglobin    Iron and TIBC         Patient reports diarrhea is starting to get better. It is possible he had a viral gastroenteritis with associated dehydration and orthostatic dizziness.     Review of recent labs include 7/11/2022 okay INR at 2.0    6/10/2022 normal creatinine 1.08    6/10/2022 normal hemoglobin at 13.4    We are going to decrease metoprolol from 75 mg twice daily to 50 mg twice daily because of the orthostatic dizziness. Patient also told to drink increased Gatorade/fluids with electrolytes. We will also check C. difficile along with repeat hemoglobin and iron. We are discontinuing metformin with his last A1c 5.7 and the diarrhea might be partially aggravated by the metformin. Plan:       Return if symptoms worsen or fail to improve, for Hypertension, Hyperlipidemia, Diabetes. Orders Placed This Encounter   Procedures    Clostridium Difficile Toxin/Antigen     Standing Status:   Future     Standing Expiration Date:   7/19/2023    Hemoglobin     Standing Status:   Future     Standing Expiration Date:   7/19/2023    Iron and TIBC     Standing Status:   Future     Standing Expiration Date:   7/19/2023     Order Specific Question:   Is Patient Fasting? Answer:   na     Order Specific Question:   No of Hours? Answer:   na       No orders of the defined types were placed in this encounter. Patientgiven educational materials - see patient instructions. Discussed use, benefit,and side effects of prescribed medications. All patient questions answered. Ptvoiced understanding. Reviewed health maintenance. Instructed to continue currentmedications, diet and exercise. Patient agreed with treatment plan. Follow up asdirected.      Electronically signed by Red Bunch MD on 7/19/2022 at 2:46 PM

## 2022-07-21 DIAGNOSIS — D64.9 ANEMIA, UNSPECIFIED TYPE: Primary | ICD-10-CM

## 2022-07-22 ENCOUNTER — HOSPITAL ENCOUNTER (OUTPATIENT)
Dept: PHARMACY | Age: 77
Setting detail: THERAPIES SERIES
Discharge: HOME | End: 2022-07-22
Payer: MEDICARE

## 2022-07-22 DIAGNOSIS — I48.91 ATRIAL FIBRILLATION WITH RVR (HCC): Primary | ICD-10-CM

## 2022-07-22 LAB
INR BLD: 2.9
PROTIME: 35.1 SECONDS

## 2022-07-22 PROCEDURE — 85610 PROTHROMBIN TIME: CPT

## 2022-07-22 PROCEDURE — 36416 COLLJ CAPILLARY BLOOD SPEC: CPT

## 2022-07-22 PROCEDURE — 99211 OFF/OP EST MAY X REQ PHY/QHP: CPT

## 2022-07-22 NOTE — PROGRESS NOTES
ANTICOAGULATION SERVICE    Date of Clinic Visit:  2022    Umu Mcadams is a 65 y.o. male who presents to clinic today for anticoagulation monitoring and adjustment. Recent INR Results:  Internal QC passed  Lab Results   Component Value Date    INR 2.9 2022    INR 2 2022       Current Warfarin Dosage:  Dosing Plan  As of 2022      TTR:  34.8 % (2.5 mo)   Full warfarin instructions:  2.5 mg every Mon, Fri; 3.5 mg all other days                 Assessment/Plan:    Modify warfarin dose as noted above: INR remains in range today but increased from bottom of range to top of range in past 10 days. I will try decreasing dose 4% and recheck in two weeks. Next Clinic Appointment:  Return date  As of 2022      TTR:  34.8 % (2.5 mo)   Next INR check:  2022               Please call [facility] at [phone] with any questions. Thanks!   Ave Joiner [facility] [place]  Anticoagulation Service Pharmacist  2022 9:15 AM    For Pharmacy Admin Tracking Only    Intervention Detail: Adherence Monitorin  Total # of Interventions Recommended: 0  Total # of Interventions Accepted: 0  Time Spent (min): 15

## 2022-07-29 ENCOUNTER — TELEPHONE (OUTPATIENT)
Dept: INTERNAL MEDICINE | Age: 77
End: 2022-07-29

## 2022-07-29 NOTE — TELEPHONE ENCOUNTER
Last appt: 7/19/2022  Next appt: 9/26/2022    Patient called in with an update on his blood pressure reading he has been getting 91/58 while sitting. He says he still gets the dizzy spells about a half hour after taking his metoprolol medication. He did decrease it down to 50 mg twice a day but still gets the dizzy spells. He was wondering what else he should do.

## 2022-07-29 NOTE — TELEPHONE ENCOUNTER
Try taking half of the 50 mg metoprolol (equals 25 mg) twice daily and follow both heart rate and blood pressure and report numbers after a few days. Also, drink plenty of fluids after taking metoprolol.     Update epic med list

## 2022-08-05 ENCOUNTER — HOSPITAL ENCOUNTER (OUTPATIENT)
Dept: PHARMACY | Age: 77
Setting detail: THERAPIES SERIES
Discharge: HOME | End: 2022-08-05
Payer: MEDICARE

## 2022-08-05 DIAGNOSIS — I48.91 ATRIAL FIBRILLATION WITH RVR (HCC): Primary | ICD-10-CM

## 2022-08-05 LAB
INR BLD: 1.6
PROTIME: 19.4 SECONDS

## 2022-08-05 PROCEDURE — 36416 COLLJ CAPILLARY BLOOD SPEC: CPT

## 2022-08-05 PROCEDURE — 99211 OFF/OP EST MAY X REQ PHY/QHP: CPT

## 2022-08-05 PROCEDURE — 85610 PROTHROMBIN TIME: CPT

## 2022-08-05 NOTE — PROGRESS NOTES
ANTICOAGULATION SERVICE    Date of Clinic Visit:  8/5/2022    Rj Figueroa is a 65 y.o. male who presents to clinic today for anticoagulation monitoring and adjustment. Recent INR Results:  Internal QC passed  Lab Results   Component Value Date    INR 1.6 08/05/2022    INR 2.9 07/22/2022       Current Warfarin Dosage:  Dosing Plan  As of 8/5/2022      TTR:  40.2 % (2.9 mo)   Full warfarin instructions:  8/5: 4.5 mg; Otherwise 2.5 mg every Mon; 3.5 mg all other days                 Assessment/Plan:    Modify warfarin dose as noted above: INR is low today. I decreased dose 4% at last visit due to increase in INR. I will increase dose back up to previous dose and give extra 1 mg today. Recheck in 12 days. Next Clinic Appointment:  Return date  As of 8/5/2022      TTR:  40.2 % (2.9 mo)   Next INR check:  8/19/2022               Please call [facility] Anticoagulation Clinic at [phone] with any questions. Thanks!   Celeste Lopez [facility]  Anticoagulation Service Pharmacist  8/5/2022 9:05 AM    For Pharmacy Admin Tracking Only    Intervention Detail: Dose Adjustment: 1, reason: Therapy Optimization  Total # of Interventions Recommended: 1  Total # of Interventions Accepted: 1  Time Spent (min): 20

## 2022-08-10 ENCOUNTER — HOSPITAL ENCOUNTER (OUTPATIENT)
Dept: ULTRASOUND IMAGING | Age: 77
Discharge: HOME | End: 2022-08-12
Payer: MEDICARE

## 2022-08-10 DIAGNOSIS — N13.30 HYDRONEPHROSIS, RIGHT: ICD-10-CM

## 2022-08-10 PROCEDURE — 76770 US EXAM ABDO BACK WALL COMP: CPT

## 2022-08-15 DIAGNOSIS — S22.39XD CLOSED FRACTURE OF ONE RIB WITH ROUTINE HEALING, UNSPECIFIED LATERALITY, SUBSEQUENT ENCOUNTER: ICD-10-CM

## 2022-08-15 RX ORDER — OXYCODONE HYDROCHLORIDE AND ACETAMINOPHEN 5; 325 MG/1; MG/1
1 TABLET ORAL EVERY 6 HOURS PRN
Qty: 90 TABLET | Refills: 0 | Status: SHIPPED | OUTPATIENT
Start: 2022-08-15 | End: 2022-09-19 | Stop reason: SDUPTHER

## 2022-08-17 ENCOUNTER — HOSPITAL ENCOUNTER (OUTPATIENT)
Dept: PHARMACY | Age: 77
Setting detail: THERAPIES SERIES
Discharge: HOME | End: 2022-08-17
Payer: MEDICARE

## 2022-08-17 ENCOUNTER — OFFICE VISIT (OUTPATIENT)
Dept: UROLOGY | Age: 77
End: 2022-08-17
Payer: MEDICARE

## 2022-08-17 VITALS
HEIGHT: 69 IN | HEART RATE: 64 BPM | WEIGHT: 203 LBS | DIASTOLIC BLOOD PRESSURE: 82 MMHG | BODY MASS INDEX: 30.07 KG/M2 | OXYGEN SATURATION: 96 % | SYSTOLIC BLOOD PRESSURE: 124 MMHG

## 2022-08-17 DIAGNOSIS — N12 PYELONEPHRITIS: ICD-10-CM

## 2022-08-17 DIAGNOSIS — I48.91 ATRIAL FIBRILLATION WITH RVR (HCC): Primary | ICD-10-CM

## 2022-08-17 DIAGNOSIS — N40.1 BPH WITH OBSTRUCTION/LOWER URINARY TRACT SYMPTOMS: Primary | ICD-10-CM

## 2022-08-17 DIAGNOSIS — R31.0 GROSS HEMATURIA: ICD-10-CM

## 2022-08-17 DIAGNOSIS — N13.8 BPH WITH OBSTRUCTION/LOWER URINARY TRACT SYMPTOMS: Primary | ICD-10-CM

## 2022-08-17 DIAGNOSIS — N13.30 HYDRONEPHROSIS, RIGHT: ICD-10-CM

## 2022-08-17 LAB
INR BLD: 2.7
PROTIME: 32.1 SECONDS

## 2022-08-17 PROCEDURE — 85610 PROTHROMBIN TIME: CPT

## 2022-08-17 PROCEDURE — 36416 COLLJ CAPILLARY BLOOD SPEC: CPT

## 2022-08-17 PROCEDURE — 1123F ACP DISCUSS/DSCN MKR DOCD: CPT | Performed by: UROLOGY

## 2022-08-17 PROCEDURE — 1036F TOBACCO NON-USER: CPT | Performed by: UROLOGY

## 2022-08-17 PROCEDURE — 99213 OFFICE O/P EST LOW 20 MIN: CPT | Performed by: UROLOGY

## 2022-08-17 PROCEDURE — 99211 OFF/OP EST MAY X REQ PHY/QHP: CPT

## 2022-08-17 PROCEDURE — G8417 CALC BMI ABV UP PARAM F/U: HCPCS | Performed by: UROLOGY

## 2022-08-17 PROCEDURE — G8427 DOCREV CUR MEDS BY ELIG CLIN: HCPCS | Performed by: UROLOGY

## 2022-08-17 NOTE — PROGRESS NOTES
Jo Vaughan MD   Urology Clinic office Visit      Patient: Norberto Osullivan  YOB: 1945  Date: 8/17/2022    HISTORY OF PRESENT ILLNESS:   The patient is a 65 y.o. male who presents today for evaluation of the following problem(s):      1. BPH with obstruction/lower urinary tract symptoms    2. Gross hematuria    3. Hydronephrosis, right    4. Pyelonephritis           Overall the problem(s) : are improved  Associated Symptoms: No dysuria, gross hematuria. Pain Severity: Pain Score:   0 - No pain    Summary of old records: hx of retention/BPH  (Patient's old records, notes and chart reviewed and summarized above.)      Recent pyelo  Gross hematuria with clots, terminal  Severity is described as moderate. The course of symptoms over time is sudden. Alleviating factors: none  Worsening factors: none  Lower urinary tract symptoms: none  Former smoker  On plavix    Voiding well; hematuria resolved- no recurrence    Ct Abdomen Pelvis 3/26/2021  *No CT abnormality is identified to explain the patient's hematuria. *Stable 3 cm fusiform type infrarenal abdominal aortic aneurysm. I independently reviewed and verified the images and reports from:  8/10/2022  Unremarkable exam of the kidneys      Last several PSA's:  Lab Results   Component Value Date    PSA 0.53 08/20/2021    PSA 1.12 08/17/2020    PSA 1.00 08/15/2019       Last total testosterone:  No results found for: TESTOSTERONE    Urinalysis today:  No results found for this visit on 08/17/22.       Last BUN and creatinine:  Lab Results   Component Value Date    BUN 25 (H) 06/10/2022     Lab Results   Component Value Date    CREATININE 1.08 06/10/2022       Imaging Reviewed during this Office Visit:   (results were independently reviewed by physician and radiology report verified)    PAST MEDICAL, FAMILY AND SOCIAL HISTORY:  Past Medical History:   Diagnosis Date    Abnormal cardiovascular stress test 01/2022    Asthma     CAD (coronary artery disease)     STENTS X 4    Cancer ([facility] [address] [facility]     THROAT, HX. RADIATION , LT EAR    Cervical radiculopathy     Chronic back pain     Colonic polyp     Degeneration of cervical intervertebral disc     [facility], 1990 C4/C5-C6/C7    Degeneration of cervical intervertebral disc     [facility] 1994, C3/C4    Depression     Diverticulosis     GERD (gastroesophageal reflux disease)     Glucose intolerance (impaired glucose tolerance)     IS NOT DIABETIC, PER PT    HTN (hypertension)     Hyperlipidemia     Lumbar radiculopathy     MI (myocardial infarction) ([facility] [address]     2011    Numbness and tingling     HANDS    OA (osteoarthritis)     Pre-diabetes     Sacroiliac pain 11/04/2014    Vision abnormalities     WEARS GLASSES     Past Surgical History:   Procedure Laterality Date    ABSCESS DRAINAGE Right     ELBOW WITH CLOSURE    ANESTHESIA NERVE BLOCK Bilateral 06/02/2017    Bilat L1 L2 L3 L4 L5 Diagnostic Medial Branch Blk performed by Edgar Zhao MD at [address] [place] [address] Bilateral 06/09/2017    Bilat L1 L2 L3 L4 L5 Confirmatory Medial BB performed by Edgar Zhao MD at [address] [place] [address]  02/09/2022    CERVICAL SPINE SURGERY  08/09/2001    Anterior Cervical Decompression and Fusion C3-4    CERVICAL SPINE SURGERY Left [id number]    Cervical C6-C7 Discectomy and Foraminotomy    COLONOSCOPY  2007, 2003    POLYPS    COLONOSCOPY  09/09/2013    hyperplastic polyp x 3    COLONOSCOPY N/A 10/15/2018    hyperplastic polyp, severe sigmoid diverticulosis, large ext. hemorrhoid, Dr Red Major  05/05/2011    PROX RCA X2    CORONARY ANGIOPLASTY WITH STENT PLACEMENT  05/31/2011    XIENCE TO MID LAD X2    CYSTOSCOPY Bilateral 05/19/2021    CYSTO Bilateral Retrogrades performed by Yehuda Henry MD at [address] [facility] 2012,2011    FINGER TRIGGER RELEASE Right     THIRD FINGER    [facility]. INJECTION PROCEDURE FOR SACROILIAC JOINT Bilateral 03/14/2017 Bilat Sacroiliac & Piriformis Inj's performed by Will Desir MD at [address] [facility] [address] Right     ELBOW    LUMBAR FUSION  04/07/2014    lumbar decompression and fusion    LUMBAR FUSION N/A 11/15/2017    LUMBAR DECOMPRESSION POSTERIOR W/FUSION L3 L4 ( with SPINAL CORD MONITOR ) performed by Arline Scott MD at [place] [address]  8074,[phone]    Fusion    LUMBAR SPINE SURGERY Left 02/14/2017    Left L4 & L5 Transforaminal ANITHA performed by Will Desir MD at [place] [address] Bilateral 05/02/2017    Bilat L5 Transforaminal ANITHA performed by Will Desir MD at [place] [address] Bilateral 05/09/2017    Bilat L5 Transforaminal ANITHA performed by Will Desir MD at [place] [address]  2/19/13, 5/14/13    L1/L2 IESI    OTHER SURGICAL HISTORY      Hardware correction, patient had 1 broken screw and to loose screws in back     OTHER SURGICAL HISTORY Left 09/27/2016     C6 TFE    OTHER SURGICAL HISTORY Bilateral 11/29/2016    L3, L4 L5 Diagnostic Medial Branch Block    OTHER SURGICAL HISTORY  12/06/2016    jovani L3 L4 L5 conf MBB    OTHER SURGICAL HISTORY Right 12/12/2016    L3,L4,L5 RFA    OTHER SURGICAL HISTORY Left 12/15/2017    L3.  L4, L5 RFA    OTHER SURGICAL HISTORY Left 01/31/2017    L4 & L5 TFE    [place] ARTHROCENTESIS ASPIR&/INJ MAJOR JT/BURSA W/O US Left 03/31/2017    Left Hip Inj performed by Will Desir MD at [address] ARTHROCENTESIS ASPIR&/INJ MAJOR JT/BURSA W/O US Left 04/14/2017    Left Hip Inj performed by Will Desir MD at [address] HEMORRHOIDECTOMY,INT/EXT,1 COLUMN/GROUP N/A 11/07/2018    External HEMORRHOIDECTOMY performed by Matilda Barrios MD at [place] [address] Right 06/29/2017    Right L1 L2 L3 L4 L5 Radiofrequency Ablation performed by Will Desir MD at [place] [address] Left 07/06/2017    Left L1 L2 L3 L4 L5 Radiofrequency performed by Will Desir MD at [address] [place] [address] OR    SKIN BIOPSY      LT EAR    UPPER GASTROINTESTINAL ENDOSCOPY N/A 2019    mild gastritis, mild to moderate esophagitis, Dr. Daniel Estrada     Family History   Problem Relation Age of Onset    Cancer Father         colon cancer     No outpatient medications have been marked as taking for the 22 encounter (Office Visit) with Christoph Lewis MD.       Crestor [rosuvastatin], Ibuprofen, Lisinopril, and Effient [prasugrel]  Social History     Tobacco Use   Smoking Status Former    Packs/day: 1.00    Years: 40.00    Pack years: 40.00    Types: Cigarettes, Pipe, Cigars    Start date: 1964    Quit date: 2011    Years since quittin.2   Smokeless Tobacco Former    Types: Chew    Quit date: 2011       Social History     Substance and Sexual Activity   Alcohol Use Yes    Alcohol/week: 0.0 standard drinks    Comment: RARE       REVIEW OF SYSTEMS:  Constitutional: negative  Eyes: negative  Respiratory: negative  Cardiovascular: negative  Gastrointestinal: negative  Musculoskeletal: negative  Genitourinary: negative except for what is in HPI  Skin: negative   Neurological: negative  Hematological/Lymphatic: negative  Psychological: negative    Physical Exam:    This a 65 y.o. male   Vitals:    22 0924   BP: 124/82   Pulse: 64   SpO2: 96%     Constitutional: Patient in no acute distress; Neuro: alert and oriented to person place and time. Psych: Mood and affect normal.  Skin: Normal        Assessment and Plan      1. BPH with obstruction/lower urinary tract symptoms    2. Gross hematuria    3. Hydronephrosis, right    4. Pyelonephritis         Cystoscopy bilateral retrograde pyelogram 2022  Findings: Enlarged vascular prostate with elevated median bar/median lobe that was vascular as well.   The rest of bladder was completely normal.    Was in ER post op for hematuria  CT shows right hydro with likely clot in the right renal pelvis  Possible pyelonephritis- resolved  Completed abx course    US reviewed- WNL    Fu 1 year with UA with PSA               Mayur Barcenas M.D, MD Cecelia Bolaños, MD [facility]

## 2022-08-17 NOTE — PROGRESS NOTES
ANTICOAGULATION SERVICE    Date of Clinic Visit:  2022    Heidi Osullivan is a 65 y.o. male who presents to clinic today for anticoagulation monitoring and adjustment. Recent INR Results:  Internal QC passed  Lab Results   Component Value Date    INR 2.7 2022    INR 1.6 2022       Current Warfarin Dosage:  Dosing Plan  As of 2022      TTR:  43.0 % (3.3 mo)   Full warfarin instructions:  3 mg every Mon, Wed, Fri; 3.5 mg all other days                 Assessment/Plan:    Modify warfarin dose as noted above: INR is back in range today. INR has increased from 1.6 to 2.7 in 12 days. I will try decreasing dose back down 2% to keep INR from going above range. Recheck two weeks. Next Clinic Appointment:  Return date  As of 2022      TTR:  43.0 % (3.3 mo)   Next INR check:  2022               Please call [facility] Anticoagulation Clinic at [phone] with any questions. Thanks!   Daniel Crisostomo [facility] [place]  Anticoagulation Service Pharmacist  2022 8:56 AM    For Pharmacy Admin Tracking Only    Intervention Detail: Adherence Monitorin  Total # of Interventions Recommended: 0  Total # of Interventions Accepted: 0  Time Spent (min): 15

## 2022-08-19 ENCOUNTER — TELEPHONE (OUTPATIENT)
Dept: INTERNAL MEDICINE | Age: 77
End: 2022-08-19

## 2022-08-19 NOTE — TELEPHONE ENCOUNTER
Patient would like us to send an order to [facility] . So they can  his oxygen tanks that are at this house.

## 2022-08-22 ENCOUNTER — OFFICE VISIT (OUTPATIENT)
Dept: PRIMARY CARE CLINIC | Age: 77
End: 2022-08-22
Payer: MEDICARE

## 2022-08-22 ENCOUNTER — HOSPITAL ENCOUNTER (OUTPATIENT)
Dept: GENERAL RADIOLOGY | Age: 77
Discharge: HOME | End: 2022-08-24
Payer: MEDICARE

## 2022-08-22 VITALS
HEART RATE: 60 BPM | SYSTOLIC BLOOD PRESSURE: 126 MMHG | BODY MASS INDEX: 31.1 KG/M2 | TEMPERATURE: 98 F | DIASTOLIC BLOOD PRESSURE: 74 MMHG | WEIGHT: 210.6 LBS | OXYGEN SATURATION: 98 %

## 2022-08-22 DIAGNOSIS — M17.12 LOCALIZED OSTEOARTHRITIS OF LEFT KNEE: ICD-10-CM

## 2022-08-22 DIAGNOSIS — M25.562 ACUTE PAIN OF LEFT KNEE: ICD-10-CM

## 2022-08-22 DIAGNOSIS — M25.562 ACUTE PAIN OF LEFT KNEE: Primary | ICD-10-CM

## 2022-08-22 PROCEDURE — 1123F ACP DISCUSS/DSCN MKR DOCD: CPT | Performed by: FAMILY MEDICINE

## 2022-08-22 PROCEDURE — 20610 DRAIN/INJ JOINT/BURSA W/O US: CPT | Performed by: FAMILY MEDICINE

## 2022-08-22 PROCEDURE — 1036F TOBACCO NON-USER: CPT | Performed by: FAMILY MEDICINE

## 2022-08-22 PROCEDURE — 99213 OFFICE O/P EST LOW 20 MIN: CPT | Performed by: FAMILY MEDICINE

## 2022-08-22 PROCEDURE — G8417 CALC BMI ABV UP PARAM F/U: HCPCS | Performed by: FAMILY MEDICINE

## 2022-08-22 PROCEDURE — G8427 DOCREV CUR MEDS BY ELIG CLIN: HCPCS | Performed by: FAMILY MEDICINE

## 2022-08-22 PROCEDURE — 73562 X-RAY EXAM OF KNEE 3: CPT

## 2022-08-22 PROCEDURE — 99212 OFFICE O/P EST SF 10 MIN: CPT | Performed by: FAMILY MEDICINE

## 2022-08-22 PROCEDURE — PBSHW PBB SHADOW CHARGE: Performed by: FAMILY MEDICINE

## 2022-08-22 RX ORDER — TRIAMCINOLONE ACETONIDE 40 MG/ML
40 INJECTION, SUSPENSION INTRA-ARTICULAR; INTRAMUSCULAR ONCE
Status: COMPLETED | OUTPATIENT
Start: 2022-08-22 | End: 2022-08-22

## 2022-08-22 RX ADMIN — TRIAMCINOLONE ACETONIDE 40 MG: 40 INJECTION, SUSPENSION INTRA-ARTICULAR; INTRAMUSCULAR at 12:25

## 2022-08-22 ASSESSMENT — PATIENT HEALTH QUESTIONNAIRE - PHQ9
SUM OF ALL RESPONSES TO PHQ QUESTIONS 1-9: 0
SUM OF ALL RESPONSES TO PHQ QUESTIONS 1-9: 0
1. LITTLE INTEREST OR PLEASURE IN DOING THINGS: 0
SUM OF ALL RESPONSES TO PHQ9 QUESTIONS 1 & 2: 0
2. FEELING DOWN, DEPRESSED OR HOPELESS: 0
SUM OF ALL RESPONSES TO PHQ QUESTIONS 1-9: 0
SUM OF ALL RESPONSES TO PHQ QUESTIONS 1-9: 0

## 2022-08-22 ASSESSMENT — ENCOUNTER SYMPTOMS: BACK PAIN: 0

## 2022-08-22 NOTE — PROGRESS NOTES
2022     Norberto Osullivan (:  1945) is a 65 y.o. male, here for evaluation of the following medical concerns:    Knee Pain   The incident occurred 3 to 5 days ago (left knee pain has been ongoing for the last 2 years after falling on the knee, but over the weekend stood up and had sudden onset of pain in the left knee and has had significant pain ever since). The injury mechanism was a fall. The pain is present in the left knee. The quality of the pain is described as aching. The pain is moderate. The pain has been Constant since onset. Associated symptoms include an inability to bear weight. Pertinent negatives include no numbness or tingling. The symptoms are aggravated by movement, palpation and weight bearing. He has tried NSAIDs and rest for the symptoms. The treatment provided no relief. Did review patient's med list, allergies, social history,pmhx and pshx today as noted in the record. Review of Systems   Musculoskeletal:  Positive for arthralgias, gait problem and joint swelling. Negative for back pain, myalgias, neck pain and neck stiffness. Neurological:  Negative for tingling, weakness and numbness. Prior to Visit Medications    Medication Sig Taking? Authorizing Provider   oxyCODONE-acetaminophen (PERCOCET) 5-325 MG per tablet Take 1 tablet by mouth every 6 hours as needed for Pain for up to 30 days.  Yes Vivi Kong MD   metoprolol tartrate (LOPRESSOR) 50 MG tablet Take 1.5 tablets by mouth 2 times daily  Patient taking differently: Take 25 mg by mouth 2 times daily Yes Ncik Heredia DO   pantoprazole (PROTONIX) 40 MG tablet Take 1 tablet by mouth once daily Yes Vivi Kong MD   naloxone 4 MG/0.1ML LIQD nasal spray 1 spray by Nasal route as needed for Opioid Reversal Yes Ignacio Gunn MD   ondansetron (ZOFRAN ODT) 4 MG disintegrating tablet Take 1 tablet by mouth every 8 hours as needed for Nausea Yes Ignacio Gunn MD   tiZANidine (ZANAFLEX) 4 MG tablet TAKE ONE TABLET BY MOUTH THREE TIMES DAILY Yes Heidi Wu, MD   nitroGLYCERIN (NITROSTAT) 0.4 MG SL tablet Place 1 tablet under the tongue every 5 minutes as needed for Chest pain up to max of 3 total doses. If no relief after 1 dose, call 911. Yes Heidi Wu, MD   warfarin (COUMADIN) 5 MG tablet Take 1 tablet by mouth daily Yes Heidi Wu, MD   losartan (COZAAR) 25 MG tablet TAKE 1 TABLET DAILY Yes Heidi Wu, MD   vitamin C (ASCORBIC ACID) 500 MG tablet Take 500 mg by mouth 2 times daily Yes Historical Provider, MD   atorvastatin (LIPITOR) 80 MG tablet TAKE 1 TABLET DAILY Yes Heidi Wu, MD   PARoxetine (PAXIL) 20 MG tablet TAKE 1 TABLET BY MOUTH ONCE DAILY IN THE MORNING Yes Heidi Wu, MD   ferrous sulfate (IRON 325) 325 (65 Fe) MG tablet Take 1 tablet by mouth 2 times daily  Patient taking differently: Take 325 mg by mouth 3 times daily (with meals) Yes Heidi Wu, MD   famotidine (PEPCID) 20 MG tablet Take 20 mg by mouth daily. Yes Historical Provider, MD   celecoxib (CELEBREX) 200 MG capsule TAKE 1 CAPSULE BY MOUTH TWICE DAILY  Patient not taking: Reported on 2022  Heidi Wu MD        Social History     Tobacco Use    Smoking status: Former     Packs/day: 1.00     Years: 40.00     Pack years: 40.00     Types: Cigarettes, Pipe, Cigars     Start date: 1964     Quit date: 2011     Years since quittin.3    Smokeless tobacco: Former     Types: Chew     Quit date: 2011   Substance Use Topics    Alcohol use: Yes     Alcohol/week: 0.0 standard drinks     Comment: RARE        Vitals:    22 1048   BP: 126/74   Site: Left Upper Arm   Position: Sitting   Cuff Size: Large Adult   Pulse: 60   Temp: 98 °F (36.7 °C)   TempSrc: Tympanic   SpO2: 98%   Weight: 210 lb 9.6 oz (95.5 kg)     Estimated body mass index is 31.1 kg/m² as calculated from the following:    Height as of 22: 5' 9\" (1.753 m).     Weight as of this encounter: 210 lb 9.6 oz (95.5 kg).    Physical Exam  Vitals and nursing note reviewed. Constitutional:       General: He is not in acute distress. Appearance: He is well-developed. He is not diaphoretic. HENT:      Head: Normocephalic and atraumatic. Eyes:      Conjunctiva/sclera: Conjunctivae normal.   Pulmonary:      Effort: Pulmonary effort is normal.   Musculoskeletal:         General: Tenderness present. No swelling. Cervical back: Normal range of motion. Comments: Left knee with pain with palpation to the anterior medial and lateral joint space. Moves the knee joint in a normal range of motion. Has mild anterior effusion noted. Ligamentous structures are intact. Skin:     General: Skin is warm and dry. Coloration: Skin is not pale. Findings: No erythema or rash. Neurological:      Mental Status: He is alert and oriented to person, place, and time. Psychiatric:         Behavior: Behavior normal.         Thought Content: Thought content normal.         Judgment: Judgment normal.       ASSESSMENT/PLAN:  Encounter Diagnosis   Name Primary? Acute pain of left knee Yes     No orders of the defined types were placed in this encounter. I did personally review his xrays with him today. Has some moderate degenerative changes, but no acute pathology. Orders Placed This Encounter   Procedures    XR KNEE LEFT (3 VIEWS)     Standing Status:   Future     Number of Occurrences:   1     Standing Expiration Date:   8/22/2023     Order Specific Question:   Reason for exam:     Answer:   left knee pain    MO ARTHROCENTESIS ASPIR&/INJ MAJOR JT/BURSA W/O US     Procedure Note    PREOP DIAGNOSIS:  [] Right [x]  Left    [] Bilateral Knee Pain    POSTOP DIAGNOSIS:  [] Right [x]  Left    [] Bilateral Knee Pain    OPERATION:  [] Right [x]  Left    [] Bilateral INTRA-ARTICULAR KNEE INJECTION      PROCEDURE:  After sterile prep, an Anterior and Lateral approach was used.     [x] 40 mg Kenalog  [x]  2 cc 1% Xylocaine without Epi       []  12 mg of Celestone     Injected intra-articularly without incident. Pre- and post neurovascular status verified. No complications noted. Informed consent and time out was completed prior to procedure    Tylenol 1-2 tabs po q4h prn     Range of motion exercises encouraged as able. Return  if no improvement in symptoms or if any further symptoms arise. No follow-ups on file. An electronic signature was used to authenticate this note.     --Christofer Ruano,  on 8/22/2022 at 11:12 AM

## 2022-09-01 ENCOUNTER — HOSPITAL ENCOUNTER (OUTPATIENT)
Dept: PHARMACY | Age: 77
Setting detail: THERAPIES SERIES
Discharge: HOME | End: 2022-09-01
Payer: MEDICARE

## 2022-09-01 DIAGNOSIS — I48.91 ATRIAL FIBRILLATION WITH RVR (HCC): Primary | ICD-10-CM

## 2022-09-01 LAB
INR BLD: 1.7
PROTIME: 19.9 SECONDS

## 2022-09-01 PROCEDURE — 85610 PROTHROMBIN TIME: CPT

## 2022-09-01 PROCEDURE — 99211 OFF/OP EST MAY X REQ PHY/QHP: CPT

## 2022-09-01 PROCEDURE — 36416 COLLJ CAPILLARY BLOOD SPEC: CPT

## 2022-09-01 NOTE — PROGRESS NOTES
ANTICOAGULATION SERVICE    Date of Clinic Visit:  9/1/2022    Hugo Osullivan is a 65 y.o. male who presents to clinic today for anticoagulation monitoring and adjustment. Recent INR Results:  Internal QC passed  Lab Results   Component Value Date    INR 1.7 09/01/2022    INR 2.7 08/17/2022       Current Warfarin Dosage:  Dosing Plan  As of 9/1/2022      TTR:  46.5 % (3.8 mo)   Full warfarin instructions:  3.5 mg every day                 Assessment/Plan:    Modify warfarin dose as noted above: INR is low today. No missed doses. I will increase weekly dose 6.5%. Recheck two weeks. Next Clinic Appointment:  Return date  As of 9/1/2022      TTR:  46.5 % (3.8 mo)   Next INR check:  9/15/2022               Please call [facility] at [phone] with any questions. Thanks!   Celeste Lopez [facility]  Anticoagulation Service Pharmacist  9/1/2022 9:14 AM    For Pharmacy Admin Tracking Only    Intervention Detail: Dose Adjustment: 1, reason: Therapy Optimization  Total # of Interventions Recommended: 1  Total # of Interventions Accepted: 1  Time Spent (min): 15

## 2022-09-02 DIAGNOSIS — I48.0 PAROXYSMAL ATRIAL FIBRILLATION (HCC): ICD-10-CM

## 2022-09-02 RX ORDER — WARFARIN SODIUM 5 MG/1
TABLET ORAL
Qty: 30 TABLET | Refills: 0 | Status: SHIPPED | OUTPATIENT
Start: 2022-09-02

## 2022-09-02 NOTE — TELEPHONE ENCOUNTER
Pharmacy requesting a refill of the below medication which has been pended for you:     Requested Prescriptions     Pending Prescriptions Disp Refills    warfarin (COUMADIN) 5 MG tablet [Pharmacy Med Name: Warfarin Sodium 5 MG Oral Tablet] 30 tablet 0     Sig: Take 1 tablet by mouth once daily       Last Appointment Date: 7/19/2022  Next Appointment Date: 9/26/2022    Allergies   Allergen Reactions    Crestor [Rosuvastatin] Other (See Comments)     Joint pain, muscle aches    Ibuprofen Other (See Comments)     bleeding    Lisinopril Hives    Effient [Prasugrel] Rash

## 2022-09-09 RX ORDER — PANTOPRAZOLE SODIUM 40 MG/1
TABLET, DELAYED RELEASE ORAL
Qty: 90 TABLET | Refills: 0 | Status: SHIPPED | OUTPATIENT
Start: 2022-09-09

## 2022-09-12 ENCOUNTER — HOSPITAL ENCOUNTER (OUTPATIENT)
Age: 77
Setting detail: SPECIMEN
Discharge: HOME | End: 2022-09-12
Payer: MEDICARE

## 2022-09-12 ENCOUNTER — HOSPITAL ENCOUNTER (OUTPATIENT)
Dept: INTERVENTIONAL RADIOLOGY/VASCULAR | Age: 77
Discharge: HOME | End: 2022-09-14
Payer: MEDICARE

## 2022-09-12 ENCOUNTER — OFFICE VISIT (OUTPATIENT)
Dept: PRIMARY CARE CLINIC | Age: 77
End: 2022-09-12
Payer: MEDICARE

## 2022-09-12 VITALS
OXYGEN SATURATION: 95 % | SYSTOLIC BLOOD PRESSURE: 156 MMHG | TEMPERATURE: 97 F | HEART RATE: 61 BPM | RESPIRATION RATE: 18 BRPM | DIASTOLIC BLOOD PRESSURE: 96 MMHG | WEIGHT: 202 LBS | BODY MASS INDEX: 29.92 KG/M2 | HEIGHT: 69 IN

## 2022-09-12 DIAGNOSIS — M79.662 PAIN IN LEFT LOWER LEG: Primary | ICD-10-CM

## 2022-09-12 DIAGNOSIS — M79.662 PAIN IN LEFT LOWER LEG: ICD-10-CM

## 2022-09-12 LAB
INR BLD: 2.3
PROTHROMBIN TIME: 24.2 SEC (ref 11.5–14.2)

## 2022-09-12 PROCEDURE — 99212 OFFICE O/P EST SF 10 MIN: CPT | Performed by: NURSE PRACTITIONER

## 2022-09-12 PROCEDURE — 85610 PROTHROMBIN TIME: CPT

## 2022-09-12 PROCEDURE — 1036F TOBACCO NON-USER: CPT | Performed by: NURSE PRACTITIONER

## 2022-09-12 PROCEDURE — 99213 OFFICE O/P EST LOW 20 MIN: CPT | Performed by: NURSE PRACTITIONER

## 2022-09-12 PROCEDURE — 93971 EXTREMITY STUDY: CPT

## 2022-09-12 PROCEDURE — G8417 CALC BMI ABV UP PARAM F/U: HCPCS | Performed by: NURSE PRACTITIONER

## 2022-09-12 PROCEDURE — G8427 DOCREV CUR MEDS BY ELIG CLIN: HCPCS | Performed by: NURSE PRACTITIONER

## 2022-09-12 PROCEDURE — 1123F ACP DISCUSS/DSCN MKR DOCD: CPT | Performed by: NURSE PRACTITIONER

## 2022-09-12 ASSESSMENT — ENCOUNTER SYMPTOMS: RESPIRATORY NEGATIVE: 1

## 2022-09-12 NOTE — PROGRESS NOTES
[facility]             [address], [address]                        Telephone [phone]             Fax [phone]     Vladislav Kim  1945  [facility] [place]:[id number]   Date of visit:  9/12/2022    Subjective: Vladislav Kim is a 65 y.o.  male who presents to [facility] today (9/12/2022) for evaluation of:    Chief Complaint   Patient presents with    Leg Pain     Left leg. Was cleaning pool with vacuum, foot tangled up. Twisted left leg. Bruising, is on coumadin. Trouble walking. Leg Pain   The incident occurred 2 days ago (09/10/22). The incident occurred at home. Injury mechanism: twisted left leg in hose while cleaning pool and tripped. Pain location: left upper and lower leg. The pain is at a severity of 7/10. The pain has been Constant since onset. Pertinent negatives include no inability to bear weight, numbness or tingling. Nothing aggravates the symptoms. He has tried acetaminophen, ice and heat for the symptoms. The treatment provided no relief.      He has the following problem list:  Patient Active Problem List   Diagnosis    Chronic back pain    Neck pain, chronic    Brachial neuritis or radiculitis    Spinal stenosis, lumbar region, with neurogenic claudication    Displacement of cervical intervertebral disc without myelopathy    CAD (coronary artery disease)    GILL (dyspnea on exertion)    S/P lumbar spinal fusion    Obesity (BMI 35.0-39.9 without comorbidity)    HTN (hypertension)    Hyperlipidemia    Type 2 diabetes mellitus without complication (HCC)    Chronic bilateral low back pain with bilateral sciatica    Left hip pain    Acquired spondylolisthesis    Back pain    Grade III hemorrhoids    Recurrent major depressive disorder, in partial remission (HCC)    Carcinoma larynx (HCC)    Atrial fibrillation with RVR (HCC)    Anemia    Nasal discharge        Current medications are:  Current Outpatient Medications   Medication Sig Dispense Refill    pantoprazole (PROTONIX) 40 MG tablet Take 1 tablet by mouth once daily 90 tablet 0    warfarin (COUMADIN) 5 MG tablet Take 1 tablet by mouth once daily 30 tablet 0    oxyCODONE-acetaminophen (PERCOCET) 5-325 MG per tablet Take 1 tablet by mouth every 6 hours as needed for Pain for up to 30 days. 90 tablet 0    metoprolol tartrate (LOPRESSOR) 50 MG tablet Take 1.5 tablets by mouth 2 times daily (Patient taking differently: Take 25 mg by mouth 2 times daily) 270 tablet 1    naloxone 4 MG/0.1ML LIQD nasal spray 1 spray by Nasal route as needed for Opioid Reversal 1 each 0    ondansetron (ZOFRAN ODT) 4 MG disintegrating tablet Take 1 tablet by mouth every 8 hours as needed for Nausea 12 tablet 0    tiZANidine (ZANAFLEX) 4 MG tablet TAKE ONE TABLET BY MOUTH THREE TIMES DAILY 90 tablet 5    nitroGLYCERIN (NITROSTAT) 0.4 MG SL tablet Place 1 tablet under the tongue every 5 minutes as needed for Chest pain up to max of 3 total doses. If no relief after 1 dose, call 911. 25 tablet 3    losartan (COZAAR) 25 MG tablet TAKE 1 TABLET DAILY 90 tablet 3    vitamin C (ASCORBIC ACID) 500 MG tablet Take 500 mg by mouth 2 times daily      atorvastatin (LIPITOR) 80 MG tablet TAKE 1 TABLET DAILY 90 tablet 3    PARoxetine (PAXIL) 20 MG tablet TAKE 1 TABLET BY MOUTH ONCE DAILY IN THE MORNING 90 tablet 3    celecoxib (CELEBREX) 200 MG capsule TAKE 1 CAPSULE BY MOUTH TWICE DAILY 180 capsule 3    ferrous sulfate (IRON 325) 325 (65 Fe) MG tablet Take 1 tablet by mouth 2 times daily (Patient taking differently: Take 325 mg by mouth 3 times daily (with meals)) 120 tablet 3    famotidine (PEPCID) 20 MG tablet Take 20 mg by mouth daily. No current facility-administered medications for this visit. He is allergic to crestor [rosuvastatin], ibuprofen, lisinopril, and effient [prasugrel]. .    He  reports that he quit smoking about 11 years ago.  His smoking use included cigarettes, pipe, and cigars. He started smoking about 58 years ago. He has a 40.00 pack-year smoking history. He quit smokeless tobacco use about 11 years ago. His smokeless tobacco use included chew. Objective:    Vitals:    09/12/22 1619 09/12/22 1624   BP: (!) 162/98 (!) 156/96   Site: Left Upper Arm Left Upper Arm   Position: Sitting Sitting   Cuff Size: Medium Adult Medium Adult   Pulse: 61    Resp: 18    Temp: 97 °F (36.1 °C)    TempSrc: Tympanic    SpO2: 95%    Weight: 202 lb (91.6 kg)    Height: 5' 9\" (1.753 m)      Body mass index is 29.83 kg/m². Review of Systems   Constitutional: Negative. Respiratory: Negative. Cardiovascular: Negative. Musculoskeletal:         Left lower and upper leg pain and bruising lower leg   Neurological:  Negative for tingling and numbness. Physical Exam  Vitals and nursing note reviewed. Constitutional:       Appearance: Normal appearance. He is well-developed. HENT:      Head: Normocephalic. Jaw: There is normal jaw occlusion. Mouth/Throat:      Lips: Pink. Mouth: Mucous membranes are moist.      Pharynx: Oropharynx is clear. Uvula midline. Eyes:      Pupils: Pupils are equal, round, and reactive to light. Cardiovascular:      Rate and Rhythm: Normal rate and regular rhythm. Heart sounds: Normal heart sounds. Pulmonary:      Effort: Pulmonary effort is normal.      Breath sounds: Normal breath sounds and air entry. Musculoskeletal:      Cervical back: Normal range of motion and neck supple. Left lower leg: Swelling and tenderness (with ecchymosis) present. No bony tenderness. Legs:    Skin:     General: Skin is warm and dry. Neurological:      General: No focal deficit present. Mental Status: He is alert and oriented to person, place, and time. Psychiatric:         Behavior: Behavior normal.         Thought Content:  Thought content normal.       Assessment and Plan:    Hospital Outpatient Visit on 09/12/2022   Component Date Value Ref Range Status    Protime 09/12/2022 24.2 (A) 11.5 - 14.2 sec Final    INR 09/12/2022 2.3   Final    Comment:       Non-therapeutic Range:     INR = 0.9-1.2  Therapeutic Range: Moderate Anticoagulant Intensity:     INR = 2.0-3.0   High Anticoagulant Intensity:     INR = 2.5-3.5                  Diagnosis Orders   1. Pain in left lower leg  Protime-INR    VL DUP LOWER EXTREMITY VENOUS LEFT        He is taking Coumadin 3.5 mg daily, last INR was 1.7 on 09/01/22 and not therapeutic. He requested to go home and have us call him with results of US. He is in stable condition and INR is therapeutic today at 2.3. Take Tylenol as needed for pain. Rest and elevate the affected painful area. Apply cold compresses intermittently as needed. As pain recedes, begin normal activities slowly as tolerated. Follow up with PCP if symptoms do not improve or worsen. The use, risks, benefits, and side effects of prescribed or recommended medications were discussed. All questions were answered and the patient/caregiver voiced understanding. No orders of the defined types were placed in this encounter.         Electronically signed by LAWRENCE Youssef CNP on 9/12/22 at 4:31 PM EDT

## 2022-09-15 ENCOUNTER — APPOINTMENT (OUTPATIENT)
Dept: PHARMACY | Age: 77
End: 2022-09-15
Payer: MEDICARE

## 2022-09-18 ENCOUNTER — HOSPITAL ENCOUNTER (OUTPATIENT)
Age: 77
Setting detail: SPECIMEN
Discharge: HOME | End: 2022-09-18
Payer: MEDICARE

## 2022-09-18 ENCOUNTER — APPOINTMENT (OUTPATIENT)
Dept: INTERVENTIONAL RADIOLOGY/VASCULAR | Age: 77
End: 2022-09-18
Payer: MEDICARE

## 2022-09-18 ENCOUNTER — HOSPITAL ENCOUNTER (EMERGENCY)
Age: 77
Discharge: HOME | End: 2022-09-18
Attending: EMERGENCY MEDICINE
Payer: MEDICARE

## 2022-09-18 VITALS
WEIGHT: 201 LBS | BODY MASS INDEX: 29.77 KG/M2 | HEIGHT: 69 IN | SYSTOLIC BLOOD PRESSURE: 184 MMHG | RESPIRATION RATE: 14 BRPM | HEART RATE: 63 BPM | DIASTOLIC BLOOD PRESSURE: 98 MMHG | TEMPERATURE: 97.7 F | OXYGEN SATURATION: 93 %

## 2022-09-18 DIAGNOSIS — D64.9 ANEMIA, UNSPECIFIED TYPE: ICD-10-CM

## 2022-09-18 DIAGNOSIS — D50.9 IRON DEFICIENCY ANEMIA, UNSPECIFIED IRON DEFICIENCY ANEMIA TYPE: ICD-10-CM

## 2022-09-18 DIAGNOSIS — Z12.5 SPECIAL SCREENING FOR MALIGNANT NEOPLASM OF PROSTATE: ICD-10-CM

## 2022-09-18 DIAGNOSIS — E11.9 TYPE 2 DIABETES MELLITUS WITHOUT COMPLICATION, WITHOUT LONG-TERM CURRENT USE OF INSULIN (HCC): ICD-10-CM

## 2022-09-18 DIAGNOSIS — I10 PRIMARY HYPERTENSION: ICD-10-CM

## 2022-09-18 DIAGNOSIS — M79.605 LEFT LEG PAIN: Primary | ICD-10-CM

## 2022-09-18 DIAGNOSIS — S80.12XD HEMATOMA OF LEG, LEFT, SUBSEQUENT ENCOUNTER: ICD-10-CM

## 2022-09-18 DIAGNOSIS — E78.49 OTHER HYPERLIPIDEMIA: ICD-10-CM

## 2022-09-18 LAB
ALBUMIN SERPL-MCNC: 3.7 G/DL (ref 3.5–5.2)
ALBUMIN/GLOBULIN RATIO: 1.5 (ref 1–2.5)
ALP BLD-CCNC: 64 U/L (ref 40–129)
ALT SERPL-CCNC: 14 U/L (ref 5–41)
ANION GAP SERPL CALCULATED.3IONS-SCNC: 9 MMOL/L (ref 9–17)
AST SERPL-CCNC: 15 U/L
BILIRUB SERPL-MCNC: 0.3 MG/DL (ref 0.3–1.2)
BUN BLDV-MCNC: 12 MG/DL (ref 8–23)
BUN/CREAT BLD: 15 (ref 9–20)
CALCIUM SERPL-MCNC: 8.8 MG/DL (ref 8.6–10.4)
CHLORIDE BLD-SCNC: 105 MMOL/L (ref 98–107)
CHOLESTEROL/HDL RATIO: 3.4
CHOLESTEROL: 151 MG/DL
CO2: 27 MMOL/L (ref 20–31)
CREAT SERPL-MCNC: 0.82 MG/DL (ref 0.7–1.2)
GFR AFRICAN AMERICAN: >60 ML/MIN
GFR NON-AFRICAN AMERICAN: >60 ML/MIN
GFR SERPL CREATININE-BSD FRML MDRD: ABNORMAL ML/MIN/{1.73_M2}
GLUCOSE BLD-MCNC: 138 MG/DL (ref 70–99)
HDLC SERPL-MCNC: 45 MG/DL
HEMOGLOBIN: 13.3 G/DL (ref 13–17)
INR BLD: 2.4
IRON SATURATION: 16 % (ref 20–55)
IRON: 53 UG/DL (ref 59–158)
LDL CHOLESTEROL: 76 MG/DL (ref 0–130)
POTASSIUM SERPL-SCNC: 4.2 MMOL/L (ref 3.7–5.3)
PROSTATE SPECIFIC ANTIGEN: 1.25 NG/ML
PROTHROMBIN TIME: 25.1 SEC (ref 11.5–14.2)
SODIUM BLD-SCNC: 141 MMOL/L (ref 135–144)
TOTAL IRON BINDING CAPACITY: 335 UG/DL (ref 250–450)
TOTAL PROTEIN: 6.2 G/DL (ref 6.4–8.3)
TRIGL SERPL-MCNC: 148 MG/DL
UNSATURATED IRON BINDING CAPACITY: 282 UG/DL (ref 112–347)

## 2022-09-18 PROCEDURE — 80053 COMPREHEN METABOLIC PANEL: CPT

## 2022-09-18 PROCEDURE — 80061 LIPID PANEL: CPT

## 2022-09-18 PROCEDURE — 83036 HEMOGLOBIN GLYCOSYLATED A1C: CPT

## 2022-09-18 PROCEDURE — 93971 EXTREMITY STUDY: CPT

## 2022-09-18 PROCEDURE — 99284 EMERGENCY DEPT VISIT MOD MDM: CPT

## 2022-09-18 PROCEDURE — 36415 COLL VENOUS BLD VENIPUNCTURE: CPT

## 2022-09-18 PROCEDURE — 6360000002 HC RX W HCPCS: Performed by: EMERGENCY MEDICINE

## 2022-09-18 PROCEDURE — 83550 IRON BINDING TEST: CPT

## 2022-09-18 PROCEDURE — G0103 PSA SCREENING: HCPCS

## 2022-09-18 PROCEDURE — 85018 HEMOGLOBIN: CPT

## 2022-09-18 PROCEDURE — 96372 THER/PROPH/DIAG INJ SC/IM: CPT

## 2022-09-18 PROCEDURE — 83540 ASSAY OF IRON: CPT

## 2022-09-18 PROCEDURE — 85610 PROTHROMBIN TIME: CPT

## 2022-09-18 RX ORDER — MORPHINE SULFATE 4 MG/ML
4 INJECTION, SOLUTION INTRAMUSCULAR; INTRAVENOUS ONCE
Status: COMPLETED | OUTPATIENT
Start: 2022-09-18 | End: 2022-09-18

## 2022-09-18 RX ADMIN — MORPHINE SULFATE 4 MG: 4 INJECTION, SOLUTION INTRAMUSCULAR; INTRAVENOUS at 09:32

## 2022-09-18 ASSESSMENT — PAIN DESCRIPTION - ORIENTATION
ORIENTATION: LEFT
ORIENTATION: LEFT
ORIENTATION: LEFT;LOWER

## 2022-09-18 ASSESSMENT — ENCOUNTER SYMPTOMS
TROUBLE SWALLOWING: 0
SORE THROAT: 0
DIARRHEA: 0
BLOOD IN STOOL: 0
SHORTNESS OF BREATH: 0
WHEEZING: 0
BACK PAIN: 0
ABDOMINAL PAIN: 0
NAUSEA: 0
VOMITING: 0
CONSTIPATION: 0

## 2022-09-18 ASSESSMENT — PAIN DESCRIPTION - DESCRIPTORS
DESCRIPTORS: THROBBING

## 2022-09-18 ASSESSMENT — PAIN DESCRIPTION - FREQUENCY
FREQUENCY: CONTINUOUS
FREQUENCY: CONTINUOUS

## 2022-09-18 ASSESSMENT — PAIN SCALES - GENERAL
PAINLEVEL_OUTOF10: 8

## 2022-09-18 ASSESSMENT — PAIN - FUNCTIONAL ASSESSMENT
PAIN_FUNCTIONAL_ASSESSMENT: 0-10
PAIN_FUNCTIONAL_ASSESSMENT: 0-10

## 2022-09-18 ASSESSMENT — PAIN DESCRIPTION - ONSET: ONSET: ON-GOING

## 2022-09-18 ASSESSMENT — PAIN DESCRIPTION - PAIN TYPE
TYPE: ACUTE PAIN
TYPE: ACUTE PAIN

## 2022-09-18 ASSESSMENT — PAIN DESCRIPTION - LOCATION
LOCATION: LEG

## 2022-09-18 NOTE — ED PROVIDER NOTES
[facility]  eMERGENCY dEPARTMENT eNCOUnter      Pt Name: Mady Angel  MRN: [id number]  [place] 1945  Date of evaluation: 9/18/2022      CHIEF COMPLAINT       Chief Complaint   Patient presents with    Leg Pain     Left calf- hematoma         HISTORY OF PRESENT ILLNESS    Norberto Osullivan is a 65 y.o. male who presents with increased swelling in his left leg, patient injured it he twisted his leg and over a week ago he developed a hematoma this leg and had an ultrasound he said its been getting steadily better until yesterday it suddenly got much worse it is hard now on the upper portion of his calf he said his calf hurts when he moves his foot up and down there is no numbness or tingling to his foot no new injury he is on anticoagulation for atrial fibs    Patient had x-ray that was negative also had an ultrasound there is no evidence of DVT evidence of a hematoma 11 x 1-1/2 cm was seen patient's INR was 2.3. Patient has a prescription for 80 Percocet a month  REVIEW OF SYSTEMS         Review of Systems   Constitutional:  Negative for chills and fever. HENT:  Negative for congestion, dental problem, sore throat and trouble swallowing. Eyes:  Negative for visual disturbance. Respiratory:  Negative for shortness of breath and wheezing. Cardiovascular:  Negative for chest pain, palpitations and leg swelling. Gastrointestinal:  Negative for abdominal pain, blood in stool, constipation, diarrhea, nausea and vomiting. Genitourinary:  Negative for difficulty urinating and dysuria. Musculoskeletal:  Negative for back pain, joint swelling and neck pain. Left calf swelling and bruising   Skin:  Negative for rash. Neurological:  Negative for dizziness, syncope, weakness and headaches. Hematological:  Negative for adenopathy. Bruises/bleeds easily. Psychiatric/Behavioral:  Negative for confusion and suicidal ideas.         PAST MEDICAL HISTORY    has a past medical history of Abnormal cardiovascular stress test, Asthma, CAD (coronary artery disease), Cancer (HCC), Cervical radiculopathy, Chronic back pain, Colonic polyp, Degeneration of cervical intervertebral disc, Degeneration of cervical intervertebral disc, Depression, Diverticulosis, GERD (gastroesophageal reflux disease), Glucose intolerance (impaired glucose tolerance), HTN (hypertension), Hyperlipidemia, Lumbar radiculopathy, MI (myocardial infarction) ([facility] [place] [address] Numbness and tingling, OA (osteoarthritis), Pre-diabetes, Sacroiliac pain, and Vision abnormalities. SURGICAL HISTORY      has a past surgical history that includes Coronary angioplasty with stent (05/05/2011); Finger trigger release (Right); Abscess Drainage (Right); other surgical history (2/19/13, 5/14/13); Elbow bursa surgery (Right, 2012,2011); Infected skin debridement (Right); Colonoscopy (2007, 2003); Colonoscopy (09/09/2013); Cervical spine surgery (08/09/2001); Cervical spine surgery (Left, [id number],6250); Lumbar spine surgery (3259,4714); lumbar fusion (04/07/2014); skin biopsy; other surgical history; other surgical history (Left, 09/27/2016); other surgical history (Bilateral, 11/29/2016); other surgical history (12/06/2016); other surgical history (Right, 12/12/2016); other surgical history (Left, 12/15/2017); other surgical history (Left, 01/31/2017); Lumbar spine surgery (Left, 02/14/2017); Injection Procedure For Sacroiliac Joint (Bilateral, 03/14/2017); pr arthrocentesis aspir&/inj major jt/bursa w/o us (Left, 03/31/2017); pr arthrocentesis aspir&/inj major jt/bursa w/o us (Left, 04/14/2017); Lumbar spine surgery (Bilateral, 05/02/2017); Lumbar spine surgery (Bilateral, 05/09/2017); Anesthesia Nerve Block (Bilateral, 06/02/2017); Anesthesia Nerve Block (Bilateral, 06/09/2017); RADIOFREQUENCY ABLATION NERVES (Right, 06/29/2017); RADIOFREQUENCY ABLATION NERVES (Left, 07/06/2017);  Cardiac catheterization (02/09/2022); back surgery; lumbar fusion (N/A, 11/15/2017); Colonoscopy (N/A, 10/15/2018); pr hemorrhoidectomy,int/ext,1 column/group (N/A, 11/07/2018); Upper gastrointestinal endoscopy (N/A, 09/30/2019); Cystoscopy (Bilateral, 05/19/2021); and Coronary angioplasty with stent (05/31/2011). CURRENT MEDICATIONS       Previous Medications    ATORVASTATIN (LIPITOR) 80 MG TABLET    TAKE 1 TABLET DAILY    CELECOXIB (CELEBREX) 200 MG CAPSULE    TAKE 1 CAPSULE BY MOUTH TWICE DAILY    FAMOTIDINE (PEPCID) 20 MG TABLET    Take 20 mg by mouth daily. FERROUS SULFATE (IRON 325) 325 (65 FE) MG TABLET    Take 1 tablet by mouth 2 times daily    LOSARTAN (COZAAR) 25 MG TABLET    TAKE 1 TABLET DAILY    METOPROLOL TARTRATE (LOPRESSOR) 50 MG TABLET    Take 1.5 tablets by mouth 2 times daily    NALOXONE 4 MG/0.1ML LIQD NASAL SPRAY    1 spray by Nasal route as needed for Opioid Reversal    NITROGLYCERIN (NITROSTAT) 0.4 MG SL TABLET    Place 1 tablet under the tongue every 5 minutes as needed for Chest pain up to max of 3 total doses. If no relief after 1 dose, call 911. ONDANSETRON (ZOFRAN ODT) 4 MG DISINTEGRATING TABLET    Take 1 tablet by mouth every 8 hours as needed for Nausea    PANTOPRAZOLE (PROTONIX) 40 MG TABLET    Take 1 tablet by mouth once daily    PAROXETINE (PAXIL) 20 MG TABLET    TAKE 1 TABLET BY MOUTH ONCE DAILY IN THE MORNING    TIZANIDINE (ZANAFLEX) 4 MG TABLET    TAKE ONE TABLET BY MOUTH THREE TIMES DAILY    VITAMIN C (ASCORBIC ACID) 500 MG TABLET    Take 500 mg by mouth 2 times daily    WARFARIN (COUMADIN) 5 MG TABLET    Take 1 tablet by mouth once daily       ALLERGIES     is allergic to crestor [rosuvastatin], ibuprofen, lisinopril, and effient [prasugrel]. FAMILY HISTORY     He indicated that the status of his father is unknown.     family history includes Cancer in his father. SOCIAL HISTORY      reports that he quit smoking about 11 years ago. His smoking use included cigarettes, pipe, and cigars. He started smoking about 58 years ago.  He has a 40.00 pack-year smoking history. He quit smokeless tobacco use about 11 years ago. His smokeless tobacco use included chew. He reports current alcohol use. He reports that he does not use drugs. PHYSICAL EXAM     INITIAL VITALS:  height is 5' 9\" (1.753 m) and weight is 201 lb (91.2 kg). His tympanic temperature is 97.7 °F (36.5 °C). His blood pressure is 147/94 (abnormal) and his pulse is 62. His respiration is 16 and oxygen saturation is 93%. Physical Exam  Constitutional:       Appearance: Normal appearance. He is well-developed. HENT:      Head: Normocephalic and atraumatic. Eyes:      Pupils: Pupils are equal, round, and reactive to light. Cardiovascular:      Rate and Rhythm: Normal rate and regular rhythm. Pulmonary:      Effort: Pulmonary effort is normal.      Breath sounds: Normal breath sounds. Abdominal:      General: Bowel sounds are normal.      Palpations: Abdomen is soft. Musculoskeletal:         General: Swelling, tenderness and signs of injury present. Normal range of motion. Cervical back: Normal range of motion and neck supple. Comments: Patient has old bruising to his left calf that goes down to his foot palpable distal pulses are noted Mariana Miguel Angel' sign is positive he has an area that firm upper calf there is no fluctuance or overlying erythema   Skin:     General: Skin is warm and dry. Neurological:      Mental Status: He is alert and oriented to person, place, and time.    Psychiatric:         Behavior: Behavior normal.         DIFFERENTIAL DIAGNOSIS/ MDM:     Hematoma left thigh suddenly worse with Homans possible rule out DVT he may have rebled in this area with him being on Coumadin we will check his INR    DIAGNOSTIC RESULTS     EKG: All EKG's are interpreted by the Emergency Department Physician who either signs or Co-signs this chart in the absence of a cardiologist.        RADIOLOGY:   I directly visualized the following  images and reviewed the radiologist interpretations:          ED BEDSIDE ULTRASOUND:       LABS:  Labs Reviewed   PROTIME-INR - Abnormal; Notable for the following components:       Result Value    Protime 25.1 (*)     All other components within normal limits       Therapeutic    EMERGENCY DEPARTMENT COURSE:   Vitals:    Vitals:    09/18/22 0808 09/18/22 0924   BP: (!) 191/92 (!) 147/94   Pulse: 63 62   Resp: 18 16   Temp: 97.7 °F (36.5 °C)    TempSrc: Tympanic    SpO2: 93% 93%   Weight: 201 lb (91.2 kg)    Height: 5' 9\" (1.753 m)      -------------------------  BP: (!) 147/94, Temp: 97.7 °F (36.5 °C), Heart Rate: 62, Resp: 16      Re-evaluation Notes    Resting comfortably ultrasound shows only the hematoma there is no evidence of a DVT he has had good arterial pulses on exam there is no evidence of compartment syndrome    CRITICAL CARE:   None        CONSULTS:      PROCEDURES:  None    FINAL IMPRESSION      1. Left leg pain    2. Hematoma of leg, left, subsequent encounter          DISPOSITION/PLAN   DISPOSITION Decision To Discharge    Condition on Disposition    Stable    PATIENT REFERRED TO:  Carmelo Cintron MD  [facility]  [place] [phone]          DISCHARGE MEDICATIONS:  New Prescriptions    No medications on file       (Please note that portions of this note were completed with a voice recognition program.  Efforts were made to edit the dictations but occasionally words are mis-transcribed.)    Monica Botello MD,, MD, F.A.A.E.M.   Attending Emergency Physician                           Monica Botello MD  09/18/22 1338

## 2022-09-19 DIAGNOSIS — S22.39XD CLOSED FRACTURE OF ONE RIB WITH ROUTINE HEALING, UNSPECIFIED LATERALITY, SUBSEQUENT ENCOUNTER: ICD-10-CM

## 2022-09-19 LAB
ESTIMATED AVERAGE GLUCOSE: 123 MG/DL
HBA1C MFR BLD: 5.9 % (ref 4–6)

## 2022-09-19 RX ORDER — OXYCODONE HYDROCHLORIDE AND ACETAMINOPHEN 5; 325 MG/1; MG/1
1 TABLET ORAL EVERY 6 HOURS PRN
Qty: 120 TABLET | Refills: 0 | Status: SHIPPED | OUTPATIENT
Start: 2022-09-19 | End: 2022-10-19

## 2022-09-20 ENCOUNTER — APPOINTMENT (OUTPATIENT)
Dept: PHARMACY | Age: 77
End: 2022-09-20
Payer: MEDICARE

## 2022-09-22 ENCOUNTER — OFFICE VISIT (OUTPATIENT)
Dept: INTERNAL MEDICINE | Age: 77
End: 2022-09-22
Payer: MEDICARE

## 2022-09-22 VITALS
BODY MASS INDEX: 29.62 KG/M2 | RESPIRATION RATE: 16 BRPM | HEIGHT: 69 IN | SYSTOLIC BLOOD PRESSURE: 132 MMHG | HEART RATE: 68 BPM | DIASTOLIC BLOOD PRESSURE: 80 MMHG | WEIGHT: 200 LBS

## 2022-09-22 DIAGNOSIS — R59.0 THORACIC LYMPHADENOPATHY: ICD-10-CM

## 2022-09-22 DIAGNOSIS — D50.9 IRON DEFICIENCY ANEMIA, UNSPECIFIED IRON DEFICIENCY ANEMIA TYPE: ICD-10-CM

## 2022-09-22 DIAGNOSIS — E11.9 TYPE 2 DIABETES MELLITUS WITHOUT COMPLICATION, WITHOUT LONG-TERM CURRENT USE OF INSULIN (HCC): ICD-10-CM

## 2022-09-22 DIAGNOSIS — S80.12XA HEMATOMA OF LEFT LOWER LEG: ICD-10-CM

## 2022-09-22 DIAGNOSIS — M79.605 PAIN OF LEFT LOWER EXTREMITY: Primary | ICD-10-CM

## 2022-09-22 DIAGNOSIS — I10 PRIMARY HYPERTENSION: ICD-10-CM

## 2022-09-22 PROCEDURE — G8417 CALC BMI ABV UP PARAM F/U: HCPCS | Performed by: INTERNAL MEDICINE

## 2022-09-22 PROCEDURE — 3044F HG A1C LEVEL LT 7.0%: CPT | Performed by: INTERNAL MEDICINE

## 2022-09-22 PROCEDURE — 1036F TOBACCO NON-USER: CPT | Performed by: INTERNAL MEDICINE

## 2022-09-22 PROCEDURE — 99214 OFFICE O/P EST MOD 30 MIN: CPT | Performed by: INTERNAL MEDICINE

## 2022-09-22 PROCEDURE — G8427 DOCREV CUR MEDS BY ELIG CLIN: HCPCS | Performed by: INTERNAL MEDICINE

## 2022-09-22 PROCEDURE — 99213 OFFICE O/P EST LOW 20 MIN: CPT | Performed by: INTERNAL MEDICINE

## 2022-09-22 PROCEDURE — 1123F ACP DISCUSS/DSCN MKR DOCD: CPT | Performed by: INTERNAL MEDICINE

## 2022-09-22 SDOH — ECONOMIC STABILITY: FOOD INSECURITY: WITHIN THE PAST 12 MONTHS, YOU WORRIED THAT YOUR FOOD WOULD RUN OUT BEFORE YOU GOT MONEY TO BUY MORE.: NEVER TRUE

## 2022-09-22 SDOH — ECONOMIC STABILITY: FOOD INSECURITY: WITHIN THE PAST 12 MONTHS, THE FOOD YOU BOUGHT JUST DIDN'T LAST AND YOU DIDN'T HAVE MONEY TO GET MORE.: NEVER TRUE

## 2022-09-22 ASSESSMENT — ENCOUNTER SYMPTOMS
CONSTIPATION: 0
COUGH: 0
DIARRHEA: 0
ABDOMINAL PAIN: 0
NAUSEA: 0
BLOOD IN STOOL: 0
SHORTNESS OF BREATH: 0
BACK PAIN: 0
EYE PAIN: 0
VOMITING: 0

## 2022-09-22 ASSESSMENT — SOCIAL DETERMINANTS OF HEALTH (SDOH): HOW HARD IS IT FOR YOU TO PAY FOR THE VERY BASICS LIKE FOOD, HOUSING, MEDICAL CARE, AND HEATING?: NOT HARD AT ALL

## 2022-09-22 NOTE — PROGRESS NOTES
CAD (coronary artery disease)     STENTS X 4    Cancer (HCC)     THROAT, HX. RADIATION , LT EAR    Cervical radiculopathy     Chronic back pain     Colonic polyp     Degeneration of cervical intervertebral disc     [facility], 1990 C4/C5-C6/C7    Degeneration of cervical intervertebral disc     [facility] 1994, C3/C4    Depression     Diverticulosis     GERD (gastroesophageal reflux disease)     Glucose intolerance (impaired glucose tolerance)     IS NOT DIABETIC, PER PT    HTN (hypertension)     Hyperlipidemia     Lumbar radiculopathy     MI (myocardial infarction) ([facility] [address]     2011    Numbness and tingling     HANDS    OA (osteoarthritis)     Pre-diabetes     Sacroiliac pain 11/04/2014    Vision abnormalities     WEARS GLASSES      Past Surgical History:   Procedure Laterality Date    ABSCESS DRAINAGE Right     ELBOW WITH CLOSURE    ANESTHESIA NERVE BLOCK Bilateral 06/02/2017    Bilat L1 L2 L3 L4 L5 Diagnostic Medial Branch Blk performed by Nevin Cook MD at [address] [place] [address],[address] Bilateral 06/09/2017    Bilat L1 L2 L3 L4 L5 Confirmatory Medial BB performed by Nevin Cook MD at [address] [place]  02/09/2022    CERVICAL SPINE SURGERY  08/09/2001    Anterior Cervical Decompression and Fusion C3-4    CERVICAL SPINE SURGERY Left [id number]    Cervical C6-C7 Discectomy and Foraminotomy    COLONOSCOPY  2007, 2003    POLYPS    COLONOSCOPY  09/09/2013    hyperplastic polyp x 3    COLONOSCOPY N/A 10/15/2018    hyperplastic polyp, severe sigmoid diverticulosis, large ext. hemorrhoid, Dr Jim Ernandez  05/05/2011    PROX RCA X2    CORONARY ANGIOPLASTY WITH STENT PLACEMENT  05/31/2011    XIENCE TO MID LAD X2    CYSTOSCOPY Bilateral 05/19/2021    CYSTO Bilateral Retrogrades performed by Anna Loera MD at [facility] Right 2012,2011    FINGER TRIGGER RELEASE Right     THIRD FINGER    HC INJECTION PROCEDURE FOR SACROILIAC JOINT Bilateral 03/14/2017    Bilat Sacroiliac & Piriformis Inj's performed by Kevan Beckman MD at [address] Right     ELBOW    LUMBAR FUSION  04/07/2014    lumbar decompression and fusion    LUMBAR FUSION N/A 11/15/2017    LUMBAR DECOMPRESSION POSTERIOR W/FUSION L3 L4 ( with SPINAL CORD MONITOR ) performed by Keke Leo MD at [address]  [id number]    Fusion    LUMBAR SPINE SURGERY Left 02/14/2017    Left L4 & L5 Transforaminal ANITHA performed by Kevan Beckman MD at [address] Bilateral 05/02/2017    Bilat L5 Transforaminal ANITHA performed by Kevan Beckman MD at [address] Bilateral 05/09/2017    Bilat L5 Transforaminal ANITHA performed by Kevan Beckman MD at [place] [address]  2/19/13, 5/14/13    L1/L2 IESI    OTHER SURGICAL HISTORY      Hardware correction, patient had 1 broken screw and to loose screws in back     OTHER SURGICAL HISTORY Left 09/27/2016     C6 TFE    OTHER SURGICAL HISTORY Bilateral 11/29/2016    L3, L4 L5 Diagnostic Medial Branch Block    OTHER SURGICAL HISTORY  12/06/2016    jovani L3 L4 L5 conf MBB    OTHER SURGICAL HISTORY Right 12/12/2016    L3,L4,L5 RFA    OTHER SURGICAL HISTORY Left 12/15/2017    L3.  L4, L5 RFA    OTHER SURGICAL HISTORY Left 01/31/2017    L4 & L5 TFE    NV ARTHROCENTESIS ASPIR&/INJ MAJOR JT/BURSA W/O US Left 03/31/2017    Left Hip Inj performed by Kevan Beckman MD at [address] ARTHROCENTESIS ASPIR&/INJ MAJOR JT/BURSA W/O US Left 04/14/2017    Left Hip Inj performed by Kevan Beckman MD at [address] HEMORRHOIDECTOMY,INT/EXT,1 COLUMN/GROUP N/A 11/07/2018    External HEMORRHOIDECTOMY performed by Matt Alonso MD at [facility] Right 06/29/2017    Right L1 L2 L3 L4 L5 Radiofrequency Ablation performed by Kevan Beckman MD at [facility] Left 07/06/2017    Left L1 L2 L3 L4 L5 Radiofrequency performed by Alona Severin, MD at [facility]      LT EAR    UPPER GASTROINTESTINAL ENDOSCOPY N/A 2019    mild gastritis, mild to moderate esophagitis, Dr. Sharlene Amador       Family History   Problem Relation Age of Onset    Cancer Father         colon cancer          Social History     Tobacco Use    Smoking status: Former     Packs/day: 1.00     Years: 40.00     Pack years: 40.00     Types: Cigarettes, Pipe, Cigars     Start date: 1964     Quit date: 2011     Years since quittin.3    Smokeless tobacco: Former     Types: Chew     Quit date: 2011   Substance Use Topics    Alcohol use: Yes     Alcohol/week: 0.0 standard drinks     Comment: RARE         Current Outpatient Medications   Medication Sig Dispense Refill    oxyCODONE-acetaminophen (PERCOCET) 5-325 MG per tablet Take 1 tablet by mouth every 6 hours as needed for Pain for up to 30 days. 120 tablet 0    pantoprazole (PROTONIX) 40 MG tablet Take 1 tablet by mouth once daily 90 tablet 0    warfarin (COUMADIN) 5 MG tablet Take 1 tablet by mouth once daily 30 tablet 0    metoprolol tartrate (LOPRESSOR) 50 MG tablet Take 1.5 tablets by mouth 2 times daily (Patient taking differently: Take 25 mg by mouth 2 times daily) 270 tablet 1    naloxone 4 MG/0.1ML LIQD nasal spray 1 spray by Nasal route as needed for Opioid Reversal 1 each 0    ondansetron (ZOFRAN ODT) 4 MG disintegrating tablet Take 1 tablet by mouth every 8 hours as needed for Nausea 12 tablet 0    tiZANidine (ZANAFLEX) 4 MG tablet TAKE ONE TABLET BY MOUTH THREE TIMES DAILY 90 tablet 5    nitroGLYCERIN (NITROSTAT) 0.4 MG SL tablet Place 1 tablet under the tongue every 5 minutes as needed for Chest pain up to max of 3 total doses.  If no relief after 1 dose, call 911. 25 tablet 3    losartan (COZAAR) 25 MG tablet TAKE 1 TABLET DAILY 90 tablet 3    vitamin C (ASCORBIC ACID) 500 MG tablet Take 500 mg by mouth 2 times daily      atorvastatin (LIPITOR) 80 MG tablet TAKE 1 TABLET DAILY 90 tablet 3 PARoxetine (PAXIL) 20 MG tablet TAKE 1 TABLET BY MOUTH ONCE DAILY IN THE MORNING 90 tablet 3    celecoxib (CELEBREX) 200 MG capsule TAKE 1 CAPSULE BY MOUTH TWICE DAILY 180 capsule 3    ferrous sulfate (IRON 325) 325 (65 Fe) MG tablet Take 1 tablet by mouth 2 times daily (Patient taking differently: Take 325 mg by mouth 3 times daily (with meals)) 120 tablet 3    famotidine (PEPCID) 20 MG tablet Take 20 mg by mouth daily. No current facility-administered medications for this visit. Allergies   Allergen Reactions    Crestor [Rosuvastatin] Other (See Comments)     Joint pain, muscle aches    Ibuprofen Other (See Comments)     bleeding    Lisinopril Hives    Effient [Prasugrel] Rash       Health Maintenance   Topic Date Due    Hepatitis C screen  Never done    DTaP/Tdap/Td vaccine (1 - Tdap) Never done    Low dose CT lung screening  Never done    Shingles vaccine (2 of 3) 01/03/2014    COVID-19 Vaccine (4 - Booster for Moderna series) 03/10/2022    Flu vaccine (1) 09/01/2022    Depression Monitoring  08/22/2023    Lipids  09/18/2023    Pneumococcal 65+ years Vaccine  Completed    Hepatitis A vaccine  Aged Out    Hib vaccine  Aged Out    Meningococcal (ACWY) vaccine  Aged Out       Subjective:      Review of Systems   Constitutional:  Negative for chills and fever. HENT:  Negative for hearing loss. Eyes:  Negative for pain and visual disturbance. Respiratory:  Negative for cough and shortness of breath. Cardiovascular:  Negative for chest pain, palpitations and leg swelling. Gastrointestinal:  Negative for abdominal pain, blood in stool, constipation, diarrhea, nausea and vomiting. Endocrine: Negative for cold intolerance, polydipsia and polyuria. Genitourinary:  Negative for difficulty urinating, dysuria and hematuria. Musculoskeletal:  Negative for arthralgias, back pain, gait problem and neck pain. Skin:  Negative for pallor and rash.    Neurological:  Negative for dizziness, weakness, numbness and headaches. Hematological:  Negative for adenopathy. Does not bruise/bleed easily. Psychiatric/Behavioral:  Negative for confusion. The patient is not nervous/anxious. Objective:     Physical Exam  Vitals reviewed. Constitutional:       Appearance: He is well-developed. HENT:      Head: Normocephalic and atraumatic. Eyes:      Pupils: Pupils are equal, round, and reactive to light. Cardiovascular:      Rate and Rhythm: Normal rate and regular rhythm. Heart sounds: No murmur heard. No friction rub. No gallop. Pulmonary:      Effort: Pulmonary effort is normal.      Breath sounds: Normal breath sounds. No wheezing or rales. Abdominal:      General: There is no distension. Palpations: Abdomen is soft. There is no mass. Tenderness: There is no abdominal tenderness. There is no rebound. Musculoskeletal:         General: Normal range of motion. Cervical back: Neck supple. Lymphadenopathy:      Cervical: No cervical adenopathy. Skin:     General: Skin is warm and dry. Findings: No rash. Neurological:      Mental Status: He is alert and oriented to person, place, and time. Cranial Nerves: No cranial nerve deficit (grossly). Psychiatric:         Thought Content: Thought content normal.      /80 (Site: Right Upper Arm, Position: Sitting, Cuff Size: Large Adult)   Pulse 68   Resp 16   Ht 5' 9\" (1.753 m)   Wt 200 lb (90.7 kg)   BMI 29.53 kg/m²     Assessment:       Diagnosis Orders   1. Pain of left lower extremity  [facility]      2. Primary hypertension        3. Hematoma of left lower leg  [facility]      4. Thoracic lymphadenopathy  CT CHEST WO CONTRAST      5. Type 2 diabetes mellitus without complication, without long-term current use of insulin ([facility])  Hemoglobin A1C      6.  Iron deficiency anemia, unspecified iron deficiency anemia type  Hemoglobin    Iron and TIBC 9-18-22 left leg venous duplex ultrasound was negative for DVT    9/18/2022 normal A1c 5.9    9-18-22 normal total cholesterol 151    Patient told to continue Coumadin and Protonix. Plan:       Return in about 18 weeks (around 1/26/2023) for Hypertension, Hyperlipidemia, Diabetes. Orders Placed This Encounter   Procedures    CT CHEST WO CONTRAST     Standing Status:   Future     Standing Expiration Date:   9/22/2023    Hemoglobin     Standing Status:   Future     Standing Expiration Date:   9/22/2023    Iron and TIBC     Standing Status:   Future     Standing Expiration Date:   9/22/2023     Order Specific Question:   Is Patient Fasting? Answer:   na     Order Specific Question:   No of Hours? Answer:   na    Hemoglobin A1C     Standing Status:   Future     Standing Expiration Date:   9/22/2023    [facility]     Referral Priority:   Routine     Referral Type:   Eval and Treat     Referral Reason:   Specialty Services Required     Requested Specialty:   Orthopedic Surgery     Number of Visits Requested:   1       No orders of the defined types were placed in this encounter. Patientgiven educational materials - see patient instructions. Discussed use, benefit,and side effects of prescribed medications. All patient questions answered. Ptvoiced understanding. Reviewed health maintenance. Instructed to continue currentmedications, diet and exercise. Patient agreed with treatment plan. Follow up asdirected.      Electronically signed by Carmelo Cintron MD on 9/22/2022 at 4:47 PM

## 2022-09-24 ENCOUNTER — HOSPITAL ENCOUNTER (OUTPATIENT)
Dept: PHARMACY | Age: 77
Setting detail: THERAPIES SERIES
Discharge: HOME | End: 2022-09-24
Payer: MEDICARE

## 2022-09-24 DIAGNOSIS — I48.91 ATRIAL FIBRILLATION WITH RVR (HCC): Primary | ICD-10-CM

## 2022-09-24 LAB
INR BLD: 2.7
PROTIME: 32.8 SECONDS

## 2022-09-24 PROCEDURE — 85610 PROTHROMBIN TIME: CPT

## 2022-09-24 PROCEDURE — 99211 OFF/OP EST MAY X REQ PHY/QHP: CPT

## 2022-09-24 PROCEDURE — 36416 COLLJ CAPILLARY BLOOD SPEC: CPT

## 2022-09-24 NOTE — PROGRESS NOTES
ANTICOAGULATION SERVICE    Date of Clinic Visit:  2022    Felisha Corral is a 65 y.o. male who presents to clinic today for anticoagulation monitoring and adjustment. Recent INR Results:  Internal QC passed  Lab Results   Component Value Date    INR 2.7 2022    INR 2.4 2022       Current Warfarin Dosage:  Dosing Plan  As of 2022      TTR:  51.3 % (4.6 mo)   Full warfarin instructions:  2.5 mg every Mon; 3.5 mg all other days                 Assessment/Plan:    Modify warfarin dose as noted above: INR remains in range today. We have given Al a slightly lower dose the last couple weeks based off INR from urgent care and the ED. I will adjust weekly dose down 4% to match what we have given the last two weeks as this seems to be keeping his INR in range. Recheck two weeks. Next Clinic Appointment:  Return date  As of 2022      TTR:  51.3 % (4.6 mo)   Next INR check:  10/7/2022               Please call [facility] at [phone] with any questions. Thanks!   Darren Bedoya [facility]  Anticoagulation Service Pharmacist  2022 9:04 AM    For Pharmacy Admin Tracking Only    Intervention Detail: Adherence Monitorin  Total # of Interventions Recommended: 0  Total # of Interventions Accepted: 0  Time Spent (min): 15

## 2022-09-26 DIAGNOSIS — S89.92XA INJURY OF LEFT LOWER LEG, INITIAL ENCOUNTER: ICD-10-CM

## 2022-09-26 DIAGNOSIS — M79.605 LEFT LEG PAIN: Primary | ICD-10-CM

## 2022-09-27 ENCOUNTER — HOSPITAL ENCOUNTER (OUTPATIENT)
Dept: GENERAL RADIOLOGY | Age: 77
Discharge: HOME | End: 2022-09-29
Payer: MEDICARE

## 2022-09-27 ENCOUNTER — OFFICE VISIT (OUTPATIENT)
Dept: ORTHOPEDIC SURGERY | Age: 77
End: 2022-09-27
Payer: MEDICARE

## 2022-09-27 VITALS
BODY MASS INDEX: 29.62 KG/M2 | HEART RATE: 62 BPM | DIASTOLIC BLOOD PRESSURE: 62 MMHG | WEIGHT: 200 LBS | HEIGHT: 69 IN | SYSTOLIC BLOOD PRESSURE: 112 MMHG

## 2022-09-27 DIAGNOSIS — M79.662 PAIN OF LEFT CALF: Primary | ICD-10-CM

## 2022-09-27 DIAGNOSIS — S89.92XA INJURY OF LEFT LOWER LEG, INITIAL ENCOUNTER: ICD-10-CM

## 2022-09-27 PROCEDURE — 1123F ACP DISCUSS/DSCN MKR DOCD: CPT | Performed by: PHYSICIAN ASSISTANT

## 2022-09-27 PROCEDURE — 73590 X-RAY EXAM OF LOWER LEG: CPT

## 2022-09-27 PROCEDURE — G8417 CALC BMI ABV UP PARAM F/U: HCPCS | Performed by: PHYSICIAN ASSISTANT

## 2022-09-27 PROCEDURE — 99213 OFFICE O/P EST LOW 20 MIN: CPT | Performed by: PHYSICIAN ASSISTANT

## 2022-09-27 PROCEDURE — G8427 DOCREV CUR MEDS BY ELIG CLIN: HCPCS | Performed by: PHYSICIAN ASSISTANT

## 2022-09-27 PROCEDURE — 99214 OFFICE O/P EST MOD 30 MIN: CPT | Performed by: PHYSICIAN ASSISTANT

## 2022-09-27 PROCEDURE — 1036F TOBACCO NON-USER: CPT | Performed by: PHYSICIAN ASSISTANT

## 2022-09-27 NOTE — PROGRESS NOTES
Orthopaedic Progress Note      CHIEF COMPLAINT:  left calf pain and swelling     HISTORY OF PRESENT ILLNESS:       Mr. Ninfa Lockhart  is a 65 y.o. male  who presents with left calf pain and swelling. Negative for DVT. On coumadin. Injury occurred two weeks ago. He is now able to WB on the left leg. Swelling noted to left calf.        Past Medical History:    Past Medical History:   Diagnosis Date    Abnormal cardiovascular stress test 01/2022    Asthma     CAD (coronary artery disease)     STENTS X 4    Cancer (HCC)     THROAT, HX. RADIATION , LT EAR    Cervical radiculopathy     Chronic back pain     Colonic polyp     Degeneration of cervical intervertebral disc     [facility], 1990 C4/C5-C6/C7    Degeneration of cervical intervertebral disc     [facility] 1994, C3/C4    Depression     Diverticulosis     GERD (gastroesophageal reflux disease)     Glucose intolerance (impaired glucose tolerance)     IS NOT DIABETIC, PER PT    HTN (hypertension)     Hyperlipidemia     Lumbar radiculopathy     MI (myocardial infarction) ([facility] [address]     2011    Numbness and tingling     HANDS    OA (osteoarthritis)     Pre-diabetes     Sacroiliac pain 11/04/2014    Vision abnormalities     WEARS GLASSES       Past Surgical History:    Past Surgical History:   Procedure Laterality Date    ABSCESS DRAINAGE Right     ELBOW WITH CLOSURE    ANESTHESIA NERVE BLOCK Bilateral 06/02/2017    Bilat L1 L2 L3 L4 L5 Diagnostic Medial Branch Blk performed by Jovanni Barcenas MD at [address] [place] [address] Bilateral 06/09/2017    Bilat L1 L2 L3 L4 L5 Confirmatory Medial BB performed by Jovanni Barcenas MD at [address] [place] [address]  02/09/2022    CERVICAL SPINE SURGERY  08/09/2001    Anterior Cervical Decompression and Fusion C3-4    CERVICAL SPINE SURGERY Left [id number]    Cervical C6-C7 Discectomy and Foraminotomy    COLONOSCOPY  2007, 2003    POLYPS    COLONOSCOPY  09/09/2013    hyperplastic polyp x 3    COLONOSCOPY N/A 10/15/2018    hyperplastic polyp, severe sigmoid diverticulosis, large ext. hemorrhoid, Dr Guan Primer  05/05/2011    PROX RCA X2    CORONARY ANGIOPLASTY WITH STENT PLACEMENT  05/31/2011    XIENCE TO MID LAD X2    CYSTOSCOPY Bilateral 05/19/2021    CYSTO Bilateral Retrogrades performed by Brooklyn Andrade MD at [address] [facility] Right 2012,2011    FINGER TRIGGER RELEASE Right     THIRD FINGER    [facility] INJECTION PROCEDURE FOR SACROILIAC JOINT Bilateral 03/14/2017    Bilat Sacroiliac & Piriformis Inj's performed by Hakan Valdez MD at [address] [facility] Right     ELBOW    LUMBAR FUSION  04/07/2014    lumbar decompression and fusion    LUMBAR FUSION N/A 11/15/2017    LUMBAR DECOMPRESSION POSTERIOR W/FUSION L3 L4 ( with SPINAL CORD MONITOR ) performed by Robin Romero MD at [facility]  [phone]    Fusion    LUMBAR SPINE SURGERY Left 02/14/2017    Left L4 & L5 Transforaminal ANITHA performed by Hakan Valdez MD at [address] Bilateral 05/02/2017    Bilat L5 Transforaminal ANITHA performed by Hakan Valdez MD at [address] Bilateral 05/09/2017    Bilat L5 Transforaminal ANITHA performed by Hakan Valdez MD at [facility] [address]  2/19/13, 5/14/13    L1/L2 IESI    OTHER SURGICAL HISTORY      Hardware correction, patient had 1 broken screw and to loose screws in back     OTHER SURGICAL HISTORY Left 09/27/2016     C6 TFE    OTHER SURGICAL HISTORY Bilateral 11/29/2016    L3, L4 L5 Diagnostic Medial Branch Block    OTHER SURGICAL HISTORY  12/06/2016    jovani L3 L4 L5 conf MBB    OTHER SURGICAL HISTORY Right 12/12/2016    L3,L4,L5 RFA    OTHER SURGICAL HISTORY Left 12/15/2017    L3.  L4, L5 RFA    OTHER SURGICAL HISTORY Left 01/31/2017    L4 & L5 TFE    ME ARTHROCENTESIS ASPIR&/INJ MAJOR JT/BURSA W/O US Left 03/31/2017    Left Hip Inj performed by Hakan Valdez MD at [address] [facility] ARTHROCENTESIS ASPIR&/INJ MAJOR JT/BURSA W/O US Left 04/14/2017    Left Hip Inj performed by Hakan Valdez MD at [address] [place] [address] Dr HEMORRHOIDECTOMY,INT/EXT,1 COLUMN/GROUP N/A 11/07/2018    External HEMORRHOIDECTOMY performed by Amber Posey MD at [place] [address] Right 06/29/2017    Right L1 L2 L3 L4 L5 Radiofrequency Ablation performed by Hakan Valdez MD at [facility] 30 Left 07/06/2017    Left L1 L2 L3 L4 L5 Radiofrequency performed by Hakan Valdez MD at [address] EAR    UPPER GASTROINTESTINAL ENDOSCOPY N/A 09/30/2019    mild gastritis, mild to moderate esophagitis, Dr. Emily Yadav         Current  Medications:  Current Outpatient Medications   Medication Sig Dispense Refill    oxyCODONE-acetaminophen (PERCOCET) 5-325 MG per tablet Take 1 tablet by mouth every 6 hours as needed for Pain for up to 30 days. 120 tablet 0    pantoprazole (PROTONIX) 40 MG tablet Take 1 tablet by mouth once daily 90 tablet 0    warfarin (COUMADIN) 5 MG tablet Take 1 tablet by mouth once daily 30 tablet 0    metoprolol tartrate (LOPRESSOR) 50 MG tablet Take 1.5 tablets by mouth 2 times daily (Patient taking differently: Take 25 mg by mouth 2 times daily) 270 tablet 1    naloxone 4 MG/0.1ML LIQD nasal spray 1 spray by Nasal route as needed for Opioid Reversal 1 each 0    ondansetron (ZOFRAN ODT) 4 MG disintegrating tablet Take 1 tablet by mouth every 8 hours as needed for Nausea 12 tablet 0    tiZANidine (ZANAFLEX) 4 MG tablet TAKE ONE TABLET BY MOUTH THREE TIMES DAILY 90 tablet 5    nitroGLYCERIN (NITROSTAT) 0.4 MG SL tablet Place 1 tablet under the tongue every 5 minutes as needed for Chest pain up to max of 3 total doses.  If no relief after 1 dose, call 911. 25 tablet 3    losartan (COZAAR) 25 MG tablet TAKE 1 TABLET DAILY 90 tablet 3    vitamin C (ASCORBIC ACID) 500 MG tablet Take 500 mg by mouth 2 times daily      atorvastatin (LIPITOR) 80 MG tablet TAKE 1 TABLET DAILY 90 tablet 3    PARoxetine (PAXIL) 20 MG tablet TAKE 1 TABLET BY MOUTH ONCE DAILY IN THE MORNING 90 tablet 3    celecoxib (CELEBREX) 200 MG capsule TAKE 1 CAPSULE BY MOUTH TWICE DAILY 180 capsule 3    ferrous sulfate (IRON 325) 325 (65 Fe) MG tablet Take 1 tablet by mouth 2 times daily (Patient taking differently: Take 325 mg by mouth 3 times daily (with meals)) 120 tablet 3    famotidine (PEPCID) 20 MG tablet Take 20 mg by mouth daily. No current facility-administered medications for this visit. Allergies:  Crestor [rosuvastatin], Ibuprofen, Lisinopril, and Effient [prasugrel]    Social History:   Social History     Tobacco Use   Smoking Status Former    Packs/day: 1.00    Years: 40.00    Pack years: 40.00    Types: Cigarettes, Pipe, Cigars    Start date: 1964    Quit date: 2011    Years since quittin.4   Smokeless Tobacco Former    Types: Chew    Quit date: 2011     Social History     Substance and Sexual Activity   Alcohol Use Yes    Alcohol/week: 0.0 standard drinks    Comment: RARE     Social History     Substance and Sexual Activity   Drug Use No       Family History:  Family History   Problem Relation Age of Onset    Cancer Father         colon cancer       REVIEW OF SYSTEMS:  Constitutional: Denies any fever, chills. Musculoskeletal: Positive for left calf hematoma. PHYSICAL EXAM:  [unfilled]  General appearance:  Alert and oriented x 3. No apparent distress, appears stated age, calm and cooperative. Musculoskeletal:  calf is soft. Swollen. Brusing noted to ankle. No issues with achillies tendon as patient is able to pedal push and pull. Does have pain to palpation at the belly of the calf .       DATA:  CBC:   Lab Results   Component Value Date/Time    WBC 8.2 06/10/2022 08:47 PM    HGB 13.3 2022 08:36 AM     06/10/2022 08:47 PM     BMP:    Lab Results   Component Value Date/Time     2022 08:36 AM    K 4.2 2022 08:36 AM     09/18/2022 08:36 AM    CO2 27 09/18/2022 08:36 AM    BUN 12 09/18/2022 08:36 AM    CREATININE 0.82 09/18/2022 08:36 AM    CALCIUM 8.8 09/18/2022 08:36 AM    GLUCOSE 138 09/18/2022 08:36 AM     PT/INR:    Lab Results   Component Value Date/Time    PROTIME 32.8 09/24/2022 08:54 AM    INR 2.7 09/24/2022 08:54 AM     Troponin:  No results found for: TROPONINI  No results for input(s): LIPASE, AMYLASE in the last 72 hours. No results for input(s): AST, ALT, BILIDIR, BILITOT, ALKPHOS in the last 72 hours. Uric Acid:  No components found for: URIC  Urine Culture:  No components found for: CURINE    Radiology:    DUP LOWER EXTREMITY VENOUS LEFT    Result Date: 9/18/2022  EXAMINATION: DUPLEX VENOUS ULTRASOUND OF THE LEFT LOWER EXTREMITY 9/18/2022 9:04 am TECHNIQUE: Duplex ultrasound using B-mode/gray scaled imaging and Doppler spectral analysis and color flow was obtained of the deep venous structures of the left extremity. COMPARISON: September 12, 2022 HISTORY: ORDERING SYSTEM PROVIDED HISTORY: Swelling left leg TECHNOLOGIST PROVIDED HISTORY: Swelling left leg Reason for Exam: swelling FINDINGS: The visualized veins of the left lower extremity are patent and free of echogenic thrombus. The veins demonstrate good compressibility with normal color flow study and spectral analysis. Decreased size of complex fluid collection at the area of bruising medial aspect left calf 8.5 x 5.9 x 4.5 cm in with no vascularity likely evolving hematoma. No evidence of DVT in the left lower extremity. Mild decrease in size of complex heterogeneous collection medial left calf likely evolving/resolving hematoma.  DUP LOWER EXTREMITY VENOUS LEFT    Result Date: 9/12/2022  EXAMINATION: DUPLEX VENOUS ULTRASOUND OF THE LEFT LOWER EXTREMITY 9/12/2022 6:02 pm TECHNIQUE: Duplex ultrasound using B-mode/gray scaled imaging and Doppler spectral analysis and color flow was obtained of the deep venous structures of the left extremity. COMPARISON: None. HISTORY: ORDERING SYSTEM PROVIDED HISTORY: Pain in left lower leg TECHNOLOGIST PROVIDED HISTORY: positive homans' sign with pain and swelling and ecchymosis left posterior lower leg FINDINGS: The visualized veins of the left lower extremity are patent and free of echogenic thrombus. The veins demonstrate good compressibility with normal color flow study and spectral analysis. There is a complex fluid collection at the site of bruising within the medial/posterior aspect of the left calf likely suggestive of organized hematoma. The collection measures approximately 1.9 by 11.6 cm and does not demonstrate vascularity. No evidence of DVT in the left lower extremity. Complex fluid collection at the site of bruising within the medial/posterior aspect of the left calf likely suggestive of organized hematoma. The collection measures approximately 1.9 by 11.6 cm and does not demonstrate vascularity. X-rays personally reviewed by me of negative for acute fracture or bony abnormality of the left tib fib 2 views        Diagnosis Orders   1. Pain of left calf              PLAN:  Treat this as a plantaris tendon rupture. WBAT  Heat, ice, rest, compression as needed    No orders of the defined types were placed in this encounter.        Procedure:        Electronically signed by Jermaine Franklin PA-C on 9/27/2022 at 10:17 AM

## 2022-10-07 ENCOUNTER — HOSPITAL ENCOUNTER (OUTPATIENT)
Dept: PHARMACY | Age: 77
Setting detail: THERAPIES SERIES
Discharge: HOME | End: 2022-10-07
Payer: MEDICARE

## 2022-10-07 DIAGNOSIS — I48.91 ATRIAL FIBRILLATION WITH RVR (HCC): Primary | ICD-10-CM

## 2022-10-07 LAB
INR BLD: 2
PROTIME: 24.4 SECONDS

## 2022-10-07 PROCEDURE — 85610 PROTHROMBIN TIME: CPT

## 2022-10-07 PROCEDURE — 36416 COLLJ CAPILLARY BLOOD SPEC: CPT

## 2022-10-07 PROCEDURE — 99211 OFF/OP EST MAY X REQ PHY/QHP: CPT

## 2022-10-07 NOTE — PROGRESS NOTES
ANTICOAGULATION SERVICE    Date of Clinic Visit:  10/7/2022    Leonela Obrien is a 65 y.o. male who presents to clinic today for anticoagulation monitoring and adjustment. Recent INR Results:  Internal QC passed  Lab Results   Component Value Date    INR 2 10/07/2022    INR 2.7 09/24/2022       Current Warfarin Dosage:  Dosing Plan  As of 10/7/2022      TTR:  55.4 % (5 mo)   Full warfarin instructions:  3.5 mg every day                 Assessment/Plan:    Modify warfarin dose as noted above: Patient just making range will increase overall dose 4.3 % and re-check INR in about 2 weeks. Next Clinic Appointment:  Return date  As of 10/7/2022      TTR:  55.4 % (5 mo)   Next INR check:  10/24/2022               Please call [facility] at [phone] with any questions. Thanks!   Candice Bright [facility]  Anticoagulation Service Pharmacist  10/7/2022 11:06 AM  For Pharmacy Admin Tracking Only    Intervention Detail: Dose Adjustment: 1, reason: Therapy Optimization  Total # of Interventions Recommended: 1  Total # of Interventions Accepted: 1  Time Spent (min): 20

## 2022-10-07 NOTE — PATIENT INSTRUCTIONS
\"On day of next appointment, please screen for temperature and COVID-19 symptoms prior to you clinic appointment. If any symptoms present, please call [phone] to reschedule. \"

## 2022-10-18 ENCOUNTER — IMMUNIZATION (OUTPATIENT)
Dept: LAB | Age: 77
End: 2022-10-18
Payer: MEDICARE

## 2022-10-18 PROCEDURE — 90694 VACC AIIV4 NO PRSRV 0.5ML IM: CPT | Performed by: INTERNAL MEDICINE

## 2022-10-18 PROCEDURE — PBSHW INFLUENZA, FLUAD, (AGE 65 Y+), IM, PF, 0.5 ML: Performed by: INTERNAL MEDICINE

## 2022-10-24 ENCOUNTER — HOSPITAL ENCOUNTER (OUTPATIENT)
Dept: PHARMACY | Age: 77
Setting detail: THERAPIES SERIES
Discharge: HOME | End: 2022-10-24
Payer: MEDICARE

## 2022-10-24 ENCOUNTER — OFFICE VISIT (OUTPATIENT)
Dept: CARDIOLOGY | Age: 77
End: 2022-10-24
Payer: MEDICARE

## 2022-10-24 VITALS
DIASTOLIC BLOOD PRESSURE: 59 MMHG | HEART RATE: 54 BPM | WEIGHT: 203 LBS | BODY MASS INDEX: 30.07 KG/M2 | HEIGHT: 69 IN | SYSTOLIC BLOOD PRESSURE: 101 MMHG

## 2022-10-24 DIAGNOSIS — E78.2 MIXED HYPERLIPIDEMIA: ICD-10-CM

## 2022-10-24 DIAGNOSIS — I48.91 ATRIAL FIBRILLATION WITH RVR (HCC): ICD-10-CM

## 2022-10-24 DIAGNOSIS — Z95.5 S/P CORONARY ARTERY STENT PLACEMENT: ICD-10-CM

## 2022-10-24 DIAGNOSIS — I48.91 ATRIAL FIBRILLATION WITH RVR (HCC): Primary | ICD-10-CM

## 2022-10-24 DIAGNOSIS — I10 ESSENTIAL HYPERTENSION: Primary | ICD-10-CM

## 2022-10-24 LAB
INR BLD: 3
PROTIME: 35.6 SECONDS

## 2022-10-24 PROCEDURE — 93005 ELECTROCARDIOGRAM TRACING: CPT | Performed by: INTERNAL MEDICINE

## 2022-10-24 PROCEDURE — 1036F TOBACCO NON-USER: CPT | Performed by: INTERNAL MEDICINE

## 2022-10-24 PROCEDURE — 1123F ACP DISCUSS/DSCN MKR DOCD: CPT | Performed by: INTERNAL MEDICINE

## 2022-10-24 PROCEDURE — 85610 PROTHROMBIN TIME: CPT

## 2022-10-24 PROCEDURE — 99214 OFFICE O/P EST MOD 30 MIN: CPT | Performed by: INTERNAL MEDICINE

## 2022-10-24 PROCEDURE — 93010 ELECTROCARDIOGRAM REPORT: CPT | Performed by: INTERNAL MEDICINE

## 2022-10-24 PROCEDURE — G8417 CALC BMI ABV UP PARAM F/U: HCPCS | Performed by: INTERNAL MEDICINE

## 2022-10-24 PROCEDURE — G8484 FLU IMMUNIZE NO ADMIN: HCPCS | Performed by: INTERNAL MEDICINE

## 2022-10-24 PROCEDURE — 99213 OFFICE O/P EST LOW 20 MIN: CPT | Performed by: INTERNAL MEDICINE

## 2022-10-24 PROCEDURE — 36416 COLLJ CAPILLARY BLOOD SPEC: CPT

## 2022-10-24 PROCEDURE — G8427 DOCREV CUR MEDS BY ELIG CLIN: HCPCS | Performed by: INTERNAL MEDICINE

## 2022-10-24 PROCEDURE — 99211 OFF/OP EST MAY X REQ PHY/QHP: CPT

## 2022-10-24 NOTE — PROGRESS NOTES
Today's Date: 10/24/2022  Patient's Name: Reid Yin  Patient's age: 65 y.o., 1945    CC:  CAD, Afib, HTN, DL    HPI:  Norberto Osullivan  is here for follow up for CAD and afib. States he is dealing a cold. No hematuria. Is dealing with a calf hematoma. He was cleaning the pool and bumped himself. Wants to come off his blood thinner. Past Medical History:   has a past medical history of Abnormal cardiovascular stress test, Asthma, CAD (coronary artery disease), Cancer (HCC), Cervical radiculopathy, Chronic back pain, Colonic polyp, Degeneration of cervical intervertebral disc, Degeneration of cervical intervertebral disc, Depression, Diverticulosis, GERD (gastroesophageal reflux disease), Glucose intolerance (impaired glucose tolerance), HTN (hypertension), Hyperlipidemia, Lumbar radiculopathy, MI (myocardial infarction) ([facility] [address] Numbness and tingling, OA (osteoarthritis), Pre-diabetes, Sacroiliac pain, and Vision abnormalities. Past Surgical History:   has a past surgical history that includes Coronary angioplasty with stent (05/05/2011); Finger trigger release (Right); Abscess Drainage (Right); other surgical history (2/19/13, 5/14/13); Elbow bursa surgery (Right, 2012,2011); Infected skin debridement (Right); Colonoscopy (2007, 2003); Colonoscopy (09/09/2013); Cervical spine surgery (08/09/2001); Cervical spine surgery (Left, [id number]); Lumbar spine surgery (9108,0122); lumbar fusion (04/07/2014); skin biopsy; other surgical history; other surgical history (Left, 09/27/2016); other surgical history (Bilateral, 11/29/2016); other surgical history (12/06/2016); other surgical history (Right, 12/12/2016); other surgical history (Left, 12/15/2017); other surgical history (Left, 01/31/2017); Lumbar spine surgery (Left, 02/14/2017);  Injection Procedure For Sacroiliac Joint (Bilateral, 03/14/2017); pr arthrocentesis aspir&/inj major jt/bursa w/o us (Left, 03/31/2017); pr arthrocentesis aspir&/inj major jt/bursa w/o us (Left, 04/14/2017); Lumbar spine surgery (Bilateral, 05/02/2017); Lumbar spine surgery (Bilateral, 05/09/2017); Anesthesia Nerve Block (Bilateral, 06/02/2017); Anesthesia Nerve Block (Bilateral, 06/09/2017); RADIOFREQUENCY ABLATION NERVES (Right, 06/29/2017); RADIOFREQUENCY ABLATION NERVES (Left, 07/06/2017); Cardiac catheterization (02/09/2022); back surgery; lumbar fusion (N/A, 11/15/2017); Colonoscopy (N/A, 10/15/2018); pr hemorrhoidectomy,int/ext,1 column/group (N/A, 11/07/2018); Upper gastrointestinal endoscopy (N/A, 09/30/2019); Cystoscopy (Bilateral, 05/19/2021); and Coronary angioplasty with stent (05/31/2011). Home Medications:  Prior to Admission medications    Medication Sig Start Date End Date Taking? Authorizing Provider   pantoprazole (PROTONIX) 40 MG tablet Take 1 tablet by mouth once daily 9/9/22  Yes Radha Ng MD   warfarin (COUMADIN) 5 MG tablet Take 1 tablet by mouth once daily 9/2/22  Yes Radha Ng MD   metoprolol tartrate (LOPRESSOR) 50 MG tablet Take 1.5 tablets by mouth 2 times daily  Patient taking differently: Take 25 mg by mouth 2 times daily 7/11/22  Yes Nick Heredia DO   naloxone 4 MG/0.1ML LIQD nasal spray 1 spray by Nasal route as needed for Opioid Reversal 6/11/22  Yes Jann Gosselin, MD   ondansetron (ZOFRAN ODT) 4 MG disintegrating tablet Take 1 tablet by mouth every 8 hours as needed for Nausea 6/11/22  Yes Jann Gosselin, MD   tiZANidine (ZANAFLEX) 4 MG tablet TAKE ONE TABLET BY MOUTH THREE TIMES DAILY 6/7/22  Yes Radha Ng MD   nitroGLYCERIN (NITROSTAT) 0.4 MG SL tablet Place 1 tablet under the tongue every 5 minutes as needed for Chest pain up to max of 3 total doses.  If no relief after 1 dose, call 911. 5/27/22  Yes Radha Ng MD   losartan (COZAAR) 25 MG tablet TAKE 1 TABLET DAILY 4/14/22  Yes Radha Ng MD   vitamin C (ASCORBIC ACID) 500 MG tablet Take 500 mg by mouth 2 times daily   Yes Historical Provider, MD atorvastatin (LIPITOR) 80 MG tablet TAKE 1 TABLET DAILY 1/4/22  Yes Juan Carlos Blake MD   PARoxetine (PAXIL) 20 MG tablet TAKE 1 TABLET BY MOUTH ONCE DAILY IN THE MORNING 11/29/21  Yes Juan Carlos Blake MD   celecoxib (CELEBREX) 200 MG capsule TAKE 1 CAPSULE BY MOUTH TWICE DAILY 9/7/21  Yes Juan Carlos Blake MD   ferrous sulfate (IRON 325) 325 (65 Fe) MG tablet Take 1 tablet by mouth 2 times daily  Patient taking differently: Take 325 mg by mouth 3 times daily (with meals) 2/25/21  Yes Juan Carlos Blake MD   famotidine (PEPCID) 20 MG tablet Take 20 mg by mouth daily. Yes Historical Provider, MD       Allergies:  Crestor [rosuvastatin], Ibuprofen, Lisinopril, and Effient [prasugrel]    Social History:   reports that he quit smoking about 11 years ago. His smoking use included cigarettes, pipe, and cigars. He started smoking about 58 years ago. He has a 40.00 pack-year smoking history. He quit smokeless tobacco use about 11 years ago. His smokeless tobacco use included chew. He reports current alcohol use. He reports that he does not use drugs. Family History: family history includes Cancer in his father. No h/o sudden cardiac death. No for premature CAD    REVIEW OF SYSTEMS:    Constitutional: there has been no unanticipated weight loss. There's been No change in energy level, No change in activity level. Eyes: No visual changes or diplopia. No scleral icterus. ENT: No Headaches, hearing loss or vertigo. No mouth sores or sore throat. Cardiovascular: see above  Respiratory: see above  Gastrointestinal: No abdominal pain, appetite loss, blood in stools. Genitourinary: No dysuria, trouble voiding, or hematuria. Musculoskeletal:  Lower Back pain reported  Integumentary: No rash or pruritis. Neurological: No headache or diplopia. No tingling  Psychiatric: No anxiety, or depression. Endocrine: No temperature intolerance.    Hematologic/Lymphatic: No abnormal bruising or bleeding, blood clots or swollen lymph nodes. Allergic/Immunologic: No nasal congestion or hives. PHYSICAL EXAM:      BP (!) 101/59   Pulse 54   Ht 5' 9\" (1.753 m)   Wt 203 lb (92.1 kg)   BMI 29.98 kg/m²   General appearance: alert and cooperative with exam  HEENT: Head: Normocephalic, no lesions, without obvious abnormality. Eyes: PERRL, EOMI  Ears: Not obvious deformations or lack of hearing  Neck: no carotid bruit, no JVD  Lungs: clear to auscultation bilaterally  Heart: regular rate and rhythm, S1, S2 normal, no murmur, click, rub or gallop  Abdomen: soft, non-tender; bowel sounds normal; no masses,  no organomegaly  Extremities: extremities normal, atraumatic, no cyanosis or edema  Neurologic: Mental status: Alert, oriented, thought content appropriate  Skin: WNL for age and condition, no obvious rashes or leasions    Labs:   Lab Results   Component Value Date    CHOL 151 09/18/2022    TRIG 148 09/18/2022    HDL 45 09/18/2022    LDLCHOLESTEROL 76 09/18/2022    VLDL NOT REPORTED (H) 08/20/2021    CHOLHDLRATIO 3.4 09/18/2022     Lab Results   Component Value Date     09/18/2022    K 4.2 09/18/2022     09/18/2022    CO2 27 09/18/2022    BUN 12 09/18/2022    CREATININE 0.82 09/18/2022    GLUCOSE 138 (H) 09/18/2022    CALCIUM 8.8 09/18/2022    PROT 6.2 (L) 09/18/2022    LABALBU 3.7 09/18/2022    BILITOT 0.3 09/18/2022    ALKPHOS 64 09/18/2022    AST 15 09/18/2022    ALT 14 09/18/2022    LABGLOM >60 09/18/2022    GFRAA >60 09/18/2022     Cardiac Data:  EKG: Sinus  Bradycardia - HR 54  -Right bundle branch block. TTE 10/17/2014  1. Left ventricle is normal in dimension with normal left ventricular systolic function. Ejection fraction is 50-55%. 2.   Biatrial enlargement. 3.   Right ventricle is dilated with normal function. 4.   Mitral valve leaflets are thickened with trivial regurgitation. 5.   Mildly sclerotic aortic valve with no stenosis or regurgitation. 6.   Trivial tricuspid regurgitation.   RVSP is 57mm Hg.  7.   Grade I diastolic dysfunction. Stress test 1/22:  1. Moderate size/intensity mid/apical inferior ischemia around old infarction  (more basal). 2.  Normal LVEF 67%. 3.  Normal wall motion. Cath on 2/22:   patent LAD and RCA stents with nonobstructive CAD. Echo on 4/22:  Normal left ventricular diameter. Borderline left ventricular hypertrophy. Left ventricular systolic function is normal. Left ventricular ejection fraction 55 %. Left atrium is moderately dilated. Right atrial dilatation. Right ventricular dilatation with reduced systolic function. Aortic valve sclerosis without stenosis. Mild mitral valve thickening with annular calcification. Mild mitral regurgitation. Mild to moderate tricuspid regurgitation. Estimated right ventricular systolic pressure is 60 mmHg. Severe pulmonary hypertension. Trivial pericardial effusion. IVC Increased diameter and impaired or no inspiratory variation indicatingelevated RA filling pressure (i.e. CVP) . Assessment and Plan:    1. CAD- STEMI 5/5/2011 with thrombectomy and MELBA to RCA followed by staged PCI of LAD with MELBA 5/31/2011. Patent Stents on 2/22 (after abnormal stress test). - on ASA, statin, BB   - can d/c ASA due to stable CAD (and hematuria), continue coumadin     2. Parox Afib- in NSR   - coumadin   - couldn't afford eliquis    - H/o of hematuria- now resolved back on coumadin    - didn't tolerate cardizem due to dizziness   - lopressor 75 bid   - he wants to come off his blood thinner- now has hematoma in calf  - will refer to Dr. Ailin Chapman for possible Watchman. 3. HTN- failry well controlled at this time. Continue current therapy. 4. Obesity - encouraged diet, exercise, and discussed weight loss extensively. 5. Hyperlipidemia- continue statin. 6. DM- management per PCP. The patient is to continue heart healthy diet, weight loss and exercise as tolerated. Patient's medications and side effects were discussed. Medication refills were provided if needed. Follow up appointment timing was discussed. All questions and concerns were addressed to patient's satisfaction. The patient is to follow up in 6 months or sooner if necessary. Thank you for allowing me to participate in the care of this patient, please do not hesitate to call if you have any questions. Dinesh Chen DO, [facility], [address], [address], [place] [address] [facility]  [facility]. [facility]  [phone]

## 2022-10-24 NOTE — PATIENT INSTRUCTIONS
Your Procedure Will Be Scheduled at:      [facility]    [facility], [address]   (located across from [address] [facility])    Located on the main floor of the [facility]. Report to our reception desk by the [employer]. Parking is free. Handicap parking is available by the main entrance. You may also park in [place] on Level 2 and enter building on the bridge to [facility]. Take elevator to the main floor. Our   Lisa Lyonswill call you to schedule your procedure within a week. If you do not receive a phone call, please call the  directly at [phone] and leave a message, or call [place] office at [phone]. Date:______________________________    Arrival Time:________________________    Procedure Time:_____________________    Instructions:_____________________________      Bring Photo I.D. and insurance cards. Bring all Medications in the bottles. Pack an overnight bag in case you are required to spend the night. You will need someone to drive you home. The  will instruct you on holding food and drink the night before or morning of your procedure. You are to take your Medications, along with your Cardiac and/or Blood Pressure Medications, with small sips of water on the morning of your Procedure, unless instructed otherwise by your Physician. If you need additional directions please call [phone]. If you have any questions please feel free to call the [place] office at [phone].

## 2022-10-24 NOTE — PROGRESS NOTES
ANTICOAGULATION SERVICE    Date of Clinic Visit:  10/24/2022    Karissa Lujan is a 65 y.o. male who presents to clinic today for anticoagulation monitoring and adjustment. Recent INR Results:  Internal QC passed  Lab Results   Component Value Date    INR 3 10/24/2022    INR 2 10/07/2022       Current Warfarin Dosage:  Dosing Plan  As of 10/24/2022      TTR:  60.1 % (5.6 mo)   Full warfarin instructions:  3 mg every Tue; 3.5 mg all other days; Starting 10/24/2022                 Assessment/Plan:    Modify warfarin dose as noted above: INR is at top of range today and increased from 2.0 two weeks ago after dose increase of 4.3%. I will decrease dose down 2% and recheck in two weeks. Next Clinic Appointment:  Return date  As of 10/24/2022      TTR:  60.1 % (5.6 mo)   Next INR check:  11/11/2022               Please call [facility] at [phone] with any questions. Thanks!   Greyson Pedraza [facility]  Anticoagulation Service Pharmacist  10/24/2022 10:26 AM    For Pharmacy Admin Tracking Only    Intervention Detail: Dose Adjustment: 1, reason: Therapy Optimization  Total # of Interventions Recommended: 1  Total # of Interventions Accepted: 1  Time Spent (min): 15

## 2022-10-25 ENCOUNTER — IMMUNIZATION (OUTPATIENT)
Dept: LAB | Age: 77
End: 2022-10-25
Payer: MEDICARE

## 2022-10-25 PROCEDURE — PBSHW COVID-19, MODERNA BIVALENT BOOSTER, (AGE 18Y+), IM, 50 MCG/0.5 ML: Performed by: FAMILY MEDICINE

## 2022-10-25 PROCEDURE — 91313 COVID-19, MODERNA BIVALENT BOOSTER, (AGE 18Y+), IM, 50 MCG/0.5 ML: CPT | Performed by: FAMILY MEDICINE

## 2022-11-01 NOTE — TELEPHONE ENCOUNTER
Patient notified and voiced understanding chest wall tenderness along L 4-6 ribs mid claviclular line

## 2022-11-02 ENCOUNTER — OFFICE VISIT (OUTPATIENT)
Dept: PRIMARY CARE CLINIC | Age: 77
End: 2022-11-02
Payer: MEDICARE

## 2022-11-02 VITALS
TEMPERATURE: 97.7 F | WEIGHT: 205.25 LBS | RESPIRATION RATE: 20 BRPM | SYSTOLIC BLOOD PRESSURE: 146 MMHG | OXYGEN SATURATION: 95 % | HEART RATE: 74 BPM | DIASTOLIC BLOOD PRESSURE: 84 MMHG | HEIGHT: 69 IN | BODY MASS INDEX: 30.4 KG/M2

## 2022-11-02 DIAGNOSIS — R05.9 COUGH, UNSPECIFIED TYPE: ICD-10-CM

## 2022-11-02 DIAGNOSIS — U07.1 COVID-19: Primary | ICD-10-CM

## 2022-11-02 LAB
INFLUENZA A ANTIGEN, POC: NEGATIVE
INFLUENZA B ANTIGEN, POC: NEGATIVE
LOT EXPIRE DATE: ABNORMAL
LOT KIT NUMBER: ABNORMAL
SARS-COV-2, POC: DETECTED
VALID INTERNAL CONTROL: ABNORMAL
VENDOR AND KIT NAME POC: ABNORMAL

## 2022-11-02 PROCEDURE — 99212 OFFICE O/P EST SF 10 MIN: CPT | Performed by: NURSE PRACTITIONER

## 2022-11-02 PROCEDURE — PBSHW POCT COVID-19 & INFLUENZA A/B: Performed by: NURSE PRACTITIONER

## 2022-11-02 PROCEDURE — G8427 DOCREV CUR MEDS BY ELIG CLIN: HCPCS | Performed by: NURSE PRACTITIONER

## 2022-11-02 PROCEDURE — 87428 SARSCOV & INF VIR A&B AG IA: CPT | Performed by: NURSE PRACTITIONER

## 2022-11-02 PROCEDURE — 3078F DIAST BP <80 MM HG: CPT | Performed by: NURSE PRACTITIONER

## 2022-11-02 PROCEDURE — 3074F SYST BP LT 130 MM HG: CPT | Performed by: NURSE PRACTITIONER

## 2022-11-02 PROCEDURE — G8484 FLU IMMUNIZE NO ADMIN: HCPCS | Performed by: NURSE PRACTITIONER

## 2022-11-02 PROCEDURE — 1036F TOBACCO NON-USER: CPT | Performed by: NURSE PRACTITIONER

## 2022-11-02 PROCEDURE — 1123F ACP DISCUSS/DSCN MKR DOCD: CPT | Performed by: NURSE PRACTITIONER

## 2022-11-02 PROCEDURE — 99213 OFFICE O/P EST LOW 20 MIN: CPT | Performed by: NURSE PRACTITIONER

## 2022-11-02 PROCEDURE — G8417 CALC BMI ABV UP PARAM F/U: HCPCS | Performed by: NURSE PRACTITIONER

## 2022-11-02 ASSESSMENT — ENCOUNTER SYMPTOMS
RHINORRHEA: 1
COUGH: 1
SHORTNESS OF BREATH: 0
CHEST TIGHTNESS: 0
SORE THROAT: 0
WHEEZING: 1

## 2022-11-02 NOTE — PROGRESS NOTES
[facility]             [address], [address]                        Telephone [phone]             Fax [phone]     Michel Petty  1945  MOREL:[id number]   Date of visit:  11/2/2022    Subjective: Michel Petty is a 65 y.o.  male who presents to [facility] today (11/2/2022) for evaluation of:    Chief Complaint   Patient presents with    Cough     Started two weeks ago, not bringing much up with cough. Chills, headache, body aches, wheezing, sore throat off and on. No taste or smell. At home covid test negative a week ago. Cough  This is a new problem. The current episode started 1 to 4 weeks ago (X 10 days). The problem has been unchanged. The problem occurs every few hours. The cough is Non-productive. Associated symptoms include headaches, rhinorrhea (few times daily) and wheezing. Pertinent negatives include no chest pain, chills, fever, nasal congestion, postnasal drip, rash, sore throat or shortness of breath. Associated symptoms comments: Body aches. Nothing aggravates the symptoms. Treatments tried: juju seltzer plus. The treatment provided mild relief. His past medical history is significant for asthma.      He has the following problem list:  Patient Active Problem List   Diagnosis    Chronic back pain    Neck pain, chronic    Brachial neuritis or radiculitis    Spinal stenosis, lumbar region, with neurogenic claudication    Displacement of cervical intervertebral disc without myelopathy    CAD (coronary artery disease)    GILL (dyspnea on exertion)    S/P lumbar spinal fusion    Obesity (BMI 35.0-39.9 without comorbidity)    HTN (hypertension)    Hyperlipidemia    Type 2 diabetes mellitus without complication (HCC)    Chronic bilateral low back pain with bilateral sciatica    Left hip pain    Acquired spondylolisthesis    Back pain    Grade III hemorrhoids    Recurrent major depressive disorder, in partial remission ([facility] [address]    Carcinoma larynx (HCC)    Atrial fibrillation with RVR (HCC)    Anemia    Nasal discharge        Current medications are:  Current Outpatient Medications   Medication Sig Dispense Refill    pantoprazole (PROTONIX) 40 MG tablet Take 1 tablet by mouth once daily 90 tablet 0    warfarin (COUMADIN) 5 MG tablet Take 1 tablet by mouth once daily 30 tablet 0    metoprolol tartrate (LOPRESSOR) 50 MG tablet Take 1.5 tablets by mouth 2 times daily (Patient taking differently: Take 25 mg by mouth 2 times daily) 270 tablet 1    ondansetron (ZOFRAN ODT) 4 MG disintegrating tablet Take 1 tablet by mouth every 8 hours as needed for Nausea 12 tablet 0    tiZANidine (ZANAFLEX) 4 MG tablet TAKE ONE TABLET BY MOUTH THREE TIMES DAILY 90 tablet 5    losartan (COZAAR) 25 MG tablet TAKE 1 TABLET DAILY 90 tablet 3    vitamin C (ASCORBIC ACID) 500 MG tablet Take 500 mg by mouth 2 times daily      atorvastatin (LIPITOR) 80 MG tablet TAKE 1 TABLET DAILY 90 tablet 3    PARoxetine (PAXIL) 20 MG tablet TAKE 1 TABLET BY MOUTH ONCE DAILY IN THE MORNING 90 tablet 3    celecoxib (CELEBREX) 200 MG capsule TAKE 1 CAPSULE BY MOUTH TWICE DAILY 180 capsule 3    ferrous sulfate (IRON 325) 325 (65 Fe) MG tablet Take 1 tablet by mouth 2 times daily (Patient taking differently: Take 325 mg by mouth 3 times daily (with meals)) 120 tablet 3    famotidine (PEPCID) 20 MG tablet Take 20 mg by mouth daily. naloxone 4 MG/0.1ML LIQD nasal spray 1 spray by Nasal route as needed for Opioid Reversal 1 each 0    nitroGLYCERIN (NITROSTAT) 0.4 MG SL tablet Place 1 tablet under the tongue every 5 minutes as needed for Chest pain up to max of 3 total doses. If no relief after 1 dose, call 911. 25 tablet 3     No current facility-administered medications for this visit. He is allergic to crestor [rosuvastatin], ibuprofen, lisinopril, and effient [prasugrel]. .    He  reports that he quit smoking about 11 years ago. His smoking use included cigarettes, pipe, and cigars. He started smoking about 58 years ago. He has a 40.00 pack-year smoking history. He quit smokeless tobacco use about 11 years ago. His smokeless tobacco use included chew. Objective:    Vitals:    11/02/22 1019 11/02/22 1024   BP: (!) 154/82 (!) 146/84   Site: Right Upper Arm Right Upper Arm   Position: Sitting Sitting   Cuff Size: Medium Adult Medium Adult   Pulse: 74    Resp: 20    Temp: 97.7 °F (36.5 °C)    TempSrc: Tympanic    SpO2: 95%    Weight: 205 lb 4 oz (93.1 kg)    Height: 5' 9\" (1.753 m)      Body mass index is 30.31 kg/m². Review of Systems   Constitutional: Negative. Negative for chills and fever. HENT:  Positive for rhinorrhea (few times daily). Negative for congestion, postnasal drip and sore throat. Respiratory:  Positive for cough and wheezing. Negative for chest tightness and shortness of breath. Cardiovascular:  Negative for chest pain. Skin:  Negative for rash. Neurological:  Positive for headaches. Physical Exam  Vitals and nursing note reviewed. Constitutional:       Appearance: Normal appearance. He is well-developed. HENT:      Head: Normocephalic. Jaw: There is normal jaw occlusion. Right Ear: Tympanic membrane, ear canal and external ear normal.      Left Ear: Tympanic membrane, ear canal and external ear normal.      Nose: Nose normal.      Mouth/Throat:      Lips: Pink. Mouth: Mucous membranes are moist.      Pharynx: Oropharynx is clear. Uvula midline. Eyes:      Pupils: Pupils are equal, round, and reactive to light. Cardiovascular:      Rate and Rhythm: Normal rate and regular rhythm. Heart sounds: Normal heart sounds. Pulmonary:      Effort: Pulmonary effort is normal.      Breath sounds: Normal breath sounds and air entry. Musculoskeletal:      Cervical back: Normal range of motion and neck supple. Lymphadenopathy:      Cervical: No cervical adenopathy.    Skin: General: Skin is warm and dry. Neurological:      General: No focal deficit present. Mental Status: He is alert and oriented to person, place, and time. Psychiatric:         Behavior: Behavior normal.         Thought Content: Thought content normal.       Assessment and Plan:    Office Visit on 2022   Component Date Value Ref Range Status    VALID INTERNAL CONTROL 2022 ok   Final    Lot Kit Number 2022 [id number]   Final    Lot  Date 2022 31,023   Final    SARS-COV-2, POC 2022 Detected (A)  Not Detected Final    Influenza A Antigen, POC 2022 Negative  Negative Final    Influenza B Antigen, POC 2022 Negative  Negative Final    Vendor and kit name 2022 Veritor   Final          Diagnosis Orders   1. COVID-19        2. Cough, unspecified type  POCT COVID-19 & Influenza A/B        I recommended that he use OTC Robitussin to help with cough. he was also encouraged to use tylenol for pain/fever. Increase water intake. Use cool mist humidifier at bedtime. Use nasal saline flush as needed. Good hand hygiene. he was instructed to return if there is no improvement or symptoms worsen. The use, risks, benefits, and side effects of prescribed or recommended medications were discussed. All questions were answered and the patient/caregiver voiced understanding. No orders of the defined types were placed in this encounter.         Electronically signed by LAWRENCE Pineda CNP on 22 at 10:44 AM EDT

## 2022-11-11 ENCOUNTER — HOSPITAL ENCOUNTER (OUTPATIENT)
Dept: PHARMACY | Age: 77
Setting detail: THERAPIES SERIES
Discharge: HOME | End: 2022-11-11
Payer: MEDICARE

## 2022-11-11 DIAGNOSIS — I48.91 ATRIAL FIBRILLATION WITH RVR (HCC): Primary | ICD-10-CM

## 2022-11-11 LAB
INR BLD: 2
PROTIME: 24.3 SECONDS

## 2022-11-11 PROCEDURE — 36416 COLLJ CAPILLARY BLOOD SPEC: CPT

## 2022-11-11 PROCEDURE — 85610 PROTHROMBIN TIME: CPT

## 2022-11-11 PROCEDURE — 99211 OFF/OP EST MAY X REQ PHY/QHP: CPT

## 2022-11-11 NOTE — PATIENT INSTRUCTIONS
Covid test negative.  Voice message left with Patient that test negative and instructed to continue to quarantine until day of surgery \"On day of next appointment, please screen for temperature and COVID-19 symptoms prior to you clinic appointment. If any symptoms present, please call [phone] to reschedule. \"

## 2022-11-11 NOTE — PROGRESS NOTES
ANTICOAGULATION SERVICE    Date of Clinic Visit:  11/11/2022    Marcus Levy is a 65 y.o. male who presents to clinic today for anticoagulation monitoring and adjustment. Recent INR Results:  Internal QC passed  Lab Results   Component Value Date    INR 2 11/11/2022    INR 3 10/24/2022       Current Warfarin Dosage:  *  Dosing Plan  As of 11/11/2022      TTR:  63.9 % (6.2 mo)   Full warfarin instructions:  3 mg every Tue; 3.5 mg all other days; Starting 11/11/2022                 Assessment/Plan:    Continue current regimen as INR remains stable. Next Clinic Appointment:  Return date  As of 11/11/2022      TTR:  63.9 % (6.2 mo)   Next INR check:  12/9/2022               Please call [facility] at [phone] with any questions. Thanks!   Trudy Hughes [facility]  Anticoagulation Service Pharmacist  11/11/2022 10:52 AM  For Pharmacy Admin Tracking Only    Intervention Detail: Dose Adjustment: 0, reason: Patient Preference  Total # of Interventions Recommended: 0  Total # of Interventions Accepted: 0  Time Spent (min): 15

## 2022-11-23 DIAGNOSIS — S22.39XD CLOSED FRACTURE OF ONE RIB WITH ROUTINE HEALING, UNSPECIFIED LATERALITY, SUBSEQUENT ENCOUNTER: ICD-10-CM

## 2022-11-23 RX ORDER — OXYCODONE HYDROCHLORIDE AND ACETAMINOPHEN 5; 325 MG/1; MG/1
1 TABLET ORAL EVERY 6 HOURS PRN
Qty: 120 TABLET | Refills: 0 | Status: SHIPPED | OUTPATIENT
Start: 2022-11-23 | End: 2022-11-23 | Stop reason: RX

## 2022-11-23 RX ORDER — OXYCODONE HYDROCHLORIDE AND ACETAMINOPHEN 5; 325 MG/1; MG/1
1 TABLET ORAL EVERY 6 HOURS PRN
Qty: 120 TABLET | Refills: 0 | Status: SHIPPED | OUTPATIENT
Start: 2022-11-23 | End: 2022-12-23

## 2022-11-23 NOTE — TELEPHONE ENCOUNTER
[place] checked today    Controlled Substance Monitoring:    Acute and Chronic Pain Monitoring:   RX Monitoring 11/23/2022   Attestation -   Periodic Controlled Substance Monitoring No signs of potential drug abuse or diversion identified.;Obtaining appropriate analgesic effect of treatment.

## 2022-11-23 NOTE — TELEPHONE ENCOUNTER
Al called, requesting a refill of the below medication which has been pended for you:     Requested Prescriptions     Pending Prescriptions Disp Refills    oxyCODONE-acetaminophen (PERCOCET) 5-325 MG per tablet 120 tablet 0     Sig: Take 1 tablet by mouth every 6 hours as needed for Pain for up to 30 days.        Last Appointment Date: 9/22/22  Next Appointment Date: 1/27/23    Allergies   Allergen Reactions    Crestor [Rosuvastatin] Other (See Comments)     Joint pain, muscle aches    Ibuprofen Other (See Comments)     bleeding    Lisinopril Hives    Effient [Prasugrel] Rash

## 2022-11-23 NOTE — TELEPHONE ENCOUNTER
[facility]  dose not have the percocet. They are cancelling the order. Per [facility] has it in stock.

## 2022-12-09 ENCOUNTER — HOSPITAL ENCOUNTER (OUTPATIENT)
Dept: PHARMACY | Age: 77
Setting detail: THERAPIES SERIES
Discharge: HOME | End: 2022-12-09
Payer: MEDICARE

## 2022-12-09 DIAGNOSIS — I48.91 ATRIAL FIBRILLATION WITH RVR (HCC): Primary | ICD-10-CM

## 2022-12-09 LAB
INR BLD: 2.1
PROTIME: 25.5 SECONDS

## 2022-12-09 PROCEDURE — 85610 PROTHROMBIN TIME: CPT

## 2022-12-09 PROCEDURE — 99211 OFF/OP EST MAY X REQ PHY/QHP: CPT

## 2022-12-09 PROCEDURE — 36416 COLLJ CAPILLARY BLOOD SPEC: CPT

## 2022-12-09 NOTE — PROGRESS NOTES
ANTICOAGULATION SERVICE    Date of Clinic Visit:  12/9/2022    Carrillo Plunkett is a 65 y.o. male who presents to clinic today for anticoagulation monitoring and adjustment. Recent INR Results:  Internal QC passed  Lab Results   Component Value Date    INR 2.1 12/09/2022    INR 2 11/11/2022       Dosing Plan  As of 12/9/2022      TTR:  68.6 % (7.1 mo)   Full warfarin instructions:  3 mg every Tue; 3.5 mg all other days; Starting 12/9/2022                 Assessment/Plan:    Continue current regimen as INR remains stable. Next Clinic Appointment:  Return date  As of 12/9/2022      TTR:  68.6 % (7.1 mo)   Next INR check:  1/6/2023               Please call [facility] at [phone] with any questions. Thanks!   Ariadna Lou [facility]  Anticoagulation Service Pharmacist  12/9/2022 10:30 AM  For Pharmacy Admin Tracking Only    Intervention Detail: Dose Adjustment: 0, reason: Patient Preference  Total # of Interventions Recommended: 0  Total # of Interventions Accepted: 0  Time Spent (min): 15

## 2022-12-11 DIAGNOSIS — F33.41 RECURRENT MAJOR DEPRESSIVE DISORDER, IN PARTIAL REMISSION (HCC): ICD-10-CM

## 2022-12-11 DIAGNOSIS — I48.0 PAROXYSMAL ATRIAL FIBRILLATION (HCC): ICD-10-CM

## 2022-12-12 RX ORDER — WARFARIN SODIUM 5 MG/1
TABLET ORAL
Qty: 30 TABLET | Refills: 0 | Status: SHIPPED | OUTPATIENT
Start: 2022-12-12

## 2022-12-12 RX ORDER — PANTOPRAZOLE SODIUM 40 MG/1
TABLET, DELAYED RELEASE ORAL
Qty: 90 TABLET | Refills: 0 | Status: SHIPPED | OUTPATIENT
Start: 2022-12-12

## 2022-12-12 RX ORDER — PAROXETINE HYDROCHLORIDE 20 MG/1
TABLET, FILM COATED ORAL
Qty: 90 TABLET | Refills: 0 | Status: SHIPPED | OUTPATIENT
Start: 2022-12-12

## 2022-12-12 NOTE — TELEPHONE ENCOUNTER
[address] requesting a refill of the below medication which has been pended for you:     Requested Prescriptions     Pending Prescriptions Disp Refills    pantoprazole (PROTONIX) 40 MG tablet [Pharmacy Med Name: Pantoprazole Sodium 40 MG Oral Tablet Delayed Release] 90 tablet 0     Sig: Take 1 tablet by mouth once daily    warfarin (COUMADIN) 5 MG tablet [Pharmacy Med Name: Warfarin Sodium 5 MG Oral Tablet] 30 tablet 0     Sig: Take 1 tablet by mouth once daily    PARoxetine (PAXIL) 20 MG tablet [Pharmacy Med Name: PARoxetine HCl 20 MG Oral Tablet] 90 tablet 0     Sig: TAKE 1 TABLET BY MOUTH ONCE DAILY IN THE MORNING       Last Appointment Date: 9/22/2022  Next Appointment Date: 1/27/2023    Allergies   Allergen Reactions    Crestor [Rosuvastatin] Other (See Comments)     Joint pain, muscle aches    Ibuprofen Other (See Comments)     bleeding    Lisinopril Hives    Effient [Prasugrel] Rash

## 2022-12-13 ENCOUNTER — HOSPITAL ENCOUNTER (OUTPATIENT)
Dept: CARDIAC CATH/INVASIVE PROCEDURES | Age: 77
Discharge: HOME | End: 2022-12-13
Payer: MEDICARE

## 2022-12-13 VITALS
HEART RATE: 56 BPM | BODY MASS INDEX: 29.77 KG/M2 | DIASTOLIC BLOOD PRESSURE: 81 MMHG | OXYGEN SATURATION: 93 % | WEIGHT: 201 LBS | TEMPERATURE: 98 F | SYSTOLIC BLOOD PRESSURE: 147 MMHG | RESPIRATION RATE: 17 BRPM | HEIGHT: 69 IN

## 2022-12-13 LAB
EGFR, POC: >60 ML/MIN/1.73M2
GLUCOSE BLD-MCNC: 131 MG/DL (ref 74–100)
POC BUN: 18 MG/DL (ref 8–26)
POC CHLORIDE: 107 MMOL/L (ref 98–107)
POC CREATININE: 0.84 MG/DL (ref 0.51–1.19)
POC HEMATOCRIT: 44 % (ref 41–53)
POC HEMOGLOBIN: 15 G/DL (ref 13.5–17.5)
POC POTASSIUM: 4.1 MMOL/L (ref 3.5–4.5)
POC SODIUM: 142 MMOL/L (ref 138–146)

## 2022-12-13 PROCEDURE — 2709999900 HC NON-CHARGEABLE SUPPLY

## 2022-12-13 PROCEDURE — 84520 ASSAY OF UREA NITROGEN: CPT

## 2022-12-13 PROCEDURE — 84132 ASSAY OF SERUM POTASSIUM: CPT

## 2022-12-13 PROCEDURE — 82435 ASSAY OF BLOOD CHLORIDE: CPT

## 2022-12-13 PROCEDURE — 82565 ASSAY OF CREATININE: CPT

## 2022-12-13 PROCEDURE — 7100000000 HC PACU RECOVERY - FIRST 15 MIN

## 2022-12-13 PROCEDURE — 93325 DOPPLER ECHO COLOR FLOW MAPG: CPT

## 2022-12-13 PROCEDURE — 84295 ASSAY OF SERUM SODIUM: CPT

## 2022-12-13 PROCEDURE — 93312 ECHO TRANSESOPHAGEAL: CPT

## 2022-12-13 PROCEDURE — 85014 HEMATOCRIT: CPT

## 2022-12-13 PROCEDURE — 7100000001 HC PACU RECOVERY - ADDTL 15 MIN

## 2022-12-13 PROCEDURE — 2580000003 HC RX 258: Performed by: INTERNAL MEDICINE

## 2022-12-13 PROCEDURE — 85610 PROTHROMBIN TIME: CPT

## 2022-12-13 PROCEDURE — 6360000002 HC RX W HCPCS

## 2022-12-13 PROCEDURE — 82947 ASSAY GLUCOSE BLOOD QUANT: CPT

## 2022-12-13 RX ORDER — SODIUM CHLORIDE 9 MG/ML
INJECTION, SOLUTION INTRAVENOUS CONTINUOUS
Status: DISCONTINUED | OUTPATIENT
Start: 2022-12-13 | End: 2022-12-14 | Stop reason: HOSPADM

## 2022-12-13 RX ORDER — FAMOTIDINE 20 MG/1
20 TABLET, FILM COATED ORAL DAILY
COMMUNITY

## 2022-12-13 RX ADMIN — SODIUM CHLORIDE: 9 INJECTION, SOLUTION INTRAVENOUS at 10:42

## 2022-12-13 NOTE — PROGRESS NOTES
All discharge instructions reviewed, questions answered, paper signed and given copy. Patient discharged per ambulation with family and belongings. Discussed holding coumadin for 5 days / NPO after midnight on 20th. And also aware of meds able to take with sips water in am 12-20.   To bring bottles of home medications on 12-20

## 2022-12-13 NOTE — DISCHARGE INSTRUCTIONS
TRANSESOPHAGEAL ECHOCARDIOGRAM / JOSIAS DISCHARGE INSTRUCTIONS    If the following occurs call your physician or seek help    -trouble with swallowing    -spitting or coughing up blood    -severe pain in the throat region / your throat can be sore for several days but pain should not increase as days continue    Do not drive or operate heavy machinery today due to sedation            Transesophageal Echocardiogram:  What is a transesophageal echocardiogram?     A transesophageal echocardiogram is a test to help your doctor look at the inside of your heart. A small device called a transducer directs sound waves toward your heart. The sound waves make a picture of the heart's valves and chambers. Your doctor may do this test to look for certain types of heart disease. Or it may be done to see how disease is affecting your heart. You will be given medicine to make you sleepy and comfortable during the test.  The doctor puts a small, flexible tube into your throat and guides it to the esophagus. This is the tube that connects your mouth to your stomach. The doctor will ask you to swallow as the tube goes down. The transducer is at the tip of the tube. It gets close to your heart to make clear pictures. The doctor will look at the ultrasound pictures on a screen. You will not be able to eat or drink until the numbness from the throat spray wears off. Your throat may be sore for a few days after the test.  Follow-up care is a key part of your treatment and safety. Be sure to make and go to all appointments, and call your doctor if you are having problems. It's also a good idea to know your test results and keep a list of your current medications. Going home  Be sure you have someone to drive you home. Anesthesia and pain medicine make it unsafe for you to drive. Where can you learn more? Go to [email or web address] org and sign in to your [employer] account.  Enter K500 in the [address] Information box to learn more about Transesophageal Echocardiogram: Before Your Procedure.     If you do not have an account, please click on the Sign Up Now link. © 6721-1284 Healthwise, Incorporated. Care instructions adapted under license by [facility] ([facility]). This care instruction is for use with your licensed healthcare professional. If you have questions about a medical condition or this instruction, always ask your healthcare professional. [facility] any warranty or liability for your use of this information. Content Version: 32.7.906199; Current as of: February 20, 2015         SEDATION / ANALGESIA INFORMATION / HOME GOING ADVICE  You have received the sedation/analgesia medication during your visit    Sedation/analgesia is used during short medical procedures under controlled supervision. The medication will produce a strong relaxation. You will be able to hear, speak and follow instructions, but your memory and alertness will be decreased. You will be able to swallow and breathe on your own. During sedation/analgesia your blood pressure, heart and breathing will be watched closely. After the procedure, you may not remember what was said or done. You may have the following effects from the medication. \" Drowsiness, dizziness, sleepiness or confusion. \" Difficulty remembering or delayed reaction times. \" Loss of fine muscle control or difficulty with your balance especially while walking. \" Difficulty focusing or blurred vision. You may not be aware of slight changes in your behavior and/or your reaction time because of the medication used during the procedure. Therefore you should follow these instructions. \" Have someone responsible help you with your care. \" Do not drive for 24 hours. \" Do not operate equipment for 24 hours (lawnmowers, power tools, kitchen accessories, stove). \" Do not drink any alcoholic beverages for a minimum of 24 hours.   \" Do not make important personal, legal or business decisions for 24 hours. \" You may experience dizziness or lightheadedness. Move slowly and carefully, do not make sudden position changes. \" Drink extra amounts of fluids today. \" Increase your diet as tolerated (unless you have received specific instructions from your doctor). \" If you feel nauseated, continue with liquids until the nausea is gone. \" Notify your physician if you have not urinated within 8 hours after the procedure. \" Resume your medications unless otherwise instructed.

## 2022-12-13 NOTE — PROGRESS NOTES
Patient admitted, consent signed and questions answered. Patient ready for procedure. Call light to reach with side rails up 2 of 2. Family at bedside with patient. History and physical completed.  Encourage wife and patient to bring meds in bottles next hospital encouter
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
## 2022-12-13 NOTE — PROGRESS NOTES
Returns post JOSIAS to pcc room 6 remaining NPO. Vitals to monitor. Side rails up with call light to reach. Family at bedside.

## 2022-12-14 LAB
POC INR: 2.9
PROTHROMBIN TIME, POC: 32.8 SEC (ref 10.4–14.2)

## 2022-12-19 ENCOUNTER — ANESTHESIA EVENT (OUTPATIENT)
Dept: CARDIAC CATH/INVASIVE PROCEDURES | Age: 77
End: 2022-12-19

## 2022-12-20 ENCOUNTER — ANESTHESIA (OUTPATIENT)
Dept: CARDIAC CATH/INVASIVE PROCEDURES | Age: 77
End: 2022-12-20

## 2022-12-20 ENCOUNTER — HOSPITAL ENCOUNTER (OUTPATIENT)
Dept: CARDIAC CATH/INVASIVE PROCEDURES | Age: 77
LOS: 1 days | Discharge: HOME | End: 2022-12-21
Attending: INTERNAL MEDICINE | Admitting: INTERNAL MEDICINE
Payer: MEDICARE

## 2022-12-20 PROBLEM — Z98.890 S/P CARDIAC CATH: Status: ACTIVE | Noted: 2022-12-20

## 2022-12-20 LAB
ACTIVATED CLOTTING TIME: 203 SEC (ref 79–149)
EGFR, POC: >60 ML/MIN/1.73M2
GLUCOSE BLD-MCNC: 130 MG/DL (ref 74–100)
HCT VFR BLD CALC: 40.7 % (ref 40.7–50.3)
HEMOGLOBIN: 12.9 G/DL (ref 13–17)
LV EF: 55 %
LVEF MODALITY: NORMAL
PLATELET # BLD: 202 K/UL (ref 138–453)
POC BUN: 14 MG/DL (ref 8–26)
POC CHLORIDE: 106 MMOL/L (ref 98–107)
POC CREATININE: 0.86 MG/DL (ref 0.51–1.19)
POC HEMATOCRIT: 45 % (ref 41–53)
POC HEMOGLOBIN: 15.3 G/DL (ref 13.5–17.5)
POC INR: 1.1
POC POTASSIUM: 3.9 MMOL/L (ref 3.5–4.5)
POC SODIUM: 146 MMOL/L (ref 138–146)
PROTHROMBIN TIME, POC: 12.9 SEC (ref 10.4–14.2)

## 2022-12-20 PROCEDURE — 86901 BLOOD TYPING SEROLOGIC RH(D): CPT

## 2022-12-20 PROCEDURE — 85014 HEMATOCRIT: CPT

## 2022-12-20 PROCEDURE — C1889 IMPLANT/INSERT DEVICE, NOC: HCPCS

## 2022-12-20 PROCEDURE — 84295 ASSAY OF SERUM SODIUM: CPT

## 2022-12-20 PROCEDURE — 86900 BLOOD TYPING SEROLOGIC ABO: CPT

## 2022-12-20 PROCEDURE — 93005 ELECTROCARDIOGRAM TRACING: CPT | Performed by: INTERNAL MEDICINE

## 2022-12-20 PROCEDURE — 93312 ECHO TRANSESOPHAGEAL: CPT

## 2022-12-20 PROCEDURE — 85049 AUTOMATED PLATELET COUNT: CPT

## 2022-12-20 PROCEDURE — 86920 COMPATIBILITY TEST SPIN: CPT

## 2022-12-20 PROCEDURE — 82947 ASSAY GLUCOSE BLOOD QUANT: CPT

## 2022-12-20 PROCEDURE — 6360000002 HC RX W HCPCS

## 2022-12-20 PROCEDURE — 85347 COAGULATION TIME ACTIVATED: CPT

## 2022-12-20 PROCEDURE — 93320 DOPPLER ECHO COMPLETE: CPT

## 2022-12-20 PROCEDURE — 2580000003 HC RX 258: Performed by: INTERNAL MEDICINE

## 2022-12-20 PROCEDURE — 33340 PERQ CLSR TCAT L ATR APNDGE: CPT

## 2022-12-20 PROCEDURE — 86850 RBC ANTIBODY SCREEN: CPT

## 2022-12-20 PROCEDURE — 84132 ASSAY OF SERUM POTASSIUM: CPT

## 2022-12-20 PROCEDURE — 85018 HEMOGLOBIN: CPT

## 2022-12-20 PROCEDURE — 2580000003 HC RX 258: Performed by: STUDENT IN AN ORGANIZED HEALTH CARE EDUCATION/TRAINING PROGRAM

## 2022-12-20 PROCEDURE — 7100000000 HC PACU RECOVERY - FIRST 15 MIN

## 2022-12-20 PROCEDURE — 2720000010 HC SURG SUPPLY STERILE

## 2022-12-20 PROCEDURE — C1766 INTRO/SHEATH,STRBLE,NON-PEEL: HCPCS

## 2022-12-20 PROCEDURE — 7100000001 HC PACU RECOVERY - ADDTL 15 MIN

## 2022-12-20 PROCEDURE — 3700000001 HC ADD 15 MINUTES (ANESTHESIA)

## 2022-12-20 PROCEDURE — C1894 INTRO/SHEATH, NON-LASER: HCPCS

## 2022-12-20 PROCEDURE — 82565 ASSAY OF CREATININE: CPT

## 2022-12-20 PROCEDURE — 82435 ASSAY OF BLOOD CHLORIDE: CPT

## 2022-12-20 PROCEDURE — 85610 PROTHROMBIN TIME: CPT

## 2022-12-20 PROCEDURE — 93325 DOPPLER ECHO COLOR FLOW MAPG: CPT

## 2022-12-20 PROCEDURE — 84520 ASSAY OF UREA NITROGEN: CPT

## 2022-12-20 PROCEDURE — 3700000000 HC ANESTHESIA ATTENDED CARE

## 2022-12-20 PROCEDURE — 2709999900 HC NON-CHARGEABLE SUPPLY

## 2022-12-20 RX ORDER — LIDOCAINE HYDROCHLORIDE 10 MG/ML
INJECTION, SOLUTION INFILTRATION; PERINEURAL
Status: DISPENSED
Start: 2022-12-20 | End: 2022-12-21

## 2022-12-20 RX ORDER — PHENYLEPHRINE HCL IN 0.9% NACL 1 MG/10 ML
SYRINGE (ML) INTRAVENOUS PRN
Status: DISCONTINUED | OUTPATIENT
Start: 2022-12-20 | End: 2022-12-20 | Stop reason: SDUPTHER

## 2022-12-20 RX ORDER — FENTANYL CITRATE 50 UG/ML
INJECTION, SOLUTION INTRAMUSCULAR; INTRAVENOUS PRN
Status: DISCONTINUED | OUTPATIENT
Start: 2022-12-20 | End: 2022-12-20 | Stop reason: SDUPTHER

## 2022-12-20 RX ORDER — CLOPIDOGREL BISULFATE 75 MG/1
75 TABLET ORAL DAILY
Status: DISCONTINUED | OUTPATIENT
Start: 2022-12-21 | End: 2022-12-21 | Stop reason: HOSPADM

## 2022-12-20 RX ORDER — HYDRALAZINE HYDROCHLORIDE 20 MG/ML
20 INJECTION INTRAMUSCULAR; INTRAVENOUS ONCE
Status: COMPLETED | OUTPATIENT
Start: 2022-12-20 | End: 2022-12-20

## 2022-12-20 RX ORDER — HYDRALAZINE HYDROCHLORIDE 20 MG/ML
INJECTION INTRAMUSCULAR; INTRAVENOUS PRN
Status: DISCONTINUED | OUTPATIENT
Start: 2022-12-20 | End: 2022-12-20 | Stop reason: SDUPTHER

## 2022-12-20 RX ORDER — LABETALOL HYDROCHLORIDE 5 MG/ML
INJECTION, SOLUTION INTRAVENOUS PRN
Status: DISCONTINUED | OUTPATIENT
Start: 2022-12-20 | End: 2022-12-20 | Stop reason: SDUPTHER

## 2022-12-20 RX ORDER — PROTAMINE SULFATE 10 MG/ML
INJECTION, SOLUTION INTRAVENOUS PRN
Status: DISCONTINUED | OUTPATIENT
Start: 2022-12-20 | End: 2022-12-20 | Stop reason: SDUPTHER

## 2022-12-20 RX ORDER — LIDOCAINE HYDROCHLORIDE 10 MG/ML
INJECTION, SOLUTION EPIDURAL; INFILTRATION; INTRACAUDAL; PERINEURAL PRN
Status: DISCONTINUED | OUTPATIENT
Start: 2022-12-20 | End: 2022-12-20 | Stop reason: SDUPTHER

## 2022-12-20 RX ORDER — FENTANYL CITRATE 50 UG/ML
25 INJECTION, SOLUTION INTRAMUSCULAR; INTRAVENOUS EVERY 5 MIN PRN
Status: DISCONTINUED | OUTPATIENT
Start: 2022-12-20 | End: 2022-12-21 | Stop reason: HOSPADM

## 2022-12-20 RX ORDER — HYDRALAZINE HYDROCHLORIDE 20 MG/ML
10 INJECTION INTRAMUSCULAR; INTRAVENOUS ONCE
Status: COMPLETED | OUTPATIENT
Start: 2022-12-20 | End: 2022-12-20

## 2022-12-20 RX ORDER — DEXAMETHASONE SODIUM PHOSPHATE 10 MG/ML
INJECTION INTRAMUSCULAR; INTRAVENOUS PRN
Status: DISCONTINUED | OUTPATIENT
Start: 2022-12-20 | End: 2022-12-20 | Stop reason: SDUPTHER

## 2022-12-20 RX ORDER — ONDANSETRON 2 MG/ML
INJECTION INTRAMUSCULAR; INTRAVENOUS PRN
Status: DISCONTINUED | OUTPATIENT
Start: 2022-12-20 | End: 2022-12-20 | Stop reason: SDUPTHER

## 2022-12-20 RX ORDER — HEPARIN SODIUM 1000 [USP'U]/ML
INJECTION, SOLUTION INTRAVENOUS; SUBCUTANEOUS PRN
Status: DISCONTINUED | OUTPATIENT
Start: 2022-12-20 | End: 2022-12-20 | Stop reason: SDUPTHER

## 2022-12-20 RX ORDER — ACETAMINOPHEN 325 MG/1
650 TABLET ORAL
Status: COMPLETED | OUTPATIENT
Start: 2022-12-20 | End: 2022-12-21

## 2022-12-20 RX ORDER — ATORVASTATIN CALCIUM 80 MG/1
80 TABLET, FILM COATED ORAL DAILY
Status: DISCONTINUED | OUTPATIENT
Start: 2022-12-21 | End: 2022-12-21 | Stop reason: HOSPADM

## 2022-12-20 RX ORDER — SODIUM CHLORIDE 9 MG/ML
INJECTION, SOLUTION INTRAVENOUS PRN
Status: DISCONTINUED | OUTPATIENT
Start: 2022-12-20 | End: 2022-12-21 | Stop reason: HOSPADM

## 2022-12-20 RX ORDER — SODIUM CHLORIDE 9 MG/ML
INJECTION, SOLUTION INTRAVENOUS CONTINUOUS
Status: DISCONTINUED | OUTPATIENT
Start: 2022-12-20 | End: 2022-12-21 | Stop reason: HOSPADM

## 2022-12-20 RX ORDER — PAROXETINE HYDROCHLORIDE 20 MG/1
20 TABLET, FILM COATED ORAL DAILY
Status: DISCONTINUED | OUTPATIENT
Start: 2022-12-21 | End: 2022-12-21 | Stop reason: HOSPADM

## 2022-12-20 RX ORDER — CLOPIDOGREL BISULFATE 75 MG/1
75 TABLET ORAL DAILY
Qty: 30 TABLET | Refills: 1 | Status: SHIPPED | OUTPATIENT
Start: 2022-12-20

## 2022-12-20 RX ORDER — DILTIAZEM HYDROCHLORIDE 60 MG/1
60 TABLET, FILM COATED ORAL 2 TIMES DAILY
COMMUNITY

## 2022-12-20 RX ORDER — SODIUM CHLORIDE 0.9 % (FLUSH) 0.9 %
5-40 SYRINGE (ML) INJECTION PRN
Status: DISCONTINUED | OUTPATIENT
Start: 2022-12-20 | End: 2022-12-21 | Stop reason: HOSPADM

## 2022-12-20 RX ORDER — HYDRALAZINE HYDROCHLORIDE 20 MG/ML
INJECTION INTRAMUSCULAR; INTRAVENOUS
Status: DISPENSED
Start: 2022-12-20 | End: 2022-12-21

## 2022-12-20 RX ORDER — PROPOFOL 10 MG/ML
INJECTION, EMULSION INTRAVENOUS PRN
Status: DISCONTINUED | OUTPATIENT
Start: 2022-12-20 | End: 2022-12-20 | Stop reason: SDUPTHER

## 2022-12-20 RX ORDER — LABETALOL HYDROCHLORIDE 5 MG/ML
INJECTION, SOLUTION INTRAVENOUS
Status: COMPLETED
Start: 2022-12-20 | End: 2022-12-20

## 2022-12-20 RX ORDER — GLYCOPYRROLATE 0.2 MG/ML
INJECTION INTRAMUSCULAR; INTRAVENOUS PRN
Status: DISCONTINUED | OUTPATIENT
Start: 2022-12-20 | End: 2022-12-20 | Stop reason: SDUPTHER

## 2022-12-20 RX ORDER — NEOSTIGMINE METHYLSULFATE 5 MG/5 ML
SYRINGE (ML) INTRAVENOUS PRN
Status: DISCONTINUED | OUTPATIENT
Start: 2022-12-20 | End: 2022-12-20 | Stop reason: SDUPTHER

## 2022-12-20 RX ORDER — LANOLIN ALCOHOL/MO/W.PET/CERES
325 CREAM (GRAM) TOPICAL 2 TIMES DAILY
Status: DISCONTINUED | OUTPATIENT
Start: 2022-12-20 | End: 2022-12-21 | Stop reason: HOSPADM

## 2022-12-20 RX ORDER — ROCURONIUM BROMIDE 10 MG/ML
INJECTION, SOLUTION INTRAVENOUS PRN
Status: DISCONTINUED | OUTPATIENT
Start: 2022-12-20 | End: 2022-12-20 | Stop reason: SDUPTHER

## 2022-12-20 RX ORDER — OXYCODONE HYDROCHLORIDE AND ACETAMINOPHEN 5; 325 MG/1; MG/1
1 TABLET ORAL EVERY 6 HOURS PRN
Status: DISCONTINUED | OUTPATIENT
Start: 2022-12-20 | End: 2022-12-21 | Stop reason: HOSPADM

## 2022-12-20 RX ORDER — SODIUM CHLORIDE 0.9 % (FLUSH) 0.9 %
5-40 SYRINGE (ML) INJECTION EVERY 12 HOURS SCHEDULED
Status: DISCONTINUED | OUTPATIENT
Start: 2022-12-20 | End: 2022-12-21 | Stop reason: HOSPADM

## 2022-12-20 RX ORDER — FAMOTIDINE 20 MG/1
20 TABLET, FILM COATED ORAL DAILY
Status: DISCONTINUED | OUTPATIENT
Start: 2022-12-21 | End: 2022-12-21 | Stop reason: HOSPADM

## 2022-12-20 RX ADMIN — Medication 100 MCG: at 15:45

## 2022-12-20 RX ADMIN — FENTANYL CITRATE 50 MCG: 50 INJECTION, SOLUTION INTRAMUSCULAR; INTRAVENOUS at 15:23

## 2022-12-20 RX ADMIN — GLYCOPYRROLATE 0.6 MG: 0.2 INJECTION INTRAMUSCULAR; INTRAVENOUS at 16:39

## 2022-12-20 RX ADMIN — Medication 3 MG: at 16:39

## 2022-12-20 RX ADMIN — LIDOCAINE HYDROCHLORIDE 50 MG: 10 INJECTION, SOLUTION EPIDURAL; INFILTRATION; INTRACAUDAL; PERINEURAL at 15:23

## 2022-12-20 RX ADMIN — LABETALOL HYDROCHLORIDE 5 MG: 5 INJECTION, SOLUTION INTRAVENOUS at 16:49

## 2022-12-20 RX ADMIN — HYDRALAZINE HYDROCHLORIDE 20 MG: 20 INJECTION INTRAMUSCULAR; INTRAVENOUS at 14:04

## 2022-12-20 RX ADMIN — FENTANYL CITRATE 50 MCG: 50 INJECTION, SOLUTION INTRAMUSCULAR; INTRAVENOUS at 15:37

## 2022-12-20 RX ADMIN — SODIUM CHLORIDE, PRESERVATIVE FREE 10 ML: 5 INJECTION INTRAVENOUS at 20:44

## 2022-12-20 RX ADMIN — SODIUM CHLORIDE: 9 INJECTION, SOLUTION INTRAVENOUS at 14:04

## 2022-12-20 RX ADMIN — DEXAMETHASONE SODIUM PHOSPHATE 10 MG: 10 INJECTION INTRAMUSCULAR; INTRAVENOUS at 15:43

## 2022-12-20 RX ADMIN — PROPOFOL 20 MG: 10 INJECTION, EMULSION INTRAVENOUS at 16:39

## 2022-12-20 RX ADMIN — PROTAMINE SULFATE 20 MG: 10 INJECTION, SOLUTION INTRAVENOUS at 16:40

## 2022-12-20 RX ADMIN — HYDRALAZINE HYDROCHLORIDE 10 MG: 20 INJECTION INTRAMUSCULAR; INTRAVENOUS at 16:44

## 2022-12-20 RX ADMIN — Medication 100 MCG: at 15:44

## 2022-12-20 RX ADMIN — PROPOFOL 60 MG: 10 INJECTION, EMULSION INTRAVENOUS at 15:40

## 2022-12-20 RX ADMIN — PROPOFOL 140 MG: 10 INJECTION, EMULSION INTRAVENOUS at 15:23

## 2022-12-20 RX ADMIN — PROPOFOL 50 MG: 10 INJECTION, EMULSION INTRAVENOUS at 16:21

## 2022-12-20 RX ADMIN — ROCURONIUM BROMIDE 50 MG: 10 INJECTION, SOLUTION INTRAVENOUS at 15:24

## 2022-12-20 RX ADMIN — ONDANSETRON 4 MG: 2 INJECTION INTRAMUSCULAR; INTRAVENOUS at 16:35

## 2022-12-20 RX ADMIN — SODIUM CHLORIDE: 9 INJECTION, SOLUTION INTRAVENOUS at 16:24

## 2022-12-20 RX ADMIN — SODIUM CHLORIDE: 9 INJECTION, SOLUTION INTRAVENOUS at 09:30

## 2022-12-20 RX ADMIN — Medication 100 MCG: at 15:48

## 2022-12-20 RX ADMIN — Medication 100 MCG: at 15:53

## 2022-12-20 RX ADMIN — Medication 200 MCG: at 16:17

## 2022-12-20 RX ADMIN — HEPARIN SODIUM 5000 UNITS: 1000 INJECTION, SOLUTION INTRAVENOUS; SUBCUTANEOUS at 15:52

## 2022-12-20 RX ADMIN — Medication 100 MCG: at 16:30

## 2022-12-20 RX ADMIN — HYDRALAZINE HYDROCHLORIDE 10 MG: 20 INJECTION INTRAMUSCULAR; INTRAVENOUS at 09:30

## 2022-12-20 RX ADMIN — HEPARIN SODIUM 3000 UNITS: 1000 INJECTION, SOLUTION INTRAVENOUS; SUBCUTANEOUS at 15:59

## 2022-12-20 SDOH — ECONOMIC STABILITY: INCOME INSECURITY: HOW HARD IS IT FOR YOU TO PAY FOR THE VERY BASICS LIKE FOOD, HOUSING, MEDICAL CARE, AND HEATING?: NOT HARD AT ALL

## 2022-12-20 SDOH — SOCIAL STABILITY: SOCIAL NETWORK
IN A TYPICAL WEEK, HOW MANY TIMES DO YOU TALK ON THE PHONE WITH FAMILY, FRIENDS, OR NEIGHBORS?: MORE THAN THREE TIMES A WEEK

## 2022-12-20 SDOH — SOCIAL STABILITY: SOCIAL NETWORK: ARE YOU MARRIED, WIDOWED, DIVORCED, SEPARATED, NEVER MARRIED, OR LIVING WITH A PARTNER?: MARRIED

## 2022-12-20 SDOH — SOCIAL STABILITY: SOCIAL NETWORK: HOW OFTEN DO YOU ATTENT MEETINGS OF THE CLUB OR ORGANIZATION YOU BELONG TO?: MORE THAN 4 TIMES PER YEAR

## 2022-12-20 SDOH — HEALTH STABILITY: PHYSICAL HEALTH: ON AVERAGE, HOW MANY MINUTES DO YOU ENGAGE IN EXERCISE AT THIS LEVEL?: 0 MIN

## 2022-12-20 SDOH — ECONOMIC STABILITY: HOUSING INSECURITY: IN THE LAST 12 MONTHS, HOW MANY PLACES HAVE YOU LIVED?: 1

## 2022-12-20 SDOH — SOCIAL STABILITY: SOCIAL INSECURITY: WITHIN THE LAST YEAR, HAVE YOU BEEN AFRAID OF YOUR PARTNER OR EX-PARTNER?: NO

## 2022-12-20 SDOH — HEALTH STABILITY: MENTAL HEALTH: HOW OFTEN DO YOU HAVE A DRINK CONTAINING ALCOHOL?: MONTHLY OR LESS

## 2022-12-20 SDOH — SOCIAL STABILITY: SOCIAL INSECURITY
WITHIN THE LAST YEAR, HAVE TO BEEN RAPED OR FORCED TO HAVE ANY KIND OF SEXUAL ACTIVITY BY YOUR PARTNER OR EX-PARTNER?: NO

## 2022-12-20 SDOH — SOCIAL STABILITY: SOCIAL NETWORK: HOW OFTEN DO YOU ATTEND CHURCH OR RELIGIOUS SERVICES?: MORE THAN 4 TIMES PER YEAR

## 2022-12-20 SDOH — SOCIAL STABILITY: SOCIAL NETWORK
DO YOU BELONG TO ANY CLUBS OR ORGANIZATIONS SUCH AS CHURCH GROUPS UNIONS, FRATERNAL OR ATHLETIC GROUPS, OR SCHOOL GROUPS?: YES

## 2022-12-20 SDOH — ECONOMIC STABILITY: FOOD INSECURITY: WITHIN THE PAST 12 MONTHS, YOU WORRIED THAT YOUR FOOD WOULD RUN OUT BEFORE YOU GOT MONEY TO BUY MORE.: NEVER TRUE

## 2022-12-20 SDOH — HEALTH STABILITY: MENTAL HEALTH
STRESS IS WHEN SOMEONE FEELS TENSE, NERVOUS, ANXIOUS, OR CAN'T SLEEP AT NIGHT BECAUSE THEIR MIND IS TROUBLED. HOW STRESSED ARE YOU?: NOT AT ALL

## 2022-12-20 SDOH — ECONOMIC STABILITY: INCOME INSECURITY: IN THE LAST 12 MONTHS, WAS THERE A TIME WHEN YOU WERE NOT ABLE TO PAY THE MORTGAGE OR RENT ON TIME?: NO

## 2022-12-20 SDOH — ECONOMIC STABILITY: FOOD INSECURITY: WITHIN THE PAST 12 MONTHS, THE FOOD YOU BOUGHT JUST DIDN'T LAST AND YOU DIDN'T HAVE MONEY TO GET MORE.: NEVER TRUE

## 2022-12-20 SDOH — HEALTH STABILITY: PHYSICAL HEALTH: ON AVERAGE, HOW MANY DAYS PER WEEK DO YOU ENGAGE IN MODERATE TO STRENUOUS EXERCISE (LIKE A BRISK WALK)?: 0 DAYS

## 2022-12-20 SDOH — SOCIAL STABILITY: SOCIAL INSECURITY: WITHIN THE LAST YEAR, HAVE YOU BEEN HUMILIATED OR EMOTIONALLY ABUSED IN OTHER WAYS BY YOUR PARTNER OR EX-PARTNER?: NO

## 2022-12-20 SDOH — SOCIAL STABILITY: SOCIAL NETWORK: HOW OFTEN DO YOU GET TOGETHER WITH FRIENDS OR RELATIVES?: MORE THAN THREE TIMES A WEEK

## 2022-12-20 SDOH — HEALTH STABILITY: MENTAL HEALTH: HOW MANY STANDARD DRINKS CONTAINING ALCOHOL DO YOU HAVE ON A TYPICAL DAY?: 1 OR 2

## 2022-12-20 SDOH — SOCIAL STABILITY: SOCIAL INSECURITY
WITHIN THE LAST YEAR, HAVE YOU BEEN KICKED, HIT, SLAPPED, OR OTHERWISE PHYSICALLY HURT BY YOUR PARTNER OR EX-PARTNER?: NO

## 2022-12-20 ASSESSMENT — ENCOUNTER SYMPTOMS: SHORTNESS OF BREATH: 1
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
## 2022-12-20 NOTE — OP NOTE
[facility]    Watchman Procedure    Date:   12/20/2022  Patient name: Roya Osullivan  Date of admission:  12/20/2022  8:17 AM  MRN:   [id number]  YOB: 1945    Operators:  Primary:       [x] Diane Jesus M.D. []          Pre Procedure Conscious Sedation Data:    ASA Class:    [] I [x] II [] III [] IV    Mallampati Class:  [] I [x] II [] III [] IV    Procedures performed:     [x] Percutaneous transcatheter closure of the left atrial appendage (GRANT) with endocardial implant (Watchman)    [x] Trans septal Puncture    [x] GRANT Angiogram    [x] JOSIAS ( Dr: Elaine Fuentes)        Indication of procedure:      [x] Atrial Fibrillation  [] CVA  [x] High risk for bleeding  [x] Bleeding episodes  [] Risk of fall is high with needs for [facility]. [x] Patient refusal for Anticoagulation. Patient has been seen by General cardiology and shared decision making has been made for pursuing GRANT closure. Patient here for GRANT closure with Watchman device. Details of procedure: The patient was brought to LAB in stable condition. The patient was in a fasting and non-sedated state. The risks, benefits and alternatives of the procedure were discussed with the patient. The risks including, but not limited to, the risks of vascular injury, bleeding, infection, device malfunction, lead dislodgement, radiation exposure, injury to cardiac and surrounding structures (including pneumothorax), stroke, myocardial infarction and death were discussed in detail. The patient opted to proceed with the device implantation. Written informed consent was signed and placed in the chart. Prophylactic antibiotic was given. The patient was prepped and draped in a sterile fashion. A timeout protocol was completed to identify the patient and the procedure being performed.  Patient underwent general anesthesia by anesthesiology team.       Transesophageal echocardiography was performed and measurements of left atrial appendage, including ostium size and depth were obtained for selection of appropriate watchman device in different positions (5,08,93,611)     JOSIAS Angle About 0 About 45 About 90 About 135   GRANT Ostial Width 15 10 15 15   GRANT Depth 20 20 20 20         Decision was made to use a size 24 mm Watchman device based on JOSIAS measurement and Watchman guidance protocol: Both groins were prepped in a sterile fashion. We gained access in the  [x] Right  [] Left femoral veins. We gained access in the  [] Right  [x] Left femoral artery for pigtail guidance. Right femoral access was obtained using modified using modified seldinger technique. 12 F sheath was placed Patient received a bolus of heparin prior transseptal puncture. Transseptal punctures through intact septum were done using JOSIAS guidance as well as pressure monitoring using Ravinder trans-septal system     JOSIAS and Fluoroscopy  were used to confirm in [place] and TAMAYO projections. We placed  Ravinder  sheath dilator  inside the left atrium. A long wire was placed into the left superior pulmonary vein. Then the watchman access sheath was advanced and carefully paced at the ostium  of PV and positioned into left atrium . Then a pigtail catheter was inserted into the access sheath and was placed inside the left atrial appendage. Angiography of GRANT was performed. Pigtail catheter was removed. Then Watchman delivery sheath was inserted into the access sheath. Using fluoroscopy and live JOSIAS the anterior lobe of the appendage was located and delivery sheath was advanced into this lobe. Then device was implanted into the appendage under fluoroscopy and live JOSIAS imaging. Device positioning was confirmed and compression ratio was confirmed using JOSIAS in different angles ,     ATug test was done multiple times to insure device stability   JOSIAS guidance and 3D guidance revealed device to be well seated with excellent occlusion of atrial appendage using contrast injection  .       After deployment a tug test was done and stability of device was checked. Position of device was checked. Measurement of device showed appropriate compression of the device. Using color Doppler, no leak around the was noted. PASS criteria for releasing of device were met and device was released. A figure of 8 was used to achieve hemostasis. Patient was extubated and transferred to the floor. No immediate complications noted. Pass Criteria met. [x] Position   [x] [place]   [x] Size   [x] Seal    Assessment and Summary:    Successful deployment of left atrial appendage occlusion device with no complication    WATCHMAN device used 24 mm size     Angiogram revealed good seal and no thrombus     JOSIAS confirmed placement and seal    EBL Less than 20 mL  No complications        Plan:     The patient will be admitted and have usual post care  Start anticoagulation  for at least next 6 weeks  Start ASA  Echocardiogram tomorrow   Patient is participating in the left atrial appendage occlusion/closure registry. [address] is approved by the [employer] for Medicare and Medicaid Services (CMS) to meet the registry requirements outlined in the national coverage decisions for Percutaneous Left Atrial Appendage Closure  FU JOSIAS in 6-8 weeks to monitor device stability and decide about AC as recommended.         Naz Wick M.D.

## 2022-12-20 NOTE — PROCEDURES
[facility]  3D Interventional /Structural JOSIAS   Op Note for JOSISA guidance during Watchman Deployment       12/20/22   Georgiann Hodgkin  [id number]  1945    Primary/Ordering Cardiologist: Dr. Emi Goff  Indication: JOSIAS guidance during Watchman    Operators:  Primary: Nicholas Russo DO    Patient seen and examined. History and Physical reviewed. Labs reviewed. After informed consent was obtained with explanation of the risks and benefits, the patient was brought to [facility] OR All sedation was administered via the Anesthesia department. Intubation done with single effort. JOSIAS Pre findings:    Structures:  Normal LV function with EF 55%  Normal wall thickness  Trivial significant pericardial effusion  Left Atrium/GRANT- dilated, No thrombus  IntraAtrial Septum- intact. Significant Valve Diseases: mild MR. GRANT Morphology-  [place]     GRANT Sizes:  0 degree- width (mm)- 15 - length- 20  45 degree- width (mm)- 10 - length- 20  90 degree- width (mm)- 15 - length- 20  135 degree- width (mm)- 15 - length- 20    Initially attempted size 20 mm, deployed, but via flouro, not good seal.   Then we attempted size 24 mm. Measurements below post 24 mm deployment. Post Deployment Watchman Compression Size :  0 degree- size (mm): 19 - Compression: 21%  45 degree- size (mm): 19 - Compression: 21%  90 degree- size (mm): 20 - Compression: 21%  135 degree- size (mm): 19 - Compression: 16%  <1/3 shoulder noted. Post Deployment Leak Check:  0 degree- none  45 degree- none  90 degree-  none  135 degree- none    Post Deployment Tug Test: Passed     Post Deployment Effusion Check: stable trivial effusion    Post Deployment Septum Check: Iatrogenic Small ASD noted with L- R shunt    Watchman well seated and in good position confirmed on multiple views/angles. Conclusion:   1. Successful Interventional / Structural JOSIAS for Watchman Deployment  2. See above for full details. 3. No effusion.     All of the above was relayed in real time to the Structural Heart Team / , Pre-op, Intra-op, and Post-op. Nick Heredia DO, [facility], [facility], [address], [place] [facility]  [facility]  [phone]
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
## 2022-12-20 NOTE — PROGRESS NOTES
Rt. Groin with hematoma around femstop. Manual pressure applied. BP better 101/48 after IVF. Awaiting cardiology fellow to assess.

## 2022-12-20 NOTE — PROGRESS NOTES
Received post watchman procedure to [facility] room 6. Assessment obtained. Restrictions reviewed with patient. Post procedure pathway initiated. B/L groin site soft , Rt. Groin with fem stop. Dressing dry and intact to Lt. Groin. No hematoma noted. Family at side. Patient without complaints. Head of bed flat with B/L  leg straight.

## 2022-12-20 NOTE — PROGRESS NOTES
Lt. Radial art line d/c per protocol. Manual pressure applied x 5min. Site remains soft, no hematoma noted. Lt. Radial pulse +2.

## 2022-12-20 NOTE — PROGRESS NOTES
Patient admitted, consent signed and questions answered. Patient ready for procedure. Call light to reach with side rails up 2 of 2. B/L groin and Rt. wrist clipped with writer and Zakia Riojas present. Family at bedside with patient. History and physical complete, EKG completed.

## 2022-12-20 NOTE — PROGRESS NOTES
Pt. And family updated that procedure delayed d/t Dr. Cuba Jimenez attending to emergency at [address] [place] [address]. Understanding verb.

## 2022-12-20 NOTE — ANESTHESIA PROCEDURE NOTES
Arterial Line:    An arterial line was placed using ultrasound guidance, in the OR for the following indication(s): continuous blood pressure monitoring and blood sampling needed. A  (size) (length), Arrow (type) catheter was placed, Seldinger technique used, into the left radial artery, secured by Tegaderm. Anesthesia type: General    Events:  patient tolerated procedure well with no complications. 12/20/2022 3:25 PM12/20/2022 3:30 PM  Anesthesiologist: Kristal Samuels MD  Performed: Anesthesiologist   Preanesthetic Checklist  Completed: patient identified, IV checked, site marked, risks and benefits discussed, surgical/procedural consents, equipment checked, pre-op evaluation, timeout performed, anesthesia consent given, oxygen available, monitors applied/VS acknowledged, fire risk safety assessment completed and verbalized and blood product R/B/A discussed and consented

## 2022-12-20 NOTE — ANESTHESIA POSTPROCEDURE EVALUATION
Department of Anesthesiology  Postprocedure Note    Patient: Kelsey Causey  MRN: [id number]  YOB: 1945  Date of evaluation: 12/20/2022      Procedure Summary     Date: 12/20/22 Room / Location: [facility] Cath Lab    Anesthesia Start: 5029 Anesthesia Stop: 7982    Procedure: CATH LAB WITH ANESTHESIA Diagnosis:     Scheduled Providers:  Responsible Provider: Onur Reynolds MD    Anesthesia Type: general ASA Status: 3          Anesthesia Type: No value filed.     Johan Phase I:      Johan Phase II:        Anesthesia Post Evaluation    Patient location during evaluation: bedside  Patient participation: complete - patient participated  Level of consciousness: awake  Airway patency: patent  Nausea & Vomiting: no nausea and no vomiting  Complications: no  Cardiovascular status: hemodynamically stable  Respiratory status: acceptable  Hydration status: stable  Comments: BP (!) 107/49   Pulse 65   Temp (!) 96.2 °F (35.7 °C) (Axillary)   Resp 10   SpO2 95%

## 2022-12-21 ENCOUNTER — APPOINTMENT (OUTPATIENT)
Dept: GENERAL RADIOLOGY | Age: 77
End: 2022-12-21
Attending: INTERNAL MEDICINE
Payer: MEDICARE

## 2022-12-21 VITALS
WEIGHT: 209.44 LBS | HEART RATE: 93 BPM | DIASTOLIC BLOOD PRESSURE: 92 MMHG | SYSTOLIC BLOOD PRESSURE: 163 MMHG | TEMPERATURE: 98.1 F | RESPIRATION RATE: 18 BRPM | OXYGEN SATURATION: 92 % | BODY MASS INDEX: 30.93 KG/M2

## 2022-12-21 LAB
ABO/RH: NORMAL
ANION GAP SERPL CALCULATED.3IONS-SCNC: 13 MMOL/L (ref 9–17)
ANTIBODY SCREEN: NEGATIVE
ARM BAND NUMBER: NORMAL
BLD PROD TYP BPU: NORMAL
BLD PROD TYP BPU: NORMAL
BPU ID: NORMAL
BPU ID: NORMAL
BUN BLDV-MCNC: 16 MG/DL (ref 8–23)
CALCIUM SERPL-MCNC: 8.1 MG/DL (ref 8.6–10.4)
CHLORIDE BLD-SCNC: 103 MMOL/L (ref 98–107)
CO2: 23 MMOL/L (ref 20–31)
CREAT SERPL-MCNC: 0.81 MG/DL (ref 0.7–1.2)
CROSSMATCH RESULT: NORMAL
CROSSMATCH RESULT: NORMAL
DISPENSE STATUS BLOOD BANK: NORMAL
DISPENSE STATUS BLOOD BANK: NORMAL
EKG ATRIAL RATE: 53 BPM
EKG P AXIS: 56 DEGREES
EKG P-R INTERVAL: 184 MS
EKG Q-T INTERVAL: 458 MS
EKG QRS DURATION: 142 MS
EKG QTC CALCULATION (BAZETT): 429 MS
EKG R AXIS: 60 DEGREES
EKG T AXIS: 12 DEGREES
EKG VENTRICULAR RATE: 53 BPM
EXPIRATION DATE: NORMAL
GFR SERPL CREATININE-BSD FRML MDRD: >60 ML/MIN/1.73M2
GLUCOSE BLD-MCNC: 147 MG/DL (ref 70–99)
MAGNESIUM: 1.7 MG/DL (ref 1.6–2.6)
POTASSIUM SERPL-SCNC: 4.1 MMOL/L (ref 3.7–5.3)
SODIUM BLD-SCNC: 139 MMOL/L (ref 135–144)
TRANSFUSION STATUS: NORMAL
TRANSFUSION STATUS: NORMAL
UNIT DIVISION: 0
UNIT DIVISION: 0

## 2022-12-21 PROCEDURE — 93010 ELECTROCARDIOGRAM REPORT: CPT | Performed by: INTERNAL MEDICINE

## 2022-12-21 PROCEDURE — 6370000000 HC RX 637 (ALT 250 FOR IP): Performed by: STUDENT IN AN ORGANIZED HEALTH CARE EDUCATION/TRAINING PROGRAM

## 2022-12-21 PROCEDURE — 6370000000 HC RX 637 (ALT 250 FOR IP): Performed by: SURGERY

## 2022-12-21 PROCEDURE — 6360000002 HC RX W HCPCS: Performed by: SURGERY

## 2022-12-21 PROCEDURE — 36415 COLL VENOUS BLD VENIPUNCTURE: CPT

## 2022-12-21 PROCEDURE — 83735 ASSAY OF MAGNESIUM: CPT

## 2022-12-21 PROCEDURE — 94761 N-INVAS EAR/PLS OXIMETRY MLT: CPT

## 2022-12-21 PROCEDURE — 80048 BASIC METABOLIC PNL TOTAL CA: CPT

## 2022-12-21 PROCEDURE — 2580000003 HC RX 258: Performed by: STUDENT IN AN ORGANIZED HEALTH CARE EDUCATION/TRAINING PROGRAM

## 2022-12-21 PROCEDURE — 2700000000 HC OXYGEN THERAPY PER DAY

## 2022-12-21 PROCEDURE — 93926 LOWER EXTREMITY STUDY: CPT

## 2022-12-21 RX ORDER — MAGNESIUM SULFATE IN WATER 40 MG/ML
2000 INJECTION, SOLUTION INTRAVENOUS ONCE
Status: COMPLETED | OUTPATIENT
Start: 2022-12-21 | End: 2022-12-21

## 2022-12-21 RX ORDER — DILTIAZEM HYDROCHLORIDE 60 MG/1
60 TABLET, FILM COATED ORAL 3 TIMES DAILY
Status: DISCONTINUED | OUTPATIENT
Start: 2022-12-21 | End: 2022-12-21 | Stop reason: HOSPADM

## 2022-12-21 RX ORDER — ASPIRIN 81 MG/1
81 TABLET ORAL DAILY
Qty: 90 TABLET | Refills: 1 | Status: SHIPPED | OUTPATIENT
Start: 2022-12-21

## 2022-12-21 RX ADMIN — DILTIAZEM HYDROCHLORIDE 60 MG: 60 TABLET, FILM COATED ORAL at 12:17

## 2022-12-21 RX ADMIN — FERROUS SULFATE TAB EC 325 MG (65 MG FE EQUIVALENT) 325 MG: 325 (65 FE) TABLET DELAYED RESPONSE at 08:56

## 2022-12-21 RX ADMIN — ATORVASTATIN CALCIUM 80 MG: 80 TABLET, FILM COATED ORAL at 08:56

## 2022-12-21 RX ADMIN — ACETAMINOPHEN 650 MG: 325 TABLET ORAL at 06:15

## 2022-12-21 RX ADMIN — SODIUM CHLORIDE, PRESERVATIVE FREE 10 ML: 5 INJECTION INTRAVENOUS at 08:56

## 2022-12-21 RX ADMIN — FAMOTIDINE 20 MG: 20 TABLET, FILM COATED ORAL at 08:56

## 2022-12-21 RX ADMIN — MAGNESIUM SULFATE HEPTAHYDRATE 2000 MG: 40 INJECTION, SOLUTION INTRAVENOUS at 14:00

## 2022-12-21 RX ADMIN — PAROXETINE HYDROCHLORIDE HEMIHYDRATE 20 MG: 20 TABLET, FILM COATED ORAL at 08:56

## 2022-12-21 RX ADMIN — METOPROLOL TARTRATE 75 MG: 25 TABLET ORAL at 12:17

## 2022-12-21 ASSESSMENT — PAIN SCALES - GENERAL: PAINLEVEL_OUTOF10: 5

## 2022-12-21 ASSESSMENT — PAIN DESCRIPTION - ORIENTATION: ORIENTATION: MID

## 2022-12-21 ASSESSMENT — PAIN DESCRIPTION - DESCRIPTORS: DESCRIPTORS: ACHING

## 2022-12-21 ASSESSMENT — PAIN DESCRIPTION - LOCATION: LOCATION: HEAD

## 2022-12-21 NOTE — DISCHARGE SUMMARY
[facility]  Discharge Note                 Name:  Dominga Bauer  YOB: 1945  Social Security Number:  [id number]  Medical Record Number:  [id number]    Date of Admission:  12/20/2022  Date of Discharge:  12/21/2022    Admitting physician: Yolanda Mcfarland MD    Discharge Attending: LAWRENCE Doherty NP, CNP  Primary Care Physician: Rosette Cisneros MD  Consultants: none   Discharge to home in stable condition     HOSPITAL ADMISSION PROBLEM LIST:  Patient Active Problem List   Diagnosis    Chronic back pain    Neck pain, chronic    Brachial neuritis or radiculitis    Spinal stenosis, lumbar region, with neurogenic claudication    Displacement of cervical intervertebral disc without myelopathy    CAD (coronary artery disease)    GILL (dyspnea on exertion)    S/P lumbar spinal fusion    Obesity (BMI 35.0-39.9 without comorbidity)    HTN (hypertension)    Hyperlipidemia    Type 2 diabetes mellitus without complication (HCC)    Chronic bilateral low back pain with bilateral sciatica    Left hip pain    Acquired spondylolisthesis    Back pain    Grade III hemorrhoids    Recurrent major depressive disorder, in partial remission ([address]    Carcinoma larynx (HCC)    Atrial fibrillation with RVR ([address]    Anemia    Nasal discharge    S/P cardiac cath         Procedures:cardiac catheterization,  JOSIAS     HOSPITAL COURSE :           The patient was admitted for: watchman procedure .  Was desaturating on RA , patient states that is  normal for him  denied SOB , home oxygen was evaluated and patient was qualified but patient refused home oxygen  , per  patient was qualified for home o2 with last visit but never wore it , history of smoking , patient  had his blood pressure and pulse elevated and stabilized once home medication were resumed , also developed soft hematoma last night and scan was ordered and revealed fistula but no pseudoaneurysm , HGB stable ,patient anxious and wanted to be discharged home . Hospital Procedures if any: watchman procedure , JOSIAS       JOSIAS Pre findings:     Structures:  Normal LV function with EF 55%  Normal wall thickness  Trivial significant pericardial effusion  Left Atrium/GRANT- dilated, No thrombus  IntraAtrial Septum- intact. Significant Valve Diseases: mild MR. GRANT Morphology-  Chicken Wing      GRANT Sizes:  0 degree- width (mm)- 15 - length- 20  45 degree- width (mm)- 10 - length- 20  90 degree- width (mm)- 15 - length- 20  135 degree- width (mm)- 15 - length- 20     Initially attempted size 20 mm, deployed, but via flouro, not good seal.   Then we attempted size 24 mm. Measurements below post 24 mm deployment. Post Deployment Watchman Compression Size :  0 degree- size (mm): 19 - Compression: 21%  45 degree- size (mm): 19 - Compression: 21%  90 degree- size (mm): 20 - Compression: 21%  135 degree- size (mm): 19 - Compression: 16%  <1/3 shoulder noted. Post Deployment Leak Check:  0 degree- none  45 degree- none  90 degree-  none  135 degree- none     Post Deployment Tug Test: Passed      Post Deployment Effusion Check: stable trivial effusion     Post Deployment Septum Check: Iatrogenic Small ASD noted with L- R shunt     Watchman well seated and in good position confirmed on multiple views/angles. Conclusion:   1. Successful Interventional / Structural JOSIAS for Watchman Deployment  2. See above for full details. 3. No effusion. All of the above was relayed in real time to the Structural Heart Team / , Pre-op, Intra-op, and Post-op. Nick Heredia DO, [facility] [address] [facility]  [facility] [employer]  [phone]      Assessment and Summary:     Successful deployment of left atrial appendage occlusion device with no complication     WATCHMAN device used 24 mm size      Angiogram revealed good seal and no thrombus      JOSIAS confirmed placement and seal     EBL Less than 20 mL  No complications Plan:      The patient will be admitted and have usual post care  Start anticoagulation  for at least next 6 weeks  Start ASA  Echocardiogram tomorrow   Patient is participating in the left atrial appendage occlusion/closure registry. [address] is approved by the [employer] for Medicare and Medicaid Services (CMS) to meet the registry requirements outlined in the national coverage decisions for Percutaneous Left Atrial Appendage Closure  FU JOSIAS in 6-8 weeks to monitor device stability and decide about AC as recommended. Tremayne Mitchell M.D. Medications changes recommendation: see list   Follow Up Plan: 2 weeks with Dr. Brigette Barry and one week  with vascular       Discharge exam:   Vitals:    12/21/22 1119   BP:    Pulse: (!) 112   Resp:    Temp:    SpO2: (!) 87%     Neuro: normal  Chest: Clear to ausculation. No wheezing. Diminished to bases   Cardiac: Regular rate. s1 and s2 auscultated. No murmur noted. Large ecchymotic soft area to right groin noted , left groin with no hematoma or bleeding noted    Abdomen/groin: soft, non-tender, without masses or organomegaly  Lower extremity edema: none    Discharge Medications:     Medication List      START taking these medications     aspirin EC 81 MG EC tablet; Take 1 tablet by mouth daily   clopidogrel 75 MG tablet; Commonly known as: Plavix; Take 1 tablet by   mouth daily     CONTINUE taking these medications     atorvastatin 80 MG tablet; Commonly known as: LIPITOR; TAKE 1 TABLET   DAILY   dilTIAZem 60 MG tablet; Commonly known as: CARDIZEM   famotidine 20 MG tablet; Commonly known as: PEPCID   ferrous sulfate 325 (65 Fe) MG tablet; Commonly known as: IRON 325; Take   1 tablet by mouth 2 times daily   losartan 25 MG tablet; Commonly known as: COZAAR; TAKE 1 TABLET DAILY   metoprolol tartrate 50 MG tablet; Commonly known as: LOPRESSOR;  Take 1.5   tablets by mouth 2 times daily   naloxone 4 MG/0.1ML Liqd nasal spray; 1 spray by Nasal route as needed   for Opioid Reversal   nitroGLYCERIN 0.4 MG SL tablet; Commonly known as: NITROSTAT; Place 1   tablet under the tongue every 5 minutes as needed for Chest pain up to max   of 3 total doses. If no relief after 1 dose, call 911. oxyCODONE-acetaminophen 5-325 MG per tablet; Commonly known as:   Percocet; Take 1 tablet by mouth every 6 hours as needed for Pain for up   to 30 days. pantoprazole 40 MG tablet; Commonly known as: PROTONIX; Take 1 tablet by   mouth once daily   PARoxetine 20 MG tablet; Commonly known as: PAXIL; TAKE 1 TABLET BY   MOUTH ONCE DAILY IN THE MORNING   tiZANidine 4 MG tablet; Commonly known as: ZANAFLEX; TAKE ONE TABLET BY   MOUTH THREE TIMES DAILY   vitamin C 500 MG tablet; Commonly known as: ASCORBIC ACID     STOP taking these medications     warfarin 5 MG tablet; Commonly known as: COUMADIN            Discussed with patient and nursing. Medications and discharge instructions reviewed with patient and nursing. Discussed in detail with patient post cath POC including but not limited to medications, diet, exercise, bilateral femoral artery site care, and follow-up. Questions and concerns addressed. OK for discharge home today. F/U in office in 1-3 weeks.      Electronically signed by LAWRENCE Farooq NP on 12/21/2022 at 4:30 PM  [facility]      [phone]

## 2022-12-21 NOTE — PLAN OF CARE
Patient was evaluated today for the diagnosis of hypoxia . I entered a DME order for home oxygen because the diagnosis and testing requires the patient to have supplemental oxygen. Condition will improve or be benefited by oxygen use. The patient is not able to perform good mobility in a home setting and therefore does require the use of a portable oxygen system. The need for this equipment was discussed with the patient and he understands and is in agreement.

## 2022-12-21 NOTE — HOME CARE
Home Oxygen Evaluation    Home Oxygen Evaluation completed. Patient is on 2 liters per minute via NC. Resting SpO2 = 94%  Resting SpO2 on room air = 92%    SpO2 on room air with exercise = 88%  SpO2 on oxygen as above with exercise = 93%    Nocturnal Oximetry with patient on room air is recommended is SpO2 is between 89% and 95% (requires additional order). Patient qualifies for 2L home O2 when exercises.     Jaquelin Mahoney RCP  2:59 PM

## 2022-12-21 NOTE — PROGRESS NOTES
Assessment: Patient was awake and oriented when  visited. Family was present and open to spiritual care. Patient was in good spirit and seemed to have strong ravi in God and in the power of prayer. When asked how he was feeling, patient responded; \"good. \" Patient was raised Sabianist and a [facility] [employer]. Patient received sacrament of anointing of the sick. Intervention:  provided presence, offered support and prayed with patient and family. Outcome: Patient and family expressed appreciation for the spiritual and emotional support they received. Plan: Follow up visits recommended for more prayers and support.

## 2022-12-21 NOTE — PLAN OF CARE
Problem: Chronic Conditions and Co-morbidities  Goal: Patient's chronic conditions and co-morbidity symptoms are monitored and maintained or improved  Outcome: Progressing     Problem: Discharge Planning  Goal: Discharge to home or other facility with appropriate resources  Outcome: Progressing  Flowsheets (Taken 12/20/2022 2056)  Discharge to home or other facility with appropriate resources:   Identify barriers to discharge with patient and caregiver   Identify discharge learning needs (meds, wound care, etc)   Refer to discharge planning if patient needs post-hospital services based on physician order or complex needs related to functional status, cognitive ability or social support system     Problem: Safety - Adult  Goal: Free from fall injury  Outcome: Progressing     Problem: ABCDS Injury Assessment  Goal: Absence of physical injury  Outcome: Progressing

## 2022-12-21 NOTE — CARE COORDINATION
Case Management Assessment  Initial Evaluation    Date/Time of Evaluation: 12/21/2022 10:25 AM  Assessment Completed by: Sheryle Kell, RN    If patient is discharged prior to next notation, then this note serves as note for discharge by case management. Patient Name: Rishi Franks                   YOB: 1945  Diagnosis: S/P cardiac cath [Z98.890]                   Date / Time: 12/20/2022  8:30 PM    Patient Admission Status: Outpatient in a bed   Readmission Risk (Low < 19, Mod (19-27), High > 27): Readmission Risk Score: 9.3    Current PCP: Dominic Owusu MD  PCP verified by CM? (P) Yes    Chart Reviewed: Yes      History Provided by: (P) Patient  Patient Orientation: (P) Alert and Oriented    Patient Cognition: (P) Alert    Hospitalization in the last 30 days (Readmission):  No    If yes, Readmission Assessment in CM Navigator will be completed. Advance Directives:      Code Status: Full Code   Patient's Primary Decision Maker is: Legal Next of Kin    Primary Decision MakeMitzy Hutchins Spouse - [phone]    Discharge Planning:    Patient lives with: (P) Spouse/Significant Other Type of Home: (P) House  Primary Care Giver: (P) Self  Patient Support Systems include: (P) Spouse/Significant Other, Family Members   Current Financial resources: (P) Medicare  Current community resources:    Current services prior to admission: (P) None            Current DME:              Type of Home Care services:  (P) None    ADLS  Prior functional level: (P) Independent in ADLs/IADLs  Current functional level: (P) Independent in ADLs/IADLs    PT AM-PAC:   /24  OT AM-PAC:   /24    Family can provide assistance at DC: (P) Yes  Would you like Case Management to discuss the discharge plan with any other family members/significant others, and if so, who?     Plans to Return to Present Housing: (P) Yes  Other Identified Issues/Barriers to RETURNING to current housing: none  Potential Assistance needed at discharge: (P) N/A            Potential DME:    Patient expects to discharge to: (P) [facility] for transportation at discharge:      Financial    Payor: Norvin Angelucci / Plan: MEDICARE PART A AND B / Product Type: *No Product type* /     Does insurance require precert for SNF: No    Potential assistance Purchasing Medications: No  Meds-to-Beds request: No    [address], [address] [place], [place] [address]y [phone] - F [phone]  [address] [place] [address]  Phone: [phone] Fax: [phone]    Notes:    Factors facilitating achievement of predicted outcomes: Family support, Motivated, Cooperative, and Pleasant    Barriers to discharge: Medical complications    Additional Case Management Notes: patient plans to return home independently with his wife. He denies needs and has transportation    The Plan for Transition of Care is related to the following treatment goals of S/P cardiac cath [U26.473]    IF APPLICABLE: The Patient and/or patient representative Al and his family were provided with a choice of provider and agrees with the discharge plan. Freedom of choice list with basic dialogue that supports the patient's individualized plan of care/goals and shares the quality data associated with the providers was provided to: (P) Patient   Patient Representative Name:       The Patient and/or Patient Representative Agree with the Discharge Plan? (P) Yes    Shannen Moya RN  Case Management Department  Ph: [phone] Fax: [phone] patient refusing oxygen. He relates that he just had it in the spring from [facility] and returned it. If he needs it, he will follow up with his PCP.  Arianna Lorenzo NP notified

## 2022-12-21 NOTE — PROGRESS NOTES
SPIRITUAL CARE DEPARTMENT - [address]  PROGRESS NOTE    Shift date: 12/21/2022  Shift day: Wednesday   Shift # 1    Room # 2021/2021-01   Name: Ceci Zeng                Gnosticism: Unknown   Place of Scientologist: Unknown    Referral:  Nurse for ACP Document Conversation    Admit Date & Time: 12/20/2022  8:30 PM    Assessment: Ceci Zeng is a 65 y.o. male in the hospital because no active principal. Upon entering the room writer observes patient is laying in bed with the television on. Patient seems to be in a cheerful mood and is smiling. Intervention:  Writer introduced self and title as  Writer offered space for patient  to express feelings, needs, and concerns and provided a ministry presence. Patient says that he was told to do ACP Documents to name his wife.  told patient that his wife is automatically his POA and if he wasn't going to name anyone else he was fine. Outcome:  Patient thanked  for coming and for easing his mind. Plan:  Chaplains will remain available to offer spiritual and emotional support as needed. Electronically signed by Valeria Butterfield, on 12/21/2022 at 2:11 PM.  [facility]  [phone]       12/21/22 2700   Encounter Summary   Service Provided For: Patient   Referral/Consult From: Nurse   Support System Spouse   Last Encounter  12/21/22   Complexity of Encounter Low   Begin Time 1140   End Time  1150   Total Time Calculated 10 min   Advance Care Planning   Type ACP conversation   Assessment/Intervention/Outcome   Assessment Calm;Coping   Intervention Active listening;Sustaining Presence/Ministry of presence   Outcome Comfort; Connection/Belonging;Coping;Encouraged;Engaged in conversation;Expressed Gratitude

## 2022-12-29 DIAGNOSIS — E78.2 MIXED HYPERLIPIDEMIA: ICD-10-CM

## 2022-12-29 RX ORDER — ATORVASTATIN CALCIUM 80 MG/1
TABLET, FILM COATED ORAL
Qty: 90 TABLET | Refills: 3 | Status: SHIPPED | OUTPATIENT
Start: 2022-12-29

## 2023-01-23 ENCOUNTER — HOSPITAL ENCOUNTER (OUTPATIENT)
Age: 78
Discharge: HOME | End: 2023-01-23
Payer: MEDICARE

## 2023-01-23 ENCOUNTER — HOSPITAL ENCOUNTER (OUTPATIENT)
Dept: CT IMAGING | Age: 78
Discharge: HOME | End: 2023-01-25
Payer: MEDICARE

## 2023-01-23 DIAGNOSIS — R59.0 THORACIC LYMPHADENOPATHY: ICD-10-CM

## 2023-01-23 DIAGNOSIS — E11.9 TYPE 2 DIABETES MELLITUS WITHOUT COMPLICATION, WITHOUT LONG-TERM CURRENT USE OF INSULIN (HCC): ICD-10-CM

## 2023-01-23 DIAGNOSIS — D50.9 IRON DEFICIENCY ANEMIA, UNSPECIFIED IRON DEFICIENCY ANEMIA TYPE: ICD-10-CM

## 2023-01-23 LAB
ESTIMATED AVERAGE GLUCOSE: 105 MG/DL
HBA1C MFR BLD: 5.3 % (ref 4–6)
HEMOGLOBIN: 13.8 G/DL (ref 13–17)
IRON SATURATION: 23 % (ref 20–55)
IRON: 86 UG/DL (ref 59–158)
TOTAL IRON BINDING CAPACITY: 370 UG/DL (ref 250–450)
UNSATURATED IRON BINDING CAPACITY: 284 UG/DL (ref 112–347)

## 2023-01-23 PROCEDURE — 83550 IRON BINDING TEST: CPT

## 2023-01-23 PROCEDURE — G1010 CDSM STANSON: HCPCS

## 2023-01-23 PROCEDURE — 36415 COLL VENOUS BLD VENIPUNCTURE: CPT

## 2023-01-23 PROCEDURE — 83036 HEMOGLOBIN GLYCOSYLATED A1C: CPT

## 2023-01-23 PROCEDURE — 85018 HEMOGLOBIN: CPT

## 2023-01-23 PROCEDURE — 83540 ASSAY OF IRON: CPT

## 2023-01-27 ENCOUNTER — OFFICE VISIT (OUTPATIENT)
Dept: INTERNAL MEDICINE | Age: 78
End: 2023-01-27
Payer: MEDICARE

## 2023-01-27 VITALS
DIASTOLIC BLOOD PRESSURE: 52 MMHG | HEART RATE: 54 BPM | RESPIRATION RATE: 16 BRPM | HEIGHT: 69 IN | BODY MASS INDEX: 30.81 KG/M2 | SYSTOLIC BLOOD PRESSURE: 92 MMHG | OXYGEN SATURATION: 94 % | WEIGHT: 208 LBS

## 2023-01-27 DIAGNOSIS — D50.9 IRON DEFICIENCY ANEMIA, UNSPECIFIED IRON DEFICIENCY ANEMIA TYPE: ICD-10-CM

## 2023-01-27 DIAGNOSIS — E11.9 TYPE 2 DIABETES MELLITUS WITHOUT COMPLICATION, WITHOUT LONG-TERM CURRENT USE OF INSULIN (HCC): ICD-10-CM

## 2023-01-27 DIAGNOSIS — I10 PRIMARY HYPERTENSION: Primary | ICD-10-CM

## 2023-01-27 DIAGNOSIS — E78.49 OTHER HYPERLIPIDEMIA: ICD-10-CM

## 2023-01-27 PROCEDURE — G8484 FLU IMMUNIZE NO ADMIN: HCPCS | Performed by: INTERNAL MEDICINE

## 2023-01-27 PROCEDURE — 3074F SYST BP LT 130 MM HG: CPT | Performed by: INTERNAL MEDICINE

## 2023-01-27 PROCEDURE — 1123F ACP DISCUSS/DSCN MKR DOCD: CPT | Performed by: INTERNAL MEDICINE

## 2023-01-27 PROCEDURE — 99214 OFFICE O/P EST MOD 30 MIN: CPT | Performed by: INTERNAL MEDICINE

## 2023-01-27 PROCEDURE — G8417 CALC BMI ABV UP PARAM F/U: HCPCS | Performed by: INTERNAL MEDICINE

## 2023-01-27 PROCEDURE — G8427 DOCREV CUR MEDS BY ELIG CLIN: HCPCS | Performed by: INTERNAL MEDICINE

## 2023-01-27 PROCEDURE — 99212 OFFICE O/P EST SF 10 MIN: CPT | Performed by: INTERNAL MEDICINE

## 2023-01-27 PROCEDURE — 3078F DIAST BP <80 MM HG: CPT | Performed by: INTERNAL MEDICINE

## 2023-01-27 PROCEDURE — 1036F TOBACCO NON-USER: CPT | Performed by: INTERNAL MEDICINE

## 2023-01-27 PROCEDURE — 3044F HG A1C LEVEL LT 7.0%: CPT | Performed by: INTERNAL MEDICINE

## 2023-01-27 RX ORDER — OXYCODONE HYDROCHLORIDE AND ACETAMINOPHEN 5; 325 MG/1; MG/1
1 TABLET ORAL
COMMUNITY

## 2023-01-27 ASSESSMENT — PATIENT HEALTH QUESTIONNAIRE - PHQ9
10. IF YOU CHECKED OFF ANY PROBLEMS, HOW DIFFICULT HAVE THESE PROBLEMS MADE IT FOR YOU TO DO YOUR WORK, TAKE CARE OF THINGS AT HOME, OR GET ALONG WITH OTHER PEOPLE: 0
SUM OF ALL RESPONSES TO PHQ QUESTIONS 1-9: 0
8. MOVING OR SPEAKING SO SLOWLY THAT OTHER PEOPLE COULD HAVE NOTICED. OR THE OPPOSITE, BEING SO FIGETY OR RESTLESS THAT YOU HAVE BEEN MOVING AROUND A LOT MORE THAN USUAL: 0
5. POOR APPETITE OR OVEREATING: 0
6. FEELING BAD ABOUT YOURSELF - OR THAT YOU ARE A FAILURE OR HAVE LET YOURSELF OR YOUR FAMILY DOWN: 0
SUM OF ALL RESPONSES TO PHQ QUESTIONS 1-9: 0
SUM OF ALL RESPONSES TO PHQ QUESTIONS 1-9: 0
3. TROUBLE FALLING OR STAYING ASLEEP: 0
2. FEELING DOWN, DEPRESSED OR HOPELESS: 0
7. TROUBLE CONCENTRATING ON THINGS, SUCH AS READING THE NEWSPAPER OR WATCHING TELEVISION: 0
4. FEELING TIRED OR HAVING LITTLE ENERGY: 0
SUM OF ALL RESPONSES TO PHQ QUESTIONS 1-9: 0
9. THOUGHTS THAT YOU WOULD BE BETTER OFF DEAD, OR OF HURTING YOURSELF: 0
1. LITTLE INTEREST OR PLEASURE IN DOING THINGS: 0
SUM OF ALL RESPONSES TO PHQ9 QUESTIONS 1 & 2: 0

## 2023-01-27 ASSESSMENT — ENCOUNTER SYMPTOMS
DIARRHEA: 0
NAUSEA: 0
COUGH: 0
CONSTIPATION: 0
BLOOD IN STOOL: 0
BACK PAIN: 0
EYE PAIN: 0
ABDOMINAL PAIN: 0
VOMITING: 0
SHORTNESS OF BREATH: 0
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
## 2023-02-03 ENCOUNTER — TELEPHONE (OUTPATIENT)
Dept: INTERNAL MEDICINE | Age: 78
End: 2023-02-03

## 2023-02-03 NOTE — TELEPHONE ENCOUNTER
Patient came to the office today to have a blood pressure recheck. Patient blood pressure after waiting 15 minutes was 132/78. Patient wants Dr Baldemar Mendiola to know he is not taking Lisinopril and he is not dizzy.

## 2023-02-06 ENCOUNTER — OFFICE VISIT (OUTPATIENT)
Dept: CARDIOLOGY | Age: 78
End: 2023-02-06
Payer: MEDICARE

## 2023-02-06 VITALS
BODY MASS INDEX: 29.68 KG/M2 | SYSTOLIC BLOOD PRESSURE: 166 MMHG | WEIGHT: 201 LBS | HEART RATE: 60 BPM | DIASTOLIC BLOOD PRESSURE: 75 MMHG

## 2023-02-06 DIAGNOSIS — I48.91 ATRIAL FIBRILLATION WITH RVR (HCC): ICD-10-CM

## 2023-02-06 DIAGNOSIS — I10 ESSENTIAL HYPERTENSION: Primary | ICD-10-CM

## 2023-02-06 DIAGNOSIS — R94.39 ABNORMAL STRESS TEST: ICD-10-CM

## 2023-02-06 DIAGNOSIS — E78.2 MIXED HYPERLIPIDEMIA: ICD-10-CM

## 2023-02-06 DIAGNOSIS — Z95.5 S/P CORONARY ARTERY STENT PLACEMENT: ICD-10-CM

## 2023-02-06 DIAGNOSIS — Z95.818 PRESENCE OF WATCHMAN LEFT ATRIAL APPENDAGE CLOSURE DEVICE: ICD-10-CM

## 2023-02-06 PROCEDURE — 1123F ACP DISCUSS/DSCN MKR DOCD: CPT | Performed by: INTERNAL MEDICINE

## 2023-02-06 PROCEDURE — G8417 CALC BMI ABV UP PARAM F/U: HCPCS | Performed by: INTERNAL MEDICINE

## 2023-02-06 PROCEDURE — G8484 FLU IMMUNIZE NO ADMIN: HCPCS | Performed by: INTERNAL MEDICINE

## 2023-02-06 PROCEDURE — 93005 ELECTROCARDIOGRAM TRACING: CPT | Performed by: INTERNAL MEDICINE

## 2023-02-06 PROCEDURE — 3077F SYST BP >= 140 MM HG: CPT | Performed by: INTERNAL MEDICINE

## 2023-02-06 PROCEDURE — 99213 OFFICE O/P EST LOW 20 MIN: CPT | Performed by: INTERNAL MEDICINE

## 2023-02-06 PROCEDURE — 1036F TOBACCO NON-USER: CPT | Performed by: INTERNAL MEDICINE

## 2023-02-06 PROCEDURE — 99214 OFFICE O/P EST MOD 30 MIN: CPT | Performed by: INTERNAL MEDICINE

## 2023-02-06 PROCEDURE — 3078F DIAST BP <80 MM HG: CPT | Performed by: INTERNAL MEDICINE

## 2023-02-06 PROCEDURE — G8427 DOCREV CUR MEDS BY ELIG CLIN: HCPCS | Performed by: INTERNAL MEDICINE

## 2023-02-06 PROCEDURE — 93010 ELECTROCARDIOGRAM REPORT: CPT | Performed by: INTERNAL MEDICINE

## 2023-02-06 RX ORDER — CARVEDILOL 6.25 MG/1
6.25 TABLET ORAL 2 TIMES DAILY
Qty: 180 TABLET | Refills: 1 | Status: SHIPPED | OUTPATIENT
Start: 2023-02-06
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
## 2023-02-09 ENCOUNTER — TELEPHONE (OUTPATIENT)
Dept: CARDIOLOGY | Age: 78
End: 2023-02-09

## 2023-02-09 NOTE — TELEPHONE ENCOUNTER
Patient called back and writer informed him that he does not need to repeat JOSIAS. Patient had no further questions or concerns.

## 2023-02-26 DIAGNOSIS — F33.41 RECURRENT MAJOR DEPRESSIVE DISORDER, IN PARTIAL REMISSION (HCC): ICD-10-CM

## 2023-02-27 RX ORDER — PAROXETINE HYDROCHLORIDE 20 MG/1
TABLET, FILM COATED ORAL
Qty: 90 TABLET | Refills: 3 | Status: SHIPPED | OUTPATIENT
Start: 2023-02-27

## 2023-02-27 RX ORDER — TIZANIDINE 4 MG/1
TABLET ORAL
Qty: 180 TABLET | Refills: 3 | Status: SHIPPED | OUTPATIENT
Start: 2023-02-27

## 2023-02-27 RX ORDER — DILTIAZEM HYDROCHLORIDE 60 MG/1
TABLET, FILM COATED ORAL
Qty: 90 TABLET | Refills: 3 | Status: SHIPPED | OUTPATIENT
Start: 2023-02-27

## 2023-02-27 NOTE — TELEPHONE ENCOUNTER
Al called requesting a refill of the below medication which has been pended for you:     Requested Prescriptions     Pending Prescriptions Disp Refills    PARoxetine (PAXIL) 20 MG tablet [Pharmacy Med Name: PARoxetine HCl 20 MG Oral Tablet] 90 tablet 0     Sig: TAKE 1 TABLET BY MOUTH ONCE DAILY IN THE MORNING       Last Appointment Date: Visit date not found  Next Appointment Date: Visit date not found    Allergies   Allergen Reactions    Crestor [Rosuvastatin] Other (See Comments)     Joint pain, muscle aches    Ibuprofen Other (See Comments)     bleeding    Lisinopril Hives    Effient [Prasugrel] Rash

## 2023-02-27 NOTE — TELEPHONE ENCOUNTER
Al called requesting a refill of the below medication which has been pended for you:     Requested Prescriptions     Pending Prescriptions Disp Refills    tiZANidine (ZANAFLEX) 4 MG tablet [Pharmacy Med Name: tiZANidine HCl 4 MG Oral Tablet] 90 tablet 0     Sig: TAKE 1 TABLET BY MOUTH THREE TIMES DAILY    dilTIAZem (CARDIZEM) 60 MG tablet [Pharmacy Med Name: dilTIAZem HCl 60 MG Oral Tablet] 90 tablet 0     Sig: TAKE 1 TABLET BY MOUTH EVERY 8 HOURS       Last Appointment Date: 1/27/2023  Next Appointment Date: 5/30/2023    Allergies   Allergen Reactions    Crestor [Rosuvastatin] Other (See Comments)     Joint pain, muscle aches    Ibuprofen Other (See Comments)     bleeding    Lisinopril Hives    Effient [Prasugrel] Rash

## 2023-03-06 NOTE — TELEPHONE ENCOUNTER
Al called requesting a refill of the below medication which has been pended for you:     Requested Prescriptions     Pending Prescriptions Disp Refills    pantoprazole (PROTONIX) 40 MG tablet [Pharmacy Med Name: Pantoprazole Sodium 40 MG Oral Tablet Delayed Release] 90 tablet 0     Sig: Take 1 tablet by mouth once daily       Last Appointment Date: Visit date not found  Next Appointment Date: Visit date not found    Allergies   Allergen Reactions    Crestor [Rosuvastatin] Other (See Comments)     Joint pain, muscle aches    Ibuprofen Other (See Comments)     bleeding    Lisinopril Hives    Effient [Prasugrel] Rash

## 2023-03-07 RX ORDER — PANTOPRAZOLE SODIUM 40 MG/1
TABLET, DELAYED RELEASE ORAL
Qty: 90 TABLET | Refills: 3 | Status: SHIPPED | OUTPATIENT
Start: 2023-03-07

## 2023-04-24 DIAGNOSIS — I10 ESSENTIAL HYPERTENSION: ICD-10-CM

## 2023-04-24 RX ORDER — LOSARTAN POTASSIUM 25 MG/1
TABLET ORAL
Qty: 90 TABLET | Refills: 3 | OUTPATIENT
Start: 2023-04-24

## 2023-05-15 ENCOUNTER — OFFICE VISIT (OUTPATIENT)
Dept: CARDIOLOGY | Age: 78
End: 2023-05-15
Payer: MEDICARE

## 2023-05-15 VITALS
HEIGHT: 69 IN | BODY MASS INDEX: 30.21 KG/M2 | SYSTOLIC BLOOD PRESSURE: 130 MMHG | WEIGHT: 204 LBS | DIASTOLIC BLOOD PRESSURE: 74 MMHG | HEART RATE: 63 BPM

## 2023-05-15 DIAGNOSIS — I25.10 CORONARY ARTERY DISEASE INVOLVING NATIVE CORONARY ARTERY OF NATIVE HEART WITHOUT ANGINA PECTORIS: ICD-10-CM

## 2023-05-15 DIAGNOSIS — Z95.5 S/P CORONARY ARTERY STENT PLACEMENT: ICD-10-CM

## 2023-05-15 DIAGNOSIS — I48.91 ATRIAL FIBRILLATION WITH RVR (HCC): Primary | ICD-10-CM

## 2023-05-15 DIAGNOSIS — Z95.818 PRESENCE OF WATCHMAN LEFT ATRIAL APPENDAGE CLOSURE DEVICE: ICD-10-CM

## 2023-05-15 PROCEDURE — 93005 ELECTROCARDIOGRAM TRACING: CPT | Performed by: INTERNAL MEDICINE

## 2023-05-15 PROCEDURE — 99213 OFFICE O/P EST LOW 20 MIN: CPT | Performed by: INTERNAL MEDICINE
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
## 2023-05-22 ENCOUNTER — HOSPITAL ENCOUNTER (OUTPATIENT)
Age: 78
Discharge: HOME | End: 2023-05-22
Payer: MEDICARE

## 2023-05-22 DIAGNOSIS — E11.9 TYPE 2 DIABETES MELLITUS WITHOUT COMPLICATION, WITHOUT LONG-TERM CURRENT USE OF INSULIN (HCC): ICD-10-CM

## 2023-05-22 DIAGNOSIS — D50.9 IRON DEFICIENCY ANEMIA, UNSPECIFIED IRON DEFICIENCY ANEMIA TYPE: ICD-10-CM

## 2023-05-22 LAB
HGB BLD-MCNC: 14.6 G/DL (ref 13–17)
IRON SATN MFR SERPL: 36 % (ref 20–55)
IRON SERPL-MCNC: 126 UG/DL (ref 59–158)
TIBC SERPL-MCNC: 346 UG/DL (ref 250–450)
UNSATURATED IRON BINDING CAPACITY: 220 UG/DL (ref 112–347)

## 2023-05-22 PROCEDURE — 83550 IRON BINDING TEST: CPT

## 2023-05-22 PROCEDURE — 36415 COLL VENOUS BLD VENIPUNCTURE: CPT

## 2023-05-22 PROCEDURE — 83036 HEMOGLOBIN GLYCOSYLATED A1C: CPT

## 2023-05-22 PROCEDURE — 85018 HEMOGLOBIN: CPT

## 2023-05-22 PROCEDURE — 83540 ASSAY OF IRON: CPT

## 2023-05-23 LAB
EST. AVERAGE GLUCOSE BLD GHB EST-MCNC: 108 MG/DL
HBA1C MFR BLD: 5.4 % (ref 4–6)

## 2023-05-24 ENCOUNTER — TELEPHONE (OUTPATIENT)
Dept: INTERNAL MEDICINE | Age: 78
End: 2023-05-24

## 2023-05-24 DIAGNOSIS — D50.9 IRON DEFICIENCY ANEMIA, UNSPECIFIED IRON DEFICIENCY ANEMIA TYPE: Primary | ICD-10-CM

## 2023-05-24 NOTE — TELEPHONE ENCOUNTER
Last appt: 1/27/2023  Next appt: 5/30/2023    Labs are pending for iron and hemoglobin.   Ferrous Sulfate removed from medication list.

## 2023-05-30 ENCOUNTER — OFFICE VISIT (OUTPATIENT)
Dept: INTERNAL MEDICINE | Age: 78
End: 2023-05-30
Payer: MEDICARE

## 2023-05-30 VITALS
HEART RATE: 62 BPM | OXYGEN SATURATION: 93 % | SYSTOLIC BLOOD PRESSURE: 114 MMHG | WEIGHT: 206 LBS | RESPIRATION RATE: 16 BRPM | DIASTOLIC BLOOD PRESSURE: 68 MMHG | BODY MASS INDEX: 30.51 KG/M2 | HEIGHT: 69 IN

## 2023-05-30 DIAGNOSIS — Z12.5 SPECIAL SCREENING FOR MALIGNANT NEOPLASM OF PROSTATE: ICD-10-CM

## 2023-05-30 DIAGNOSIS — Z00.00 GENERAL MEDICAL EXAM: Primary | ICD-10-CM

## 2023-05-30 DIAGNOSIS — I10 PRIMARY HYPERTENSION: ICD-10-CM

## 2023-05-30 DIAGNOSIS — E78.49 OTHER HYPERLIPIDEMIA: ICD-10-CM

## 2023-05-30 DIAGNOSIS — C32.9 CARCINOMA LARYNX (HCC): ICD-10-CM

## 2023-05-30 DIAGNOSIS — Z00.00 MEDICARE ANNUAL WELLNESS VISIT, SUBSEQUENT: ICD-10-CM

## 2023-05-30 DIAGNOSIS — F33.41 RECURRENT MAJOR DEPRESSIVE DISORDER, IN PARTIAL REMISSION (HCC): ICD-10-CM

## 2023-05-30 DIAGNOSIS — E11.9 TYPE 2 DIABETES MELLITUS WITHOUT COMPLICATION, WITHOUT LONG-TERM CURRENT USE OF INSULIN (HCC): ICD-10-CM

## 2023-05-30 PROCEDURE — 3074F SYST BP LT 130 MM HG: CPT | Performed by: INTERNAL MEDICINE

## 2023-05-30 PROCEDURE — 1036F TOBACCO NON-USER: CPT | Performed by: INTERNAL MEDICINE

## 2023-05-30 PROCEDURE — 1123F ACP DISCUSS/DSCN MKR DOCD: CPT | Performed by: INTERNAL MEDICINE

## 2023-05-30 PROCEDURE — G0439 PPPS, SUBSEQ VISIT: HCPCS | Performed by: INTERNAL MEDICINE

## 2023-05-30 PROCEDURE — 99397 PER PM REEVAL EST PAT 65+ YR: CPT | Performed by: INTERNAL MEDICINE

## 2023-05-30 PROCEDURE — 99214 OFFICE O/P EST MOD 30 MIN: CPT | Performed by: INTERNAL MEDICINE

## 2023-05-30 PROCEDURE — 3078F DIAST BP <80 MM HG: CPT | Performed by: INTERNAL MEDICINE

## 2023-05-30 PROCEDURE — G8417 CALC BMI ABV UP PARAM F/U: HCPCS | Performed by: INTERNAL MEDICINE

## 2023-05-30 PROCEDURE — 3044F HG A1C LEVEL LT 7.0%: CPT | Performed by: INTERNAL MEDICINE

## 2023-05-30 PROCEDURE — G8427 DOCREV CUR MEDS BY ELIG CLIN: HCPCS | Performed by: INTERNAL MEDICINE

## 2023-05-30 ASSESSMENT — ENCOUNTER SYMPTOMS
COUGH: 0
EYE PAIN: 0
NAUSEA: 0
VOMITING: 0
SHORTNESS OF BREATH: 0
BACK PAIN: 0
CONSTIPATION: 0
BLOOD IN STOOL: 0
DIARRHEA: 0
ABDOMINAL PAIN: 0

## 2023-05-30 ASSESSMENT — PATIENT HEALTH QUESTIONNAIRE - PHQ9
SUM OF ALL RESPONSES TO PHQ QUESTIONS 1-9: 0
3. TROUBLE FALLING OR STAYING ASLEEP: 0
SUM OF ALL RESPONSES TO PHQ QUESTIONS 1-9: 0
2. FEELING DOWN, DEPRESSED OR HOPELESS: 0
7. TROUBLE CONCENTRATING ON THINGS, SUCH AS READING THE NEWSPAPER OR WATCHING TELEVISION: 0
1. LITTLE INTEREST OR PLEASURE IN DOING THINGS: 0
10. IF YOU CHECKED OFF ANY PROBLEMS, HOW DIFFICULT HAVE THESE PROBLEMS MADE IT FOR YOU TO DO YOUR WORK, TAKE CARE OF THINGS AT HOME, OR GET ALONG WITH OTHER PEOPLE: 0
8. MOVING OR SPEAKING SO SLOWLY THAT OTHER PEOPLE COULD HAVE NOTICED. OR THE OPPOSITE, BEING SO FIGETY OR RESTLESS THAT YOU HAVE BEEN MOVING AROUND A LOT MORE THAN USUAL: 0
6. FEELING BAD ABOUT YOURSELF - OR THAT YOU ARE A FAILURE OR HAVE LET YOURSELF OR YOUR FAMILY DOWN: 0
4. FEELING TIRED OR HAVING LITTLE ENERGY: 0
SUM OF ALL RESPONSES TO PHQ9 QUESTIONS 1 & 2: 0
SUM OF ALL RESPONSES TO PHQ QUESTIONS 1-9: 0
9. THOUGHTS THAT YOU WOULD BE BETTER OFF DEAD, OR OF HURTING YOURSELF: 0
5. POOR APPETITE OR OVEREATING: 0
SUM OF ALL RESPONSES TO PHQ QUESTIONS 1-9: 0

## 2023-05-30 NOTE — PROGRESS NOTES
Medicare Annual Wellness Visit    Norberto Osullivan is here for Medicare AWV (4 month), Hypertension, Hyperlipidemia, and Diabetes    Assessment & Plan     Recommendations for Preventive Services Due: see orders and patient instructions/AVS.  Recommended screening schedule for the next 5-10 years is provided to the patient in written form: see Patient Instructions/AVS.     Return in about 4 months (around 10/5/2023) for Hypertension, Hyperlipidemia, Diabetes. Subjective       Patient's complete Health Risk Assessment and screening values have been reviewed and are found in Flowsheets. The following problems were reviewed today and where indicated follow up appointments were made and/or referrals ordered. Positive Risk Factor Screenings with Interventions:                Opioid Risk: (Low risk score <55) Opioid risk score: 20    Patient is low risk for opioid use disorder or overdose. Last PDMP [place] as Reviewed:  Review User Review Instant Review Result   Olu Shannon 11/23/2022  1:06 PM     Reviewed PDMP [1]     Last Controlled Substance Monitoring Documentation      Flowsheet Row Refill from 11/23/2022 in [facility] department of [facility]   Periodic Controlled Substance Monitoring No signs of potential drug abuse or diversion identified., Obtaining appropriate analgesic effect of treatment. filed at 11/23/2022 5020              Weight and Activity:  Physical Activity: Insufficiently Active    Days of Exercise per Week: 2 days    Minutes of Exercise per Session: 10 min     On average, how many days per week do you engage in moderate to strenuous exercise (like a brisk walk)?: 2 days  Have you lost any weight without trying in the past 3 months?: No  Body mass index is 30.42 kg/m².  (!) Abnormal  Obesity Interventions:  Patient declines any further evaluation or treatment          Dentist Screen:  Have you seen the dentist within the past year?: (!)
asdirected.      Electronically signed by Shira Sanchez MD on 5/30/2023 at 9:44 AM

## 2023-05-30 NOTE — PATIENT INSTRUCTIONS
Personalized Preventive Plan for Norberto Osullivan - 5/30/2023  Medicare offers a range of preventive health benefits. Some of the tests and screenings are paid in full while other may be subject to a deductible, co-insurance, and/or copay. Some of these benefits include a comprehensive review of your medical history including lifestyle, illnesses that may run in your family, and various assessments and screenings as appropriate. After reviewing your medical record and screening and assessments performed today your provider may have ordered immunizations, labs, imaging, and/or referrals for you. A list of these orders (if applicable) as well as your Preventive Care list are included within your After Visit Summary for your review. Other Preventive Recommendations:    A preventive eye exam performed by an eye specialist is recommended every 1-2 years to screen for glaucoma; cataracts, macular degeneration, and other eye disorders. A preventive dental visit is recommended every 6 months. Try to get at least 150 minutes of exercise per week or 10,000 steps per day on a pedometer . Order or download the FREE \"Exercise & Physical Activity: Your Everyday Guide\" from [employer]. Call 4-910.540.3382 or search [employer] online. You need 9436-1959 mg of calcium and 4586-0555 IU of vitamin D per day. It is possible to meet your calcium requirement with diet alone, but a vitamin D supplement is usually necessary to meet this goal.  When exposed to the sun, use a sunscreen that protects against both UVA and UVB radiation with an SPF of 30 or greater. Reapply every 2 to 3 hours or after sweating, drying off with a towel, or swimming. Always wear a seat belt when traveling in a car. Always wear a helmet when riding a bicycle or motorcycle.

## 2023-07-12 ENCOUNTER — TELEPHONE (OUTPATIENT)
Dept: SURGERY | Age: 78
End: 2023-07-12

## 2023-07-24 ENCOUNTER — TELEPHONE (OUTPATIENT)
Dept: INTERNAL MEDICINE | Age: 78
End: 2023-07-24

## 2023-07-24 DIAGNOSIS — G89.29 CHRONIC BILATERAL LOW BACK PAIN WITHOUT SCIATICA: Primary | ICD-10-CM

## 2023-07-24 DIAGNOSIS — M54.50 CHRONIC BILATERAL LOW BACK PAIN WITHOUT SCIATICA: Primary | ICD-10-CM

## 2023-07-24 RX ORDER — CARVEDILOL 6.25 MG/1
6.25 TABLET ORAL 2 TIMES DAILY
Qty: 180 TABLET | Refills: 1 | Status: SHIPPED | OUTPATIENT
Start: 2023-07-24

## 2023-07-24 RX ORDER — OXYCODONE HYDROCHLORIDE AND ACETAMINOPHEN 5; 325 MG/1; MG/1
1 TABLET ORAL EVERY 6 HOURS PRN
Qty: 120 TABLET | Refills: 0 | Status: SHIPPED | OUTPATIENT
Start: 2023-07-24 | End: 2023-08-23

## 2023-07-24 RX ORDER — DILTIAZEM HYDROCHLORIDE 60 MG/1
TABLET, FILM COATED ORAL
Qty: 90 TABLET | Refills: 3 | Status: SHIPPED | OUTPATIENT
Start: 2023-07-24

## 2023-07-24 NOTE — TELEPHONE ENCOUNTER
Al called requesting a refill of the below medication which has been pended for you:     Requested Prescriptions     Pending Prescriptions Disp Refills    oxyCODONE-acetaminophen (PERCOCET) 5-325 MG per tablet       Sig: Take 1 tablet by mouth.     carvedilol (COREG) 6.25 MG tablet 180 tablet 1     Sig: Take 1 tablet by mouth 2 times daily       Last Appointment Date: 5/30/2023  Next Appointment Date: 10/3/2023    Allergies   Allergen Reactions    Crestor [Rosuvastatin] Other (See Comments)     Joint pain, muscle aches    Ibuprofen Other (See Comments)     bleeding    Lisinopril Hives    Effient [Prasugrel] Rash

## 2023-07-24 NOTE — TELEPHONE ENCOUNTER
Al called requesting a refill of the below medication which has been pended for you:     Requested Prescriptions     Pending Prescriptions Disp Refills    dilTIAZem (CARDIZEM) 60 MG tablet [Pharmacy Med Name: dilTIAZem HCl 60 MG Oral Tablet] 90 tablet 0     Sig: TAKE 1 TABLET BY MOUTH EVERY 8 HOURS       Last Appointment Date: 5/30/2023  Next Appointment Date: 10/3/2023    Allergies   Allergen Reactions    Crestor [Rosuvastatin] Other (See Comments)     Joint pain, muscle aches    Ibuprofen Other (See Comments)     bleeding    Lisinopril Hives    Effient [Prasugrel] Rash

## 2023-07-24 NOTE — TELEPHONE ENCOUNTER
Elvie Rodriguez from [address] [place] [address] in [facility] called and said that we sent in a script for the patients percocet 5/325 for 120 tablets. The pharmacist said since the patient has not filled the script since 11/2022 that it considers him being opioid naive and [facility] has been in trouble with the [facility] so it puts a bunch of hard stops in the system for them and it will only allow to fill a 7 day supply of the medication. Then after the 7 day supply we can send in a 30 day supply, but the patient would have to fill the script every 30 days to continue getting a full 30 day supply. [facility] is aware that they are the only pharmacy that is required to do this.

## 2023-07-25 ENCOUNTER — TELEPHONE (OUTPATIENT)
Dept: SURGERY | Age: 78
End: 2023-07-25

## 2023-07-25 NOTE — TELEPHONE ENCOUNTER
[address] [facility]         Patient:Norberto Osullivan           II           Surgical/Procedure Planned: Colonoscopy    Date & Location: 10/24/2023 at [facility]       Outpatient   Planned Length of OR: 30 min    Sedation: intravenous sedation    Estimated Cardiac Risk for Non-Cardiac Surgery/Procedure     Low______ Moderate__X____ High______    Medication Instructions - Clarification needed by this date:       ASA 81 mg  Hold _5__ Days  Plavix   Hold _5__ Days      Resume medications:     Lovenox indicated: _____Yes __X___NO    Provider:Dr. Khushbu Vyas of Provider Giving Orders for Medication holds:    _____________________________________________  Filiberto Luis DO[facility], [address] [facility]  [facility]  [phone]

## 2023-08-07 DIAGNOSIS — M79.672 LEFT FOOT PAIN: Primary | ICD-10-CM

## 2023-08-17 ENCOUNTER — HOSPITAL ENCOUNTER (OUTPATIENT)
Dept: GENERAL RADIOLOGY | Age: 78
Discharge: HOME | End: 2023-08-19
Attending: PODIATRIST
Payer: MEDICARE

## 2023-08-17 ENCOUNTER — OFFICE VISIT (OUTPATIENT)
Dept: ORTHOPEDIC SURGERY | Age: 78
End: 2023-08-17
Payer: MEDICARE

## 2023-08-17 VITALS
BODY MASS INDEX: 30.51 KG/M2 | SYSTOLIC BLOOD PRESSURE: 122 MMHG | HEIGHT: 69 IN | DIASTOLIC BLOOD PRESSURE: 72 MMHG | WEIGHT: 206 LBS | HEART RATE: 76 BPM

## 2023-08-17 DIAGNOSIS — M25.572 LEFT ANKLE PAIN, UNSPECIFIED CHRONICITY: ICD-10-CM

## 2023-08-17 DIAGNOSIS — M25.572 LEFT ANKLE PAIN, UNSPECIFIED CHRONICITY: Primary | ICD-10-CM

## 2023-08-17 DIAGNOSIS — M21.42 ACQUIRED LEFT FLAT FOOT: ICD-10-CM

## 2023-08-17 DIAGNOSIS — M19.072 PRIMARY OSTEOARTHRITIS OF LEFT FOOT: ICD-10-CM

## 2023-08-17 DIAGNOSIS — M79.672 LEFT FOOT PAIN: ICD-10-CM

## 2023-08-17 DIAGNOSIS — M79.672 LEFT FOOT PAIN: Primary | ICD-10-CM

## 2023-08-17 PROCEDURE — 3078F DIAST BP <80 MM HG: CPT | Performed by: NURSE PRACTITIONER

## 2023-08-17 PROCEDURE — G8427 DOCREV CUR MEDS BY ELIG CLIN: HCPCS | Performed by: NURSE PRACTITIONER

## 2023-08-17 PROCEDURE — 3074F SYST BP LT 130 MM HG: CPT | Performed by: NURSE PRACTITIONER

## 2023-08-17 PROCEDURE — 99215 OFFICE O/P EST HI 40 MIN: CPT | Performed by: NURSE PRACTITIONER

## 2023-08-17 PROCEDURE — G8417 CALC BMI ABV UP PARAM F/U: HCPCS | Performed by: NURSE PRACTITIONER

## 2023-08-17 PROCEDURE — 1036F TOBACCO NON-USER: CPT | Performed by: NURSE PRACTITIONER

## 2023-08-17 PROCEDURE — 73610 X-RAY EXAM OF ANKLE: CPT

## 2023-08-17 PROCEDURE — 73630 X-RAY EXAM OF FOOT: CPT

## 2023-08-17 PROCEDURE — 1123F ACP DISCUSS/DSCN MKR DOCD: CPT | Performed by: NURSE PRACTITIONER

## 2023-08-17 PROCEDURE — 99202 OFFICE O/P NEW SF 15 MIN: CPT | Performed by: NURSE PRACTITIONER

## 2023-08-17 RX ORDER — HYDROCODONE BITARTRATE AND ACETAMINOPHEN 5; 325 MG/1; MG/1
1 TABLET ORAL EVERY 6 HOURS PRN
Qty: 21 TABLET | Refills: 0 | Status: SHIPPED | OUTPATIENT
Start: 2023-08-17 | End: 2023-08-17 | Stop reason: CLARIF
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
## 2023-08-18 ENCOUNTER — HOSPITAL ENCOUNTER (OUTPATIENT)
Age: 78
Discharge: HOME | End: 2023-08-18
Payer: MEDICARE

## 2023-08-18 ENCOUNTER — HOSPITAL ENCOUNTER (OUTPATIENT)
Age: 78
End: 2023-08-18
Payer: MEDICARE

## 2023-08-18 DIAGNOSIS — R31.0 GROSS HEMATURIA: ICD-10-CM

## 2023-08-18 LAB
BACTERIA URNS QL MICRO: ABNORMAL
BILIRUB UR QL STRIP: NEGATIVE
CLARITY UR: ABNORMAL
COLOR UR: YELLOW
EPI CELLS #/AREA URNS HPF: ABNORMAL /HPF (ref 0–5)
GLUCOSE UR STRIP-MCNC: NEGATIVE MG/DL
HGB UR QL STRIP.AUTO: ABNORMAL
KETONES UR STRIP-MCNC: NEGATIVE MG/DL
LEUKOCYTE ESTERASE UR QL STRIP: NEGATIVE
NITRITE UR QL STRIP: NEGATIVE
PH UR STRIP: 5.5 [PH] (ref 5–6)
PROT UR STRIP-MCNC: NEGATIVE MG/DL
RBC #/AREA URNS HPF: ABNORMAL /HPF (ref 0–4)
SP GR UR STRIP: 1.02 (ref 1.01–1.02)
UROBILINOGEN UR STRIP-ACNC: NORMAL EU/DL (ref 0–1)
WBC #/AREA URNS HPF: ABNORMAL /HPF (ref 0–4)

## 2023-08-18 PROCEDURE — 81001 URINALYSIS AUTO W/SCOPE: CPT

## 2023-08-21 DIAGNOSIS — E78.2 MIXED HYPERLIPIDEMIA: ICD-10-CM

## 2023-08-21 RX ORDER — CLOPIDOGREL BISULFATE 75 MG/1
75 TABLET ORAL DAILY
Qty: 30 TABLET | Refills: 1 | Status: SHIPPED | OUTPATIENT
Start: 2023-08-21

## 2023-08-21 RX ORDER — ATORVASTATIN CALCIUM 80 MG/1
80 TABLET, FILM COATED ORAL DAILY
Qty: 90 TABLET | Refills: 3 | Status: SHIPPED | OUTPATIENT
Start: 2023-08-21

## 2023-08-21 NOTE — TELEPHONE ENCOUNTER
Refill request sent to [facility]    Medication pended if agreeable     Last Appt:  5/30/2023  Next Appt:   9/11/2023  Med verified in Epic

## 2023-08-22 ENCOUNTER — HOSPITAL ENCOUNTER (OUTPATIENT)
Age: 78
Discharge: HOME | End: 2023-08-22
Payer: MEDICARE

## 2023-08-22 DIAGNOSIS — Z12.5 SCREENING PSA (PROSTATE SPECIFIC ANTIGEN): ICD-10-CM

## 2023-08-22 DIAGNOSIS — Z12.5 SCREENING PSA (PROSTATE SPECIFIC ANTIGEN): Primary | ICD-10-CM

## 2023-08-22 LAB — PSA SERPL-MCNC: 1.89 NG/ML

## 2023-08-22 PROCEDURE — G0103 PSA SCREENING: HCPCS

## 2023-08-22 PROCEDURE — 36415 COLL VENOUS BLD VENIPUNCTURE: CPT

## 2023-08-23 ENCOUNTER — OFFICE VISIT (OUTPATIENT)
Dept: UROLOGY | Age: 78
End: 2023-08-23
Payer: MEDICARE

## 2023-08-23 ENCOUNTER — OFFICE VISIT (OUTPATIENT)
Dept: PRIMARY CARE CLINIC | Age: 78
End: 2023-08-23
Payer: MEDICARE

## 2023-08-23 ENCOUNTER — HOSPITAL ENCOUNTER (OUTPATIENT)
Dept: GENERAL RADIOLOGY | Age: 78
Discharge: HOME | End: 2023-08-25
Payer: MEDICARE

## 2023-08-23 VITALS
BODY MASS INDEX: 29.62 KG/M2 | HEIGHT: 69 IN | TEMPERATURE: 98.3 F | HEART RATE: 78 BPM | WEIGHT: 200 LBS | SYSTOLIC BLOOD PRESSURE: 130 MMHG | OXYGEN SATURATION: 95 % | RESPIRATION RATE: 16 BRPM | DIASTOLIC BLOOD PRESSURE: 70 MMHG

## 2023-08-23 VITALS
DIASTOLIC BLOOD PRESSURE: 62 MMHG | SYSTOLIC BLOOD PRESSURE: 122 MMHG | HEIGHT: 69 IN | WEIGHT: 200 LBS | BODY MASS INDEX: 29.62 KG/M2 | HEART RATE: 78 BPM

## 2023-08-23 DIAGNOSIS — S93.401A SPRAIN OF RIGHT ANKLE, UNSPECIFIED LIGAMENT, INITIAL ENCOUNTER: ICD-10-CM

## 2023-08-23 DIAGNOSIS — S99.911A RIGHT ANKLE INJURY, INITIAL ENCOUNTER: Primary | ICD-10-CM

## 2023-08-23 DIAGNOSIS — R31.0 GROSS HEMATURIA: ICD-10-CM

## 2023-08-23 DIAGNOSIS — N40.1 BPH WITH OBSTRUCTION/LOWER URINARY TRACT SYMPTOMS: Primary | ICD-10-CM

## 2023-08-23 DIAGNOSIS — N13.8 BPH WITH OBSTRUCTION/LOWER URINARY TRACT SYMPTOMS: Primary | ICD-10-CM

## 2023-08-23 DIAGNOSIS — S99.911A RIGHT ANKLE INJURY, INITIAL ENCOUNTER: ICD-10-CM

## 2023-08-23 DIAGNOSIS — R97.20 ELEVATED PSA: ICD-10-CM

## 2023-08-23 DIAGNOSIS — N13.30 HYDRONEPHROSIS, RIGHT: ICD-10-CM

## 2023-08-23 DIAGNOSIS — N12 PYELONEPHRITIS: ICD-10-CM

## 2023-08-23 PROCEDURE — G8417 CALC BMI ABV UP PARAM F/U: HCPCS

## 2023-08-23 PROCEDURE — 3074F SYST BP LT 130 MM HG: CPT | Performed by: UROLOGY

## 2023-08-23 PROCEDURE — 1123F ACP DISCUSS/DSCN MKR DOCD: CPT

## 2023-08-23 PROCEDURE — 3078F DIAST BP <80 MM HG: CPT

## 2023-08-23 PROCEDURE — G8427 DOCREV CUR MEDS BY ELIG CLIN: HCPCS

## 2023-08-23 PROCEDURE — 99213 OFFICE O/P EST LOW 20 MIN: CPT | Performed by: UROLOGY

## 2023-08-23 PROCEDURE — 3078F DIAST BP <80 MM HG: CPT | Performed by: UROLOGY

## 2023-08-23 PROCEDURE — 73610 X-RAY EXAM OF ANKLE: CPT

## 2023-08-23 PROCEDURE — 99213 OFFICE O/P EST LOW 20 MIN: CPT

## 2023-08-23 PROCEDURE — 99214 OFFICE O/P EST MOD 30 MIN: CPT | Performed by: UROLOGY

## 2023-08-23 PROCEDURE — G8427 DOCREV CUR MEDS BY ELIG CLIN: HCPCS | Performed by: UROLOGY

## 2023-08-23 PROCEDURE — 1036F TOBACCO NON-USER: CPT | Performed by: UROLOGY

## 2023-08-23 PROCEDURE — 73630 X-RAY EXAM OF FOOT: CPT

## 2023-08-23 PROCEDURE — 99212 OFFICE O/P EST SF 10 MIN: CPT

## 2023-08-23 PROCEDURE — 1036F TOBACCO NON-USER: CPT

## 2023-08-23 PROCEDURE — 3074F SYST BP LT 130 MM HG: CPT

## 2023-08-23 PROCEDURE — G8417 CALC BMI ABV UP PARAM F/U: HCPCS | Performed by: UROLOGY

## 2023-08-23 PROCEDURE — 1123F ACP DISCUSS/DSCN MKR DOCD: CPT | Performed by: UROLOGY

## 2023-08-23 ASSESSMENT — ENCOUNTER SYMPTOMS
RESPIRATORY NEGATIVE: 1
GASTROINTESTINAL NEGATIVE: 1
EYES NEGATIVE: 1
COLOR CHANGE: 1
ALLERGIC/IMMUNOLOGIC NEGATIVE: 1

## 2023-08-23 NOTE — PROGRESS NOTES
Nora Yadav MD   Urology Clinic office Visit      Patient: Norberto Osullivan  YOB: 1945  Date: 8/23/2023    HISTORY OF PRESENT ILLNESS:   The patient is a 77 y.o. male who presents today for evaluation of the following problem(s):      1. BPH with obstruction/lower urinary tract symptoms    2. Gross hematuria    3. Hydronephrosis, right    4. Pyelonephritis    5. Elevated PSA           Overall the problem(s) : are improved  Associated Symptoms: No dysuria, gross hematuria. Pain Severity:      Summary of old records: hx of retention/BPH  (Patient's old records, notes and chart reviewed and summarized above.)      Recent pyelo  Gross hematuria with clots, terminal  Severity is described as moderate. The course of symptoms over time is sudden. Alleviating factors: none  Worsening factors: none  Lower urinary tract symptoms: none  Former smoker  On plavix    Voiding well; hematuria resolved- no recurrence    Ct Abdomen Pelvis 3/26/2021  *No CT abnormality is identified to explain the patient's hematuria. *Stable 3 cm fusiform type infrarenal abdominal aortic aneurysm. I independently reviewed and verified the images and reports from:  8/10/2022  Unremarkable exam of the kidneys      Last several PSA's:  Lab Results   Component Value Date    PSA 1.89 08/22/2023    PSA 1.25 09/18/2022    PSA 0.53 08/20/2021       Last total testosterone:  No results found for: TESTOSTERONE    Urinalysis today:  No results found for this visit on 08/23/23.       Last BUN and creatinine:  Lab Results   Component Value Date    BUN 16 12/21/2022     Lab Results   Component Value Date    CREATININE 0.81 12/21/2022       Imaging Reviewed during this Office Visit:   (results were independently reviewed by physician and radiology report verified)    PAST MEDICAL, FAMILY AND SOCIAL HISTORY:  Past Medical History:   Diagnosis Date    Abnormal cardiovascular stress test 01/2022    Anticoagulated on Coumadin     Asthma

## 2023-08-24 ENCOUNTER — OFFICE VISIT (OUTPATIENT)
Dept: ORTHOPEDIC SURGERY | Age: 78
End: 2023-08-24
Payer: MEDICARE

## 2023-08-24 VITALS — WEIGHT: 200 LBS | BODY MASS INDEX: 29.62 KG/M2 | HEIGHT: 69 IN

## 2023-08-24 DIAGNOSIS — S93.491A SPRAIN OF OTHER LIGAMENT OF RIGHT ANKLE, INITIAL ENCOUNTER: Primary | ICD-10-CM

## 2023-08-24 PROCEDURE — 99215 OFFICE O/P EST HI 40 MIN: CPT | Performed by: PODIATRIST

## 2023-08-24 RX ORDER — PREDNISONE 20 MG/1
20 TABLET ORAL DAILY
Qty: 14 TABLET | Refills: 0 | Status: SHIPPED | OUTPATIENT
Start: 2023-08-24 | End: 2023-09-07

## 2023-08-24 NOTE — PROGRESS NOTES
[address]         History and physical      Norberto Osullivan  MEDICAL RECORD NUMBER:  [id number]  AGE: 77 y.o. GENDER: male  : 1945  EPISODE DATE:  2023    Subjective:     Chief Complaint   Patient presents with    Foot Swelling     Walter foot pain/ discuss surgery         HISTORY of PRESENT ILLNESS HPI    Patient is a pleasant 65-year-old male who is following up for persistent ongoing left foot pain. Patient was initially evaluated by practitioner Krystyna davenport on 2023. Today patient has attempted conservative care including shoe and activity modification anti-inflammatories as well as compression stockings with little to no improvement. Patient is interested in moving forward with surgical reconstruction of the left foot reviewed previous x-rays with significant osteophytic type changes with metatarsus elevatus of the left first ray due to arthritis of the first metatarsal joint as well as chronic subluxation of the second tarsal phalangeal joint with transverse and sagittal plane deviations as well as chronic lateral ankle instability. Patient will require surgical intervention in the form of a left ankle lateral collateral ligament repair, left first tarsometatarsal joint fusion, second metatarsal phalange joint capsulotomy with open repair of plantar ligament insufficiency as well as likely second hammertoe reconstruction. All questions concerns guarding perioperative management including benefits risk complications and alternatives to procedure were discussed in detail. This was noticed patient's preoperative history and physical for planned surgical invention. In addition patient also presents with acute right ankle sprain was seen through the ER over the weekend. Plain from x-rays were negative. Clinical examination shows pain over description of his anterior talofibular as well as calcaneal fib ligaments.   Patient was fit dispensed offloading pneumatic

## 2023-09-06 ENCOUNTER — HOSPITAL ENCOUNTER (OUTPATIENT)
Age: 78
Discharge: HOME | End: 2023-09-06
Payer: MEDICARE

## 2023-09-06 DIAGNOSIS — Z00.00 GENERAL MEDICAL EXAM: ICD-10-CM

## 2023-09-06 DIAGNOSIS — D50.9 IRON DEFICIENCY ANEMIA, UNSPECIFIED IRON DEFICIENCY ANEMIA TYPE: ICD-10-CM

## 2023-09-06 DIAGNOSIS — E78.49 OTHER HYPERLIPIDEMIA: ICD-10-CM

## 2023-09-06 DIAGNOSIS — I10 PRIMARY HYPERTENSION: ICD-10-CM

## 2023-09-06 DIAGNOSIS — E11.9 TYPE 2 DIABETES MELLITUS WITHOUT COMPLICATION, WITHOUT LONG-TERM CURRENT USE OF INSULIN (HCC): ICD-10-CM

## 2023-09-06 DIAGNOSIS — Z12.5 SPECIAL SCREENING FOR MALIGNANT NEOPLASM OF PROSTATE: ICD-10-CM

## 2023-09-06 LAB
ALBUMIN SERPL-MCNC: 4.5 G/DL (ref 3.5–5.2)
ALBUMIN/GLOB SERPL: 2 {RATIO} (ref 1–2.5)
ALP SERPL-CCNC: 54 U/L (ref 40–129)
ALT SERPL-CCNC: 17 U/L (ref 5–41)
ANION GAP SERPL CALCULATED.3IONS-SCNC: 9 MMOL/L (ref 9–17)
AST SERPL-CCNC: 15 U/L
BILIRUB SERPL-MCNC: 0.4 MG/DL (ref 0.3–1.2)
BUN SERPL-MCNC: 18 MG/DL (ref 8–23)
BUN/CREAT SERPL: 18 (ref 9–20)
CALCIUM SERPL-MCNC: 9.4 MG/DL (ref 8.6–10.4)
CHLORIDE SERPL-SCNC: 103 MMOL/L (ref 98–107)
CHOLEST SERPL-MCNC: 232 MG/DL
CHOLESTEROL/HDL RATIO: 2.8
CO2 SERPL-SCNC: 32 MMOL/L (ref 20–31)
CREAT SERPL-MCNC: 1 MG/DL (ref 0.7–1.2)
CREAT UR-MCNC: 149.9 MG/DL (ref 39–259)
GFR SERPL CREATININE-BSD FRML MDRD: >60 ML/MIN/1.73M2
GLUCOSE SERPL-MCNC: 145 MG/DL (ref 70–99)
HDLC SERPL-MCNC: 83 MG/DL
HGB BLD-MCNC: 13 G/DL (ref 13–17)
IRON SATN MFR SERPL: 11 % (ref 20–55)
IRON SERPL-MCNC: 49 UG/DL (ref 59–158)
LDLC SERPL CALC-MCNC: 101 MG/DL (ref 0–130)
MICROALBUMIN UR-MCNC: 16 MG/L
MICROALBUMIN/CREAT UR-RTO: 11 MCG/MG CREAT
POTASSIUM SERPL-SCNC: 4.8 MMOL/L (ref 3.7–5.3)
PROT SERPL-MCNC: 6.8 G/DL (ref 6.4–8.3)
PSA SERPL-MCNC: 2.33 NG/ML
SODIUM SERPL-SCNC: 144 MMOL/L (ref 135–144)
TIBC SERPL-MCNC: 452 UG/DL (ref 250–450)
TRIGL SERPL-MCNC: 238 MG/DL
UNSATURATED IRON BINDING CAPACITY: 403 UG/DL (ref 112–347)

## 2023-09-06 PROCEDURE — 83540 ASSAY OF IRON: CPT

## 2023-09-06 PROCEDURE — 85018 HEMOGLOBIN: CPT

## 2023-09-06 PROCEDURE — 82570 ASSAY OF URINE CREATININE: CPT

## 2023-09-06 PROCEDURE — 80053 COMPREHEN METABOLIC PANEL: CPT

## 2023-09-06 PROCEDURE — 82043 UR ALBUMIN QUANTITATIVE: CPT

## 2023-09-06 PROCEDURE — G0103 PSA SCREENING: HCPCS

## 2023-09-06 PROCEDURE — 83550 IRON BINDING TEST: CPT

## 2023-09-06 PROCEDURE — 36415 COLL VENOUS BLD VENIPUNCTURE: CPT

## 2023-09-06 PROCEDURE — 83036 HEMOGLOBIN GLYCOSYLATED A1C: CPT

## 2023-09-06 PROCEDURE — 80061 LIPID PANEL: CPT

## 2023-09-07 LAB
EST. AVERAGE GLUCOSE BLD GHB EST-MCNC: 143 MG/DL
HBA1C MFR BLD: 6.6 % (ref 4–6)

## 2023-09-11 ENCOUNTER — OFFICE VISIT (OUTPATIENT)
Dept: INTERNAL MEDICINE | Age: 78
End: 2023-09-11
Payer: MEDICARE

## 2023-09-11 ENCOUNTER — OFFICE VISIT (OUTPATIENT)
Dept: CARDIOLOGY | Age: 78
End: 2023-09-11
Payer: MEDICARE

## 2023-09-11 ENCOUNTER — HOSPITAL ENCOUNTER (OUTPATIENT)
Age: 78
Discharge: HOME | End: 2023-09-11
Payer: MEDICARE

## 2023-09-11 VITALS
WEIGHT: 210.4 LBS | HEIGHT: 69 IN | SYSTOLIC BLOOD PRESSURE: 140 MMHG | HEART RATE: 84 BPM | BODY MASS INDEX: 31.16 KG/M2 | DIASTOLIC BLOOD PRESSURE: 68 MMHG

## 2023-09-11 VITALS
BODY MASS INDEX: 31.1 KG/M2 | HEART RATE: 84 BPM | RESPIRATION RATE: 16 BRPM | DIASTOLIC BLOOD PRESSURE: 70 MMHG | SYSTOLIC BLOOD PRESSURE: 132 MMHG | WEIGHT: 210 LBS | HEIGHT: 69 IN

## 2023-09-11 DIAGNOSIS — I10 ESSENTIAL HYPERTENSION: ICD-10-CM

## 2023-09-11 DIAGNOSIS — R94.39 ABNORMAL STRESS TEST: ICD-10-CM

## 2023-09-11 DIAGNOSIS — I25.83 CORONARY ARTERY DISEASE DUE TO LIPID RICH PLAQUE: ICD-10-CM

## 2023-09-11 DIAGNOSIS — Z01.818 PRE-OP EVALUATION: Primary | ICD-10-CM

## 2023-09-11 DIAGNOSIS — I48.91 ATRIAL FIBRILLATION WITH RVR (HCC): Primary | ICD-10-CM

## 2023-09-11 DIAGNOSIS — D50.9 IRON DEFICIENCY ANEMIA, UNSPECIFIED IRON DEFICIENCY ANEMIA TYPE: ICD-10-CM

## 2023-09-11 DIAGNOSIS — I25.10 CORONARY ARTERY DISEASE INVOLVING NATIVE CORONARY ARTERY OF NATIVE HEART WITHOUT ANGINA PECTORIS: ICD-10-CM

## 2023-09-11 DIAGNOSIS — Z01.818 PRE-OP EVALUATION: ICD-10-CM

## 2023-09-11 DIAGNOSIS — E78.2 MIXED HYPERLIPIDEMIA: ICD-10-CM

## 2023-09-11 DIAGNOSIS — Z98.61 CORONARY ANGIOPLASTY STATUS: ICD-10-CM

## 2023-09-11 DIAGNOSIS — E11.9 TYPE 2 DIABETES MELLITUS WITHOUT COMPLICATION, WITHOUT LONG-TERM CURRENT USE OF INSULIN (HCC): ICD-10-CM

## 2023-09-11 DIAGNOSIS — Z95.818 PRESENCE OF WATCHMAN LEFT ATRIAL APPENDAGE CLOSURE DEVICE: ICD-10-CM

## 2023-09-11 DIAGNOSIS — Z01.810 PREPROCEDURAL CARDIOVASCULAR EXAMINATION: ICD-10-CM

## 2023-09-11 DIAGNOSIS — I25.10 CORONARY ARTERY DISEASE DUE TO LIPID RICH PLAQUE: ICD-10-CM

## 2023-09-11 DIAGNOSIS — Z95.5 S/P CORONARY ARTERY STENT PLACEMENT: ICD-10-CM

## 2023-09-11 LAB
ERYTHROCYTE [DISTWIDTH] IN BLOOD BY AUTOMATED COUNT: 13.7 % (ref 11.8–14.4)
HCT VFR BLD AUTO: 39.2 % (ref 40.7–50.3)
HGB BLD-MCNC: 12.3 G/DL (ref 13–17)
MCH RBC QN AUTO: 29.2 PG (ref 25.2–33.5)
MCHC RBC AUTO-ENTMCNC: 31.4 G/DL (ref 25.2–33.5)
MCV RBC AUTO: 93.1 FL (ref 82.6–102.9)
NRBC BLD-RTO: 0 PER 100 WBC
PLATELET # BLD AUTO: 296 K/UL (ref 138–453)
PMV BLD AUTO: 8.2 FL (ref 8.1–13.5)
RBC # BLD AUTO: 4.21 M/UL (ref 4.21–5.77)
WBC OTHER # BLD: 5.2 K/UL (ref 3.5–11.3)

## 2023-09-11 PROCEDURE — G8417 CALC BMI ABV UP PARAM F/U: HCPCS | Performed by: INTERNAL MEDICINE

## 2023-09-11 PROCEDURE — 36415 COLL VENOUS BLD VENIPUNCTURE: CPT

## 2023-09-11 PROCEDURE — 99214 OFFICE O/P EST MOD 30 MIN: CPT | Performed by: INTERNAL MEDICINE

## 2023-09-11 PROCEDURE — 1123F ACP DISCUSS/DSCN MKR DOCD: CPT | Performed by: INTERNAL MEDICINE

## 2023-09-11 PROCEDURE — 1036F TOBACCO NON-USER: CPT | Performed by: INTERNAL MEDICINE

## 2023-09-11 PROCEDURE — 93005 ELECTROCARDIOGRAM TRACING: CPT | Performed by: INTERNAL MEDICINE

## 2023-09-11 PROCEDURE — 3078F DIAST BP <80 MM HG: CPT | Performed by: INTERNAL MEDICINE

## 2023-09-11 PROCEDURE — 3044F HG A1C LEVEL LT 7.0%: CPT | Performed by: INTERNAL MEDICINE

## 2023-09-11 PROCEDURE — 3075F SYST BP GE 130 - 139MM HG: CPT | Performed by: INTERNAL MEDICINE

## 2023-09-11 PROCEDURE — 85027 COMPLETE CBC AUTOMATED: CPT

## 2023-09-11 PROCEDURE — 3077F SYST BP >= 140 MM HG: CPT | Performed by: INTERNAL MEDICINE

## 2023-09-11 PROCEDURE — G8427 DOCREV CUR MEDS BY ELIG CLIN: HCPCS | Performed by: INTERNAL MEDICINE

## 2023-09-11 PROCEDURE — 93010 ELECTROCARDIOGRAM REPORT: CPT | Performed by: INTERNAL MEDICINE

## 2023-09-11 PROCEDURE — 99212 OFFICE O/P EST SF 10 MIN: CPT | Performed by: INTERNAL MEDICINE

## 2023-09-11 RX ORDER — CLOPIDOGREL BISULFATE 75 MG/1
75 TABLET ORAL DAILY
Qty: 90 TABLET | Refills: 3 | Status: SHIPPED | OUTPATIENT
Start: 2023-09-11

## 2023-09-11 ASSESSMENT — ENCOUNTER SYMPTOMS
COUGH: 0
SHORTNESS OF BREATH: 0
NAUSEA: 0
VOMITING: 0
BACK PAIN: 0
CONSTIPATION: 0
EYE PAIN: 0
BLOOD IN STOOL: 0
DIARRHEA: 0
ABDOMINAL PAIN: 0
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
## 2023-09-11 NOTE — PROGRESS NOTES
hyperplastic polyp, severe sigmoid diverticulosis, large ext. hemorrhoid, Dr Marichuy Shipman  05/05/2011    PROX RCA X2    CORONARY ANGIOPLASTY WITH STENT PLACEMENT  05/31/2011    XIENCE TO MID LAD X2    CYSTOSCOPY Bilateral 05/19/2021    CYSTO Bilateral Retrogrades performed by Adriel Tyler MD at [address] Right 2012,2011    EYE SURGERY      FINGER TRIGGER RELEASE Right     THIRD FINGER    [facility] INJECTION PROCEDURE FOR SACROILIAC JOINT Bilateral 03/14/2017    Bilat Sacroiliac & Piriformis Inj's performed by Preito Mckinney MD at [address] [facility] Right     ELBOW    LUMBAR FUSION  04/07/2014    lumbar decompression and fusion    LUMBAR FUSION N/A 11/15/2017    LUMBAR DECOMPRESSION POSTERIOR W/FUSION L3 L4 ( with SPINAL CORD MONITOR ) performed by Laura Mooney MD at [address] [facility]  [id number]    Fusion    LUMBAR SPINE SURGERY Left 02/14/2017    Left L4 & L5 Transforaminal ANITHA performed by Prieto Mckinney MD at [address] [facility] Bilateral 05/02/2017    Bilat L5 Transforaminal ANITHA performed by Prieto Mckinney MD at [address] [facility] Bilateral 05/09/2017    Bilat L5 Transforaminal ANITHA performed by Prieto Mckinney MD at [facility]  2/19/13, 5/14/13    L1/L2 IESI    OTHER SURGICAL HISTORY      Hardware correction, patient had 1 broken screw and to loose screws in back     OTHER SURGICAL HISTORY Left 09/27/2016     C6 TFE    OTHER SURGICAL HISTORY Bilateral 11/29/2016    L3, L4 L5 Diagnostic Medial Branch Block    OTHER SURGICAL HISTORY  12/06/2016    jovani L3 L4 L5 conf MBB    OTHER SURGICAL HISTORY Right 12/12/2016    L3,L4,L5 RFA    OTHER SURGICAL HISTORY Left 12/15/2017    L3.  L4, L5 RFA    OTHER SURGICAL HISTORY Left 01/31/2017    L4 & L5 TFE    NE ARTHROCENTESIS ASPIR&/INJ MAJOR JT/BURSA W/O US Left 03/31/2017    Left Hip Inj performed by Prieto Mckinney MD at [address]

## 2023-09-14 ENCOUNTER — OFFICE VISIT (OUTPATIENT)
Dept: ORTHOPEDIC SURGERY | Age: 78
End: 2023-09-14
Payer: MEDICARE

## 2023-09-14 VITALS
SYSTOLIC BLOOD PRESSURE: 128 MMHG | BODY MASS INDEX: 31.1 KG/M2 | OXYGEN SATURATION: 94 % | RESPIRATION RATE: 16 BRPM | HEART RATE: 79 BPM | HEIGHT: 69 IN | WEIGHT: 210 LBS | DIASTOLIC BLOOD PRESSURE: 60 MMHG

## 2023-09-14 DIAGNOSIS — M19.072 PRIMARY OSTEOARTHRITIS OF LEFT FOOT: ICD-10-CM

## 2023-09-14 DIAGNOSIS — S93.491A SPRAIN OF OTHER LIGAMENT OF RIGHT ANKLE, INITIAL ENCOUNTER: Primary | ICD-10-CM

## 2023-09-14 PROCEDURE — 1036F TOBACCO NON-USER: CPT | Performed by: NURSE PRACTITIONER

## 2023-09-14 PROCEDURE — 1123F ACP DISCUSS/DSCN MKR DOCD: CPT | Performed by: NURSE PRACTITIONER

## 2023-09-14 PROCEDURE — 99214 OFFICE O/P EST MOD 30 MIN: CPT | Performed by: NURSE PRACTITIONER

## 2023-09-14 PROCEDURE — G8417 CALC BMI ABV UP PARAM F/U: HCPCS | Performed by: NURSE PRACTITIONER

## 2023-09-14 PROCEDURE — 3078F DIAST BP <80 MM HG: CPT | Performed by: NURSE PRACTITIONER

## 2023-09-14 PROCEDURE — G8427 DOCREV CUR MEDS BY ELIG CLIN: HCPCS | Performed by: NURSE PRACTITIONER

## 2023-09-14 PROCEDURE — 99213 OFFICE O/P EST LOW 20 MIN: CPT | Performed by: NURSE PRACTITIONER

## 2023-09-14 PROCEDURE — 3074F SYST BP LT 130 MM HG: CPT | Performed by: NURSE PRACTITIONER

## 2023-09-14 NOTE — PROGRESS NOTES
grossly intact bilaterally. Musculoskeletal: Muscle strength 5/5 for all plantarflexors, dorsiflexors, inverters and everters examined. Full ROM noted at the STJ with the knee extended and flexed, AJ, MTJ, 1st MPJ bilaterally. Decreased ROM noted at the forefoot. Noted sumeet-talar subluxation on stance exam. Abductovalgus deformity of the hallux. Second lesser digit subluxed dorsally and Cross-over. Moderate heterotrophic bone formation encompassing the first tarsometatarsal joint. Contralateral right ankle has pinpoint tenderness of distribution of the lateral collateral ligaments as well as attempted drawer sign. Patient is partial weightbearing on clinical exam    IMAGING:  3 views of the foot including standing and oblique demonstrate severe tarsometatarsal degenerative changes, MTPJ degenerative changes. Bipartate sesamoids. No fracture or dislocation. FINDINGS:  Moderate soft tissue swelling and edema about the right ankle. Ankle mortise  appears intact. Mild plantar calcaneal spur. Mild degenerative changes of  the midfoot and TMT joints. Boehler's angle is maintained. Mild distal  Achilles enthesopathy. No tibiotalar joint effusion. No acute fracture or  dislocation. IMPRESSION:  1. Moderate soft tissue swelling and edema about the ankle. 2. Mild plantar calcaneal spur. Mild degenerative changes as above. 3. No acute fracture or dislocation.             LABS       CBC:   Lab Results   Component Value Date/Time    WBC 5.2 09/11/2023 04:25 PM    HGB 12.3 09/11/2023 04:25 PM    HCT 39.2 09/11/2023 04:25 PM    MCV 93.1 09/11/2023 04:25 PM     09/11/2023 04:25 PM     BMP:   Lab Results   Component Value Date/Time     09/06/2023 09:33 AM    K 4.8 09/06/2023 09:33 AM     09/06/2023 09:33 AM    CO2 32 09/06/2023 09:33 AM    BUN 18 09/06/2023 09:33 AM    CREATININE 1.0 09/06/2023 09:33 AM     PT/INR:   Lab Results   Component Value Date/Time    PROTIME 12.9 12/20/2022 09:06 AM

## 2023-09-21 ENCOUNTER — APPOINTMENT (OUTPATIENT)
Dept: GENERAL RADIOLOGY | Age: 78
End: 2023-09-21
Attending: PODIATRIST
Payer: MEDICARE

## 2023-09-21 ENCOUNTER — ANESTHESIA EVENT (OUTPATIENT)
Dept: OPERATING ROOM | Age: 78
End: 2023-09-21
Payer: MEDICARE

## 2023-09-21 ENCOUNTER — ANESTHESIA (OUTPATIENT)
Dept: OPERATING ROOM | Age: 78
End: 2023-09-21
Payer: MEDICARE

## 2023-09-21 ENCOUNTER — HOSPITAL ENCOUNTER (OUTPATIENT)
Age: 78
Setting detail: OUTPATIENT SURGERY
Discharge: HOME | End: 2023-09-21
Attending: PODIATRIST | Admitting: PODIATRIST
Payer: MEDICARE

## 2023-09-21 VITALS
WEIGHT: 210 LBS | HEART RATE: 77 BPM | DIASTOLIC BLOOD PRESSURE: 85 MMHG | TEMPERATURE: 97 F | BODY MASS INDEX: 31.1 KG/M2 | SYSTOLIC BLOOD PRESSURE: 146 MMHG | OXYGEN SATURATION: 96 % | RESPIRATION RATE: 14 BRPM | HEIGHT: 69 IN

## 2023-09-21 DIAGNOSIS — Z41.9 ELECTIVE SURGERY: Primary | ICD-10-CM

## 2023-09-21 PROCEDURE — 3600000012 HC SURGERY LEVEL 2 ADDTL 15MIN: Performed by: PODIATRIST

## 2023-09-21 PROCEDURE — 7100000011 HC PHASE II RECOVERY - ADDTL 15 MIN: Performed by: PODIATRIST

## 2023-09-21 PROCEDURE — 28270 RELEASE OF FOOT CONTRACTURE: CPT | Performed by: PODIATRIST

## 2023-09-21 PROCEDURE — 6360000002 HC RX W HCPCS: Performed by: NURSE ANESTHETIST, CERTIFIED REGISTERED

## 2023-09-21 PROCEDURE — 2720000010 HC SURG SUPPLY STERILE: Performed by: PODIATRIST

## 2023-09-21 PROCEDURE — 28298 COR HLX VLGS PRX PHLX OSTEOT: CPT | Performed by: PODIATRIST

## 2023-09-21 PROCEDURE — 27698 REPAIR OF ANKLE LIGAMENT: CPT | Performed by: PODIATRIST

## 2023-09-21 PROCEDURE — 28292 COR HLX VLGS RSC PRX PHLX BS: CPT | Performed by: PODIATRIST

## 2023-09-21 PROCEDURE — 2500000003 HC RX 250 WO HCPCS: Performed by: NURSE ANESTHETIST, CERTIFIED REGISTERED

## 2023-09-21 PROCEDURE — 2709999900 HC NON-CHARGEABLE SUPPLY: Performed by: PODIATRIST

## 2023-09-21 PROCEDURE — 3700000000 HC ANESTHESIA ATTENDED CARE: Performed by: PODIATRIST

## 2023-09-21 PROCEDURE — 7100000010 HC PHASE II RECOVERY - FIRST 15 MIN: Performed by: PODIATRIST

## 2023-09-21 PROCEDURE — 28740 FUSION OF FOOT BONES: CPT | Performed by: PODIATRIST

## 2023-09-21 PROCEDURE — 2500000003 HC RX 250 WO HCPCS

## 2023-09-21 PROCEDURE — 64447 NJX AA&/STRD FEMORAL NRV IMG: CPT | Performed by: NURSE ANESTHETIST, CERTIFIED REGISTERED

## 2023-09-21 PROCEDURE — 3700000001 HC ADD 15 MINUTES (ANESTHESIA): Performed by: PODIATRIST

## 2023-09-21 PROCEDURE — 6360000002 HC RX W HCPCS

## 2023-09-21 PROCEDURE — C1713 ANCHOR/SCREW BN/BN,TIS/BN: HCPCS | Performed by: PODIATRIST

## 2023-09-21 PROCEDURE — 6360000002 HC RX W HCPCS: Performed by: PODIATRIST

## 2023-09-21 PROCEDURE — 3600000002 HC SURGERY LEVEL 2 BASE: Performed by: PODIATRIST

## 2023-09-21 PROCEDURE — 28308 INCISION OF METATARSAL: CPT | Performed by: PODIATRIST

## 2023-09-21 PROCEDURE — 64445 NJX AA&/STRD SCIATIC NRV IMG: CPT | Performed by: NURSE ANESTHETIST, CERTIFIED REGISTERED

## 2023-09-21 PROCEDURE — C1769 GUIDE WIRE: HCPCS | Performed by: PODIATRIST

## 2023-09-21 PROCEDURE — 2580000003 HC RX 258: Performed by: PODIATRIST

## 2023-09-21 PROCEDURE — 7100000000 HC PACU RECOVERY - FIRST 15 MIN: Performed by: PODIATRIST

## 2023-09-21 DEVICE — ICONIX SPEED ANCHOR 2.3MM 1.2MM AND 2.0MM XBRAID TT SUTURE TAPE
Type: IMPLANTABLE DEVICE | Site: FOOT | Status: FUNCTIONAL
Brand: ICONIX

## 2023-09-21 DEVICE — IMPLANTABLE DEVICE
Type: IMPLANTABLE DEVICE | Site: FOOT | Status: FUNCTIONAL
Brand: ORTHOLOC 3DI

## 2023-09-21 DEVICE — IMPLANTABLE DEVICE: Type: IMPLANTABLE DEVICE | Site: FOOT | Status: FUNCTIONAL

## 2023-09-21 DEVICE — IMPLANTABLE DEVICE
Type: IMPLANTABLE DEVICE | Site: FOOT | Status: FUNCTIONAL
Brand: ORTHOLOC™ 2 LAPIFUSE™

## 2023-09-21 DEVICE — IMPLANTABLE DEVICE
Type: IMPLANTABLE DEVICE | Site: FOOT | Status: FUNCTIONAL
Brand: FUSEFORCE

## 2023-09-21 DEVICE — GRAFT BONE TIB INJ 1.5CC ALLOGRFT AUGMENT: Type: IMPLANTABLE DEVICE | Site: FOOT | Status: FUNCTIONAL

## 2023-09-21 RX ORDER — FENTANYL CITRATE 50 UG/ML
25 INJECTION, SOLUTION INTRAMUSCULAR; INTRAVENOUS EVERY 5 MIN PRN
Status: CANCELLED | OUTPATIENT
Start: 2023-09-21

## 2023-09-21 RX ORDER — DEXAMETHASONE SODIUM PHOSPHATE 10 MG/ML
INJECTION INTRAMUSCULAR; INTRAVENOUS PRN
Status: DISCONTINUED | OUTPATIENT
Start: 2023-09-21 | End: 2023-09-21 | Stop reason: SDUPTHER

## 2023-09-21 RX ORDER — CYCLOBENZAPRINE HCL 5 MG
10 TABLET ORAL 3 TIMES DAILY PRN
Qty: 40 TABLET | Refills: 0 | Status: SHIPPED | OUTPATIENT
Start: 2023-09-21 | End: 2023-10-01

## 2023-09-21 RX ORDER — LIDOCAINE HYDROCHLORIDE 20 MG/ML
INJECTION, SOLUTION EPIDURAL; INFILTRATION; INTRACAUDAL; PERINEURAL PRN
Status: DISCONTINUED | OUTPATIENT
Start: 2023-09-21 | End: 2023-09-21 | Stop reason: SDUPTHER

## 2023-09-21 RX ORDER — ONDANSETRON 2 MG/ML
4 INJECTION INTRAMUSCULAR; INTRAVENOUS EVERY 6 HOURS PRN
Status: CANCELLED | OUTPATIENT
Start: 2023-09-21

## 2023-09-21 RX ORDER — SODIUM CHLORIDE 9 MG/ML
INJECTION, SOLUTION INTRAVENOUS PRN
Status: CANCELLED | OUTPATIENT
Start: 2023-09-21

## 2023-09-21 RX ORDER — SODIUM CHLORIDE 0.9 % (FLUSH) 0.9 %
5-40 SYRINGE (ML) INJECTION EVERY 12 HOURS SCHEDULED
Status: CANCELLED | OUTPATIENT
Start: 2023-09-21

## 2023-09-21 RX ORDER — FENTANYL CITRATE 50 UG/ML
INJECTION, SOLUTION INTRAMUSCULAR; INTRAVENOUS PRN
Status: DISCONTINUED | OUTPATIENT
Start: 2023-09-21 | End: 2023-09-21 | Stop reason: SDUPTHER

## 2023-09-21 RX ORDER — CEFADROXIL 500 MG/1
500 CAPSULE ORAL 2 TIMES DAILY
Qty: 20 CAPSULE | Refills: 0 | Status: SHIPPED | OUTPATIENT
Start: 2023-09-21 | End: 2023-10-01

## 2023-09-21 RX ORDER — OXYCODONE HYDROCHLORIDE 5 MG/1
10 TABLET ORAL EVERY 4 HOURS PRN
Status: CANCELLED | OUTPATIENT
Start: 2023-09-21

## 2023-09-21 RX ORDER — ASPIRIN 325 MG
325 TABLET ORAL DAILY
Qty: 14 TABLET | Refills: 0 | Status: SHIPPED | OUTPATIENT
Start: 2023-09-21

## 2023-09-21 RX ORDER — SODIUM CHLORIDE, SODIUM LACTATE, POTASSIUM CHLORIDE, CALCIUM CHLORIDE 600; 310; 30; 20 MG/100ML; MG/100ML; MG/100ML; MG/100ML
INJECTION, SOLUTION INTRAVENOUS CONTINUOUS
Status: DISCONTINUED | OUTPATIENT
Start: 2023-09-21 | End: 2023-09-21 | Stop reason: HOSPADM

## 2023-09-21 RX ORDER — ONDANSETRON 2 MG/ML
INJECTION INTRAMUSCULAR; INTRAVENOUS PRN
Status: DISCONTINUED | OUTPATIENT
Start: 2023-09-21 | End: 2023-09-21 | Stop reason: SDUPTHER

## 2023-09-21 RX ORDER — OXYCODONE HYDROCHLORIDE 5 MG/1
5 TABLET ORAL EVERY 4 HOURS PRN
Status: CANCELLED | OUTPATIENT
Start: 2023-09-21

## 2023-09-21 RX ORDER — HYDROCODONE BITARTRATE AND ACETAMINOPHEN 5; 325 MG/1; MG/1
1 TABLET ORAL EVERY 4 HOURS PRN
Qty: 42 TABLET | Refills: 0 | Status: SHIPPED | OUTPATIENT
Start: 2023-09-21 | End: 2023-09-28

## 2023-09-21 RX ORDER — SODIUM CHLORIDE 0.9 % (FLUSH) 0.9 %
5-40 SYRINGE (ML) INJECTION PRN
Status: CANCELLED | OUTPATIENT
Start: 2023-09-21

## 2023-09-21 RX ORDER — BUPIVACAINE HYDROCHLORIDE 5 MG/ML
INJECTION, SOLUTION EPIDURAL; INTRACAUDAL
Status: COMPLETED | OUTPATIENT
Start: 2023-09-21 | End: 2023-09-21

## 2023-09-21 RX ORDER — OXYCODONE HYDROCHLORIDE AND ACETAMINOPHEN 5; 325 MG/1; MG/1
1 TABLET ORAL EVERY 4 HOURS PRN
COMMUNITY

## 2023-09-21 RX ORDER — METOPROLOL TARTRATE 5 MG/5ML
INJECTION INTRAVENOUS PRN
Status: DISCONTINUED | OUTPATIENT
Start: 2023-09-21 | End: 2023-09-21 | Stop reason: SDUPTHER

## 2023-09-21 RX ORDER — ONDANSETRON 2 MG/ML
4 INJECTION INTRAMUSCULAR; INTRAVENOUS
Status: CANCELLED | OUTPATIENT
Start: 2023-09-21 | End: 2023-09-22

## 2023-09-21 RX ORDER — PROPOFOL 10 MG/ML
INJECTION, EMULSION INTRAVENOUS PRN
Status: DISCONTINUED | OUTPATIENT
Start: 2023-09-21 | End: 2023-09-21 | Stop reason: SDUPTHER

## 2023-09-21 RX ORDER — IPRATROPIUM BROMIDE AND ALBUTEROL SULFATE 2.5; .5 MG/3ML; MG/3ML
1 SOLUTION RESPIRATORY (INHALATION)
Status: CANCELLED | OUTPATIENT
Start: 2023-09-21 | End: 2023-09-22

## 2023-09-21 RX ORDER — ONDANSETRON 4 MG/1
4 TABLET, ORALLY DISINTEGRATING ORAL EVERY 8 HOURS PRN
Status: CANCELLED | OUTPATIENT
Start: 2023-09-21

## 2023-09-21 RX ADMIN — FENTANYL CITRATE 50 MCG: 50 INJECTION, SOLUTION INTRAMUSCULAR; INTRAVENOUS at 14:59

## 2023-09-21 RX ADMIN — BUPIVACAINE HYDROCHLORIDE 10 ML: 5 INJECTION, SOLUTION EPIDURAL; INTRACAUDAL at 13:17

## 2023-09-21 RX ADMIN — BUPIVACAINE HYDROCHLORIDE 30 ML: 5 INJECTION, SOLUTION EPIDURAL; INTRACAUDAL; PERINEURAL at 13:15

## 2023-09-21 RX ADMIN — Medication 2000 MG: at 15:02

## 2023-09-21 RX ADMIN — SODIUM CHLORIDE, POTASSIUM CHLORIDE, SODIUM LACTATE AND CALCIUM CHLORIDE: 600; 310; 30; 20 INJECTION, SOLUTION INTRAVENOUS at 14:10

## 2023-09-21 RX ADMIN — DEXAMETHASONE SODIUM PHOSPHATE 10 MG: 10 INJECTION INTRAMUSCULAR; INTRAVENOUS at 15:02

## 2023-09-21 RX ADMIN — LIDOCAINE HYDROCHLORIDE 100 MG: 20 INJECTION, SOLUTION EPIDURAL; INFILTRATION; INTRACAUDAL; PERINEURAL at 14:59

## 2023-09-21 RX ADMIN — METOPROLOL TARTRATE 3 MG: 1 INJECTION, SOLUTION INTRAVENOUS at 16:15

## 2023-09-21 RX ADMIN — PROPOFOL 150 MG: 10 INJECTION, EMULSION INTRAVENOUS at 14:59

## 2023-09-21 RX ADMIN — FENTANYL CITRATE 50 MCG: 50 INJECTION, SOLUTION INTRAMUSCULAR; INTRAVENOUS at 16:15

## 2023-09-21 RX ADMIN — DEXAMETHASONE SODIUM PHOSPHATE 10 MG: 10 INJECTION INTRAMUSCULAR; INTRAVENOUS at 13:17

## 2023-09-21 RX ADMIN — METOPROLOL TARTRATE 2 MG: 1 INJECTION, SOLUTION INTRAVENOUS at 16:53

## 2023-09-21 RX ADMIN — ONDANSETRON 4 MG: 2 INJECTION INTRAMUSCULAR; INTRAVENOUS at 16:41

## 2023-09-21 ASSESSMENT — PAIN SCALES - GENERAL
PAINLEVEL_OUTOF10: 0

## 2023-09-21 ASSESSMENT — PAIN - FUNCTIONAL ASSESSMENT: PAIN_FUNCTIONAL_ASSESSMENT: 0-10

## 2023-09-21 ASSESSMENT — ENCOUNTER SYMPTOMS: SHORTNESS OF BREATH: 1

## 2023-09-21 NOTE — ANESTHESIA POSTPROCEDURE EVALUATION
Department of Anesthesiology  Postprocedure Note    Patient: Keisha Melchor  MRN: [id number]  YOB: 1945  Date of evaluation: 9/21/2023      Procedure Summary     Date: 09/21/23 Room / Location: [address] [place] [address]    Anesthesia Start: 9790 Anesthesia Stop: 1706    Procedure: Left Lateral collateral ligament reconstruction of ankle, first tarsometatarsal capsulotomy, second metatarsal plantar joint capsulotomy with open repair plantar ligament rupture and hammer toe reconstruction (Left: Foot) Diagnosis:       Osteoarthritis of left foot, unspecified osteoarthritis type      (Osteoarthritis of left foot, unspecified osteoarthritis type [M19.072])    Surgeons: Oren Meng DPM Responsible Provider: LAWRENCE Davis CRNA    Anesthesia Type: general, regional ASA Status: 3          Anesthesia Type: No value filed.     Johan Phase I: Johan Score: 10    Johan Phase II:        Anesthesia Post Evaluation    Patient location during evaluation: PACU  Patient participation: complete - patient participated  Level of consciousness: awake and alert  Pain score: 0  Airway patency: patent  Nausea & Vomiting: no nausea and no vomiting  Complications: no  Cardiovascular status: blood pressure returned to baseline and hemodynamically stable  Respiratory status: acceptable, spontaneous ventilation and room air  Hydration status: euvolemic  Multimodal analgesia pain management approach  Pain management: adequate

## 2023-09-21 NOTE — ANESTHESIA PROCEDURE NOTES
Peripheral Block    Patient location during procedure: pre-op  Reason for block: post-op pain management and at surgeon's request  Start time: 9/21/2023 1:17 PM  End time: 9/21/2023 1:19 PM  Staffing  Performed: resident/CRNA   Resident/CRNA: LAWRENCE Huggins CRNA  Performed by: LAWRENCE Huggins CRNA  Authorized by: LAWRENCE Huggins CRNA    Preanesthetic Checklist  Completed: patient identified, IV checked, site marked, risks and benefits discussed, surgical/procedural consents, equipment checked, pre-op evaluation, timeout performed, anesthesia consent given, oxygen available, monitors applied/VS acknowledged, fire risk safety assessment completed and verbalized and blood product R/B/A discussed and consented  Peripheral Block   Patient position: supine  Prep: ChloraPrep  Provider prep: sterile gloves and mask  Patient monitoring: cardiac monitor, continuous pulse ox, IV access and responsive to questions  Block type: Femoral  Adductor canal  Laterality: left  Injection technique: single-shot  Guidance: ultrasound guided    Needle   Needle type: insulated echogenic nerve stimulator needle   Needle gauge: 21 G  Needle localization: ultrasound guidance  Assessment   Injection assessment: negative aspiration for heme, no paresthesia on injection, local visualized surrounding nerve on ultrasound and no intravascular symptoms  Paresthesia pain: none  Hemodynamics: stable  Outcomes: uncomplicated    Medications Administered  bupivacaine (MARCAINE) PF injection 0.5% - Perineural, Thigh Left   10 mL - 9/21/2023 1:17:00 PM

## 2023-09-21 NOTE — ANESTHESIA PROCEDURE NOTES
Peripheral Block    Patient location during procedure: pre-op  Reason for block: post-op pain management and at surgeon's request  Start time: 9/21/2023 1:15 PM  End time: 9/21/2023 1:17 PM  Staffing  Performed: resident/CRNA   Resident/CRNA: LAWRENCE Leyva CRNA  Performed by: LAWRENCE Leyva CRNA  Authorized by: LAWRENCE Leyva CRNA    Preanesthetic Checklist  Completed: patient identified, IV checked, site marked, risks and benefits discussed, surgical/procedural consents, equipment checked, pre-op evaluation, timeout performed, anesthesia consent given, oxygen available, monitors applied/VS acknowledged, fire risk safety assessment completed and verbalized and blood product R/B/A discussed and consented  Peripheral Block   Patient position: right lateral decubitus  Prep: ChloraPrep  Provider prep: sterile gloves and mask  Patient monitoring: continuous pulse ox, cardiac monitor, IV access and responsive to questions  Block type: Sciatic  Popliteal  Laterality: left  Injection technique: single-shot  Guidance: ultrasound guided    Needle   Needle type: insulated echogenic nerve stimulator needle   Needle gauge: 20 G  Needle localization: ultrasound guidance  Assessment   Injection assessment: negative aspiration for heme, no paresthesia on injection, local visualized surrounding nerve on ultrasound and no intravascular symptoms  Paresthesia pain: none  Hemodynamics: stable  Real-time US image taken/store: yes  Outcomes: uncomplicated    Medications Administered  bupivacaine (MARCAINE) PF injection 0.5% - Perineural, Leg Left   30 mL - 9/21/2023 1:15:00 PM

## 2023-09-21 NOTE — OP NOTE
grafting. We then obtained an 8 mm interpositional bone graft which was placed to help realign the metatarsal both in transverse and sagittal planes the fusion site was provisionally fixated with 0.062 K wires. We then obtained a guidepin from the 4.0 headed screw system which was placed in a distal medial plantar to proximal dorsal lateral orientation from the first metatarsal base into the intermediate cuneiform. This was noted to be in anatomic location on both AP lateral views. This was then measured countersunk and drilled appropriately and filled with a 4.0 mm headed compression screw. We then obtained a crosscheck fusion plate which was provisionally fixated both proximal and distal.  Distal bay screw holes were drilled bicortical and filled with 3.5 mm locking screws. Proximal base screw holes were drilled in similar fashion and filled with 3.5 mm locking screws. Left first metatarsophalangeal joint Norma Orchard bunionectomy 40341  Which was directed out to the level of the first metatarsal phalangeal joint which we performed a dorsal medial capsulotomy gaining access to the articular surface of the metatarsal head as well as base of the proximal phalanx. Patient was actually noted to have relatively good anatomy in regards to residual cartilage we then proceeded with an intra-articular lateral soft tissue release removing/transecting the deep intermetatarsal ligament, lateral collateral ligament as well as the fibular suspensory ligament. We then used a McGlamry elevator to release the underlying sesamoidal apparatus. A TPS sagittal saw was used to resect the dorsal medial eminence of the first metatarsal head followed by a medial based capsulorrhaphy in which removed a truncated wedge of tissue from the medial aspect was then imbricated using a running intracapsular 2-0 Ethilon stitch.     Left first proximal phalangeal osteotomy/Levi phalangeal osteotomy [id number]  Patient was found to have some nature of the digit in the form of his extensor tendon at which point we performed a Z-lengthening of his long extensor tendon which was then imbricated with a 2-0 Vicryl stitch. Final AP lateral radiographs were saved for permanent you showing anatomic alignment of the left midfoot status post first tarsometatarsal joint fusion with a first metatarsophalangeal joint Aldridge bunionectomy Akin phalangeal osteotomy as well as lesser second metatarsal Weil osteotomy and capsulotomy the second metatarsophalangeal joint. Operative sites were irrigated with normal sterile saline and layered closure of the incision sites was performed using 2-0 Vicryl for capsular closure followed by 3-0 Monocryl for subcutaneous and a combination of skin staples and Prolene for skin. Patient was placed in a Betadine nonadherent dressing with application of a short leg posterior splint. He tolerated procedure well was transferred to PACU in stable condition all questions concerns regarding postop management were addressed with family. Liberty Batista, ZAY, RNFA assisted throughout the duration of the procedure in terms of patient positioning intraoperative retraction primary incision closure application of postoperative dressings.     Negro Padilla   9/21/2023

## 2023-09-21 NOTE — BRIEF OP NOTE
Brief Postoperative Note      Patient: Joann Osullivan  YOB: 1945  MRN: [id number]    Date of Procedure: 9/21/2023    Pre-Op Diagnosis Codes:     * Osteoarthritis of left foot, unspecified osteoarthritis type [M19.072]    Post-Op Diagnosis: Same       Procedure(s):  Left Lateral collateral ligament reconstruction of ankle   first tarsometatarsal arthrodesis  1st MPJ gill bunionectomy  1st proximal phalanx akin phalangeal osteotomy  2nd MPJ capsulotomy   second metatarsal weil osteotomy    Surgeon(s):  Hodan Damon DPM    Assistant:  Kavon Goodson CNP     Anesthesia: General    Estimated Blood Loss (mL): less than 50     Complications: None    Specimens:   * No specimens in log *    Implants:  Implant Name Type Inv. Item Serial No.  Lot No. LRB No. Used Action   GRAFT BONE TIB INJ 1.5CC ALLOGRFT AUGMENT - [id number]  GRAFT BONE TIB INJ 1.5CC ALLOGRFT AUGMENT  [facility] [employer] [facility] [id number] Left 1 Implanted   ALLOGRAFT BNE KOWALSKI WD 8 MM BICORTICAL ALLOPURE - [id number]  ALLOGRAFT BNE KOWALSKI WD 8 MM BICORTICAL ALLOPURE  [employer] [facility]  Left 1 Implanted   STAPLE BNE FIX [id number] NIT - [id number]  STAPLE BNE FIX [id number] NIT  [facility] [employer] [facility] [id number] Left 1 Implanted   PLATE BNE LAPIDUS STD LT ORTHOLOC 2 LAPIFUSE - [id number]  PLATE BNE LAPIDUS STD LT ORTHOLOC 2 LAPIFUSE  [facility]  Left 1 Implanted   SCREW BNE L30MM DIA3. 5MM PUR MARISA TI FOREFOOT MIDFOOT ANK - [id number]  SCREW BNE L30MM DIA3. 5MM PUR MARISA TI FOREFOOT MIDFOOT ANK  [employer] [facility]  Left 1 Implanted   SCREW BNE L20MM DIA3.5MM MARISA FT ANK TI CHAO FULL THRD FOR - [id number]  SCREW BNE L20MM DIA3.5MM MARISA FT ANK TI CHAO FULL THRD FOR  [facility]  Left 1 Implanted   SCREW BNE L22MM DIA3.5MM MARISA FT ANK TI CHAO FULL THRD FOR - [id number]  SCREW BNE L22MM DIA3.5MM MARISA FT ANK TI CHAO FULL THRD FOR  [employer]  Left 1 DISCHARGE PLANNING     Discharge to home or other facility with appropriate resources Progressing        INFECTION - ADULT     Absence or prevention of progression during hospitalization Progressing     Absence of fever/infection during neutropenic period Progressing        Knowledge Deficit     Patient/family/caregiver demonstrates understanding of disease process, treatment plan, medications, and discharge instructions Progressing        PAIN - ADULT     Verbalizes/displays adequate comfort level or baseline comfort level Progressing        Potential for Falls     Patient will remain free of falls Progressing        SAFETY ADULT     Maintain or return to baseline ADL function Progressing     Maintain or return mobility status to optimal level Progressing     Patient will remain free of falls Progressing

## 2023-09-21 NOTE — DISCHARGE INSTRUCTIONS
Discharge instructions:    Fluids and Diet  -begin with clear liquids, bouillon, dry toast, soda crackers  -If not nauseated, you may resume a regular diet when you desire    Medications  -Take prescription medications only as directed  -If pain is not severe, you may take the non-prescription medication that you normally take  -You may resume your prescription medication scheduled  -If any side effects or adverse reactions occur, discontinue the medication and contact your doctor  -review the patient drug information leaflet, when provided, before you begin taking the medication    Ambulation and Activity (nonweightbearing operative limb)  -You are advised to go directly home from the hospital  -Use the prescribed walking aids                            - crutches/roll-a-bout/walker and surgical boot   -Do no lift or move heavy objects  -Do not Drive    Post Anesthesia Instructions  -Do not drive or operate machinery  -Do not consume alcohol, tranquilizers, sleeping medications, or a non-prescription pain medication  -Do not make important decisions  -You should have someone home with you tonight    Care of the Operative Site  -Keep cast or bandage clean, dry, and intact  -Do not shower  -Do not remove bandage  -Elevate Operative extremity as much as possible to reduce swelling and discomfort for the first 72 hours  -Apply ice bag wrapped in thick towel over bandage 20 minutes of every hour for the first 72 hours while awake  -Do not attempt to put anything between the cast or dressing ans skin, some itching is normal    Special Instructions: Call doctor immediately if you develop any of the following:  -Fever over 100 degrees by mouth - take your temperature daily  -Pain not relieved by medication ordered  -Swelling, increased redness, warmth, or hardness around operative area  -Numb, tingling or cold toes  -Toe(s) become white or bluish  -Bandage becomes wet, soiled, or blood soaked (a small amount of bleeding may be normal)  -Increasing or progressive drainage from surgical area    Call the Office for any other questions or concerns: [facility], [address] [place] [address],[address] [facility]   Electronically signed by Kelli Wilson DPM on 9/21/2023 at 5:02 PM

## 2023-09-21 NOTE — H&P
[address]         History and physical        Norberto Osullivan  MEDICAL RECORD NUMBER:  [id number]  AGE: 77 y.o. GENDER: male  : 1945  EPISODE DATE:  23      Subjective:           Chief Complaint   Patient presents with    Ankle Injury       Recheck right ankle sprain. Also  do H&P for left foot fusion surgery and do consent. Surgery scheduled for 23         HISTORY of PRESENT ILLNESS HPI  This will serve as the patient's clinical note as well as the history and physical for planned upcoming procedure. Patient is a pleasant 65-year-old male who is following up for persistent ongoing left foot pain. Up until this point, patient has failed conservative treatment including shoe modification, anti-inflammatories, and knee-high graded compression. At this point in time, patient is interested in moving forward with surgical reconstruction of the left foot reviewed previous x-rays with significant osteophytic type changes with metatarsus elevatus of the left first ray due to arthritis of the first metatarsal joint as well as chronic subluxation of the second tarsal phalangeal joint with transverse and sagittal plane deviations as well as chronic lateral ankle instability. Patient will require surgical intervention in the form of a left ankle lateral collateral ligament repair, left first tarsometatarsal joint fusion, second metatarsal phalange joint capsulotomy with open repair of plantar ligament insufficiency as well as likely second hammertoe reconstruction. All questions concerns guarding perioperative management including benefits risk complications and alternatives to procedure were discussed in detail.                                             PAST MEDICAL HISTORY     Past Medical History             Diagnosis Date    Abnormal cardiovascular stress test 2022    Anticoagulated on Coumadin      Asthma      Atrial fibrillation ([address]) 2022    CAD (coronary artery 04:25 PM      BMP:         Lab Results   Component Value Date/Time      09/06/2023 09:33 AM     K 4.8 09/06/2023 09:33 AM      09/06/2023 09:33 AM     CO2 32 09/06/2023 09:33 AM     BUN 18 09/06/2023 09:33 AM     CREATININE 1.0 09/06/2023 09:33 AM      PT/INR:         Lab Results   Component Value Date/Time     PROTIME 12.9 12/20/2022 09:06 AM     INR 1.1 12/20/2022 09:06 AM     INR 2.1 12/09/2022 12:00 AM      Prealbumin: No results found for: \"PREALBUMIN\"  Albumin:        Lab Results   Component Value Date/Time     LABALBU 4.5 09/06/2023 09:33 AM      Sed Rate:No results found for: \"SEDRATE\"  CRP: No results found for: \"CRP\"  Micro: No results found for: \"BC\"   Hemoglobin A1C:         Lab Results   Component Value Date/Time     LABA1C 6.6 09/06/2023 09:34 AM         Assessment:      1.left foot primary osteoarthritis  2. left ankle instability  3.subluxation lesser second metatarsal  4. left hammertoe  5.acute right ankle sprain            Patient Active Problem List   Diagnosis    Chronic back pain    Neck pain, chronic    Brachial neuritis or radiculitis    Spinal stenosis, lumbar region, with neurogenic claudication    Displacement of cervical intervertebral disc without myelopathy    CAD (coronary artery disease)    GILL (dyspnea on exertion)    S/P lumbar spinal fusion    Obesity (BMI 35.0-39.9 without comorbidity)    HTN (hypertension)    Hyperlipidemia    Type 2 diabetes mellitus without complication (HCC)    Chronic bilateral low back pain with bilateral sciatica    Left hip pain    Acquired spondylolisthesis    Back pain    Grade III hemorrhoids    Recurrent major depressive disorder, in partial remission (HCC)    Carcinoma larynx (HCC)    Atrial fibrillation with RVR (HCC)    Anemia    Nasal discharge    S/P cardiac cath    Coronary artery disease involving native coronary artery of native heart without angina pectoris    S/P coronary artery stent placement    Presence of Watchman left atrial

## 2023-09-21 NOTE — H&P
Department of Surgery  H&P Interval Note  Outpatient History and Physical was reviewed pre-operatively, with no interval changes to note prior to scheduled surgical intervention. Patient was assessed, all questions/concerns regarding sumeet-operative management were addressed to patient satisfaction. Operative site was marked prior to transportation to the operative room. Miller Thorne D.P.M.

## 2023-09-22 PROBLEM — Z41.9 ELECTIVE SURGERY: Status: ACTIVE | Noted: 2023-09-11

## 2023-10-02 ENCOUNTER — HOSPITAL ENCOUNTER (OUTPATIENT)
Dept: GENERAL RADIOLOGY | Age: 78
Discharge: HOME | End: 2023-10-04
Payer: MEDICARE

## 2023-10-02 ENCOUNTER — OFFICE VISIT (OUTPATIENT)
Dept: ORTHOPEDIC SURGERY | Age: 78
End: 2023-10-02
Payer: MEDICARE

## 2023-10-02 VITALS
HEIGHT: 69 IN | SYSTOLIC BLOOD PRESSURE: 146 MMHG | BODY MASS INDEX: 31.1 KG/M2 | DIASTOLIC BLOOD PRESSURE: 76 MMHG | WEIGHT: 210 LBS

## 2023-10-02 DIAGNOSIS — M19.072 PRIMARY OSTEOARTHRITIS OF LEFT FOOT: Primary | ICD-10-CM

## 2023-10-02 DIAGNOSIS — Z98.890 STATUS POST FOOT SURGERY: ICD-10-CM

## 2023-10-02 DIAGNOSIS — Z09 STATUS POST ORTHOPEDIC SURGERY, FOLLOW-UP EXAM: ICD-10-CM

## 2023-10-02 PROCEDURE — 73630 X-RAY EXAM OF FOOT: CPT

## 2023-10-02 PROCEDURE — 29405 APPL SHORT LEG CAST: CPT | Performed by: PODIATRIST

## 2023-10-02 PROCEDURE — 99214 OFFICE O/P EST MOD 30 MIN: CPT | Performed by: PODIATRIST

## 2023-10-02 RX ORDER — HYDROCODONE BITARTRATE AND ACETAMINOPHEN 5; 325 MG/1; MG/1
1 TABLET ORAL EVERY 4 HOURS PRN
Qty: 42 TABLET | Refills: 0 | Status: SHIPPED | OUTPATIENT
Start: 2023-10-02 | End: 2023-10-09
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
## 2023-10-09 DIAGNOSIS — Z09 STATUS POST ORTHOPEDIC SURGERY, FOLLOW-UP EXAM: Primary | ICD-10-CM

## 2023-10-09 DIAGNOSIS — M19.072 PRIMARY OSTEOARTHRITIS OF LEFT FOOT: ICD-10-CM

## 2023-10-16 ENCOUNTER — HOSPITAL ENCOUNTER (OUTPATIENT)
Dept: GENERAL RADIOLOGY | Age: 78
Discharge: HOME | End: 2023-10-18
Attending: PODIATRIST
Payer: MEDICARE

## 2023-10-16 ENCOUNTER — OFFICE VISIT (OUTPATIENT)
Dept: ORTHOPEDIC SURGERY | Age: 78
End: 2023-10-16
Attending: PODIATRIST
Payer: MEDICARE

## 2023-10-16 VITALS — BODY MASS INDEX: 31.1 KG/M2 | WEIGHT: 210 LBS | HEIGHT: 69 IN

## 2023-10-16 DIAGNOSIS — Z09 STATUS POST ORTHOPEDIC SURGERY, FOLLOW-UP EXAM: Primary | ICD-10-CM

## 2023-10-16 DIAGNOSIS — Z09 STATUS POST ORTHOPEDIC SURGERY, FOLLOW-UP EXAM: ICD-10-CM

## 2023-10-16 DIAGNOSIS — M21.42 ACQUIRED LEFT FLAT FOOT: ICD-10-CM

## 2023-10-16 DIAGNOSIS — M19.072 PRIMARY OSTEOARTHRITIS OF LEFT FOOT: ICD-10-CM

## 2023-10-16 PROCEDURE — 99214 OFFICE O/P EST MOD 30 MIN: CPT | Performed by: NURSE PRACTITIONER

## 2023-10-16 PROCEDURE — 73610 X-RAY EXAM OF ANKLE: CPT

## 2023-10-16 PROCEDURE — 99024 POSTOP FOLLOW-UP VISIT: CPT | Performed by: NURSE PRACTITIONER

## 2023-10-20 NOTE — PROGRESS NOTES
orthopedic surgery, follow-up  exam  TECHNOLOGIST PROVIDED HISTORY:  Reason for Exam: S/p orthopedic surgery 3 weeks ago; pt unable to bear  weight- done supine     FINDINGS:  There is moderate ankle osteoarthritis. Surgical staples are noted in the  soft tissues. No acute fracture or dislocation is identified. There is a  plantar calcaneal enthesophyte. Hardware is partially seen in the midfoot. IMPRESSION:  Moderate ankle osteoarthritis with postoperative changes       LABS       CBC:   Lab Results   Component Value Date/Time    WBC 5.2 09/11/2023 04:25 PM    HGB 12.3 09/11/2023 04:25 PM    HCT 39.2 09/11/2023 04:25 PM    MCV 93.1 09/11/2023 04:25 PM     09/11/2023 04:25 PM     BMP:   Lab Results   Component Value Date/Time     09/06/2023 09:33 AM    K 4.8 09/06/2023 09:33 AM     09/06/2023 09:33 AM    CO2 32 09/06/2023 09:33 AM    BUN 18 09/06/2023 09:33 AM    CREATININE 1.0 09/06/2023 09:33 AM     PT/INR:   Lab Results   Component Value Date/Time    PROTIME 12.9 12/20/2022 09:06 AM    INR 1.1 12/20/2022 09:06 AM    INR 2.1 12/09/2022 12:00 AM     Prealbumin: No results found for: \"PREALBUMIN\"  Albumin:  Lab Results   Component Value Date/Time    LABALBU 4.5 09/06/2023 09:33 AM     Sed Rate:No results found for: \"SEDRATE\"  CRP: No results found for: \"CRP\"  Micro: No results found for: \"BC\"   Hemoglobin A1C:   Lab Results   Component Value Date/Time    LABA1C 6.6 09/06/2023 09:34 AM       Assessment:     1. Primary osteoarthritis of left foot    2.  Status post orthopedic surgery, follow-up exam      Patient Active Problem List   Diagnosis    Chronic back pain    Neck pain, chronic    Brachial neuritis or radiculitis    Spinal stenosis, lumbar region, with neurogenic claudication    Displacement of cervical intervertebral disc without myelopathy    CAD (coronary artery disease)    GILL (dyspnea on exertion)    S/P lumbar spinal fusion    Obesity (BMI 35.0-39.9 without comorbidity)    HTN

## 2023-10-24 ENCOUNTER — HOSPITAL ENCOUNTER (OUTPATIENT)
Age: 78
Setting detail: OUTPATIENT SURGERY
Discharge: HOME | End: 2023-10-24
Attending: SURGERY | Admitting: SURGERY
Payer: MEDICARE

## 2023-10-24 ENCOUNTER — ANESTHESIA (OUTPATIENT)
Dept: OPERATING ROOM | Age: 78
End: 2023-10-24
Payer: MEDICARE

## 2023-10-24 ENCOUNTER — ANESTHESIA EVENT (OUTPATIENT)
Dept: OPERATING ROOM | Age: 78
End: 2023-10-24
Payer: MEDICARE

## 2023-10-24 VITALS
RESPIRATION RATE: 16 BRPM | SYSTOLIC BLOOD PRESSURE: 140 MMHG | OXYGEN SATURATION: 94 % | TEMPERATURE: 96.8 F | HEART RATE: 78 BPM | DIASTOLIC BLOOD PRESSURE: 71 MMHG

## 2023-10-24 DIAGNOSIS — Z80.0 FAMILY HISTORY OF COLON CANCER: ICD-10-CM

## 2023-10-24 DIAGNOSIS — Z86.010 HISTORY OF COLON POLYPS: ICD-10-CM

## 2023-10-24 PROCEDURE — 6360000002 HC RX W HCPCS: Performed by: NURSE ANESTHETIST, CERTIFIED REGISTERED

## 2023-10-24 PROCEDURE — 88305 TISSUE EXAM BY PATHOLOGIST: CPT

## 2023-10-24 PROCEDURE — 3609010400 HC COLONOSCOPY POLYPECTOMY HOT BIOPSY: Performed by: SURGERY

## 2023-10-24 PROCEDURE — 3700000001 HC ADD 15 MINUTES (ANESTHESIA): Performed by: SURGERY

## 2023-10-24 PROCEDURE — 7100000011 HC PHASE II RECOVERY - ADDTL 15 MIN: Performed by: SURGERY

## 2023-10-24 PROCEDURE — 2709999900 HC NON-CHARGEABLE SUPPLY: Performed by: SURGERY

## 2023-10-24 PROCEDURE — 2580000003 HC RX 258: Performed by: SURGERY

## 2023-10-24 PROCEDURE — 3700000000 HC ANESTHESIA ATTENDED CARE: Performed by: SURGERY

## 2023-10-24 PROCEDURE — 7100000010 HC PHASE II RECOVERY - FIRST 15 MIN: Performed by: SURGERY

## 2023-10-24 RX ORDER — SODIUM CHLORIDE, SODIUM LACTATE, POTASSIUM CHLORIDE, CALCIUM CHLORIDE 600; 310; 30; 20 MG/100ML; MG/100ML; MG/100ML; MG/100ML
INJECTION, SOLUTION INTRAVENOUS CONTINUOUS
Status: DISCONTINUED | OUTPATIENT
Start: 2023-10-24 | End: 2023-10-24 | Stop reason: HOSPADM

## 2023-10-24 RX ORDER — PROPOFOL 10 MG/ML
INJECTION, EMULSION INTRAVENOUS PRN
Status: DISCONTINUED | OUTPATIENT
Start: 2023-10-24 | End: 2023-10-24 | Stop reason: SDUPTHER

## 2023-10-24 RX ADMIN — PROPOFOL 150 MCG/KG/MIN: 10 INJECTION, EMULSION INTRAVENOUS at 10:00

## 2023-10-24 RX ADMIN — PROPOFOL 100 MG: 10 INJECTION, EMULSION INTRAVENOUS at 09:59

## 2023-10-24 RX ADMIN — SODIUM CHLORIDE, POTASSIUM CHLORIDE, SODIUM LACTATE AND CALCIUM CHLORIDE: 600; 310; 30; 20 INJECTION, SOLUTION INTRAVENOUS at 09:43

## 2023-10-24 ASSESSMENT — PAIN SCALES - GENERAL
PAINLEVEL_OUTOF10: 0

## 2023-10-24 NOTE — H&P
H &P Colonoscopy  Patient:Norberto Osullivan                 :1945  (Yes) patient has seen Dr. Freddy Parker prior to procedure  PCP: Dr. Reddy Chen     CC: hx of colon polyp, family hx of colon cancer           HPI:     ROS:  All 12 systems negative except the positives in the HPI. Colonoscopy  Abd pain: no  Anemia: no  Bloating:no  Diarrhea: no  Constipation: no  Melena: no  Hematochezia:no  Rectal Bleeding:no  Rectal/Anal Pain:no  Pruritus: no  Family history colon Cancer: yes, father  Previous colon cancer: no  Previous Colon Polyp: yes, 2018  Change in bowels: no  Decrease caliber of stool: no  Change in color of stool: no     Previous work up date: Colonoscopy with Dr. Freddy Parker at [facility] on 10/15/2018= hyperplastic polyp x1, severe sigmoid diverticulosis, large external hemorrhoid               Family History   Problem Relation Age of Onset    Heart Disease Mother      Heart Disease Father      Cancer Father           colon cancer      Social History            Socioeconomic History    Marital status:        Spouse name: Not on file    Number of children: Not on file    Years of education: Not on file    Highest education level: Not on file   Occupational History    Not on file   Tobacco Use    Smoking status: Former       Packs/day: 1.00       Years: 40.00       Pack years: 40.00       Types: Cigarettes, Pipe, Cigars       Start date: 1964       Quit date: 2011       Years since quittin.2    Smokeless tobacco: Former       Types: Chew       Quit date: 2011   Vaping Use    Vaping Use: Never used   Substance and Sexual Activity    Alcohol use:  Yes       Alcohol/week: 0.0 standard drinks       Comment: RARE    Drug use: No    Sexual activity: Not on file   Other Topics Concern    Not on file   Social History Narrative     ** Merged History Encounter **            Social Determinants of Health          Financial Resource Strain: Low Risk     Difficulty of Paying Living Expenses: Not hard at all   Food

## 2023-10-24 NOTE — ANESTHESIA POSTPROCEDURE EVALUATION
Department of Anesthesiology  Postprocedure Note    Patient: Betty Plunkett  MRN: [id number]  YOB: 1945  Date of evaluation: 10/24/2023      Procedure Summary     Date: 10/24/23 Room / Location: [facility]    Anesthesia Start: 5195 Anesthesia Stop: 1034    Procedure: COLONOSCOPY POLYPECTOMY HOT BIOPSY Diagnosis:       History of colon polyps      Family history of colon cancer    Surgeons: Suzy Gilmore MD Responsible Provider: LAWRENCE Grace CRNA    Anesthesia Type: general, TIVA ASA Status: 3          Anesthesia Type: No value filed.     Johan Phase I: Johan Score: 10    Johan Phase II: Johan Score: 8      Anesthesia Post Evaluation    Patient location during evaluation: bedside  Patient participation: complete - patient participated  Level of consciousness: awake  Pain score: 0  Airway patency: patent  Nausea & Vomiting: no nausea and no vomiting  Complications: no  Cardiovascular status: hemodynamically stable  Respiratory status: nonlabored ventilation and spontaneous ventilation  Hydration status: stable  Pain management: adequate

## 2023-10-24 NOTE — OP NOTE
COLONOSCOPY PROCEDURE NOTE:      Pre op diagnosis:     1. Hx of colon polyp  2.  + fam hx colon cancer  3. Last CS 2018, = polypl  4. Age69 yo     Operative Procedure:    1. Colonoscopy with hot forceps    Surgeon:    Dr. Good Vee     Anesthesia:    IV sedation per CRNA    EBL:  minimal    Procedure:    Patient was taken to the endoscopy suite and placed in a left lateral decubitus position. They were given IV sedation as mentioned above. A digital rectal exam was performed. There were no masses and anal tone was normal.  The colonoscope was inserted into the rectum and carefully manipulated up through the sigmoid colon, transverse colon, right colon and into the cecum. The cecum was identified by the ileal cecal valve and transabdominal illumination. Then the scope was slowly withdrawn. The scope was retroflexed in the rectum and the dentate line was examined. The scope was removed. The patient tolerated the procedure well. The following findings were noted.         Final Diagnosis:    Moderate to severe diverticulosis  8 mm polyps at cecum, ICV and rectum x 4, all removed with hot forceps    Plan:    Await bx  Repeat CS in 3 years due to > 3 polyps, but must look at risks/rewards at past the age of 75

## 2023-10-24 NOTE — DISCHARGE INSTRUCTIONS
Await biopsy  Repeat Colonoscopy in 3 years due to > 3 polyps, but must look at risks/rewards at past the age of 75    DISCHARGE INSTRUCTIONS FOLLOWING COLONOSCOPY    Activity  You have received sedation. Your judgement and coordination may be impaired. Do not drive or operate any machinery today. Do not make personal, medical, legal or business decisions today. Do not consume alcohol, tranquilizers or sleeping medications today unless otherwise instructed by your physician. Rest today. You may resume normal activity tomorrow. Because air is put into your colon during this procedure, it is normal to pass large amounts of air from your rectum. Feelings of bloating, gas or cramping may persist for 24 hours. You may not have a bowel movement for 1-3 days after this procedure. Diet  Begin with sips of clear liquids and if no nausea, you may progress to your normal diet. Medications  You may resume your usual medications, unless otherwise instructed. Follow-Up  As instructed. CALL YOUR PHYSICIAN if you experience any of the following:  Bleeding from your rectum (a teaspoonful or more)      - If you had a biopsy taken today, a small amount of bleeding may occur. Severe abdominal pain/cramping and/or distention that is not relieved by passing gas. Persistent nausea or vomiting. Signs of infection including fever, chills, redness or swelling @ the IV site.       If you have questions or problems call [phone] [facility]) or after business hours call [phone] [facility])

## 2023-10-25 ENCOUNTER — IMMUNIZATION (OUTPATIENT)
Dept: LAB | Age: 78
End: 2023-10-25
Payer: MEDICARE

## 2023-10-25 LAB — SURGICAL PATHOLOGY REPORT: NORMAL

## 2023-10-25 PROCEDURE — PBSHW INFLUENZA, FLUAD, (AGE 65 Y+), IM, PF, 0.5 ML: Performed by: INTERNAL MEDICINE

## 2023-10-25 PROCEDURE — G0008 ADMIN INFLUENZA VIRUS VAC: HCPCS | Performed by: INTERNAL MEDICINE

## (undated) DEVICE — C-ARMOR C-ARM EQUIPMENT COVERS CLEAR STERILE UNIVERSAL FIT 12 PER CASE: Brand: C-ARMOR

## (undated) DEVICE — MARKER W/PRE PRINTED CUSTOM LABEL

## (undated) DEVICE — CHLORAPREP 26ML CLEAR

## (undated) DEVICE — GOWN,AURORA,NONRNF,XL,30/CS: Brand: MEDLINE

## (undated) DEVICE — NEEDLE SPNL 22GA L5IN BLK HUB S STL W/ QNCKE PNT W/OUT

## (undated) DEVICE — JELLY LUBRICATING 4OZ FLIP TOP TB E Z

## (undated) DEVICE — SUTURE VCRL + SZ 2 L27IN ABSRB UD L40MM CP 1/2 CIR REV CUT VCP195H

## (undated) DEVICE — SINGLE PORT MANIFOLD: Brand: NEPTUNE 2

## (undated) DEVICE — YANKAUER,BULB TIP,W/O VENT,RIGID,STERILE: Brand: MEDLINE

## (undated) DEVICE — SHEET, T, LAPAROTOMY, STERILE: Brand: MEDLINE

## (undated) DEVICE — CANNULA NSL AD L2IN ETCO2 SAMP SFT CRUSH RESIST FEM AIRLFE

## (undated) DEVICE — CONNECTOR TBNG AUX H2O JET DISP FOR OLY 160/180 SER

## (undated) DEVICE — BANDAGE ADH DIA7/8IN NAT FLEX-FABRIC WVN FOR WND PROTCT

## (undated) DEVICE — COVER LT HNDL BLU PLAS

## (undated) DEVICE — SYRINGE IRRIG 60ML SFT PLIABLE BLB EZ TO GRP 1 HND USE W/

## (undated) DEVICE — GLOVE ORANGE PI 7 1/2   MSG9075

## (undated) DEVICE — SUTURE VCRL SZ 2-0 L27IN ABSRB UD L36MM CP-1 1/2 CIR REV J266H

## (undated) DEVICE — TRAY PAIN CUST

## (undated) DEVICE — TOWEL,OR,DSP,ST,BLUE,STD,4/PK,20PK/CS: Brand: MEDLINE

## (undated) DEVICE — COVER,TABLE,77X90,STERILE: Brand: MEDLINE

## (undated) DEVICE — SET EXTN SM 0.5ML L13IN BOR W/O INJ SITE

## (undated) DEVICE — SUTURE PROL SZ 3-0 L18IN NONABSORBABLE BLU L24MM FS-1 3/8 8684G

## (undated) DEVICE — LINE SAMP O2 6.5FT W/FEMALE CONN F/ADULT CAPNOLINE PLUS

## (undated) DEVICE — 9165 UNIVERSAL PATIENT PLATE: Brand: 3M™

## (undated) DEVICE — GLOVE SURG SZ 65 L12IN THK91MIL BRN LTX FREE

## (undated) DEVICE — 20 ML SYRINGE LUER-LOCK TIP: Brand: MONOJECT

## (undated) DEVICE — GOWN,AURORA,NONREINFORCED,LARGE: Brand: MEDLINE

## (undated) DEVICE — KIT POS FRME SFT TCH HDRST PLLW ARM CRADL PD CVR DRP BAR

## (undated) DEVICE — GUIDEWIRE ORTH DIA1.4MM FOR MAXTORQUE CANN SCR SYS

## (undated) DEVICE — GLOVE SURG SZ 8 L12IN THK91MIL BRN LTX FREE POLYCHLOROPRENE

## (undated) DEVICE — BANDAGE COMPR W6INXL10YD ST M E WHITE/BEIGE

## (undated) DEVICE — GARMENT,MEDLINE,DVT,INT,CALF,MED, GEN2: Brand: MEDLINE

## (undated) DEVICE — Z DISCONTINUED USE 2624852 GLOVE SURG 7 PF TEXT NEOPRNE BRN STRL NEOLON 2G LF

## (undated) DEVICE — DISCONTINUED USE 394504 SYRINGE LUER LOCK TIP 12 ML

## (undated) DEVICE — PRECISION OFFSET (9.0 X 0.254 X 18.5MM)

## (undated) DEVICE — 4-PORT MANIFOLD: Brand: NEPTUNE 2

## (undated) DEVICE — 1200CC GUARDIAN II: Brand: GUARDIAN

## (undated) DEVICE — GOWN,AURORA,NON-REINFORCED,2XL: Brand: MEDLINE

## (undated) DEVICE — GLOVE SURG SZ 8 L12IN FNGR THK13MIL BRN LTX SYN POLYMER W

## (undated) DEVICE — BASIN SET: Brand: MEDLINE INDUSTRIES, INC.

## (undated) DEVICE — MANIFOLD 4 PRTS RT SIDE 360/ON

## (undated) DEVICE — BITE BLOCK W/VELCRO STRAP

## (undated) DEVICE — SOLUTION SCRB 4OZ 4% CHG CLN BASE FOR PT SKIN ANTISEPSIS

## (undated) DEVICE — 6 ML SYRINGE LUER-LOCK TIP: Brand: MONOJECT

## (undated) DEVICE — GLOVE ORANGE PI 7   MSG9070

## (undated) DEVICE — Z DISCONTINUED PER MEDLINE USE 2741943 DRESSING AQUACEL 10 IN ALG W9XL25CM SIL CVR WTRPRF VIR BACT BARR ANTIMIC

## (undated) DEVICE — BANDAGE COBAN 4 IN COMPR W4INXL5YD FOAM COHESIVE QUIK STK SELF ADH SFT

## (undated) DEVICE — GLOVE ORANGE PI 8   MSG9080

## (undated) DEVICE — BANDAGE,GAUZE,BULKEE II,4.5"X4.1YD,STRL: Brand: MEDLINE

## (undated) DEVICE — Device

## (undated) DEVICE — Z DISCONTINUED USE 2651424 COVER EQUIP D18IN CNTOUR ELAS BND SNUG CLSR

## (undated) DEVICE — FORCEPS BX L240CM JAW DIA2.2MM RAD JAW 4 HOT DISP

## (undated) DEVICE — NEEDLE FLTR 18GA L1.5IN MEM THK5UM BLNT DISP

## (undated) DEVICE — Z DISCONTINUED USE 2624853 GLOVE SURG SZ 75 L12IN THK91MIL BRN LTX FREE

## (undated) DEVICE — DISCONTINUED NO SUB IDED TG GLOVE SURG SENSICARE ALOE LT LF PF ST GRN SZ 8

## (undated) DEVICE — CATHETER URET 5FR L70CM TIP 8FR OPN END CONE TIP INJ HUB

## (undated) DEVICE — SOLUTION IV 1000ML LAC RINGERS PH 6.5 INJ USP VIAFLX PLAS

## (undated) DEVICE — SOLUTION IV IRRIG WATER 1000ML POUR BRL 2F7114

## (undated) DEVICE — CANNULA 15CM, 5MM TIP, 18G: Brand: CANNULARFK

## (undated) DEVICE — SOLUTION IV IRRIG POUR BRL 0.9% SODIUM CHL 2F7124

## (undated) DEVICE — SOLUTION IV 1000ML 0.9% SOD CHL PH 5 INJ USP VIAFLX PLAS

## (undated) DEVICE — ENDOSCOPIC KIT 10X0.75 FT COLON W/ 1.1 OZ LUBE DBL BNDL SEAL

## (undated) DEVICE — PAD ELECSURG GRND DISP

## (undated) DEVICE — COUNTERSINK DRL DIA4MM G

## (undated) DEVICE — SYRINGE,EAR/ULCER, 2 OZ, STERILE: Brand: MEDLINE

## (undated) DEVICE — SUTURE MCRYL SZ 3-0 L27IN ABSRB UD L24MM PS-1 3/8 CIR PRIM Y936H

## (undated) DEVICE — GAUZE,SPONGE,4"X4",8PLY,STRL,LF,10/TRAY: Brand: MEDLINE

## (undated) DEVICE — BUR RND DIA COARSE 5.0MM

## (undated) DEVICE — DUAL CUT SAGITTAL BLADE

## (undated) DEVICE — STAPLER SKIN LEG L3.9MM DIA0.53MM WIDE ROT HD FOR WND CLSR

## (undated) DEVICE — Z DISCONTINUED USE 2624850 GLOVE SURG SZ 6 L12IN THK91MIL BRN LTX FREE POLYCHLOROPRENE

## (undated) DEVICE — INTENDED FOR TISSUE SEPARATION, AND OTHER PROCEDURES THAT REQUIRE A SHARP SURGICAL BLADE TO PUNCTURE OR CUT.: Brand: BARD-PARKER ® CARBON RIB-BACK BLADES

## (undated) DEVICE — SUTURE VCRL SZ 3-0 L27IN ABSRB UD L26MM SH 1/2 CIR J416H

## (undated) DEVICE — Z INACTIVE PER PHARM SPONGE ABSORBABLE TOP 50 GEL GELFOAM LF

## (undated) DEVICE — BIT DRL DIA2.7MM G MAXTORQUE

## (undated) DEVICE — CYSTO/BLADDER IRRIGATION SET, REGULATING CLAMP

## (undated) DEVICE — GAUZE,SPONGE,4"X4",12PLY,STERILE,LF,2'S: Brand: MEDLINE

## (undated) DEVICE — PADDING CAST W6INXL4YD COT LO LINTING WYTEX

## (undated) DEVICE — SOLUTION PREP 4OZ 10% POVIDONE IOD GEL

## (undated) DEVICE — 60 ML SYRINGE REGULAR TIP: Brand: MONOJECT

## (undated) DEVICE — DRESSING GZ PETRO ST 6X36IN CURAD

## (undated) DEVICE — BLADE CLIPPER GEN PURP NS

## (undated) DEVICE — SURGICAL PROCEDURE PACK GWN W/ BTC A

## (undated) DEVICE — INTENT TO BE USED WITH SUTURE MATERIAL FOR TISSUE CLOSURE: Brand: RICHARD-ALLAN®  NEEDLE 1/2 CIRCLE REVERSE CUTTING

## (undated) DEVICE — SUTURE VCRL SZ 3-0 L27IN ABSRB VLT L26MM SH 1/2 CIR J316H

## (undated) DEVICE — GAUZE,SPONGE,FLUFF,6"X6.75",STRL,5/TRAY: Brand: MEDLINE

## (undated) DEVICE — TUBING, SUCTION, 3/16" X 20', STRAIGHT: Brand: MEDLINE

## (undated) DEVICE — POUCH DRNGE FLX BND INTEGR RAIL CLMP DISP EZ CTCH

## (undated) DEVICE — PROTECTOR EYE PT SELF ADH NS OPT GRD LF

## (undated) DEVICE — TIDISHIELD BAND BAGS CLEAR POLYETHYLENE STERILE 20IN X 20IN 100 PER CASE: Brand: TIDISHIELD

## (undated) DEVICE — DRESSING PETRO W3XL8IN OIL EMUL N ADH GZ KNIT IMPREG CELOS

## (undated) DEVICE — GARMENT,MEDLINE,DVT,INT,CALF,LG, GEN2: Brand: MEDLINE

## (undated) DEVICE — SYRINGE ANGIO 3ML W/ CLR POLYCARB BRL YEL PLUNG BLK MED

## (undated) DEVICE — FORCEPS BX L240CM JAW DIA2.4MM ORNG L CAP W/ NDL DISP RAD

## (undated) DEVICE — DRILL BIT

## (undated) DEVICE — PACK SURG PROC REMINDER N WVN DISPOSABLE BEAC TIME OUT

## (undated) DEVICE — SUTURE VCRL + SZ 2-0 L27IN ABSRB UD CP-1 1/2 CIR REV CUT VCP266H

## (undated) DEVICE — BANDAGE,GAUZE,4.5"X4.1YD,STERILE,LF: Brand: MEDLINE

## (undated) DEVICE — COLONOSCOPE ENDOSCP ABSORBENT SM PEDIATRIC 104 MM VISION

## (undated) DEVICE — PACK,LAPAROTOMY,PK IV,AURORA: Brand: MEDLINE

## (undated) DEVICE — 3M™ WARMING BLANKET, LOWER BODY, 10 PER CASE, 42568: Brand: BAIR HUGGER™

## (undated) DEVICE — CO2 CANNULA,SSOFT,ADLT,7O2,4CO2,FEMALE: Brand: MEDLINE

## (undated) DEVICE — SUTURE ETHBND EXCEL SZ 2-0 L30IN NONABSORBABLE GRN L26MM SH X833H

## (undated) DEVICE — MERCY DEFIANCE ENDO KIT: Brand: MEDLINE INDUSTRIES, INC.

## (undated) DEVICE — PACK PROCEDURE SURG EXTREMITY MIN

## (undated) DEVICE — MEDI-VAC NON-CONDUCTIVE SUCTION TUBING: Brand: CARDINAL HEALTH

## (undated) DEVICE — BNDG,ELSTC,MATRIX,STRL,4"X5YD,LF,HOOK&LP: Brand: MEDLINE

## (undated) DEVICE — BANDAGE,ELASTIC,ESMARK,STERILE,4"X9',LF: Brand: MEDLINE

## (undated) DEVICE — Z DUP USE 2565107 PACK SURG PROC LEG CYSTO T-DRAPE REINF TBL CVR HND TWL

## (undated) DEVICE — Z DISCONTINUED BY MEDLINE USE 2711682 TRAY SKIN PREP DRY W/ PREM GLV